# Patient Record
Sex: FEMALE | Race: BLACK OR AFRICAN AMERICAN | Employment: UNEMPLOYED | URBAN - METROPOLITAN AREA
[De-identification: names, ages, dates, MRNs, and addresses within clinical notes are randomized per-mention and may not be internally consistent; named-entity substitution may affect disease eponyms.]

---

## 2017-01-17 RX ORDER — IBUPROFEN 800 MG/1
TABLET ORAL
Qty: 60 TAB | Refills: 0 | Status: SHIPPED | OUTPATIENT
Start: 2017-01-17 | End: 2017-09-20 | Stop reason: SDUPTHER

## 2017-01-30 ENCOUNTER — OFFICE VISIT (OUTPATIENT)
Dept: FAMILY MEDICINE CLINIC | Age: 57
End: 2017-01-30

## 2017-01-30 ENCOUNTER — HOSPITAL ENCOUNTER (OUTPATIENT)
Dept: LAB | Age: 57
Discharge: HOME | End: 2017-01-30

## 2017-01-30 VITALS
DIASTOLIC BLOOD PRESSURE: 80 MMHG | BODY MASS INDEX: 32.28 KG/M2 | SYSTOLIC BLOOD PRESSURE: 138 MMHG | OXYGEN SATURATION: 98 % | WEIGHT: 213 LBS | HEIGHT: 68 IN | HEART RATE: 101 BPM | TEMPERATURE: 98.6 F | RESPIRATION RATE: 14 BRPM

## 2017-01-30 DIAGNOSIS — Z12.11 COLON CANCER SCREENING: ICD-10-CM

## 2017-01-30 DIAGNOSIS — I10 ESSENTIAL HYPERTENSION: ICD-10-CM

## 2017-01-30 DIAGNOSIS — E78.5 HYPERLIPIDEMIA WITH TARGET LDL LESS THAN 100: ICD-10-CM

## 2017-01-30 DIAGNOSIS — E11.8 CONTROLLED TYPE 2 DIABETES MELLITUS WITH COMPLICATION, WITHOUT LONG-TERM CURRENT USE OF INSULIN (HCC): Primary | ICD-10-CM

## 2017-01-30 DIAGNOSIS — E66.9 OBESITY, CLASS I, BMI 30-34.9: ICD-10-CM

## 2017-01-30 DIAGNOSIS — R19.5 OCCULT BLOOD IN STOOLS: ICD-10-CM

## 2017-01-30 LAB — HBA1C MFR BLD HPLC: 6.6 %

## 2017-01-30 NOTE — PROGRESS NOTES
Chief Complaint   Patient presents with    Diabetes       1. Have you been to the ER, urgent care clinic since your last visit? Hospitalized since your last visit? No    2. Have you seen or consulted any other health care providers outside of the [address] [place] [address] since your last visit? Include any pap smears or colon screening. No    Body mass index is 32.39 kg/(m^2).

## 2017-01-30 NOTE — PROGRESS NOTES
HISTORY OF PRESENT ILLNESS  Aung Garrido is a 49 y.o. female. Blood pressure 138/80, pulse (!) 101, temperature 98.6 °F (37 °C), temperature source Oral, resp. rate 14, height 5' 8\" (1.727 m), weight 213 lb (96.6 kg), SpO2 98 %. Body mass index is 32.39 kg/(m^2). Chief Complaint   Patient presents with    Diabetes      HPI   Aung Garrido 49 y.o. female  presents to the office today for a follow up on chronic conditions. Hypertension: Bp at office today 147/78, 138/88 with manual arm cuff recheck. Pt continues with Losartan-HCTZ 100-25 mg daily. Bp is slightly elevated at office today. Pt attributes elevated bp to increased stress in her life. Advised pt to monitor her bp outside the office. DM2: A1c per POC today 6.6%, decreased from A1c 7.6% per labs on 10/31/16. Pt continues with Metformin 500 mg BID. She notes she has been eating more grilled foods lately and eating wheat bread. Diabetes is controlled with A1c 6.6% per POC today. Continue current regimen. Hyperlipidemia: Lipid panel on 10/31/16 notable for total cholesterol 201, LDL 73, HDL 61, and triglycerides 335. Pt continues with Simvastatin 20 mg nightly. Cholesterol is stable, continue current regimen. Health maintenance: Pt agrees to complete FIT testing. Current Outpatient Prescriptions   Medication Sig Dispense Refill    ibuprofen (MOTRIN) 800 mg tablet TAKE ONE TABLET BY MOUTH EVERY 8 HOURS AS NEEDED FOR PAIN 60 Tab 0    simvastatin (ZOCOR) 20 mg tablet TAKE ONE TABLET BY MOUTH NIGHTLY 90 Tab 0    albuterol (PROVENTIL VENTOLIN) 2.5 mg /3 mL (0.083 %) nebulizer solution 3 mL by Nebulization route every four (4) hours as needed for Wheezing. 1 Package 5    fluticasone (FLONASE) 50 mcg/actuation nasal spray INSTILL TWO SPRAYS INTO EACH NOSTRIL ONCE DAILY. 1 Bottle 3    metFORMIN (GLUCOPHAGE) 500 mg tablet Take 1 Tab by mouth two (2) times daily (with meals).  180 Tab 1    losartan-hydroCHLOROthiazide (HYZAAR) 100-25 mg per tablet Take 1 Tab by mouth daily. 90 Tab 1    levocetirizine (XYZAL) 5 mg tablet Take 1 Tab by mouth daily. 90 Tab 0    albuterol (PROVENTIL HFA, VENTOLIN HFA, PROAIR HFA) 90 mcg/actuation inhaler Take 2 Puffs by inhalation every six (6) hours as needed for Wheezing. 1 Inhaler 3    oxyCODONE-acetaminophen (PERCOCET) 5-325 mg per tablet Take 1 Tab by mouth every eight (8) hours as needed for Pain. Max Daily Amount: 3 Tabs. 20 Tab 0    Nebulizer & Compressor machine Nebulizer machine x 1 Nebulizer Kit 1 each 0     Allergies   Allergen Reactions    Vicodin [Hydrocodone-Acetaminophen] Hives     Past Medical History   Diagnosis Date    Arthritis     Diabetes ([address]     GERD (gastroesophageal reflux disease)     High cholesterol     Hypertension     Other ill-defined conditions(799.89)      herniated disc to back    Seasonal allergies      Past Surgical History   Procedure Laterality Date    Hx orthopaedic       left hand    Hx gyn       tubal ligation, hysterectomy    Hx other surgical       corticosteroid injections     Family History   Problem Relation Age of Onset    Hypertension Mother     Diabetes Father      Social History   Substance Use Topics    Smoking status: Former Smoker     Quit date: 10/30/2012    Smokeless tobacco: Never Used    Alcohol use Yes        Review of Systems   Constitutional: Negative. Negative for malaise/fatigue. Eyes: Negative for blurred vision. Respiratory: Negative for shortness of breath. Cardiovascular: Negative for chest pain and leg swelling. Musculoskeletal: Negative. Neurological: Negative. Negative for dizziness and headaches. All other systems reviewed and are negative. Physical Exam   Constitutional: She is oriented to person, place, and time. She appears well-developed and well-nourished. No distress. HENT:   Head: Normocephalic and atraumatic. Neck: Carotid bruit is not present.    Cardiovascular: Normal rate, regular rhythm, normal heart sounds and intact distal pulses. Exam reveals no gallop and no friction rub. No murmur heard. Pulmonary/Chest: Effort normal and breath sounds normal. No respiratory distress. She has no wheezes. She has no rales. Musculoskeletal: She exhibits no edema. Neurological: She is alert and oriented to person, place, and time. Skin: She is not diaphoretic. Psychiatric: She has a normal mood and affect. Her behavior is normal. Judgment and thought content normal.   Nursing note and vitals reviewed. ASSESSMENT and PLAN  Teto Meraz was seen today for diabetes. Diagnoses and all orders for this visit:    Controlled type 2 diabetes mellitus with complication, without long-term current use of insulin (HCC)  -     AMB POC HEMOGLOBIN A1C  - Diabetes is controlled with A1c 6.6% per POC today. Continue current regimen. Essential hypertension        - Bp is slightly elevated at office today. Pt attributes this to increased stress in her life. Advised pt to monitor her bp outside the office. Hyperlipidemia with target LDL less than 100  Stable, continue current regimen    Obesity, Class I, BMI 30-34.9  I have reviewed/discussed the above normal BMI with the patient. I have recommended the following interventions: dietary management education, guidance, and counseling, dietary needs education, encourage exercise, feeding regime, lifestyle education regarding diet and monitor weight . The plan is as follows: I have counseled this patient on diet and exercise regimens. .      Colon cancer screening  -     OCCULT BLOOD, IMMUNOASSAY (FIT)      Follow-up Disposition:  Return in about 3 months (around 4/30/2017) for diabetes follow up. Medication risks/benefits/costs/interactions/alternatives discussed with patient.   Advised patient to call back or return to office if symptoms worsen/change/persist.  If patient cannot reach us or should anything more severe/urgent arise he/she should proceed directly to the nearest emergency department. Discussed expected course/resolution/complications of diagnosis in detail with patient. Patient given a written after visit summary which includes her diagnoses, current medications and vitals. Patient expressed understanding with the diagnosis and plan. Written by liliam Palencia, as dictated by Raimundo Martinez M.D.    I have reviewed and agree with the above note and have made corrections where appropriate, Dr. Yelena Solomon MD

## 2017-01-30 NOTE — MR AVS SNAPSHOT
Visit Information Date & Time Provider Department Dept. Phone Encounter #  
 1/30/2017 11:30 AM Shreya Baron  [facility] [phone] [id number] Follow-up Instructions Return in about 3 months (around 4/30/2017) for diabetes follow up. Upcoming Health Maintenance Date Due  
 PAP AKA CERVICAL CYTOLOGY 10/30/2016 EYE EXAM RETINAL OR DILATED Q1 11/11/2016 Pneumococcal 19-64 Medium Risk (1 of 1 - PPSV23) 7/27/2023* HEMOGLOBIN A1C Q6M 4/30/2017 BREAST CANCER SCRN MAMMOGRAM 6/15/2017 FOOT EXAM Q1 10/31/2017 MICROALBUMIN Q1 10/31/2017 LIPID PANEL Q1 10/31/2017 COLONOSCOPY 4/1/2024 DTaP/Tdap/Td series (2 - Td) 2/5/2025 *Topic was postponed. The date shown is not the original due date. Allergies as of 1/30/2017  Review Complete On: 1/30/2017 By: Shreya Baron MD  
  
 Severity Noted Reaction Type Reactions Vicodin [Hydrocodone-acetaminophen]  07/23/2012    Hives Current Immunizations  Reviewed on 8/29/2016 Name Date Tdap 2/5/2015 Not reviewed this visit You Were Diagnosed With   
  
 Codes Comments Controlled type 2 diabetes mellitus with complication, without long-term current use of insulin ([facility]    -  Primary ICD-10-CM: E11.8 ICD-9-CM: 250.90 Essential hypertension     ICD-10-CM: I10 
ICD-9-CM: 401.9 Hyperlipidemia with target LDL less than 100     ICD-10-CM: E78.5 ICD-9-CM: 272.4 Obesity, Class I, BMI 30-34.9     ICD-10-CM: E66.9 ICD-9-CM: 278.00 Colon cancer screening     ICD-10-CM: Z12.11 ICD-9-CM: V76.51 Vitals BP Pulse Temp Resp Height(growth percentile) Weight(growth percentile) 138/80 (!) 101 98.6 °F (37 °C) (Oral) 14 5' 8\" (1.727 m) 213 lb (96.6 kg) SpO2 BMI OB Status Smoking Status 98% 32.39 kg/m2 Hysterectomy Former Smoker Vitals History BMI and BSA Data  Body Mass Index Body Surface Area  
 32.39 kg/m 2 2.15 m 2  
  
  
 Preferred Pharmacy Pharmacy Name Phone [phone] [facility] [phone] Your Updated Medication List  
  
   
This list is accurate as of: 1/30/17 12:44 PM.  Always use your most recent med list.  
  
  
  
  
 * albuterol 90 mcg/actuation inhaler Commonly known as:  PROVENTIL HFA, VENTOLIN HFA, PROAIR HFA Take 2 Puffs by inhalation every six (6) hours as needed for Wheezing. * albuterol 2.5 mg /3 mL (0.083 %) nebulizer solution Commonly known as:  PROVENTIL VENTOLIN  
3 mL by Nebulization route every four (4) hours as needed for Wheezing. fluticasone 50 mcg/actuation nasal spray Commonly known as:  Kennedy Eddy INSTILL TWO SPRAYS INTO EACH NOSTRIL ONCE DAILY. ibuprofen 800 mg tablet Commonly known as:  MOTRIN  
TAKE ONE TABLET BY MOUTH EVERY 8 HOURS AS NEEDED FOR PAIN  
  
 levocetirizine 5 mg tablet Commonly known as:  Abhinav Balk Take 1 Tab by mouth daily. losartan-hydroCHLOROthiazide 100-25 mg per tablet Commonly known as:  HYZAAR Take 1 Tab by mouth daily. metFORMIN 500 mg tablet Commonly known as:  GLUCOPHAGE Take 1 Tab by mouth two (2) times daily (with meals). Nebulizer & Compressor machine Nebulizer machine x 1 Nebulizer Kit  
  
 oxyCODONE-acetaminophen 5-325 mg per tablet Commonly known as:  PERCOCET Take 1 Tab by mouth every eight (8) hours as needed for Pain. Max Daily Amount: 3 Tabs. simvastatin 20 mg tablet Commonly known as:  ZOCOR  
TAKE ONE TABLET BY MOUTH NIGHTLY * Notice: This list has 2 medication(s) that are the same as other medications prescribed for you. Read the directions carefully, and ask your doctor or other care provider to review them with you. We Performed the Following AMB POC HEMOGLOBIN A1C [24309 CPT(R)] OCCULT BLOOD, IMMUNOASSAY (FIT) [id number] CPT(R)] Follow-up Instructions Return in about 3 months (around 4/30/2017) for diabetes follow up. Introducing [facility]! Dear Rupal Gutierrez: Thank you for requesting a [employer] account. Our records indicate that you have previously registered for a [employer] account but its currently inactive. Please call our [employer] support line at [phone]. Additional Information If you have questions, please visit the Frequently Asked Questions section of the [employer] website at [email or web address] Remember, [employer] is NOT to be used for urgent needs. For medical emergencies, dial 911. Now available from your iPhone and Android! Please provide this summary of care documentation to your next provider. Your primary care clinician is listed as Phylicia Garza. If you have any questions after today's visit, please call [phone].

## 2017-01-31 LAB
ALBUMIN SERPL-MCNC: 4.6 G/DL (ref 3.5–5.5)
ALBUMIN/GLOB SERPL: 1.7 {RATIO} (ref 1.1–2.5)
ALP SERPL-CCNC: 84 IU/L (ref 39–117)
ALT SERPL-CCNC: 20 IU/L (ref 0–32)
AST SERPL-CCNC: 19 IU/L (ref 0–40)
BILIRUB SERPL-MCNC: 0.4 MG/DL (ref 0–1.2)
BUN SERPL-MCNC: 20 MG/DL (ref 6–24)
BUN/CREAT SERPL: 20 (ref 9–23)
CALCIUM SERPL-MCNC: 10.4 MG/DL (ref 8.7–10.2)
CHLORIDE SERPL-SCNC: 97 MMOL/L (ref 96–106)
CHOLEST SERPL-MCNC: 213 MG/DL (ref 100–199)
CO2 SERPL-SCNC: 21 MMOL/L (ref 18–29)
CREAT SERPL-MCNC: 0.99 MG/DL (ref 0.57–1)
GLOBULIN SER CALC-MCNC: 2.7 G/DL (ref 1.5–4.5)
GLUCOSE SERPL-MCNC: 154 MG/DL (ref 65–99)
HDLC SERPL-MCNC: 65 MG/DL
INTERPRETATION, 910389: NORMAL
LDLC SERPL CALC-MCNC: 84 MG/DL (ref 0–99)
POTASSIUM SERPL-SCNC: 4.2 MMOL/L (ref 3.5–5.2)
PROT SERPL-MCNC: 7.3 G/DL (ref 6–8.5)
SODIUM SERPL-SCNC: 134 MMOL/L (ref 134–144)
TRIGL SERPL-MCNC: 318 MG/DL (ref 0–149)
VLDLC SERPL CALC-MCNC: 64 MG/DL (ref 5–40)

## 2017-02-01 ENCOUNTER — HOSPITAL ENCOUNTER (OUTPATIENT)
Dept: LAB | Age: 57
Discharge: HOME | End: 2017-02-01
Payer: MEDICARE

## 2017-02-01 PROCEDURE — 82270 OCCULT BLOOD FECES: CPT

## 2017-02-08 LAB — HEMOCCULT STL QL IA: POSITIVE

## 2017-02-17 NOTE — PROGRESS NOTES
Reached patient on the phone, adv her of results, Jacqueline King will arrange GI appt. Patient/ Caller given an opportunity to ask questions, repeated information, and verbalized understanding.

## 2017-04-11 RX ORDER — LEVOCETIRIZINE DIHYDROCHLORIDE 5 MG/1
TABLET, FILM COATED ORAL
Qty: 90 TAB | Refills: 0 | Status: SHIPPED | OUTPATIENT
Start: 2017-04-11 | End: 2017-06-28 | Stop reason: SDUPTHER

## 2017-05-30 RX ORDER — METFORMIN HYDROCHLORIDE 500 MG/1
500 TABLET ORAL DAILY
COMMUNITY
End: 2017-06-28 | Stop reason: DRUGHIGH

## 2017-05-31 ENCOUNTER — ANESTHESIA EVENT (OUTPATIENT)
Dept: ENDOSCOPY | Age: 57
End: 2017-05-31
Payer: MEDICARE

## 2017-05-31 ENCOUNTER — HOSPITAL ENCOUNTER (OUTPATIENT)
Age: 57
Setting detail: OUTPATIENT SURGERY
Discharge: HOME | End: 2017-05-31
Attending: INTERNAL MEDICINE | Admitting: INTERNAL MEDICINE
Payer: MEDICARE

## 2017-05-31 ENCOUNTER — ANESTHESIA (OUTPATIENT)
Dept: ENDOSCOPY | Age: 57
End: 2017-05-31
Payer: MEDICARE

## 2017-05-31 VITALS
HEART RATE: 76 BPM | WEIGHT: 213 LBS | TEMPERATURE: 97.5 F | SYSTOLIC BLOOD PRESSURE: 137 MMHG | OXYGEN SATURATION: 100 % | HEIGHT: 68 IN | DIASTOLIC BLOOD PRESSURE: 90 MMHG | RESPIRATION RATE: 24 BRPM | BODY MASS INDEX: 32.28 KG/M2

## 2017-05-31 PROCEDURE — 74011250637 HC RX REV CODE- 250/637: Performed by: INTERNAL MEDICINE

## 2017-05-31 PROCEDURE — 88305 TISSUE EXAM BY PATHOLOGIST: CPT | Performed by: INTERNAL MEDICINE

## 2017-05-31 PROCEDURE — 74011000250 HC RX REV CODE- 250

## 2017-05-31 PROCEDURE — 74011250636 HC RX REV CODE- 250/636

## 2017-05-31 PROCEDURE — 77030013992 HC SNR POLYP ENDOSC BSC -B: Performed by: INTERNAL MEDICINE

## 2017-05-31 PROCEDURE — 76040000019: Performed by: INTERNAL MEDICINE

## 2017-05-31 PROCEDURE — 77030027957 HC TBNG IRR ENDOGTR BUSS -B: Performed by: INTERNAL MEDICINE

## 2017-05-31 PROCEDURE — 76060000031 HC ANESTHESIA FIRST 0.5 HR: Performed by: INTERNAL MEDICINE

## 2017-05-31 RX ORDER — DEXTROMETHORPHAN/PSEUDOEPHED 2.5-7.5/.8
1.2 DROPS ORAL
Status: DISCONTINUED | OUTPATIENT
Start: 2017-05-31 | End: 2017-05-31 | Stop reason: HOSPADM

## 2017-05-31 RX ORDER — SODIUM CHLORIDE 0.9 % (FLUSH) 0.9 %
5-10 SYRINGE (ML) INJECTION EVERY 8 HOURS
Status: DISCONTINUED | OUTPATIENT
Start: 2017-05-31 | End: 2017-05-31 | Stop reason: HOSPADM

## 2017-05-31 RX ORDER — PROPOFOL 10 MG/ML
INJECTION, EMULSION INTRAVENOUS AS NEEDED
Status: DISCONTINUED | OUTPATIENT
Start: 2017-05-31 | End: 2017-05-31 | Stop reason: HOSPADM

## 2017-05-31 RX ORDER — EPINEPHRINE 0.1 MG/ML
1 INJECTION INTRACARDIAC; INTRAVENOUS
Status: DISCONTINUED | OUTPATIENT
Start: 2017-05-31 | End: 2017-05-31 | Stop reason: HOSPADM

## 2017-05-31 RX ORDER — LIDOCAINE HYDROCHLORIDE 20 MG/ML
INJECTION, SOLUTION EPIDURAL; INFILTRATION; INTRACAUDAL; PERINEURAL AS NEEDED
Status: DISCONTINUED | OUTPATIENT
Start: 2017-05-31 | End: 2017-05-31 | Stop reason: HOSPADM

## 2017-05-31 RX ORDER — SODIUM CHLORIDE 0.9 % (FLUSH) 0.9 %
5-10 SYRINGE (ML) INJECTION AS NEEDED
Status: DISCONTINUED | OUTPATIENT
Start: 2017-05-31 | End: 2017-05-31 | Stop reason: HOSPADM

## 2017-05-31 RX ORDER — SODIUM CHLORIDE 9 MG/ML
50 INJECTION, SOLUTION INTRAVENOUS CONTINUOUS
Status: DISCONTINUED | OUTPATIENT
Start: 2017-05-31 | End: 2017-05-31 | Stop reason: HOSPADM

## 2017-05-31 RX ORDER — FENTANYL CITRATE 50 UG/ML
100 INJECTION, SOLUTION INTRAMUSCULAR; INTRAVENOUS
Status: DISCONTINUED | OUTPATIENT
Start: 2017-05-31 | End: 2017-05-31 | Stop reason: HOSPADM

## 2017-05-31 RX ORDER — NALOXONE HYDROCHLORIDE 0.4 MG/ML
0.4 INJECTION, SOLUTION INTRAMUSCULAR; INTRAVENOUS; SUBCUTANEOUS
Status: DISCONTINUED | OUTPATIENT
Start: 2017-05-31 | End: 2017-05-31 | Stop reason: HOSPADM

## 2017-05-31 RX ORDER — FLUMAZENIL 0.1 MG/ML
0.2 INJECTION INTRAVENOUS
Status: DISCONTINUED | OUTPATIENT
Start: 2017-05-31 | End: 2017-05-31 | Stop reason: HOSPADM

## 2017-05-31 RX ORDER — SODIUM CHLORIDE 9 MG/ML
INJECTION, SOLUTION INTRAVENOUS
Status: DISCONTINUED | OUTPATIENT
Start: 2017-05-31 | End: 2017-05-31 | Stop reason: HOSPADM

## 2017-05-31 RX ORDER — MIDAZOLAM HYDROCHLORIDE 1 MG/ML
.25-1 INJECTION, SOLUTION INTRAMUSCULAR; INTRAVENOUS
Status: DISCONTINUED | OUTPATIENT
Start: 2017-05-31 | End: 2017-05-31 | Stop reason: HOSPADM

## 2017-05-31 RX ORDER — ATROPINE SULFATE 0.1 MG/ML
0.5 INJECTION INTRAVENOUS
Status: DISCONTINUED | OUTPATIENT
Start: 2017-05-31 | End: 2017-05-31 | Stop reason: HOSPADM

## 2017-05-31 RX ADMIN — PROPOFOL 50 MG: 10 INJECTION, EMULSION INTRAVENOUS at 08:49

## 2017-05-31 RX ADMIN — LIDOCAINE HYDROCHLORIDE 50 MG: 20 INJECTION, SOLUTION EPIDURAL; INFILTRATION; INTRACAUDAL; PERINEURAL at 08:44

## 2017-05-31 RX ADMIN — SODIUM CHLORIDE: 9 INJECTION, SOLUTION INTRAVENOUS at 08:15

## 2017-05-31 RX ADMIN — PROPOFOL 50 MG: 10 INJECTION, EMULSION INTRAVENOUS at 09:00

## 2017-05-31 RX ADMIN — SIMETHICONE 80 MG: 20 SUSPENSION/ DROPS ORAL at 08:54

## 2017-05-31 RX ADMIN — PROPOFOL 50 MG: 10 INJECTION, EMULSION INTRAVENOUS at 08:54

## 2017-05-31 RX ADMIN — PROPOFOL 50 MG: 10 INJECTION, EMULSION INTRAVENOUS at 08:55

## 2017-05-31 RX ADMIN — PROPOFOL 50 MG: 10 INJECTION, EMULSION INTRAVENOUS at 08:51

## 2017-05-31 RX ADMIN — PROPOFOL 50 MG: 10 INJECTION, EMULSION INTRAVENOUS at 08:46

## 2017-05-31 RX ADMIN — PROPOFOL 50 MG: 10 INJECTION, EMULSION INTRAVENOUS at 08:45

## 2017-05-31 RX ADMIN — PROPOFOL 50 MG: 10 INJECTION, EMULSION INTRAVENOUS at 08:50

## 2017-05-31 RX ADMIN — PROPOFOL 50 MG: 10 INJECTION, EMULSION INTRAVENOUS at 08:58

## 2017-05-31 RX ADMIN — PROPOFOL 50 MG: 10 INJECTION, EMULSION INTRAVENOUS at 08:47

## 2017-05-31 RX ADMIN — PROPOFOL 50 MG: 10 INJECTION, EMULSION INTRAVENOUS at 08:56

## 2017-05-31 RX ADMIN — PROPOFOL 50 MG: 10 INJECTION, EMULSION INTRAVENOUS at 09:02

## 2017-05-31 RX ADMIN — LIDOCAINE HYDROCHLORIDE 50 MG: 20 INJECTION, SOLUTION EPIDURAL; INFILTRATION; INTRACAUDAL; PERINEURAL at 08:52

## 2017-05-31 NOTE — H&P
118 [facility].  [address] [facility] [address] [facility], [address]  [phone]                                History and Physical     NAME: Jesse Fox   :  1960   MRN:  [id number]     HPI:  The patient was seen and examined. Past Surgical History:   Procedure Laterality Date    HX GYN      tubal ligation, hysterectomy    HX ORTHOPAEDIC      left hand ligament removed    HX OTHER SURGICAL      corticosteroid injections - back    HX TONSILLECTOMY       Past Medical History:   Diagnosis Date    Arthritis     Chronic pain     3 herniated discs in lower back    Diabetes (HCC)     GERD (gastroesophageal reflux disease)     High cholesterol     Hypertension     Other ill-defined conditions     herniated disc to back    PUD (peptic ulcer disease)     Seasonal allergies      Social History   Substance Use Topics    Smoking status: Former Smoker     Quit date: 10/30/2012    Smokeless tobacco: Never Used    Alcohol use Yes      Comment: weekends     Allergies   Allergen Reactions    Vicodin [Hydrocodone-Acetaminophen] Hives     Family History   Problem Relation Age of Onset    Hypertension Mother     Diabetes Father      No current facility-administered medications for this encounter. Facility-Administered Medications Ordered in Other Encounters   Medication Dose Route Frequency    0.9% sodium chloride infusion   IntraVENous CONTINUOUS         PHYSICAL EXAM:  General: WD, WN. Alert, cooperative, no acute distress    HEENT: NC, Atraumatic. PERRLA, EOMI. Anicteric sclerae. Lungs:  CTA Bilaterally. No Wheezing/Rhonchi/Rales. Heart:  Regular  rhythm,  No murmur, No Rubs, No Gallops  Abdomen: Soft, Non distended, Non tender.  +Bowel sounds, no HSM  Extremities: No c/c/e  Neurologic:  CN 2-12 gi, Alert and oriented X 3. No acute neurological distress   Psych:   Good insight. Not anxious nor agitated.     The heart, lungs and mental status were satisfactory for the administration of MAC sedation and for the procedure.       Mallampati score: 3       Assessment:   · Occult blood in stools  · Diarrhea    Plan:   · Endoscopic procedure  · MAC sedation   ·

## 2017-05-31 NOTE — PROCEDURES
118 [facility].  217 [facility] [address] [facility] [address], [address]  [phone]                              Colonoscopy Procedure Note      Indications:    Family history of coloretal cancer (screening only), Occult blood in stool, Positive cologuard test     :  Jordyn Garza MD    Referring Provider: Gail Tucker MD    Sedation:  MAC anesthesia    Procedure Details:  After informed consent was obtained with all risks and benefits of procedure explained and preoperative exam completed, the patient was taken to the endoscopy suite and placed in the left lateral decubitus position. Upon sequential sedation as per above, a digital rectal exam was performed  And was normal.  The Olympus videocolonoscope  was inserted in the rectum and carefully advanced to the cecum, which was identified by the ileocecal valve and appendiceal orifice. The quality of preparation was fair. The colonoscope was slowly withdrawn with careful evaluation between folds. Retroflexion in the rectum was performed and was normal..     Findings:   Rectum: small internal hemorrhoids were seen  Sigmoid: 1  Sessile polyp(s), the largest 6 mm in size; Descending Colon: no mucosal lesion appreciated  Transverse Colon: no mucosal lesion appreciated  Ascending Colon: no mucosal lesion appreciated  Cecum: no mucosal lesion appreciated  Terminal Ileum: not intubated    Interventions:    1 complete polypectomy were performed using cold snare  and the polyps were  retrieved    Specimen Removed:    ID Type Source Tests Collected by Time Destination   1 : polyp Preservative Sigmoid  Jordyn Garza MD 5/31/2017 0901 Pathology       Complications: None. EBL:  None. Recommendations:   -Await pathology. -Repeat colonoscopy in 3 years.  -High fiber diet. -NO aspirin/NSAID's for 7 days     Discharge Disposition:  Home in the company of a  when able to ambulate.     Jordyn Garza MD  5/31/2017  9:16 AM

## 2017-05-31 NOTE — DISCHARGE INSTRUCTIONS
118 [facility]  217 [facility] [address], [address] [facility]                                  Persaud Poptonie  [id number]  1960    COLON DISCHARGE INSTRUCTIONS    DISCOMFORT:  Redness at IV site- apply warm compress to area; if redness or soreness persist- contact your physician  There may be a slight amount of blood passed from the rectum  Gaseous discomfort- walking, belching will help relieve any discomfort  You may not operate a vehicle for 12 hours  You may not  engage in an occupation involving machinery or appliances for rest of today  You may not  drink alcoholic beverages for at least 12 hours  Avoid making any critical decisions for at least 24 hour    DIET:   High fiber diet. - however -  remember your colon is empty and a heavy meal will produce gas. Avoid these foods:  vegetables, fried / greasy foods, carbonated drinks for today     ACTIVITY:  It is recommended that you spend the remainder of the day resting -  avoid any strenuous activity. CALL M.D. ANY SIGN OF:   Increasing pain, nausea, vomiting  Abdominal distension (swelling)  New increased bleeding (oral or rectal)  Fever (chills)  Pain in chest area  Bloody discharge from nose or mouth  Shortness of breath    You may not  take any Advil, Aspirin, Ibuprofen, Motrin, Aleve, or Goodys for 7 days, ONLY  Tylenol as needed for pain. Post procedure diagnosis:  sigmoid polyp  Internal hemorrhoids    Follow-up Instructions:    Call Dr. Linda Ramesh for any questions or problems. If we took a biopsy please call the office within 2 weeks to discuss your                             pathology results.  Telephone # [phone]

## 2017-05-31 NOTE — ANESTHESIA POSTPROCEDURE EVALUATION
Post-Anesthesia Evaluation and Assessment    Patient: Elif Alaniz MRN: [id number]  SSN: [id number]    YOB: 1960  Age: 49 y.o. Sex: female       Cardiovascular Function/Vital Signs  Visit Vitals    /85    Pulse 86    Temp 36.4 °C (97.5 °F)    Resp 28    Ht 5' 8\" (1.727 m)    Wt 96.6 kg (213 lb)    SpO2 99%    Breastfeeding No    BMI 32.39 kg/m2       Patient is status post MAC anesthesia for Procedure(s):  COLONOSCOPY  ENDOSCOPIC POLYPECTOMY. Nausea/Vomiting: None    Postoperative hydration reviewed and adequate. Pain:  Pain Scale 1: Numeric (0 - 10) (05/31/17 0912)  Pain Intensity 1: 0 (05/31/17 0912)   Managed    Neurological Status: At baseline    Mental Status and Level of Consciousness: Arousable    Pulmonary Status:   O2 Device: Room air (05/31/17 0912)   Adequate oxygenation and airway patent    Complications related to anesthesia: None    Post-anesthesia assessment completed.  No concerns    Signed By: Macie Sanchez MD     May 31, 2017

## 2017-05-31 NOTE — IP AVS SNAPSHOT
2700 [facility] [address] [place] [address] 
[phone] Patient: Ching Chow MRN: [id number] :1960 You are allergic to the following Allergen Reactions Vicodin (Hydrocodone-Acetaminophen) Hives Recent Documentation Height Weight Breastfeeding? BMI OB Status Smoking Status 1.727 m 96.6 kg No 32.39 kg/m2 Hysterectomy Former Smoker Emergency Contacts Name Discharge Info Relation Home Work Mobile Nii Ventura DISCHARGE CAREGIVER [3] Spouse [3] [phone] About your hospitalization You were admitted on:  May 31, 2017 You last received care in the:  [facility] ENDOSCOPY You were discharged on:  May 31, 2017 Unit phone number:  [phone] Why you were hospitalized Your primary diagnosis was:  Not on File Providers Seen During Your Hospitalizations Provider Role Specialty Primary office phone Cosme Salas MD Attending Provider Gastroenterology [phone] Your Primary Care Physician (PCP) Primary Care Physician Office Phone Office Fax Ac Madrigal [phone] Follow-up Information None Your Appointments 2017 12:15 PM EDT ROUTINE CARE with Akira Weldon MD  
[facility]) [address]  
[phone] Current Discharge Medication List  
  
CONTINUE these medications which have NOT CHANGED Dose & Instructions Dispensing Information Comments Morning Noon Evening Bedtime * albuterol 90 mcg/actuation inhaler Commonly known as:  PROVENTIL HFA, VENTOLIN HFA, PROAIR HFA Your last dose was: Your next dose is:    
   
   
 Dose:  2 Puff Take 2 Puffs by inhalation every six (6) hours as needed for Wheezing. Quantity:  1 Inhaler Refills:  3 * albuterol 2.5 mg /3 mL (0.083 %) nebulizer solution Commonly known as:  PROVENTIL VENTOLIN Your last dose was: Your next dose is:    
   
   
 Dose:  2.5 mg  
3 mL by Nebulization route every four (4) hours as needed for Wheezing. Quantity:  1 Package Refills:  5  
     
   
   
   
  
 fluticasone 50 mcg/actuation nasal spray Commonly known as:  Jannette Lackey Your last dose was: Your next dose is:    
   
   
 INSTILL TWO SPRAYS INTO EACH NOSTRIL ONCE DAILY. Quantity:  1 Bottle Refills:  3  
     
   
   
   
  
 ibuprofen 800 mg tablet Commonly known as:  MOTRIN Your last dose was: Your next dose is: TAKE ONE TABLET BY MOUTH EVERY 8 HOURS AS NEEDED FOR PAIN Quantity:  60 Tab Refills:  0  
     
   
   
   
  
 levocetirizine 5 mg tablet Commonly known as:  Ting Clan Your last dose was: Your next dose is: TAKE ONE TABLET BY MOUTH ONCE DAILY Quantity:  90 Tab Refills:  0  
     
   
   
   
  
 losartan-hydroCHLOROthiazide 100-25 mg per tablet Commonly known as:  HYZAAR Your last dose was: Your next dose is:    
   
   
 Dose:  1 Tab Take 1 Tab by mouth daily. Quantity:  90 Tab Refills:  1 Hold until patient is ready to fill   
    
   
   
   
  
 metFORMIN 500 mg tablet Commonly known as:  GLUCOPHAGE Your last dose was: Your next dose is:    
   
   
 Dose:  500 mg Take 500 mg by mouth daily. Refills:  0 Nebulizer & Compressor machine Your last dose was: Your next dose is:    
   
   
 Nebulizer machine x 1 Nebulizer Kit Quantity:  1 each Refills:  0  
     
   
   
   
  
 oxyCODONE-acetaminophen 5-325 mg per tablet Commonly known as:  PERCOCET Your last dose was: Your next dose is:    
   
   
 Dose:  1 Tab Take 1 Tab by mouth every eight (8) hours as needed for Pain. Max Daily Amount: 3 Tabs. Quantity:  20 Tab Refills:  0  
     
   
   
   
  
 simvastatin 20 mg tablet Commonly known as:  ZOCOR Your last dose was: Your next dose is: TAKE ONE TABLET BY MOUTH NIGHTLY Quantity:  90 Tab Refills:  0  
     
   
   
   
  
 * Notice: This list has 2 medication(s) that are the same as other medications prescribed for you. Read the directions carefully, and ask your doctor or other care provider to review them with you. Discharge Instructions 118 ROHINI Yang. 
217 [facility] [address] [place], [address] 
[phone] Merle Loya 
[id number] 
1960 COLON DISCHARGE INSTRUCTIONS DISCOMFORT: 
Redness at IV site- apply warm compress to area; if redness or soreness persist- contact your physician There may be a slight amount of blood passed from the rectum Gaseous discomfort- walking, belching will help relieve any discomfort You may not operate a vehicle for 12 hours You may not  engage in an occupation involving machinery or appliances for rest of today You may not  drink alcoholic beverages for at least 12 hours Avoid making any critical decisions for at least 24 hour DIET: 
 High fiber diet.  however -  remember your colon is empty and a heavy meal will produce gas. Avoid these foods:  vegetables, fried / greasy foods, carbonated drinks for today ACTIVITY: It is recommended that you spend the remainder of the day resting -  avoid any strenuous activity. CALL M.D. ANY SIGN OF: Increasing pain, nausea, vomiting Abdominal distension (swelling) New increased bleeding (oral or rectal) Fever (chills) Pain in chest area Bloody discharge from nose or mouth Shortness of breath You may not  take any Advil, Aspirin, Ibuprofen, Motrin, Aleve, or Goodys for 7 days, ONLY  Tylenol as needed for pain. Post procedure diagnosis:  sigmoid polyp Internal hemorrhoids Follow-up Instructions: 
 
Call Dr. Pastor Romero for any questions or problems. If we took a biopsy please call the office within 2 weeks to discuss your                             pathology results. Telephone # [phone] Discharge Orders None ACO Transitions of Care Introducing [facility] Cindi Nuñez offers a voluntary care coordination program to provide high quality service and care to [facility] fee-for-service beneficiaries. Tiffanie Keyes was designed to help you enhance your health and well-being through the following services: ? Transitions of Care  support for individuals who are transitioning from one care setting to another (example: Hospital to home). ? Chronic and Complex Care Coordination  support for individuals and caregivers of those with serious or chronic illnesses or with more than one chronic (ongoing) condition and those who take a number of different medications. If you meet specific medical criteria, a [facility] [address] may call you directly to coordinate your care with your primary care physician and your other care providers. For questions about the [facility] programs, please, contact your physicians office. For general questions or additional information about Accountable Care Organizations: 
Please visit www.medicare.gov/acos. html or call 1-800-MEDICARE (0-949.679.4075) TTY users should call 2-916.370.2701. Introducing [facility]! Dear Tabatha Schmidt: Thank you for requesting a Look.io account. Our records indicate that you have previously registered for a Look.io account but its currently inactive. Please call our Look[employer]io support line at [phone]. Additional Information If you have questions, please visit the Frequently Asked Questions section of the Look.io website at [email or web address] Remember, MyChart is NOT to be used for urgent needs. For medical emergencies, dial 911. Now available from your iPhone and Android! General Information Please provide this summary of care documentation to your next provider. Patient Signature:  ____________________________________________________________ Date:  ____________________________________________________________  
  
Jonathon Maulik Provider Signature:  ____________________________________________________________ Date:  ____________________________________________________________

## 2017-05-31 NOTE — ROUTINE PROCESS
Josselin Garza  1960  [id number]    Situation:  Verbal report received from: Anastasia Delgado RN  Procedure: Procedure(s):  COLONOSCOPY  ENDOSCOPIC POLYPECTOMY    Background:    Preoperative diagnosis: BLOOD IN STOOL   Postoperative diagnosis: sigmoid polyp  Internal hemorrhoids    :  Dr. Jun Stanton  Assistant(s): Endoscopy Technician-1: Margot Medeiros IV  Endoscopy RN-1: Olive Ramirez RN    Specimens:   ID Type Source Tests Collected by Time Destination   1 : polyp Preservative Sigmoid  Lige Officer, MD 5/31/2017 0901 Pathology     H. Pylori  no    Assessment:  Intra-procedure medications     Anesthesia gave intra-procedure sedation and medications, see anesthesia flow sheet yes    Intravenous fluids: NS@ KVO     Vital signs stable     Abdominal assessment: round and soft     Recommendation:  Discharge patient per MD order.     Family or Friend   Permission to share finding with family or friend yes

## 2017-05-31 NOTE — ADDENDUM NOTE
Addendum  created 05/31/17 1327 by Sole Brown CRNA    Anesthesia Event deleted, Anesthesia Event edited, Procedure Event Log accessed

## 2017-05-31 NOTE — ANESTHESIA PREPROCEDURE EVALUATION
Anesthetic History   No history of anesthetic complications            Review of Systems / Medical History  Patient summary reviewed, nursing notes reviewed and pertinent labs reviewed    Pulmonary            Asthma        Neuro/Psych         Psychiatric history     Cardiovascular    Hypertension              Exercise tolerance: >4 METS     GI/Hepatic/Renal     GERD      PUD    Comments: Blood in stool Endo/Other    Diabetes: type 2    Obesity and arthritis     Other Findings            Physical Exam    Airway  Mallampati: II  TM Distance: > 6 cm  Neck ROM: normal range of motion   Mouth opening: Normal     Cardiovascular    Rhythm: regular  Rate: normal         Dental    Dentition: Upper partial plate     Pulmonary  Breath sounds clear to auscultation               Abdominal  Abdominal exam normal       Other Findings            Anesthetic Plan    ASA: 2  Anesthesia type: MAC          Induction: Intravenous  Anesthetic plan and risks discussed with: Patient

## 2017-06-28 ENCOUNTER — OFFICE VISIT (OUTPATIENT)
Dept: FAMILY MEDICINE CLINIC | Age: 57
End: 2017-06-28

## 2017-06-28 ENCOUNTER — HOSPITAL ENCOUNTER (OUTPATIENT)
Dept: LAB | Age: 57
Discharge: HOME | End: 2017-06-28
Payer: MEDICARE

## 2017-06-28 VITALS
HEIGHT: 68 IN | BODY MASS INDEX: 32.28 KG/M2 | TEMPERATURE: 98.8 F | HEART RATE: 107 BPM | SYSTOLIC BLOOD PRESSURE: 118 MMHG | WEIGHT: 213 LBS | DIASTOLIC BLOOD PRESSURE: 70 MMHG | RESPIRATION RATE: 14 BRPM | OXYGEN SATURATION: 98 %

## 2017-06-28 DIAGNOSIS — Z12.11 COLON CANCER SCREENING: ICD-10-CM

## 2017-06-28 DIAGNOSIS — I10 ESSENTIAL HYPERTENSION: ICD-10-CM

## 2017-06-28 DIAGNOSIS — E78.5 HYPERLIPIDEMIA WITH TARGET LDL LESS THAN 100: Primary | ICD-10-CM

## 2017-06-28 DIAGNOSIS — T63.304A SPIDER BITE, UNDETERMINED INTENT, INITIAL ENCOUNTER: ICD-10-CM

## 2017-06-28 DIAGNOSIS — E11.8 CONTROLLED TYPE 2 DIABETES MELLITUS WITH COMPLICATION, WITHOUT LONG-TERM CURRENT USE OF INSULIN (HCC): ICD-10-CM

## 2017-06-28 DIAGNOSIS — Z12.31 SCREENING MAMMOGRAM, ENCOUNTER FOR: ICD-10-CM

## 2017-06-28 DIAGNOSIS — J30.9 ALLERGIC RHINITIS, UNSPECIFIED ALLERGIC RHINITIS TRIGGER, UNSPECIFIED RHINITIS SEASONALITY: ICD-10-CM

## 2017-06-28 DIAGNOSIS — E66.9 OBESITY, CLASS I, BMI 30-34.9: ICD-10-CM

## 2017-06-28 DIAGNOSIS — Z01.419 ROUTINE GYNECOLOGICAL EXAMINATION: ICD-10-CM

## 2017-06-28 LAB — HBA1C MFR BLD HPLC: 7.7 %

## 2017-06-28 PROCEDURE — 85025 COMPLETE CBC W/AUTO DIFF WBC: CPT

## 2017-06-28 PROCEDURE — 80061 LIPID PANEL: CPT

## 2017-06-28 PROCEDURE — 80053 COMPREHEN METABOLIC PANEL: CPT

## 2017-06-28 RX ORDER — LEVOCETIRIZINE DIHYDROCHLORIDE 5 MG/1
TABLET, FILM COATED ORAL
Qty: 90 TAB | Refills: 0 | Status: SHIPPED | OUTPATIENT
Start: 2017-06-28 | End: 2018-04-30 | Stop reason: SDUPTHER

## 2017-06-28 RX ORDER — CEPHALEXIN 500 MG/1
500 CAPSULE ORAL 4 TIMES DAILY
Qty: 40 CAP | Refills: 0 | Status: SHIPPED | OUTPATIENT
Start: 2017-06-28 | End: 2017-07-08

## 2017-06-28 RX ORDER — METFORMIN HYDROCHLORIDE 500 MG/1
500 TABLET, EXTENDED RELEASE ORAL 2 TIMES DAILY
Qty: 60 TAB | Refills: 5 | Status: SHIPPED | OUTPATIENT
Start: 2017-06-28 | End: 2017-09-27 | Stop reason: SDUPTHER

## 2017-06-28 NOTE — PROGRESS NOTES
1. Have you been to the ER, urgent care clinic since your last visit? Hospitalized since your last visit? No    2. Have you seen or consulted any other health care providers outside of the [address] [place] [address] since your last visit? Include any pap smears or colon screening. No     Chief Complaint   Patient presents with    Diabetes     follow up   Keith Singh 85     patient noticed an insect bite on the left  arm and the right buttocks about a week ago, states area is painful, red, and hot.  Dry Skin     with menopause, skin has started to become very dry     Not fasting    Had colonoscopy done at [facility] 5/2017. Patient will schedule PAP and mammogram soon with her GYN at [facility]. Patient had an eye exam over a year ago and will schedule another appointment soon.

## 2017-06-28 NOTE — PROGRESS NOTES
HISTORY OF PRESENT ILLNESS  Duane Ibarra is a 49 y.o. female. Blood pressure 118/70, pulse (!) 107, temperature 98.8 °F (37.1 °C), temperature source Oral, resp. rate 14, height 5' 8\" (1.727 m), weight 213 lb (96.6 kg), SpO2 98 %. Body mass index is 32.39 kg/(m^2). Chief Complaint   Patient presents with    Diabetes     follow up   Keith Singh 85     patient noticed an insect bite on the left  arm and the right buttocks about a week ago, states area is painful, red, and hot.  Dry Skin     with menopause, skin has started to become very dry        HPI   Duane Ibarra 49 y.o. female  presents to the office today for follow up on chronic conditions. Insect bite: Pt notes that she was bitten by a spider on left arm and right thigh about a week ago, and notes redness and swelling. Pt also notes history of MRSA in 2007. I have recommended that pt take Keflex 500 mg QID. DM2: A1c per POC today 7.7%, increased from A1c of 6.6% on 01/30/17. Pt continues with Metformin 500 mg daily. Diabetes is uncontrolled with A1c 7.7% per POC today. Advised pt to increase dosage of Metformin to 500 mg BID. We will reassess A1c in three months. Health Maintenance: Pt is due for mammogram and eye exam. Pt is also due for PAP smear. Pt complains of dry skin with the start of menopause. Current Outpatient Prescriptions   Medication Sig Dispense Refill    levocetirizine (XYZAL) 5 mg tablet TAKE ONE TABLET BY MOUTH ONCE DAILY 90 Tab 0    cephALEXin (KEFLEX) 500 mg capsule Take 1 Cap by mouth four (4) times daily for 10 days. 40 Cap 0    metFORMIN (GLUCOPHAGE) 500 mg tablet Take 500 mg by mouth daily.  ibuprofen (MOTRIN) 800 mg tablet TAKE ONE TABLET BY MOUTH EVERY 8 HOURS AS NEEDED FOR PAIN 60 Tab 0    simvastatin (ZOCOR) 20 mg tablet TAKE ONE TABLET BY MOUTH NIGHTLY 90 Tab 0    albuterol (PROVENTIL VENTOLIN) 2.5 mg /3 mL (0.083 %) nebulizer solution 3 mL by Nebulization route every four (4) hours as needed for Wheezing. 1 Package 5    fluticasone (FLONASE) 50 mcg/actuation nasal spray INSTILL TWO SPRAYS INTO EACH NOSTRIL ONCE DAILY. 1 Bottle 3    losartan-hydroCHLOROthiazide (HYZAAR) 100-25 mg per tablet Take 1 Tab by mouth daily. 90 Tab 1    albuterol (PROVENTIL HFA, VENTOLIN HFA, PROAIR HFA) 90 mcg/actuation inhaler Take 2 Puffs by inhalation every six (6) hours as needed for Wheezing. 1 Inhaler 3    oxyCODONE-acetaminophen (PERCOCET) 5-325 mg per tablet Take 1 Tab by mouth every eight (8) hours as needed for Pain. Max Daily Amount: 3 Tabs. 20 Tab 0    Nebulizer & Compressor machine Nebulizer machine x 1 Nebulizer Kit 1 each 0     Allergies   Allergen Reactions    Vicodin [Hydrocodone-Acetaminophen] Hives     Past Medical History:   Diagnosis Date    Arthritis     Chronic pain     3 herniated discs in lower back    Diabetes (HCC)     GERD (gastroesophageal reflux disease)     High cholesterol     Hypertension     Other ill-defined conditions     herniated disc to back    PUD (peptic ulcer disease)     Seasonal allergies      Past Surgical History:   Procedure Laterality Date    COLONOSCOPY N/A 5/31/2017    COL[facility]OSCOPY performed by [facility] Evangelista [facility] at [facility] ENDOSCOPY    HX GYN      tubal ligation, hysterectomy    HX ORTHOPAEDIC      left hand ligament removed    HX OTHER SURGICAL      corticosteroid injections - back    HX TONSILLECTOMY       Family History   Problem Relation Age of Onset    Hypertension Mother     Diabetes Father      Social History   Substance Use Topics    Smoking status: Former Smoker     Quit date: 10/30/2012    Smokeless tobacco: Never Used    Alcohol use Yes      Comment: weekends          Review of Systems   Constitutional: Negative. Negative for malaise/fatigue. Eyes: Negative for blurred vision. Respiratory: Negative for shortness of breath. Cardiovascular: Negative for chest pain and leg swelling. Musculoskeletal: Negative.     Skin:        + insect bite on L arm and R thigh   Neurological: Negative. Negative for dizziness and headaches. All other systems reviewed and are negative. Physical Exam   Constitutional: She is oriented to person, place, and time. She appears well-developed and well-nourished. No distress. HENT:   Head: Normocephalic and atraumatic. Neck: Carotid bruit is not present. Cardiovascular: Normal rate, regular rhythm, normal heart sounds and intact distal pulses. Exam reveals no gallop and no friction rub. No murmur heard. Pulmonary/Chest: Effort normal and breath sounds normal. No respiratory distress. She has no wheezes. She has no rales. Musculoskeletal: She exhibits no edema. Neurological: She is alert and oriented to person, place, and time. Skin: She is not diaphoretic. Lower left arm: swelling noted, warm to touch, no no TTP  Left flexor region of left forearm: 1 in swelling, warm to touch, fluctuance postive  Right thigh,below right buttock: swelling noted, 2 x2 in bilaterally, warm to touch,no TTP noted   Psychiatric: She has a normal mood and affect. Her behavior is normal. Judgment and thought content normal.   Nursing note and vitals reviewed. ASSESSMENT and PLAN  Tabatha Schmidt was seen today for diabetes, insect bite and dry skin. Diagnoses and all orders for this visit:    Hyperlipidemia with target LDL less than 100  -     LIPID PANEL  -     METABOLIC PANEL, COMPREHENSIVE  -     CBC WITH AUTOMATED DIFF  - LDL is not at goal. Pt to work on LDL through diet and exercise. Controlled type 2 diabetes mellitus with complication, without long-term current use of insulin ([facility])  -     METABOLIC PANEL, COMPREHENSIVE  -     CBC WITH AUTOMATED DIFF  -     REFERRAL TO OPHTHALMOLOGY  -     AMB POC HEMOGLOBIN A1C        - metFORMIN ER (GLUCOPHAGE XR) 500 mg tablet; Take 1 Tab by mouth two (2) times a day. - Diabetes is uncontrolled with A1c 7.7% per POC today. Advised pt to increase dosage of Metformin 500 mg daily to BID.  Will reassess A1c in three months. Essential hypertension  -     METABOLIC PANEL, COMPREHENSIVE  -     CBC WITH AUTOMATED DIFF  - Bp control stable, continue current regimen. Obesity, Class I, BMI 86-21.3  -     METABOLIC PANEL, COMPREHENSIVE  -     CBC WITH AUTOMATED DIFF  - I have reviewed/discussed the above normal BMI with the patient. I have recommended the following interventions: dietary management education, guidance, and counseling, encourage exercise and monitor weight . Nevaeh Barry Colon cancer screening  -     METABOLIC PANEL, COMPREHENSIVE  -     CBC WITH AUTOMATED DIFF    Spider bite, undetermined intent, initial encounter  -     cephALEXin (KEFLEX) 500 mg capsule; Take 1 Cap by mouth four (4) times daily for 10 days.  - Pt complains of swelling and redness due to spider bites. I have recommended that patient start Keflex QID for 10 days. Allergic rhinitis, unspecified allergic rhinitis trigger, unspecified rhinitis seasonality  -     levocetirizine (XYZAL) 5 mg tablet; TAKE ONE TABLET BY MOUTH ONCE DAILY  - Stable, continue current regimen    Screening mammogram, encounter for  -     [facility] MAMMO BI SCREENING INCL CAD; Future    Routine gynecological examination  -     REFERRAL TO OBSTETRICS AND GYNECOLOGY (Dr. Madhuri Kim)        Follow-up Disposition:  Return in about 3 months (around 9/28/2017) for diabetes follow up. Medication risks/benefits/costs/interactions/alternatives discussed with patient. Advised patient to call back or return to office if symptoms worsen/change/persist.  If patient cannot reach us or should anything more severe/urgent arise he/she should proceed directly to the nearest emergency department. Discussed expected course/resolution/complications of diagnosis in detail with patient. Patient given a written after visit summary which includes her diagnoses, current medications and vitals. Patient expressed understanding with the diagnosis and plan.     Written by Lisa Serrato liliam Kramer, as dictated by Oleg Cano M.D.   I have reviewed and agree with the above note and have made corrections where appropriate, Dr. Jonh Hernandez MD

## 2017-06-28 NOTE — MR AVS SNAPSHOT
Visit Information Date & Time Provider Department Dept. Phone Encounter #  
 6/28/2017 12:15 PM Shawn Samuel MD [address] [place] [address] [phone] [id number] Follow-up Instructions Return in about 3 months (around 9/28/2017) for diabetes follow up. Upcoming Health Maintenance Date Due  
 PAP AKA CERVICAL CYTOLOGY 10/30/2016 EYE EXAM RETINAL OR DILATED Q1 11/11/2016 BREAST CANCER SCRN MAMMOGRAM 6/15/2017 Pneumococcal 19-64 Medium Risk (1 of 1 - PPSV23) 7/27/2023* HEMOGLOBIN A1C Q6M 7/30/2017 INFLUENZA AGE 9 TO ADULT 8/1/2017 FOOT EXAM Q1 10/31/2017 MICROALBUMIN Q1 10/31/2017 LIPID PANEL Q1 1/30/2018 DTaP/Tdap/Td series (2 - Td) 2/5/2025 COLONOSCOPY 5/31/2027 *Topic was postponed. The date shown is not the original due date. Allergies as of 6/28/2017  Review Complete On: 6/28/2017 By: Nick Penny LPN Severity Noted Reaction Type Reactions Vicodin [Hydrocodone-acetaminophen]  07/23/2012    Hives Current Immunizations  Reviewed on 8/29/2016 Name Date Tdap 2/5/2015 Not reviewed this visit You Were Diagnosed With   
  
 Codes Comments Hyperlipidemia with target LDL less than 100    -  Primary ICD-10-CM: E78.5 ICD-9-CM: 272.4 Controlled type 2 diabetes mellitus with complication, without long-term current use of insulin (HCC)     ICD-10-CM: E11.8 ICD-9-CM: 250.90 Essential hypertension     ICD-10-CM: I10 
ICD-9-CM: 401.9 Obesity, Class I, BMI 30-34.9     ICD-10-CM: E66.9 ICD-9-CM: 278.00 Colon cancer screening     ICD-10-CM: Z12.11 ICD-9-CM: V76.51 Spider bite, undetermined intent, initial encounter     ICD-10-CM: T63.304A ICD-9-CM: 989.5, E980.9 Allergic rhinitis, unspecified allergic rhinitis trigger, unspecified rhinitis seasonality     ICD-10-CM: J30.9 ICD-9-CM: 477.9  Screening mammogram, encounter for     ICD-10-CM: Z12.31 
ICD-9-CM: V76.12   
 Routine gynecological examination     ICD-10-CM: H86.146 ICD-9-CM: V72.31 Vitals BP Pulse Temp Resp Height(growth percentile) Weight(growth percentile) 118/70 (BP 1 Location: Left arm, BP Patient Position: Sitting) (!) 107 98.8 °F (37.1 °C) (Oral) 14 5' 8\" (1.727 m) 213 lb (96.6 kg) SpO2 BMI OB Status Smoking Status 98% 32.39 kg/m2 Hysterectomy Former Smoker Vitals History BMI and BSA Data Body Mass Index Body Surface Area  
 32.39 kg/m 2 2.15 m 2 Preferred Pharmacy Pharmacy Name Phone [phone] [facility] [phone] Your Updated Medication List  
  
   
This list is accurate as of: 6/28/17  2:08 PM.  Always use your most recent med list.  
  
  
  
  
 * albuterol 90 mcg/actuation inhaler Commonly known as:  PROVENTIL HFA, VENTOLIN HFA, PROAIR HFA Take 2 Puffs by inhalation every six (6) hours as needed for Wheezing. * albuterol 2.5 mg /3 mL (0.083 %) nebulizer solution Commonly known as:  PROVENTIL VENTOLIN  
3 mL by Nebulization route every four (4) hours as needed for Wheezing. cephALEXin 500 mg capsule Commonly known as:  Jovani Crane Take 1 Cap by mouth four (4) times daily for 10 days. fluticasone 50 mcg/actuation nasal spray Commonly known as:  Jeff Corona INSTILL TWO SPRAYS INTO EACH NOSTRIL ONCE DAILY. ibuprofen 800 mg tablet Commonly known as:  MOTRIN  
TAKE ONE TABLET BY MOUTH EVERY 8 HOURS AS NEEDED FOR PAIN  
  
 levocetirizine 5 mg tablet Commonly known as:  [place] TAKE ONE TABLET BY MOUTH ONCE DAILY losartan-hydroCHLOROthiazide 100-25 mg per tablet Commonly known as:  HYZAAR Take 1 Tab by mouth daily. metFORMIN  mg tablet Commonly known as:  GLUCOPHAGE XR Take 1 Tab by mouth two (2) times a day. Nebulizer & Compressor machine Nebulizer machine x 1 Nebulizer Kit  
  
 oxyCODONE-acetaminophen 5-325 mg per tablet Commonly known as:  PERCOCET Take 1 Tab by mouth every eight (8) hours as needed for Pain. Max Daily Amount: 3 Tabs. simvastatin 20 mg tablet Commonly known as:  ZOCOR  
TAKE ONE TABLET BY MOUTH NIGHTLY * Notice: This list has 2 medication(s) that are the same as other medications prescribed for you. Read the directions carefully, and ask your doctor or other care provider to review them with you. Prescriptions Sent to Pharmacy Refills  
 levocetirizine (XYZAL) 5 mg tablet 0 Sig: TAKE ONE TABLET BY MOUTH ONCE DAILY Class: Normal  
 Pharmacy: [address] Ph #: [phone]  
 cephALEXin (KEFLEX) 500 mg capsule 0 Sig: Take 1 Cap by mouth four (4) times daily for 10 days. Class: Normal  
 Pharmacy: [address] Ph #: [phone] Route: Oral  
 metFORMIN ER (GLUCOPHAGE XR) 500 mg tablet 5 Sig: Take 1 Tab by mouth two (2) times a day. Class: Normal  
 Pharmacy: [address] Ph #: [phone] Route: Oral  
  
We Performed the Following AMB POC HEMOGLOBIN A1C [53659 CPT(R)] CBC WITH AUTOMATED DIFF [31806 CPT(R)] LIPID PANEL [56964 CPT(R)] METABOLIC PANEL, COMPREHENSIVE [60633 CPT(R)] REFERRAL TO OBSTETRICS AND GYNECOLOGY [REF51 Custom] Comments:  
 Please evaluate patient for routine pap. REFERRAL TO OPHTHALMOLOGY [REF57 Custom] Follow-up Instructions Return in about 3 months (around 9/28/2017) for diabetes follow up. To-Do List   
 10/02/2017 Imaging:  [facility] MAMMO BI SCREENING INCL CAD Referral Information Referral ID Referred By Referred To  
  
 [id number] Elizabeth Cory [facility] [address] [place], [address] Visits Status Start Date End Date 1 New Request 6/28/17 6/28/18 If your referral has a status of pending review or denied, additional information will be sent to support the outcome of this decision. Referral ID Referred By Referred To  
 [id number] [facility] [address] [facility], [address] Visits Status Start Date End Date 1 New Request 6/28/17 6/28/18 If your referral has a status of pending review or denied, additional information will be sent to support the outcome of this decision. Introducing [facility]! Dorene Aparicio introduces [employer] patient portal. Now you can access parts of your medical record, email your doctor's office, and request medication refills online. 1. In your internet browser, go to [email or web address] 2. Click on the First Time User? Click Here link in the Sign In box. You will see the New Member Sign Up page. 3. Enter your [employer] Access Code exactly as it appears below. You will not need to use this code after youve completed the sign-up process. If you do not sign up before the expiration date, you must request a new code. · [employer] Access Code: [email or web address] Expires: 9/23/2017 12:54 PM 
 
4. Enter the last four digits of your Social Security Number (xxxx) and Date of Birth (mm/dd/yyyy) as indicated and click Submit. You will be taken to the next sign-up page. 5. Create a [facility] ID. This will be your [employer] login ID and cannot be changed, so think of one that is secure and easy to remember. 6. Create a [employer] password. You can change your password at any time. 7. Enter your Password Reset Question and Answer. This can be used at a later time if you forget your password. 8. Enter your e-mail address. You will receive e-mail notification when new information is available in [address] 9. Click Sign Up. You can now view and download portions of your medical record.  
10. Click the Download Summary menu link to download a portable copy of your medical information. If you have questions, please visit the Frequently Asked Questions section of the [facility] website. Remember, [facility] is NOT to be used for urgent needs. For medical emergencies, dial 911. Now available from your iPhone and Android! Please provide this summary of care documentation to your next provider. Your primary care clinician is listed as [place]. If you have any questions after today's visit, please call [phone].

## 2017-06-28 NOTE — LETTER
7/17/2017 11:03 AM 
 
Ms. Tania Ibrahim [address] [place] [id number] Dear Tania Childs: 
 
Please find your most recent results below. Resulted Orders LIPID PANEL Result Value Ref Range Cholesterol, total 189 100 - 199 mg/dL Triglyceride 171 (H) 0 - 149 mg/dL HDL Cholesterol 71 >39 mg/dL VLDL, calculated 34 5 - 40 mg/dL LDL, calculated 84 0 - 99 mg/dL Narrative Performed at:  [address]  [id number] : Yanira Pimentel MD, Phone:  [phone] METABOLIC PANEL, COMPREHENSIVE Result Value Ref Range Glucose 182 (H) 65 - 99 mg/dL BUN 19 6 - 24 mg/dL Creatinine 1.01 (H) 0.57 - 1.00 mg/dL GFR est non-AA 63 >59 mL/min/1.73 GFR est AA 73 >59 mL/min/1.73  
 BUN/Creatinine ratio 19 9 - 23 Sodium 132 (L) 134 - 144 mmol/L Potassium 4.6 3.5 - 5.2 mmol/L Chloride 93 (L) 96 - 106 mmol/L  
 CO2 20 18 - 29 mmol/L Calcium 10.4 (H) 8.7 - 10.2 mg/dL Protein, total 7.2 6.0 - 8.5 g/dL Albumin 4.3 3.5 - 5.5 g/dL GLOBULIN, TOTAL 2.9 1.5 - 4.5 g/dL A-G Ratio 1.5 1.2 - 2.2 Bilirubin, total 0.4 0.0 - 1.2 mg/dL Alk. phosphatase 99 39 - 117 IU/L  
 AST (SGOT) 13 0 - 40 IU/L  
 ALT (SGPT) 16 0 - 32 IU/L Narrative Performed at:  [address]  [id number] : Yanira Pimentel MD, Phone:  [phone] CBC WITH AUTOMATED DIFF Result Value Ref Range WBC 11.4 (H) 3.4 - 10.8 x10E3/uL  
 RBC 3.76 (L) 3.77 - 5.28 x10E6/uL HGB 11.2 11.1 - 15.9 g/dL HCT 34.3 34.0 - 46.6 % MCV 91 79 - 97 fL  
 MCH 29.8 26.6 - 33.0 pg  
 MCHC 32.7 31.5 - 35.7 g/dL  
 RDW 12.5 12.3 - 15.4 % PLATELET 988 721 - 824 x10E3/uL NEUTROPHILS 76 % Lymphocytes 15 % MONOCYTES 9 % EOSINOPHILS 0 % BASOPHILS 0 %  
 ABS. NEUTROPHILS 8.5 (H) 1.4 - 7.0 x10E3/uL Abs Lymphocytes 1.7 0.7 - 3.1 x10E3/uL  
 ABS. MONOCYTES 1.0 (H) 0.1 - 0.9 x10E3/uL ABS. EOSINOPHILS 0.1 0.0 - 0.4 x10E3/uL  
 ABS. BASOPHILS 0.0 0.0 - 0.2 x10E3/uL IMMATURE GRANULOCYTES 0 %  
 ABS. IMM. GRANS. 0.0 0.0 - 0.1 x10E3/uL Narrative Performed at:  [address]  [id number] : Gladys Carias MD, Phone:  [phone] AMB POC HEMOGLOBIN A1C Result Value Ref Range Hemoglobin A1c (POC) 7.7 % CVD REPORT Result Value Ref Range INTERPRETATION Note Comment:  
   Supplement report is available. Narrative Performed at:  [address] [place] [address] [address] [place] [address]  [id number] : Dottie Jones PhD, Phone:  [phone] Please call me if you have any questions: [phone] Sincerely, Doroteo Mehta MD

## 2017-06-29 LAB
ALBUMIN SERPL-MCNC: 4.3 G/DL (ref 3.5–5.5)
ALBUMIN/GLOB SERPL: 1.5 {RATIO} (ref 1.2–2.2)
ALP SERPL-CCNC: 99 IU/L (ref 39–117)
ALT SERPL-CCNC: 16 IU/L (ref 0–32)
AST SERPL-CCNC: 13 IU/L (ref 0–40)
BASOPHILS # BLD AUTO: 0 X10E3/UL (ref 0–0.2)
BASOPHILS NFR BLD AUTO: 0 %
BILIRUB SERPL-MCNC: 0.4 MG/DL (ref 0–1.2)
BUN SERPL-MCNC: 19 MG/DL (ref 6–24)
BUN/CREAT SERPL: 19 (ref 9–23)
CALCIUM SERPL-MCNC: 10.4 MG/DL (ref 8.7–10.2)
CHLORIDE SERPL-SCNC: 93 MMOL/L (ref 96–106)
CHOLEST SERPL-MCNC: 189 MG/DL (ref 100–199)
CO2 SERPL-SCNC: 20 MMOL/L (ref 18–29)
CREAT SERPL-MCNC: 1.01 MG/DL (ref 0.57–1)
EOSINOPHIL # BLD AUTO: 0.1 X10E3/UL (ref 0–0.4)
EOSINOPHIL NFR BLD AUTO: 0 %
ERYTHROCYTE [DISTWIDTH] IN BLOOD BY AUTOMATED COUNT: 12.5 % (ref 12.3–15.4)
GLOBULIN SER CALC-MCNC: 2.9 G/DL (ref 1.5–4.5)
GLUCOSE SERPL-MCNC: 182 MG/DL (ref 65–99)
HCT VFR BLD AUTO: 34.3 % (ref 34–46.6)
HDLC SERPL-MCNC: 71 MG/DL
HGB BLD-MCNC: 11.2 G/DL (ref 11.1–15.9)
IMM GRANULOCYTES # BLD: 0 X10E3/UL (ref 0–0.1)
IMM GRANULOCYTES NFR BLD: 0 %
INTERPRETATION, 910389: NORMAL
LDLC SERPL CALC-MCNC: 84 MG/DL (ref 0–99)
LYMPHOCYTES # BLD AUTO: 1.7 X10E3/UL (ref 0.7–3.1)
LYMPHOCYTES NFR BLD AUTO: 15 %
MCH RBC QN AUTO: 29.8 PG (ref 26.6–33)
MCHC RBC AUTO-ENTMCNC: 32.7 G/DL (ref 31.5–35.7)
MCV RBC AUTO: 91 FL (ref 79–97)
MONOCYTES # BLD AUTO: 1 X10E3/UL (ref 0.1–0.9)
MONOCYTES NFR BLD AUTO: 9 %
NEUTROPHILS # BLD AUTO: 8.5 X10E3/UL (ref 1.4–7)
NEUTROPHILS NFR BLD AUTO: 76 %
PLATELET # BLD AUTO: 329 X10E3/UL (ref 150–379)
POTASSIUM SERPL-SCNC: 4.6 MMOL/L (ref 3.5–5.2)
PROT SERPL-MCNC: 7.2 G/DL (ref 6–8.5)
RBC # BLD AUTO: 3.76 X10E6/UL (ref 3.77–5.28)
SODIUM SERPL-SCNC: 132 MMOL/L (ref 134–144)
TRIGL SERPL-MCNC: 171 MG/DL (ref 0–149)
VLDLC SERPL CALC-MCNC: 34 MG/DL (ref 5–40)
WBC # BLD AUTO: 11.4 X10E3/UL (ref 3.4–10.8)

## 2017-07-12 ENCOUNTER — TELEPHONE (OUTPATIENT)
Dept: FAMILY MEDICINE CLINIC | Age: 57
End: 2017-07-12

## 2017-07-12 NOTE — TELEPHONE ENCOUNTER
Patient is calling, patient states that she would like to let Dr. Benoit Boyer know that she has stopped taking her Metformin ER, due to becoming light headed and her blood sugar dropping. Patient states that she has started taking her regular metformin medication, but instead of 1 times a day like prescribed on the bottle, she is taking the medication 2 times a day. Patient states that she has not been experiencing any symptoms since doing this.      Best call back # for patient: [phone]

## 2017-07-16 NOTE — PROGRESS NOTES
Pt needs to have pt work on diet and exercise  Her A1`C was not at goal  She really needs to work on diet and follow up in 3 months. Rest of her labs, cholesterol, liver and renal function is stable.

## 2017-07-17 NOTE — PROGRESS NOTES
[phone] (Ava) verified  Patient notified of above note and voiced understanding of what was read.

## 2017-07-20 ENCOUNTER — HOSPITAL ENCOUNTER (EMERGENCY)
Age: 57
Discharge: HOME | End: 2017-07-20
Attending: EMERGENCY MEDICINE | Admitting: EMERGENCY MEDICINE
Payer: MEDICARE

## 2017-07-20 ENCOUNTER — TELEPHONE (OUTPATIENT)
Dept: FAMILY MEDICINE CLINIC | Age: 57
End: 2017-07-20

## 2017-07-20 VITALS
HEART RATE: 99 BPM | TEMPERATURE: 98.1 F | SYSTOLIC BLOOD PRESSURE: 132 MMHG | HEIGHT: 68 IN | BODY MASS INDEX: 32.3 KG/M2 | OXYGEN SATURATION: 97 % | WEIGHT: 213.13 LBS | RESPIRATION RATE: 18 BRPM | DIASTOLIC BLOOD PRESSURE: 79 MMHG

## 2017-07-20 DIAGNOSIS — L02.91 ABSCESS: Primary | ICD-10-CM

## 2017-07-20 DIAGNOSIS — B37.2 YEAST INFECTION OF THE SKIN: ICD-10-CM

## 2017-07-20 LAB
ALBUMIN SERPL BCP-MCNC: 4.1 G/DL (ref 3.5–5)
ALBUMIN/GLOB SERPL: 1 {RATIO} (ref 1.1–2.2)
ALP SERPL-CCNC: 96 U/L (ref 45–117)
ALT SERPL-CCNC: 22 U/L (ref 12–78)
ANION GAP BLD CALC-SCNC: 9 MMOL/L (ref 5–15)
APPEARANCE UR: ABNORMAL
AST SERPL W P-5'-P-CCNC: 8 U/L (ref 15–37)
BACTERIA URNS QL MICRO: NEGATIVE /HPF
BASOPHILS # BLD AUTO: 0 K/UL (ref 0–0.1)
BASOPHILS # BLD: 0 % (ref 0–1)
BILIRUB SERPL-MCNC: 0.4 MG/DL (ref 0.2–1)
BILIRUB UR QL: NEGATIVE
BUN SERPL-MCNC: 21 MG/DL (ref 6–20)
BUN/CREAT SERPL: 17 (ref 12–20)
CALCIUM SERPL-MCNC: 9.4 MG/DL (ref 8.5–10.1)
CHLORIDE SERPL-SCNC: 102 MMOL/L (ref 97–108)
CO2 SERPL-SCNC: 24 MMOL/L (ref 21–32)
COLOR UR: ABNORMAL
CREAT SERPL-MCNC: 1.24 MG/DL (ref 0.55–1.02)
EOSINOPHIL # BLD: 0.1 K/UL (ref 0–0.4)
EOSINOPHIL NFR BLD: 1 % (ref 0–7)
EPITH CASTS URNS QL MICRO: ABNORMAL /LPF
ERYTHROCYTE [DISTWIDTH] IN BLOOD BY AUTOMATED COUNT: 12.6 % (ref 11.5–14.5)
GLOBULIN SER CALC-MCNC: 4.3 G/DL (ref 2–4)
GLUCOSE SERPL-MCNC: 162 MG/DL (ref 65–100)
GLUCOSE UR STRIP.AUTO-MCNC: NEGATIVE MG/DL
HCT VFR BLD AUTO: 34.6 % (ref 35–47)
HGB BLD-MCNC: 11.1 G/DL (ref 11.5–16)
HGB UR QL STRIP: NEGATIVE
HYALINE CASTS URNS QL MICRO: ABNORMAL /LPF (ref 0–5)
KETONES UR QL STRIP.AUTO: ABNORMAL MG/DL
LEUKOCYTE ESTERASE UR QL STRIP.AUTO: ABNORMAL
LYMPHOCYTES # BLD AUTO: 29 % (ref 12–49)
LYMPHOCYTES # BLD: 2.2 K/UL (ref 0.8–3.5)
MCH RBC QN AUTO: 28.8 PG (ref 26–34)
MCHC RBC AUTO-ENTMCNC: 32.1 G/DL (ref 30–36.5)
MCV RBC AUTO: 89.9 FL (ref 80–99)
MONOCYTES # BLD: 0.7 K/UL (ref 0–1)
MONOCYTES NFR BLD AUTO: 9 % (ref 5–13)
NEUTS SEG # BLD: 4.6 K/UL (ref 1.8–8)
NEUTS SEG NFR BLD AUTO: 61 % (ref 32–75)
NITRITE UR QL STRIP.AUTO: NEGATIVE
PH UR STRIP: 5 [PH] (ref 5–8)
PLATELET # BLD AUTO: 314 K/UL (ref 150–400)
POTASSIUM SERPL-SCNC: 3.7 MMOL/L (ref 3.5–5.1)
PROT SERPL-MCNC: 8.4 G/DL (ref 6.4–8.2)
PROT UR STRIP-MCNC: NEGATIVE MG/DL
RBC # BLD AUTO: 3.85 M/UL (ref 3.8–5.2)
RBC #/AREA URNS HPF: ABNORMAL /HPF (ref 0–5)
SODIUM SERPL-SCNC: 135 MMOL/L (ref 136–145)
SP GR UR REFRACTOMETRY: 1.03 (ref 1–1.03)
UROBILINOGEN UR QL STRIP.AUTO: 0.2 EU/DL (ref 0.2–1)
WBC # BLD AUTO: 7.6 K/UL (ref 3.6–11)
WBC URNS QL MICRO: ABNORMAL /HPF (ref 0–4)

## 2017-07-20 PROCEDURE — 36415 COLL VENOUS BLD VENIPUNCTURE: CPT | Performed by: EMERGENCY MEDICINE

## 2017-07-20 PROCEDURE — 85025 COMPLETE CBC W/AUTO DIFF WBC: CPT | Performed by: EMERGENCY MEDICINE

## 2017-07-20 PROCEDURE — 96360 HYDRATION IV INFUSION INIT: CPT

## 2017-07-20 PROCEDURE — 96361 HYDRATE IV INFUSION ADD-ON: CPT

## 2017-07-20 PROCEDURE — 81001 URINALYSIS AUTO W/SCOPE: CPT | Performed by: EMERGENCY MEDICINE

## 2017-07-20 PROCEDURE — 99283 EMERGENCY DEPT VISIT LOW MDM: CPT

## 2017-07-20 PROCEDURE — 74011250636 HC RX REV CODE- 250/636: Performed by: EMERGENCY MEDICINE

## 2017-07-20 PROCEDURE — 80053 COMPREHEN METABOLIC PANEL: CPT | Performed by: EMERGENCY MEDICINE

## 2017-07-20 RX ORDER — SULFAMETHOXAZOLE AND TRIMETHOPRIM 800; 160 MG/1; MG/1
1 TABLET ORAL 2 TIMES DAILY
Qty: 14 TAB | Refills: 0 | Status: SHIPPED | OUTPATIENT
Start: 2017-07-20 | End: 2017-07-27

## 2017-07-20 RX ORDER — NYSTATIN 100000 U/G
CREAM TOPICAL 2 TIMES DAILY
Qty: 15 G | Refills: 0 | Status: SHIPPED | OUTPATIENT
Start: 2017-07-20 | End: 2017-08-17 | Stop reason: SDUPTHER

## 2017-07-20 RX ORDER — CEPHALEXIN 500 MG/1
500 CAPSULE ORAL 3 TIMES DAILY
Qty: 21 CAP | Refills: 0 | Status: SHIPPED | OUTPATIENT
Start: 2017-07-20 | End: 2017-07-27

## 2017-07-20 RX ADMIN — SODIUM CHLORIDE 1000 ML: 900 INJECTION, SOLUTION INTRAVENOUS at 19:02

## 2017-07-20 NOTE — ED TRIAGE NOTES
Pt stated she has a abscess under her right buttock and on her left 2nd finger, some drainage from buttock abscess, no drainage from finger, abscess to finger red and swollen, denies fever , pt also stated she recently changed her diabetes medications in the last week or so, pt c/o feeling lightheaded since

## 2017-07-20 NOTE — ED PROVIDER NOTES
HPI Comments: 49 y.o. female with past medical history significant for HTN, DM, mrsa, PUD, GERD, high cholesterol, tubal ligation, and colonoscopy  who presents from home with chief complaint of abscess. Pt c/o an abscess on her posterior R thigh under her buttocks and on her L index finger that are hot and painful. Pt claims she's had some drainage from her buttocks abscess in the past 2 weeks and has been taking cephalexin since seeing her PCP then. Pt reports she's in the process of moving and is unsure if she has gotten bit by an insect. Pt reports she's been feeling light-headed, and hasn't checked her blood sugar since her PCP visit 2 weeks ago. Pt recently had her metformin dose changed from twice a day, to once a day, back to twice a day extended release. Pt also claims her external aroudn her vagina has been excessively dry and pruritic. There are no other acute medical concerns at this time. PCP: Beata Felder MD    Note written by Mily Yee, as dictated by Deon Boss MD 6:56 PM         The history is provided by the patient. No  was used.         Past Medical History:   Diagnosis Date    Arthritis     Chronic pain     3 herniated discs in lower back    Diabetes ([address]     GERD (gastroesophageal reflux disease)     High cholesterol     Hypertension     MRSA infection     Other ill-defined conditions     herniated disc to back    PUD (peptic ulcer disease)     Seasonal allergies        Past Surgical History:   Procedure Laterality Date    COLONOSCOPY N/A 5/31/2017    COLONOSCOPY performed by Casi Bang [facility] at [facility] ENDOSCOPY    HX GYN      tubal ligation, hysterectomy    HX ORTHOPAEDIC      left hand ligament removed    HX OTHER SURGICAL      corticosteroid injections - back    HX TONSILLECTOMY           Family History:   Problem Relation Age of Onset    Hypertension Mother     Diabetes Father        Social History     Social History    Marital status:      Spouse name: N/A    Number of children: N/A    Years of education: N/A     Occupational History    Not on file. Social History Main Topics    Smoking status: Former Smoker     Quit date: 10/30/2012    Smokeless tobacco: Never Used    Alcohol use Yes      Comment: weekends    Drug use: No    Sexual activity: Yes     Partners: Male     Other Topics Concern    Not on file     Social History Narrative         ALLERGIES: Vicodin [hydrocodone-acetaminophen]    Review of Systems   Constitutional: Negative for chills and fever. HENT: Negative for rhinorrhea and sore throat. Respiratory: Negative for cough and shortness of breath. Cardiovascular: Negative for chest pain. Gastrointestinal: Negative for abdominal pain, diarrhea, nausea and vomiting. Genitourinary: Negative for dysuria and urgency. Vaginal itching   Musculoskeletal: Negative for arthralgias and back pain. Skin: Positive for wound (abscess under R buttocks and on L index finger). Negative for rash. Neurological: Positive for light-headedness. Negative for dizziness and weakness. All other systems reviewed and are negative. Vitals:    07/20/17 1801   BP: 130/81   Pulse: (!) 112   Resp: 16   Temp: 98.4 °F (36.9 °C)   SpO2: 97%   Weight: 96.7 kg (213 lb 2 oz)   Height: 5' 8\" (1.727 m)            Physical Exam   Nursing note and vitals reviewed.    Const:  No acute distress, well developed, well nourished  Head:  Atraumatic, normocephalic  Eyes:  PERRL, conjunctiva normal, no scleral icterus  Neck:  Supple, trachea midline  Cardiovascular:  RRR, no murmurs, no gallops, no rubs  Resp:  No resp distress, no increased work of breathing, no wheezes, no rhonchi, no rales,  Abd:  Soft, non-tender, non-distended, no rebound, no guarding, no CVA tenderness  :  Erythema/irritation of inguinal folds and labia  MSK:  No pedal edema, normal ROM  Neuro:  Alert and oriented x3, no cranial nerve defect  Skin:  Warm, dry, intact scab on R upper leg with 3 0.5cm areas of erythematous induration with no fluctuance. 1cm sized erythema induration on L index finger with white central discoloration  Psych: normal mood and affect, behavior is normal, judgement and thought content is normal  Note written by Mily Otto, as dictated by Jessie Bang MD 6:58 PM        MDM  Number of Diagnoses or Management Options  Abscess:   Yeast infection of the skin:      Amount and/or Complexity of Data Reviewed  Clinical lab tests: reviewed and ordered  Tests in the radiology section of CPT®: ordered and reviewed  Review and summarize past medical records: yes    Patient Progress  Patient progress: stable    ED Course     Pt. Presents to the ER with lesions on her finger and leg. No drainable lesions at this time. Pt. Also with what appears to be a yeast infection in her inguinal folds. Pt. Is well appearing in the ER. Pt. Has mildly elevated blood sugars but she is not in DKA. I will start her on keflex, bactrim, and nystatin cream.  Pt. To f/u with her PCP or return to the ER with worsening sx.       Procedures

## 2017-07-20 NOTE — TELEPHONE ENCOUNTER
Lseley Thorpe  [phone]    Patient states that she recently saw Dr. Bianka Carballo and was given an antibiotic for what he thought was a spider bite. She now has 3 more of these bites on her body. She asked if she should go to the ED? Per Isabelle's instruction PSR notified patient that she should go to the ED to be evaluated. Patient requested that a message be sent to Dr. Bianka Carballo so that he is aware.

## 2017-07-20 NOTE — ED NOTES
Pt ambulatory to the bathroom with a steady gait, urine sample obtained and sent to lab. Pt back in bed, resting comfortably, provided with warm blanket, call bell within reach.

## 2017-07-21 ENCOUNTER — PATIENT OUTREACH (OUTPATIENT)
Dept: FAMILY MEDICINE CLINIC | Age: 57
End: 2017-07-21

## 2017-07-21 NOTE — PROGRESS NOTES
Triston Velasquez is a 49 y.o. female   This patient was received as a referral from the Adam duenas. Low Risk            4       Total Score        4 Charlson Comorbidity Score        Criteria that do not apply:    Relationship with PCP    Patient Living Status    Patient Length of Stay > 5    More than 1 Admission in calendar year    Patient Insurance is Medicare, Medicaid or Self Pay        Summary of patients top three medical problems:     Problem 1: DM2     Problem 2: HTN     Problem 3: Hyperlipidemia    Patient's challenges to self management identified:    Patients motivational level on a scale of 0-10: 7  Medication Management:  Experiencing some problems with adjusting to ER Metformin-seemed to be causing her to be lightheaded, so she went back to the plain Metformin bid. Advance Care Planning:   Patient was offered the opportunity to discuss advance care planning:  no    Does patient have an Advance Directive: no   If no, did you provide information on Advance Care Planning?  no     [employer], Referrals, and Durable Medical Equipment:     Follow up appointments:  Needs to schedule 3 mo f/u with -Sept 27. Goals      Establish PCP relationships and regularly scheduled appointments.  Reduce ED Utilization         Called patient today to check on her. She was at the pharmacy getting her new rxs for the Keflex, Bactrim and Nystantin cream per ED recommendation. States she is moving today so has lots going on- will start new meds today. Also requested refill of her Diabetic test strips-but was unsure which glucometer. She will check at home and call me back. Reports problem with the Metformin ER- seems to be causing her to feel lightheaded. So went back to the plain Metformin bid. Said she had left a message for  telling him about it, but did not hear anything back. Advised I will send him a message as well.  Asked her about her BS numbers since going back to the plain Metformin, had run out of the test strips, so hasn't checked. Last night in ED, glucose was 162. Patient called back, glucometer is One Touch Ultra2-needs test strips called to [facility]. Called refill of One Touch Ultra 2 test strips, quantity of #100 with 3 refills to [address] ph [phone]. Patient verbalized understanding of all information discussed. Patient has this Nurse Navigators contact information for any further questions, concerns, or needs.

## 2017-07-21 NOTE — DISCHARGE INSTRUCTIONS
Skin Abscess: Care Instructions  Your Care Instructions    A skin abscess is a bacterial infection that forms a pocket of pus. A boil is a kind of skin abscess. The doctor may have cut an opening in the abscess so that the pus can drain out. You may have gauze in the cut so that the abscess will stay open and keep draining. You may need antibiotics. You will need to follow up with your doctor to make sure the infection has gone away. The doctor has checked you carefully, but problems can develop later. If you notice any problems or new symptoms, get medical treatment right away. Follow-up care is a key part of your treatment and safety. Be sure to make and go to all appointments, and call your doctor if you are having problems. It's also a good idea to know your test results and keep a list of the medicines you take. How can you care for yourself at home? · Apply warm and dry compresses, a heating pad set on low, or a hot water bottle 3 or 4 times a day for pain. Keep a cloth between the heat source and your skin. · If your doctor prescribed antibiotics, take them as directed. Do not stop taking them just because you feel better. You need to take the full course of antibiotics. · Take pain medicines exactly as directed. ¨ If the doctor gave you a prescription medicine for pain, take it as prescribed. ¨ If you are not taking a prescription pain medicine, ask your doctor if you can take an over-the-counter medicine. · Keep your bandage clean and dry. Change the bandage whenever it gets wet or dirty, or at least one time a day. · If the abscess was packed with gauze:  ¨ Keep follow-up appointments to have the gauze changed or removed. If the doctor instructed you to remove the gauze, gently pull out all of the gauze when your doctor tells you to. ¨ After the gauze is removed, soak the area in warm water for 15 to 20 minutes 2 times a day, until the wound closes. When should you call for help?   Call your doctor now or seek immediate medical care if:  · You have signs of worsening infection, such as:  ¨ Increased pain, swelling, warmth, or redness. ¨ Red streaks leading from the infected skin. ¨ Pus draining from the wound. ¨ A fever. Watch closely for changes in your health, and be sure to contact your doctor if:  · You do not get better as expected. Where can you learn more? Go to http://pooja-shon.info/. Enter K198 in the search box to learn more about \"Skin Abscess: Care Instructions. \"  Current as of: October 13, 2016  Content Version: 11.3  © 7649-8868 [employer]. Care instructions adapted under license by [employer] (which disclaims liability or warranty for this information). If you have questions about a medical condition or this instruction, always ask your healthcare professional. [address] any warranty or liability for your use of this information. Candidiasis: Care Instructions  Your Care Instructions  Candidiasis (say \"lwe-iqu-QM-uh-cathy\") is a yeast infection. Yeast normally lives in your body. But it can cause problems if your body's defenses don't work as they should. Some medicines can increase your chance of getting a yeast infection. These include antibiotics, steroids, and cancer drugs. And some diseases like AIDS and diabetes can make you more likely to get yeast infections. There are different types of yeast infections. [place] is a yeast infection in the mouth. It usually occurs in people with weak immune systems. It causes white patches inside the mouth and throat. Yeast infections of the skin usually occur in skin folds where the skin stays moist. They cause red, oozing patches on your skin. Babies can get these infections under the diaper. People who often wear gloves can get them on their hands. Many women get vaginal yeast infections. They are most common when women take antibiotics.  These infections can cause the vagina to itch and burn. They also cause white discharge that looks like cottage cheese. In rare cases, yeast infects the blood. This can cause serious disease. This kind of infection is treated with medicine given through a needle into a vein (IV). After you start treatment, a yeast infection usually goes away quickly. But if your immune system is weak, the infection may come back. Tell your doctor if you get yeast infections often. Follow-up care is a key part of your treatment and safety. Be sure to make and go to all appointments, and call your doctor if you are having problems. It's also a good idea to know your test results and keep a list of the medicines you take. How can you care for yourself at home? · Take your medicines exactly as prescribed. Call your doctor if you think you are having a problem with your medicine. · Use antibiotics only as directed by your doctor. · Eat yogurt with live cultures. It has bacteria called lactobacillus. It may help prevent some types of yeast infections. · Keep your skin clean and dry. Put powder on moist places. · If you are using a cream or suppository to treat a vaginal yeast infection, don't use condoms or a diaphragm. Use a different type of birth control. · Eat a healthy diet and get regular exercise. This will help keep your immune system strong. When should you call for help? Call your doctor now or seek immediate medical care if:  · You have a fever. · You are pregnant and have signs of a vaginal or urinary tract infection such as:  ¨ Severe itching in your vagina. ¨ Pain during sex or when you urinate. ¨ Unusual discharge from your vagina. ¨ A frequent urge to urinate. ¨ Urine that is cloudy or smells bad. Watch closely for changes in your health, and be sure to contact your doctor if:  · You do not get better as expected. Where can you learn more? Go to http://pooja-shon.info/.   Enter R376 in the search box to learn more about \"Candidiasis: Care Instructions. \"  Current as of: October 13, 2016  Content Version: 11.3  © 5923-9350 [employer]. Care instructions adapted under license by [employer] (which disclaims liability or warranty for this information). If you have questions about a medical condition or this instruction, always ask your healthcare professional. [address] any warranty or liability for your use of this information. We hope that we have addressed all of your medical concerns. The examination and treatment you received in the Emergency Department were for an emergent problem and were not intended as complete care. It is important that you follow up with your healthcare provider(s) for ongoing care. If your symptoms worsen or do not improve as expected, and you are unable to reach your usual health care provider(s), you should return to the Emergency Department. Today's healthcare is undergoing tremendous change, and patient satisfaction surveys are one of the many tools to assess the quality of medical care. You may receive a survey from the [employer] regarding your experience in the Emergency Department. I hope that your experience has been completely positive, particularly the medical care that I provided. As such, please participate in the survey; anything less than excellent does not meet my expectations or intentions. [facility] and [facility] participate in nationally recognized quality of care measures. If your blood pressure is greater than 120/80, as reported below, we urge that you seek medical care to address the potential of high blood pressure, commonly known as hypertension. Hypertension can be hereditary or can be caused by certain medical conditions, pain, stress, or \"white coat syndrome. \"       Please make an appointment with your health care provider(s) for follow up of your Emergency Department visit. VITALS:   Patient Vitals for the past 8 hrs:   Temp Pulse Resp BP SpO2   07/20/17 2123 98.1 °F (36.7 °C) 99 18 132/79 97 %   07/20/17 1801 98.4 °F (36.9 °C) (!) 112 16 130/81 97 %          Thank you for allowing us to provide you with medical care today. We realize that you have many choices for your emergency care needs. Please choose us in the future for any continued health care needs. Fabricio Sorenson Ian, [address] [place], [place] [address].   Office: [phone]            Recent Results (from the past 24 hour(s))   CBC WITH AUTOMATED DIFF    Collection Time: 07/20/17  6:09 PM   Result Value Ref Range    WBC 7.6 3.6 - 11.0 K/uL    RBC 3.85 3.80 - 5.20 M/uL    HGB 11.1 (L) 11.5 - 16.0 g/dL    HCT 34.6 (L) 35.0 - 47.0 %    MCV 89.9 80.0 - 99.0 FL    MCH 28.8 26.0 - 34.0 PG    MCHC 32.1 30.0 - 36.5 g/dL    RDW 12.6 11.5 - 14.5 %    PLATELET 468 432 - 077 K/uL    NEUTROPHILS 61 32 - 75 %    LYMPHOCYTES 29 12 - 49 %    MONOCYTES 9 5 - 13 %    EOSINOPHILS 1 0 - 7 %    BASOPHILS 0 0 - 1 %    ABS. NEUTROPHILS 4.6 1.8 - 8.0 K/UL    ABS. LYMPHOCYTES 2.2 0.8 - 3.5 K/UL    ABS. MONOCYTES 0.7 0.0 - 1.0 K/UL    ABS. EOSINOPHILS 0.1 0.0 - 0.4 K/UL    ABS. BASOPHILS 0.0 0.0 - 0.1 K/UL   METABOLIC PANEL, COMPREHENSIVE    Collection Time: 07/20/17  6:09 PM   Result Value Ref Range    Sodium 135 (L) 136 - 145 mmol/L    Potassium 3.7 3.5 - 5.1 mmol/L    Chloride 102 97 - 108 mmol/L    CO2 24 21 - 32 mmol/L    Anion gap 9 5 - 15 mmol/L    Glucose 162 (H) 65 - 100 mg/dL    BUN 21 (H) 6 - 20 MG/DL    Creatinine 1.24 (H) 0.55 - 1.02 MG/DL    BUN/Creatinine ratio 17 12 - 20      GFR est AA 55 (L) >60 ml/min/1.73m2    GFR est non-AA 45 (L) >60 ml/min/1.73m2    Calcium 9.4 8.5 - 10.1 MG/DL    Bilirubin, total 0.4 0.2 - 1.0 MG/DL    ALT (SGPT) 22 12 - 78 U/L    AST (SGOT) 8 (L) 15 - 37 U/L    Alk.  phosphatase 96 45 - 117 U/L    Protein, total 8.4 (H) 6.4 - 8.2 g/dL    Albumin 4.1 3.5 - 5.0 g/dL    Globulin 4.3 (H) 2.0 - 4.0 g/dL    A-G Ratio 1.0 (L) 1.1 - 2.2     URINALYSIS W/MICROSCOPIC    Collection Time: 07/20/17  7:01 PM   Result Value Ref Range    Color YELLOW/STRAW      Appearance CLOUDY (A) CLEAR      Specific gravity 1.028 1.003 - 1.030      pH (UA) 5.0 5.0 - 8.0      Protein NEGATIVE  NEG mg/dL    Glucose NEGATIVE  NEG mg/dL    Ketone TRACE (A) NEG mg/dL    Bilirubin NEGATIVE  NEG      Blood NEGATIVE  NEG      Urobilinogen 0.2 0.2 - 1.0 EU/dL    Nitrites NEGATIVE  NEG      Leukocyte Esterase TRACE (A) NEG      WBC 5-10 0 - 4 /hpf    RBC 0-5 0 - 5 /hpf    Epithelial cells FEW FEW /lpf    Bacteria NEGATIVE  NEG /hpf    Hyaline cast 0-2 0 - 5 /lpf

## 2017-07-21 NOTE — Clinical Note
She was out of test strips-I called [facility] #100 with 3 refills to [facility] ph [phone] for her. Thanks!  Collin Eldridge

## 2017-08-08 DIAGNOSIS — I10 ESSENTIAL HYPERTENSION WITH GOAL BLOOD PRESSURE LESS THAN 140/90: ICD-10-CM

## 2017-08-08 RX ORDER — LOSARTAN POTASSIUM AND HYDROCHLOROTHIAZIDE 25; 100 MG/1; MG/1
1 TABLET ORAL DAILY
Qty: 90 TAB | Refills: 1 | Status: SHIPPED | OUTPATIENT
Start: 2017-08-08 | End: 2018-01-08 | Stop reason: SDUPTHER

## 2017-08-08 NOTE — TELEPHONE ENCOUNTER
Allison Ceja  [phone]    Billy Olson is requesting a refill of Losartan. She states that she is out of her medication. Pharmacy verified.

## 2017-08-10 ENCOUNTER — TELEPHONE (OUTPATIENT)
Dept: FAMILY MEDICINE CLINIC | Age: 57
End: 2017-08-10

## 2017-08-10 NOTE — TELEPHONE ENCOUNTER
----- Message from Jesus Sheridan sent at 8/10/2017  4:25 PM EDT -----  Regarding: /Telephone  Pt is returning a call. The patient also, want to know why is it a hold on her prescription. Best contact number is [phone].

## 2017-08-10 NOTE — TELEPHONE ENCOUNTER
[phone] spoke to patient prescription refill request was done and the note was already in there but I advised patient that prescription was sent 8/8/2017 and patient called pharmacy for refill they should have released the medication patient understand

## 2017-08-10 NOTE — TELEPHONE ENCOUNTER
[phone] spoke to patient advised her Hyzaar was sent to pharmacy 8/8/2017 patient is not taking Lisinopril

## 2017-08-10 NOTE — TELEPHONE ENCOUNTER
----- Message from Demarcus Menard sent at 8/10/2017  1:53 PM EDT -----  Regarding: Dr. Darlene Trevino Speak requesting a refill for her Lisinopril. Pt has changed Pharmacies. Pt use [address] ([facility]) [phone]. Pt is completely out of her medication. Has not taken her medication in 4 days.       Best contact is [phone]

## 2017-08-17 NOTE — TELEPHONE ENCOUNTER
Refill request:   Requested Prescriptions     Pending Prescriptions Disp Refills    nystatin (MYCOSTATIN) topical cream 15 g 0     Sig: Apply  to affected area two (2) times a day.              Patient requested this on 8- -  Please provide status

## 2017-08-18 RX ORDER — NYSTATIN 100000 U/G
CREAM TOPICAL 2 TIMES DAILY
Qty: 15 G | Refills: 0 | Status: SHIPPED | OUTPATIENT
Start: 2017-08-18 | End: 2018-03-14 | Stop reason: SDUPTHER

## 2017-08-24 DIAGNOSIS — J45.20 MILD INTERMITTENT ASTHMA WITHOUT COMPLICATION: ICD-10-CM

## 2017-08-25 RX ORDER — ALBUTEROL SULFATE 0.83 MG/ML
SOLUTION RESPIRATORY (INHALATION)
Qty: 75 EACH | Refills: 5 | Status: SHIPPED | OUTPATIENT
Start: 2017-08-25 | End: 2018-06-25 | Stop reason: SDUPTHER

## 2017-09-20 DIAGNOSIS — E78.2 MIXED HYPERLIPIDEMIA: ICD-10-CM

## 2017-09-21 RX ORDER — IBUPROFEN 800 MG/1
800 TABLET ORAL
Qty: 60 TAB | Refills: 0 | Status: SHIPPED | OUTPATIENT
Start: 2017-09-21 | End: 2018-06-25 | Stop reason: SDUPTHER

## 2017-09-21 RX ORDER — SIMVASTATIN 20 MG/1
20 TABLET, FILM COATED ORAL
Qty: 90 TAB | Refills: 1 | Status: SHIPPED | OUTPATIENT
Start: 2017-09-21 | End: 2018-03-04 | Stop reason: SDUPTHER

## 2017-09-27 ENCOUNTER — HOSPITAL ENCOUNTER (OUTPATIENT)
Dept: LAB | Age: 57
Discharge: HOME | End: 2017-09-27
Payer: MEDICARE

## 2017-09-27 ENCOUNTER — OFFICE VISIT (OUTPATIENT)
Dept: FAMILY MEDICINE CLINIC | Age: 57
End: 2017-09-27

## 2017-09-27 VITALS
TEMPERATURE: 97.7 F | RESPIRATION RATE: 18 BRPM | OXYGEN SATURATION: 97 % | HEART RATE: 97 BPM | SYSTOLIC BLOOD PRESSURE: 139 MMHG | WEIGHT: 213 LBS | HEIGHT: 68 IN | BODY MASS INDEX: 32.28 KG/M2 | DIASTOLIC BLOOD PRESSURE: 79 MMHG

## 2017-09-27 DIAGNOSIS — E11.8 CONTROLLED TYPE 2 DIABETES MELLITUS WITH COMPLICATION, WITHOUT LONG-TERM CURRENT USE OF INSULIN (HCC): ICD-10-CM

## 2017-09-27 DIAGNOSIS — E78.5 HYPERLIPIDEMIA WITH TARGET LDL LESS THAN 100: ICD-10-CM

## 2017-09-27 DIAGNOSIS — B37.9 YEAST INFECTION: ICD-10-CM

## 2017-09-27 DIAGNOSIS — E66.9 OBESITY, CLASS I, BMI 30-34.9: ICD-10-CM

## 2017-09-27 DIAGNOSIS — L03.90 CELLULITIS, UNSPECIFIED CELLULITIS SITE: ICD-10-CM

## 2017-09-27 DIAGNOSIS — Z51.81 MEDICATION MONITORING ENCOUNTER: ICD-10-CM

## 2017-09-27 DIAGNOSIS — I10 ESSENTIAL HYPERTENSION: ICD-10-CM

## 2017-09-27 DIAGNOSIS — J40 BRONCHITIS: ICD-10-CM

## 2017-09-27 DIAGNOSIS — R79.89 ELEVATED SERUM CREATININE: ICD-10-CM

## 2017-09-27 DIAGNOSIS — R92.8 ABNORMAL MAMMOGRAM: ICD-10-CM

## 2017-09-27 LAB — HBA1C MFR BLD HPLC: 9.1 %

## 2017-09-27 PROCEDURE — 87186 SC STD MICRODIL/AGAR DIL: CPT

## 2017-09-27 PROCEDURE — 87205 SMEAR GRAM STAIN: CPT

## 2017-09-27 RX ORDER — INSULIN PUMP SYRINGE, 3 ML
EACH MISCELLANEOUS
Qty: 1 KIT | Refills: 0 | Status: SHIPPED | OUTPATIENT
Start: 2017-09-27 | End: 2019-03-04

## 2017-09-27 RX ORDER — METFORMIN HYDROCHLORIDE 500 MG/1
1000 TABLET, EXTENDED RELEASE ORAL 2 TIMES DAILY
Qty: 60 TAB | Refills: 5 | Status: SHIPPED | OUTPATIENT
Start: 2017-09-27 | End: 2017-10-23 | Stop reason: SDUPTHER

## 2017-09-27 RX ORDER — SULFAMETHOXAZOLE AND TRIMETHOPRIM 800; 160 MG/1; MG/1
1 TABLET ORAL 2 TIMES DAILY
Qty: 20 TAB | Refills: 0 | Status: SHIPPED | OUTPATIENT
Start: 2017-09-27 | End: 2017-10-07

## 2017-09-27 RX ORDER — FLUCONAZOLE 150 MG/1
150 TABLET ORAL DAILY
Qty: 1 TAB | Refills: 0 | Status: SHIPPED | OUTPATIENT
Start: 2017-09-27 | End: 2017-09-28

## 2017-09-27 NOTE — PROGRESS NOTES
Chief Complaint   Patient presents with    Diabetes     follow up    Lesion     lower right side of abdomen - painfull - x3 days    Chest Pain    Nasal Congestion    Cough     productive         1. Have you been to the ER, urgent care clinic since your last visit? Hospitalized since your last visit? Yes, [facility] ER for a lesion on upper right leg. 2. Have you seen or consulted any other health care providers outside of the [address] [place] [address] [place] since your last visit? Include any pap smears or colon screening. No     Patient states she has a mammogram scheduled for next month.

## 2017-09-27 NOTE — MR AVS SNAPSHOT
Visit Information Date & Time Provider Department Dept. Phone Encounter #  
 9/27/2017 11:30 AM Yomaira Ramirez  [facility] [phone] [id number] Follow-up Instructions Return in about 5 weeks (around 10/30/2017) for diabetes follow up. Upcoming Health Maintenance Date Due  
 PAP AKA CERVICAL CYTOLOGY 10/30/2016 EYE EXAM RETINAL OR DILATED Q1 11/11/2016 BREAST CANCER SCRN MAMMOGRAM 6/15/2017 Pneumococcal 19-64 Medium Risk (1 of 1 - PPSV23) 7/27/2023* FOOT EXAM Q1 10/31/2017 MICROALBUMIN Q1 10/31/2017 HEMOGLOBIN A1C Q6M 12/28/2017 LIPID PANEL Q1 6/28/2018 DTaP/Tdap/Td series (2 - Td) 2/5/2025 COLONOSCOPY 5/31/2027 *Topic was postponed. The date shown is not the original due date. Allergies as of 9/27/2017  Review Complete On: 9/27/2017 By: Yomaira Ramirez MD  
  
 Severity Noted Reaction Type Reactions Vicodin [Hydrocodone-acetaminophen]  07/23/2012    Hives Current Immunizations  Reviewed on 8/29/2016 Name Date Tdap 2/5/2015 Not reviewed this visit You Were Diagnosed With   
  
 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 
ICD-9-CM: 401.9 Hyperlipidemia with target LDL less than 100     ICD-10-CM: E78.5 ICD-9-CM: 272.4 Bronchitis     ICD-10-CM: J40 ICD-9-CM: 730 Obesity, Class I, BMI 30-34.9     ICD-10-CM: E66.9 ICD-9-CM: 278.00 Cellulitis, unspecified cellulitis site     ICD-10-CM: L03.90 ICD-9-CM: 682.9 Yeast infection     ICD-10-CM: B37.9 ICD-9-CM: 112.9 Medication monitoring encounter     ICD-10-CM: Z51.81 
ICD-9-CM: V58.83 Controlled type 2 diabetes mellitus with complication, without long-term current use of insulin (HCC)     ICD-10-CM: E11.8 ICD-9-CM: 250.90 Vitals BP Pulse Temp Resp Height(growth percentile) Weight(growth percentile) 139/79 (BP 1 Location: Left arm, BP Patient Position: Sitting) 97 97.7 °F (36.5 °C) (Oral) 18 5' 8\" (1.727 m) 213 lb (96.6 kg) SpO2 BMI OB Status Smoking Status 97% 32.39 kg/m2 Hysterectomy Former Smoker Vitals History BMI and BSA Data Body Mass Index Body Surface Area  
 32.39 kg/m 2 2.15 m 2 Preferred Pharmacy Pharmacy Name Phone [facility] [address] [facility] [phone] Your Updated Medication List  
  
   
This list is accurate as of: 9/27/17 12:34 PM.  Always use your most recent med list.  
  
  
  
  
 * albuterol 90 mcg/actuation inhaler Commonly known as:  PROVENTIL HFA, VENTOLIN HFA, PROAIR HFA Take 2 Puffs by inhalation every six (6) hours as needed for Wheezing. * albuterol 2.5 mg /3 mL (0.083 %) nebulizer solution Commonly known as:  PROVENTIL VENTOLIN  
3 mL by Nebulization route every four (4) hours as needed for Wheezing. * albuterol 2.5 mg /3 mL (0.083 %) nebulizer solution Commonly known as:  PROVENTIL VENTOLIN  
USE ONE VIAL IN NEBULIZER EVERY 4 HOURS AS NEEDED FOR WHEEZING Blood-Glucose Meter monitoring kit Use as directed  
  
 fluconazole 150 mg tablet Commonly known as:  DIFLUCAN Take 1 Tab by mouth daily for 1 day. FDA advises cautious prescribing of oral fluconazole in pregnancy. fluticasone 50 mcg/actuation nasal spray Commonly known as:  Amy Para INSTILL TWO SPRAYS INTO EACH NOSTRIL ONCE DAILY. ibuprofen 800 mg tablet Commonly known as:  MOTRIN Take 1 Tab by mouth three (3) times daily as needed for Pain.  
  
 levocetirizine 5 mg tablet Commonly known as:  Ashlyn Martyr TAKE ONE TABLET BY MOUTH ONCE DAILY losartan-hydroCHLOROthiazide 100-25 mg per tablet Commonly known as:  HYZAAR Take 1 Tab by mouth daily. metFORMIN  mg tablet Commonly known as:  GLUCOPHAGE XR Take 2 Tabs by mouth two (2) times a day. Nebulizer & Compressor machine Nebulizer machine x 1 Nebulizer Kit  
  
 nystatin topical cream  
Commonly known as:  MYCOSTATIN Apply  to affected area two (2) times a day. * ONETOUCH ULTRA TEST strip Generic drug:  glucose blood VI test strips 48 Each by Does Not Apply route See Admin Instructions. One Touch Ultra 2 glucometer * glucose blood VI test strips strip Commonly known as:  ASCENSIA AUTODISC VI, ONE TOUCH ULTRA TEST VI Check glucose twice daily  
  
 oxyCODONE-acetaminophen 5-325 mg per tablet Commonly known as:  PERCOCET Take 1 Tab by mouth every eight (8) hours as needed for Pain. Max Daily Amount: 3 Tabs. simvastatin 20 mg tablet Commonly known as:  ZOCOR Take 1 Tab by mouth nightly. trimethoprim-sulfamethoxazole 160-800 mg per tablet Commonly known as:  BACTRIM DS, SEPTRA DS Take 1 Tab by mouth two (2) times a day for 10 days. * Notice: This list has 5 medication(s) that are the same as other medications prescribed for you. Read the directions carefully, and ask your doctor or other care provider to review them with you. Prescriptions Sent to Pharmacy Refills  
 fluconazole (DIFLUCAN) 150 mg tablet 0 Sig: Take 1 Tab by mouth daily for 1 day. FDA advises cautious prescribing of oral fluconazole in pregnancy. Class: Normal  
 Pharmacy: [facility], [address] [facility] Ph #: [phone] Route: Oral  
 trimethoprim-sulfamethoxazole (BACTRIM DS, SEPTRA DS) 160-800 mg per tablet 0 Sig: Take 1 Tab by mouth two (2) times a day for 10 days. Class: Normal  
 Pharmacy: [facility], [address] [facility] Ph #: [phone] Route: Oral  
 metFORMIN ER (GLUCOPHAGE XR) 500 mg tablet 5 Sig: Take 2 Tabs by mouth two (2) times a day.   
 Class: Normal  
 Pharmacy: [facility] [place] [address] 36, [address] [facility] Ph #: [phone] Route: Oral  
 Blood-Glucose Meter monitoring kit 0 Sig: Use as directed Class: Normal  
 Pharmacy: [facility] [address] Ph #: [phone]  
 glucose blood VI test strips (ASCENSIA AUTODISC VI, ONE TOUCH ULTRA TEST VI) strip 11 Sig: Check glucose twice daily Class: Normal  
 Pharmacy: [facility] [address] [place] [address] 36, [address] [facility] Ph #: [phone] We Performed the Following AMB POC HEMOGLOBIN A1C [29531 CPT(R)] ANAEROBIC/AEROBIC/GRAM STAIN [id number] CPT(R)] METABOLIC PANEL, COMPREHENSIVE [59223 CPT(R)] Follow-up Instructions Return in about 5 weeks (around 10/30/2017) for diabetes follow up. To-Do List   
 10/10/2017 7:45 AM  
  Appointment with [address] [place] [address] RADHA 1 at [address] [place] [address] ([phone]) Shower or bathe using soap and water. Do not use deodorant, powder, perfumes, or lotion the day of your exam.  If your prior mammograms were not performed at [facility] 6 please bring films with you or forward prior images 2 days before your procedure. Check in at registration 15min before your appointment time unless you were instructed to do otherwise. A script is not necessary, but if you have one, please bring it on the day of the mammogram or have it faxed to the department. [facility] [phone] [address] [place] [address]  [phone] [facility] JANN  [phone] [facility] [phone] [address] [phone] Patient Instructions Learning About Meal Planning for Diabetes Why plan your meals? Meal planning can be a key part of managing diabetes. Planning meals and snacks with the right balance of carbohydrate, protein, and fat can help you keep your blood sugar at the target level you set with your doctor. You don't have to eat special foods.  You can eat what your family eats, including sweets once in a while. But you do have to pay attention to how often you eat and how much you eat of certain foods. You may want to work with a dietitian or a certified diabetes educator. He or she can give you tips and meal ideas and can answer your questions about meal planning. This health professional can also help you reach a healthy weight if that is one of your goals. What plan is right for you? Your dietitian or diabetes educator may suggest that you start with the plate format or carbohydrate counting. The plate format The plate format is a simple way to help you manage how you eat. You plan meals by learning how much space each food should take on a plate. Using the plate format helps you spread carbohydrate throughout the day. It can make it easier to keep your blood sugar level within your target range. It also helps you see if you're eating healthy portion sizes. To use the plate format, you put non-starchy vegetables on half your plate. Add meat or meat substitutes on one-quarter of the plate. Put a grain or starchy vegetable (such as brown rice or a potato) on the final quarter of the plate. You can add a small piece of fruit and some low-fat or fat-free milk or yogurt, depending on your carbohydrate goal for each meal. 
Here are some tips for using the plate format: · Make sure that you are not using an oversized plate. A 9-inch plate is best. Many restaurants use larger plates. · Get used to using the plate format at home. Then you can use it when you eat out. · Write down your questions about using the plate format. Talk to your doctor, a dietitian, or a diabetes educator about your concerns. Carbohydrate counting With carbohydrate counting, you plan meals based on the amount of carbohydrate in each food. Carbohydrate raises blood sugar higher and more quickly than any other nutrient.  It is found in desserts, breads and cereals, and fruit. It's also found in starchy vegetables such as potatoes and corn, grains such as rice and pasta, and milk and yogurt. Spreading carbohydrate throughout the day helps keep your blood sugar levels within your target range. Your daily amount depends on several things, including your weight, how active you are, which diabetes medicines you take, and what your goals are for your blood sugar levels. A registered dietitian or diabetes educator can help you plan how much carbohydrate to include in each meal and snack. A guideline for your daily amount of carbohydrate is: · 45 to 60 grams at each meal. That's about the same as 3 to 4 carbohydrate servings. · 15 to 20 grams at each snack. That's about the same as 1 carbohydrate serving. The Nutrition Facts label on packaged foods tells you how much carbohydrate is in a serving of the food. First, look at the serving size on the food label. Is that the amount you eat in a serving? All of the nutrition information on a food label is based on that serving size. So if you eat more or less than that, you'll need to adjust the other numbers. Total carbohydrate is the next thing you need to look for on the label. If you count carbohydrate servings, one serving of carbohydrate is 15 grams. For foods that don't come with labels, such as fresh fruits and vegetables, you'll need a guide that lists carbohydrate in these foods. Ask your doctor, dietitian, or diabetes educator about books or other nutrition guides you can use. If you take insulin, you need to know how many grams of carbohydrate are in a meal. This lets you know how much rapid-acting insulin to take before you eat. If you use an insulin pump, you get a constant rate of insulin during the day. So the pump must be programmed at meals to give you extra insulin to cover the rise in blood sugar after meals.  
When you know how much carbohydrate you will eat, you can take the right amount of insulin. Or, if you always use the same amount of insulin, you need to make sure that you eat the same amount of carbohydrate at meals. If you need more help to understand carbohydrate counting and food labels, ask your doctor, dietitian, or diabetes educator. How do you get started with meal planning? Here are some tips to get started: 
· Plan your meals a week at a time. Don't forget to include snacks too. · Use cookbooks or online recipes to plan several main meals. Plan some quick meals for busy nights. You also can double some recipes that freeze well. Then you can save half for other busy nights when you don't have time to cook. · Make sure you have the ingredients you need for your recipes. If you're running low on basic items, put these items on your shopping list too. · List foods that you use to make breakfasts, lunches, and snacks. List plenty of fruits and vegetables. · Post this list on the refrigerator. Add to it as you think of more things you need. · Take the list to the store to do your weekly shopping. Follow-up care is a key part of your treatment and safety. Be sure to make and go to all appointments, and call your doctor if you are having problems. It's also a good idea to know your test results and keep a list of the medicines you take. Where can you learn more? Go to http://pooja-shon.info/. Andrés Carlson in the search box to learn more about \"Learning About Meal Planning for Diabetes. \" Current as of: March 13, 2017 Content Version: 11.3 © 6246-6560 [employer]. Care instructions adapted under license by [employer] (which disclaims liability or warranty for this information). If you have questions about a medical condition or this instruction, always ask your healthcare professional. [address] any warranty or liability for your use of this information. Introducing [facility]! Eduardo Alexandra introduces [employer] patient portal. Now you can access parts of your medical record, email your doctor's office, and request medication refills online. 1. In your internet browser, go to [email or web address] 2. Click on the First Time User? Click Here link in the Sign In box. You will see the New Member Sign Up page. 3. Enter your [employer] Access Code exactly as it appears below. You will not need to use this code after youve completed the sign-up process. If you do not sign up before the expiration date, you must request a new code. · [employer] Access Code: [email or web address] Expires: 12/26/2017 12:30 PM 
 
4. Enter the last four digits of your Social Security Number (xxxx) and Date of Birth (mm/dd/yyyy) as indicated and click Submit. You will be taken to the next sign-up page. 5. Create a [employer] ID. This will be your [employer] login ID and cannot be changed, so think of one that is secure and easy to remember. 6. Create a [employer] password. You can change your password at any time. 7. Enter your Password Reset Question and Answer. This can be used at a later time if you forget your password. 8. Enter your e-mail address. You will receive e-mail notification when new information is available in [address] 9. Click Sign Up. You can now view and download portions of your medical record. 10. Click the Download Summary menu link to download a portable copy of your medical information. If you have questions, please visit the Frequently Asked Questions section of the [employer] website. Remember, [employer] is NOT to be used for urgent needs. For medical emergencies, dial 911. Now available from your iPhone and Android! Please provide this summary of care documentation to your next provider. Your primary care clinician is listed as Conception Bal. If you have any questions after today's visit, please call [phone].

## 2017-09-27 NOTE — PROGRESS NOTES
HISTORY OF PRESENT ILLNESS  Vaishali Lu is a 49 y.o. female. Blood pressure 139/79, pulse 97, temperature 97.7 °F (36.5 °C), temperature source Oral, resp. rate 18, height 5' 8\" (1.727 m), weight 213 lb (96.6 kg), SpO2 97 %. Body mass index is 32.39 kg/(m^2). Chief Complaint   Patient presents with    Diabetes     follow up    Lesion     lower right side of abdomen - painfull - x3 days    Chest Pain    Nasal Congestion    Cough     productive        HPI  Vaishali Lu 49 y.o. female  presents to the office today for follow up on chronic conditions. DM2: A1c per POC today 9.1%, elevated from A1c of 7.7% on 06/28/17. Pt continues with Metformin 500mg BID. I advised pt that she needs to take 1000mg BID. Pt states that she keeps forgetting to take her Metformin. I advised pt that she needs to get this under control and is most likely causing all of her abscesses and yeast infections. I advised pt that she needs to start taking her blood sugars daily and keep a log. Will follow up with pt in 1 month to make sure her sugars are more controlled. I advised pt to talk to my nurse navigator about her diet and controlling her diabetes. Cold symptoms: Pt reports that she has not been feeling well. Pt reports that she has had significant chest pain and tightness from coughing. Pt states that she has been using a nebulizer that helps. Pt reports rhinorrhea and nasal congestion. Pt denies green colored mucus, but has had yellow colored mucus. Pt reports taking Mucinex DM that helps open things up, but makes her cough. Pt reports that it feels like Bronchitis and started on Keflex TID for 7 days. Pt states that when she took it, it would help break up her mucus, but also made her cough. I advised pt that it is important she finished the antibiotics. Pt denies fevers or chills. Pt states that she feels like she is getting better, but still has a productive cough.      Abscess: Pt reports that she thought she had a spider bite, but went to the ED and was told it was abscess. Pt reports that she has had multiple abscesses that periodically appear. I advised pt that we should get a culture on it to rule out MRSA. Pt reports pain around the abscesses. I advised pt that because her sugars are not controlled could be causing these abscesses. Pt reports that she had MRSA years ago. I advised pt that I will treat her prophylactically with Bactrim for 10 days, which is stronger than the Amoxicillin. I advised pt to stop the Amoxicillin. I will also give pt Diflucan to prevent yeast infection. I advised pt that we will need to monitor her potassium levels because of the Bactrim and to come back in after 5 days to check her potassium. Hyperlipidemia: Lipid panel on 06/28/17 notable for total cholesterol 189, LDL 84, HDL 71, and triglycerides 171. Pt continues with Simvastatin 20mg daily. Stable, continue current regimen. Hypertension: Bp at office today 139/79. Pt continues with Hyzaar daily. Bp control stable, continue current regimen. Health maintenance: Pt reports that she is getting her mammogram on 10/10/17. I advised pt that she is due for her eye exam and pap smear. Current Outpatient Prescriptions   Medication Sig Dispense Refill    fluconazole (DIFLUCAN) 150 mg tablet Take 1 Tab by mouth daily for 1 day. FDA advises cautious prescribing of oral fluconazole in pregnancy. 1 Tab 0    trimethoprim-sulfamethoxazole (BACTRIM DS, SEPTRA DS) 160-800 mg per tablet Take 1 Tab by mouth two (2) times a day for 10 days. 20 Tab 0    metFORMIN ER (GLUCOPHAGE XR) 500 mg tablet Take 2 Tabs by mouth two (2) times a day. 60 Tab 5    Blood-Glucose Meter monitoring kit Use as directed 1 Kit 0    glucose blood VI test strips (ASCENSIA AUTODISC VI, ONE TOUCH ULTRA TEST VI) strip Check glucose twice daily 100 Strip 11    ibuprofen (MOTRIN) 800 mg tablet Take 1 Tab by mouth three (3) times daily as needed for Pain.  60 Tab 0  simvastatin (ZOCOR) 20 mg tablet Take 1 Tab by mouth nightly. 90 Tab 1    albuterol (PROVENTIL VENTOLIN) 2.5 mg /3 mL (0.083 %) nebulizer solution USE ONE VIAL IN NEBULIZER EVERY 4 HOURS AS NEEDED FOR WHEEZING 75 Each 5    nystatin (MYCOSTATIN) topical cream Apply  to affected area two (2) times a day. 15 g 0    losartan-hydroCHLOROthiazide (HYZAAR) 100-25 mg per tablet Take 1 Tab by mouth daily. 90 Tab 1    glucose blood VI test strips (ONETOUCH ULTRA TEST) strip 50 Each by Does Not Apply route See Admin Instructions. One Touch Ultra 2 glucometer      levocetirizine (XYZAL) 5 mg tablet TAKE ONE TABLET BY MOUTH ONCE DAILY 90 Tab 0    albuterol (PROVENTIL VENTOLIN) 2.5 mg /3 mL (0.083 %) nebulizer solution 3 mL by Nebulization route every four (4) hours as needed for Wheezing. 1 Package 5    fluticasone (FLONASE) 50 mcg/actuation nasal spray INSTILL TWO SPRAYS INTO EACH NOSTRIL ONCE DAILY. 1 Bottle 3    albuterol (PROVENTIL HFA, VENTOLIN HFA, PROAIR HFA) 90 mcg/actuation inhaler Take 2 Puffs by inhalation every six (6) hours as needed for Wheezing. 1 Inhaler 3    oxyCODONE-acetaminophen (PERCOCET) 5-325 mg per tablet Take 1 Tab by mouth every eight (8) hours as needed for Pain. Max Daily Amount: 3 Tabs.  20 Tab 0    Nebulizer & Compressor machine Nebulizer machine x 1 Nebulizer Kit 1 each 0     Allergies   Allergen Reactions    Vicodin [Hydrocodone-Acetaminophen] Hives     Past Medical History:   Diagnosis Date    Arthritis     Chronic pain     3 herniated discs in lower back    Diabetes (HCC)     GERD (gastroesophageal reflux disease)     High cholesterol     Hypertension     MRSA infection     Other ill-defined conditions     herniated disc to back    PUD (peptic ulcer disease)     Seasonal allergies      Past Surgical History:   Procedure Laterality Date    COLONOSCOPY N/A 5/31/2017    COL[facility] ENDOSCOPY    HX GYN      tubal ligation, hysterectomy    HX ORTHOPAEDIC      left hand ligament removed    HX OTHER SURGICAL      corticosteroid injections - back    HX TONSILLECTOMY       Family History   Problem Relation Age of Onset    Hypertension Mother     Diabetes Father      Social History   Substance Use Topics    Smoking status: Former Smoker     Quit date: 10/30/2012    Smokeless tobacco: Never Used    Alcohol use Yes      Comment: weekends        Review of Systems   Constitutional: Negative. Negative for malaise/fatigue. HENT: Positive for congestion. Positive: Rhinorrhea   Eyes: Negative for blurred vision. Respiratory: Positive for cough and sputum production. Cardiovascular: Positive for chest pain (tightness from coughing). Negative for leg swelling. Musculoskeletal: Negative. Skin:        Multiple abscesses   Neurological: Negative. Negative for dizziness and headaches. All other systems reviewed and are negative. Physical Exam   Constitutional: She is oriented to person, place, and time. She appears well-developed and well-nourished. No distress. HENT:   Head: Normocephalic and atraumatic. Neck: Carotid bruit is not present. Cardiovascular: Normal rate, regular rhythm, normal heart sounds and intact distal pulses. Exam reveals no gallop and no friction rub. No murmur heard. Pulmonary/Chest: Effort normal and breath sounds normal. No respiratory distress. She has no wheezes. She has no rales. Musculoskeletal: She exhibits no edema. Neurological: She is alert and oriented to person, place, and time. Skin: She is not diaphoretic. Warm to touch area on lower potion of abdomen. Lesion is 2x2in red ovoid lesion, enlarged with Serosanguineous discharge. Psychiatric: She has a normal mood and affect. Her behavior is normal. Judgment and thought content normal.   Nursing note and vitals reviewed. ASSESSMENT and PLAN  Diagnoses and all orders for this visit:    1.  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 -     Blood-Glucose Meter monitoring kit; Use as directed  -     glucose blood VI test strips (ASCENSIA AUTODISC VI, ONE TOUCH ULTRA TEST VI) strip; Check glucose twice daily        - A1c per POC today 9.1%, elevated from A1c of 7.7% on 06/28/17. - I will increase pt's dosage of Metformin to 1000mg BID        - I advised pt that this is most likely causing all of her abscesses and yeast infections. - I advised pt that she needs to start taking her blood sugars twice a day and keep a log.         - I advised pt to talk to my nurse navigator about her diet and controlling her diabetes. 2. Essential hypertension        - Bp control stable, continue current regimen. 3. Hyperlipidemia with target LDL less than 100        - Lipid panel on 06/28/17 notable for total cholesterol 189, LDL 84, HDL 71, and triglycerides 171. Pt continues with Simvastatin 20mg daily. Stable, continue current regimen. 4. Bronchitis        - I advised pt that it is important she finished the antibiotics. - I have started pt on Bactrim for 10 days to help alleviate her symptoms. 5. Obesity, Class I, BMI 30-34.9        - I have reviewed/discussed the above normal BMI with the patient. I have recommended the following interventions: dietary management education, guidance, and counseling, encourage exercise and monitor weight . 6. Cellulitis, unspecified cellulitis site  -     ANAEROBIC/AEROBIC/GRAM STAIN  -     trimethoprim-sulfamethoxazole (BACTRIM DS, SEPTRA DS) 160-800 mg per tablet;  Take 1 Tab by mouth two (2) times a day for 10 days.  -  I advised pt that I will treat her prophylactically with Bactrim for 10 days, which is stronger than the Amoxicillin. I advised pt to stop the Amoxicillin. - I will also give pt Diflucan to prevent yeast infection. I advised pt that we will need to monitor her potassium levels because of the Bactrim and to come back in after 5 days to check her potassium. 7. Yeast infection  -     fluconazole (DIFLUCAN) 150 mg tablet; Take 1 Tab by mouth daily for 1 day. FDA advises cautious prescribing of oral fluconazole in pregnancy. - See above. 8. Medication monitoring encounter  -     METABOLIC PANEL, COMPREHENSIVE  - See above. Follow-up Disposition:  Return in about 5 weeks (around 10/30/2017) for diabetes follow up. Medication risks/benefits/costs/interactions/alternatives discussed with patient. Advised patient to call back or return to office if symptoms worsen/change/persist.  If patient cannot reach us or should anything more severe/urgent arise he/she should proceed directly to the nearest emergency department. Discussed expected course/resolution/complications of diagnosis in detail with patient. Patient given a written after visit summary which includes her diagnoses, current medications and vitals. Patient expressed understanding with the diagnosis and plan.   Written by liliam Joyner, as dictated by, Dr. Chato Ferrer MD.   I have reviewed and agree with the above note and have made corrections where appropriate, Dr. Chato Ferrer MD

## 2017-10-01 LAB
BACTERIA SPEC AEROBE CULT: ABNORMAL
BACTERIA SPEC ANAEROBE CULT: ABNORMAL
GRAM STN SPEC: ABNORMAL
GRAM STN SPEC: ABNORMAL

## 2017-10-02 ENCOUNTER — TELEPHONE (OUTPATIENT)
Dept: FAMILY MEDICINE CLINIC | Age: 57
End: 2017-10-02

## 2017-10-02 NOTE — TELEPHONE ENCOUNTER
----- Message from Tamiko Pradhan sent at 9/29/2017  6:00 PM EDT -----  Regarding: Dr. Khanna Sor: [phone]  Pt called to check the status on her pharmacy's request for more information regarding the glucose monitor, Pt's insurance states they need more information before they can give the Pt the glucose monitor that Dr. Randy Cifuentes prescribed. [address] [facility] [phone] on [address] told Pt that they sent a fax requesting more information on Wednesday 9/27/2017 and have not heard back from the practice. Please contact the Pharmacy and the Pt. Pt is dealing with High sugar and needs a way to monitor it daily.     Thank you

## 2017-10-02 NOTE — TELEPHONE ENCOUNTER
[phone] spoke to Lucille Kahn they needed patient medicare number which I gave to Lucille Kahn per Lucille Kahn needs more information from the patient patient needs to call pharmacy.     [phone] notified patient to call pharmacy per patient she will call them

## 2017-10-02 NOTE — PROGRESS NOTES
Pt on correct ABX  She has staph aureus  conttinue current regimen  If no imp she needs to RTO after 1 week

## 2017-10-02 NOTE — TELEPHONE ENCOUNTER
[phone] called [facility] per patient didn't get prescription for blood sugar machine per pharmacist they received it but they don't have it in stock they will have it tomorrow    [phone] notified patient via

## 2017-10-04 ENCOUNTER — HOSPITAL ENCOUNTER (OUTPATIENT)
Dept: LAB | Age: 57
Discharge: HOME | End: 2017-10-04
Payer: MEDICARE

## 2017-10-04 PROCEDURE — 80053 COMPREHEN METABOLIC PANEL: CPT

## 2017-10-05 LAB
ALBUMIN SERPL-MCNC: 4.5 G/DL (ref 3.5–5.5)
ALBUMIN/GLOB SERPL: 1.4 {RATIO} (ref 1.2–2.2)
ALP SERPL-CCNC: 98 IU/L (ref 39–117)
ALT SERPL-CCNC: 15 IU/L (ref 0–32)
AST SERPL-CCNC: 15 IU/L (ref 0–40)
BILIRUB SERPL-MCNC: 0.4 MG/DL (ref 0–1.2)
BUN SERPL-MCNC: 27 MG/DL (ref 6–24)
BUN/CREAT SERPL: 20 (ref 9–23)
CALCIUM SERPL-MCNC: 10.6 MG/DL (ref 8.7–10.2)
CHLORIDE SERPL-SCNC: 96 MMOL/L (ref 96–106)
CO2 SERPL-SCNC: 24 MMOL/L (ref 18–29)
CREAT SERPL-MCNC: 1.36 MG/DL (ref 0.57–1)
GLOBULIN SER CALC-MCNC: 3.3 G/DL (ref 1.5–4.5)
GLUCOSE SERPL-MCNC: 138 MG/DL (ref 65–99)
INTERPRETATION: NORMAL
POTASSIUM SERPL-SCNC: 4.9 MMOL/L (ref 3.5–5.2)
PROT SERPL-MCNC: 7.8 G/DL (ref 6–8.5)
SODIUM SERPL-SCNC: 134 MMOL/L (ref 134–144)

## 2017-10-11 ENCOUNTER — HOSPITAL ENCOUNTER (OUTPATIENT)
Dept: MAMMOGRAPHY | Age: 57
Discharge: HOME | End: 2017-10-11
Attending: FAMILY MEDICINE
Payer: MEDICARE

## 2017-10-11 DIAGNOSIS — Z12.31 VISIT FOR SCREENING MAMMOGRAM: ICD-10-CM

## 2017-10-11 PROCEDURE — 77067 SCR MAMMO BI INCL CAD: CPT

## 2017-10-16 NOTE — PROGRESS NOTES
Please recheck BMP this week  Pt needs to be well hydrated.   Place order and inform pt to come for labs

## 2017-10-17 NOTE — PROGRESS NOTES
1. BIRADS 0:  Incomplete: Needs additional imaging evaluation. Left breast  focal asymmetry. Additional evaluation is indicated. Spot compression views,  ML view, and possible ultrasound are recommended for further evaluation. Please set up Diagnostic mammo and ultrasound of left breast        2.  No mammographic evidence of malignancy, right breast.

## 2017-10-18 NOTE — PROGRESS NOTES
[phone] (home)   Verified  notified of her labs patient understand   BMP order ok per Dr Luke Gilliland

## 2017-10-18 NOTE — PROGRESS NOTES
[phone] verified  Patient notified of above note and voiced understanding of what was read.   Ultrasound of left breast ordered ok per Dr Lopez Sang

## 2017-10-20 ENCOUNTER — TELEPHONE (OUTPATIENT)
Dept: FAMILY MEDICINE CLINIC | Age: 57
End: 2017-10-20

## 2017-10-20 ENCOUNTER — HOSPITAL ENCOUNTER (OUTPATIENT)
Dept: LAB | Age: 57
Discharge: HOME | End: 2017-10-20
Payer: MEDICARE

## 2017-10-20 DIAGNOSIS — R79.89 ELEVATED SERUM CREATININE: ICD-10-CM

## 2017-10-20 PROCEDURE — 80048 BASIC METABOLIC PNL TOTAL CA: CPT

## 2017-10-20 PROCEDURE — 36415 COLL VENOUS BLD VENIPUNCTURE: CPT

## 2017-10-20 NOTE — TELEPHONE ENCOUNTER
Sultana Hooper    [phone]            Wants to know why they want an ultra sound since having my mammogram?  Asking for a return call

## 2017-10-21 LAB
BUN SERPL-MCNC: 22 MG/DL (ref 6–24)
BUN/CREAT SERPL: 21 (ref 9–23)
CALCIUM SERPL-MCNC: 10 MG/DL (ref 8.7–10.2)
CHLORIDE SERPL-SCNC: 98 MMOL/L (ref 96–106)
CO2 SERPL-SCNC: 23 MMOL/L (ref 18–29)
CREAT SERPL-MCNC: 1.04 MG/DL (ref 0.57–1)
GFR SERPLBLD CREATININE-BSD FMLA CKD-EPI: 61 ML/MIN/1.73
GFR SERPLBLD CREATININE-BSD FMLA CKD-EPI: 70 ML/MIN/1.73
GLUCOSE SERPL-MCNC: 137 MG/DL (ref 65–99)
POTASSIUM SERPL-SCNC: 4.1 MMOL/L (ref 3.5–5.2)
SODIUM SERPL-SCNC: 136 MMOL/L (ref 134–144)

## 2017-10-23 ENCOUNTER — HOSPITAL ENCOUNTER (OUTPATIENT)
Dept: MAMMOGRAPHY | Age: 57
Discharge: HOME | End: 2017-10-23
Attending: FAMILY MEDICINE
Payer: MEDICARE

## 2017-10-23 DIAGNOSIS — R92.8 ABNORMAL MAMMOGRAM: ICD-10-CM

## 2017-10-23 PROCEDURE — 77065 DX MAMMO INCL CAD UNI: CPT

## 2017-10-24 RX ORDER — METFORMIN HYDROCHLORIDE 500 MG/1
1000 TABLET, EXTENDED RELEASE ORAL 2 TIMES DAILY
Qty: 180 TAB | Refills: 1 | Status: SHIPPED | OUTPATIENT
Start: 2017-10-24 | End: 2018-02-01 | Stop reason: SDUPTHER

## 2017-10-30 ENCOUNTER — TELEPHONE (OUTPATIENT)
Dept: FAMILY MEDICINE CLINIC | Age: 57
End: 2017-10-30

## 2017-10-30 NOTE — TELEPHONE ENCOUNTER
Patient is calling, she is requesting a call back from [facility], she called today to reschedule her appointment for 10/30 at 10:30, I advised the next available for Dr. Cathryn Torres would be 12/11/2017 at 2:00pm, the patient states this is to long and she wants to be \"squeezed in\" before then.     Best call back # for patient: [phone]

## 2017-10-30 NOTE — PROGRESS NOTES
IMPRESSION:   1. BI-RADS Assessment Category 1: Negative.  No persistent abnormalities within  the left breast. No mammographic evidence of malignancy.     Annual screening mammography is recommended

## 2017-11-02 ENCOUNTER — TELEPHONE (OUTPATIENT)
Dept: FAMILY MEDICINE CLINIC | Age: 57
End: 2017-11-02

## 2017-11-02 NOTE — TELEPHONE ENCOUNTER
Patient states she has an appt on November 8 with  at 6:15, and she would like nurse to squeeze her in  schedule at a later time if possible because she forgot she had another appointment she has to go to somewhere else she would like a call back   Best call back # for Josey Cookie [phone]

## 2017-11-02 NOTE — TELEPHONE ENCOUNTER
[phone] spoke to patient advised her appointment is on 11/8/2017 at 6:45P and she will try to get her advised her if she can't make it to just call me back and will try to schedule

## 2017-11-08 ENCOUNTER — HOSPITAL ENCOUNTER (OUTPATIENT)
Dept: LAB | Age: 57
Discharge: HOME | End: 2017-11-08
Payer: MEDICARE

## 2017-11-08 ENCOUNTER — OFFICE VISIT (OUTPATIENT)
Dept: FAMILY MEDICINE CLINIC | Age: 57
End: 2017-11-08

## 2017-11-08 VITALS
WEIGHT: 214.8 LBS | HEART RATE: 105 BPM | SYSTOLIC BLOOD PRESSURE: 132 MMHG | TEMPERATURE: 97.4 F | RESPIRATION RATE: 16 BRPM | OXYGEN SATURATION: 97 % | DIASTOLIC BLOOD PRESSURE: 80 MMHG | HEIGHT: 68 IN | BODY MASS INDEX: 32.55 KG/M2

## 2017-11-08 DIAGNOSIS — E78.5 HYPERLIPIDEMIA WITH TARGET LDL LESS THAN 100: ICD-10-CM

## 2017-11-08 DIAGNOSIS — E66.9 OBESITY, CLASS I, BMI 30-34.9: ICD-10-CM

## 2017-11-08 DIAGNOSIS — I10 ESSENTIAL HYPERTENSION: Primary | ICD-10-CM

## 2017-11-08 DIAGNOSIS — E11.8 CONTROLLED TYPE 2 DIABETES MELLITUS WITH COMPLICATION, WITHOUT LONG-TERM CURRENT USE OF INSULIN (HCC): ICD-10-CM

## 2017-11-08 PROCEDURE — 82043 UR ALBUMIN QUANTITATIVE: CPT

## 2017-11-08 NOTE — PROGRESS NOTES
Chief Complaint   Patient presents with    Follow-up     1. Have you been to the ER, urgent care clinic since your last visit? Hospitalized since your last visit? No    2. Have you seen or consulted any other health care providers outside of the [address] [place] [address] since your last visit? Include any pap smears or colon screening.  No

## 2017-11-08 NOTE — MR AVS SNAPSHOT
Visit Information Date & Time Provider Department Dept. Phone Encounter #  
 11/8/2017  6:45 PM Pastora Don MD [address] [place] [address] [phone] [id number] Follow-up Instructions Return in about 2 months (around 1/8/2018) for diabetes follow up. Upcoming Health Maintenance Date Due  
 PAP AKA CERVICAL CYTOLOGY 10/30/2016 EYE EXAM RETINAL OR DILATED Q1 11/11/2016 FOOT EXAM Q1 10/31/2017 MICROALBUMIN Q1 10/31/2017 Pneumococcal 19-64 Medium Risk (1 of 1 - PPSV23) 7/27/2023* HEMOGLOBIN A1C Q6M 3/27/2018 LIPID PANEL Q1 6/28/2018 BREAST CANCER SCRN MAMMOGRAM 10/23/2018 DTaP/Tdap/Td series (2 - Td) 2/5/2025 COLONOSCOPY 5/31/2027 *Topic was postponed. The date shown is not the original due date. Allergies as of 11/8/2017  Review Complete On: 11/8/2017 By: Pastora Don MD  
  
 Severity Noted Reaction Type Reactions Vicodin [Hydrocodone-acetaminophen]  07/23/2012    Hives Current Immunizations  Reviewed on 8/29/2016 Name Date Tdap 2/5/2015 Not reviewed this visit You Were Diagnosed With   
  
 Codes Comments Essential hypertension    -  Primary ICD-10-CM: I10 
ICD-9-CM: 401.9 Controlled type 2 diabetes mellitus with complication, without long-term current use of insulin (HCC)     ICD-10-CM: E11.8 ICD-9-CM: 250.90 Hyperlipidemia with target LDL less than 100     ICD-10-CM: E78.5 ICD-9-CM: 272.4 Obesity, Class I, BMI 30-34.9     ICD-10-CM: E66.9 ICD-9-CM: 278.00 Vitals BP Pulse Temp Resp Height(growth percentile) Weight(growth percentile) 132/80 (!) 105 97.4 °F (36.3 °C) (Oral) 16 5' 8\" (1.727 m) 214 lb 12.8 oz (97.4 kg) SpO2 BMI OB Status Smoking Status 97% 32.66 kg/m2 Hysterectomy Former Smoker Vitals History BMI and BSA Data Body Mass Index Body Surface Area  
 32.66 kg/m 2 2.16 m 2 Preferred Pharmacy Pharmacy Name Phone [facility] [address] [place] [address] [phone] Your Updated Medication List  
  
   
This list is accurate as of: 11/8/17  7:51 PM.  Always use your most recent med list.  
  
  
  
  
 * albuterol 90 mcg/actuation inhaler Commonly known as:  PROVENTIL HFA, VENTOLIN HFA, PROAIR HFA Take 2 Puffs by inhalation every six (6) hours as needed for Wheezing. * albuterol 2.5 mg /3 mL (0.083 %) nebulizer solution Commonly known as:  PROVENTIL VENTOLIN  
3 mL by Nebulization route every four (4) hours as needed for Wheezing. * albuterol 2.5 mg /3 mL (0.083 %) nebulizer solution Commonly known as:  PROVENTIL VENTOLIN  
USE ONE VIAL IN NEBULIZER EVERY 4 HOURS AS NEEDED FOR WHEEZING Blood-Glucose Meter monitoring kit Use as directed  
  
 fluticasone 50 mcg/actuation nasal spray Commonly known as:  Ward Cyphers INSTILL TWO SPRAYS INTO EACH NOSTRIL ONCE DAILY. ibuprofen 800 mg tablet Commonly known as:  MOTRIN Take 1 Tab by mouth three (3) times daily as needed for Pain.  
  
 lancets 33 gauge Misc Commonly known as: One Touch Luis Angel Copping Check blood sugar twice daily DX:E11.65  
  
 levocetirizine 5 mg tablet Commonly known as:  Norma Hemp TAKE ONE TABLET BY MOUTH ONCE DAILY losartan-hydroCHLOROthiazide 100-25 mg per tablet Commonly known as:  HYZAAR Take 1 Tab by mouth daily. metFORMIN  mg tablet Commonly known as:  GLUCOPHAGE XR Take 2 Tabs by mouth two (2) times a day. Nebulizer & Compressor machine Nebulizer machine x 1 Nebulizer Kit  
  
 nystatin topical cream  
Commonly known as:  MYCOSTATIN Apply  to affected area two (2) times a day. * ONETOUCH ULTRA TEST strip Generic drug:  glucose blood VI test strips 48 Each by Does Not Apply route See Admin Instructions. One Touch Ultra 2 glucometer * glucose blood VI test strips strip Commonly known as:  ASCENSIA AUTODISC VI, ONE TOUCH ULTRA TEST VI  
 Check glucose twice daily  
  
 oxyCODONE-acetaminophen 5-325 mg per tablet Commonly known as:  PERCOCET Take 1 Tab by mouth every eight (8) hours as needed for Pain. Max Daily Amount: 3 Tabs. simvastatin 20 mg tablet Commonly known as:  ZOCOR Take 1 Tab by mouth nightly. * Notice: This list has 5 medication(s) that are the same as other medications prescribed for you. Read the directions carefully, and ask your doctor or other care provider to review them with you. We Performed the Following  DIABETES FOOT EXAM [7 Custom] MICROALBUMIN, UR, RAND W/ MICROALBUMIN/CREA RATIO [id number] CPT(R)] Follow-up Instructions Return in about 2 months (around 1/8/2018) for diabetes follow up. To-Do List   
 12/18/2017 Lab:  HEMOGLOBIN A1C WITH EAG   
  
 12/18/2017 Lab:  LIPID PANEL   
  
 12/18/2017 Lab:  METABOLIC PANEL, COMPREHENSIVE Patient Instructions Learning About Diabetes Food Guidelines Your Care Instructions Meal planning is important to manage diabetes. It helps keep your blood sugar at a target level (which you set with your doctor). You don't have to eat special foods. You can eat what your family eats, including sweets once in a while. But you do have to pay attention to how often you eat and how much you eat of certain foods. You may want to work with a dietitian or a certified diabetes educator (CDE) to help you plan meals and snacks. A dietitian or CDE can also help you lose weight if that is one of your goals. What should you know about eating carbs? Managing the amount of carbohydrate (carbs) you eat is an important part of healthy meals when you have diabetes. Carbohydrate is found in many foods. · Learn which foods have carbs. And learn the amounts of carbs in different foods. ¨ Bread, cereal, pasta, and rice have about 15 grams of carbs in a serving.  A serving is 1 slice of bread (1 ounce), ½ cup of cooked cereal, or 1/3 cup of cooked pasta or rice. ¨ Fruits have 15 grams of carbs in a serving. A serving is 1 small fresh fruit, such as an apple or orange; ½ of a banana; ½ cup of cooked or canned fruit; ½ cup of fruit juice; 1 cup of melon or raspberries; or 2 tablespoons of dried fruit. ¨ Milk and no-sugar-added yogurt have 15 grams of carbs in a serving. A serving is 1 cup of milk or 2/3 cup of no-sugar-added yogurt. ¨ Starchy vegetables have 15 grams of carbs in a serving. A serving is ½ cup of mashed potatoes or sweet potato; 1 cup winter squash; ½ of a small baked potato; ½ cup of cooked beans; or ½ cup cooked corn or green peas. · Learn how much carbs to eat each day and at each meal. A dietitian or CDE can teach you how to keep track of the amount of carbs you eat. This is called carbohydrate counting. · If you are not sure how to count carbohydrate grams, use the Plate Method to plan meals. It is a good, quick way to make sure that you have a balanced meal. It also helps you spread carbs throughout the day. ¨ Divide your plate by types of foods. Put non-starchy vegetables on half the plate, meat or other protein food on one-quarter of the plate, and a grain or starchy vegetable in the final quarter of the plate. To this you can add a small piece of fruit and 1 cup of milk or yogurt, depending on how many carbs you are supposed to eat at a meal. 
· Try to eat about the same amount of carbs at each meal. Do not \"save up\" your daily allowance of carbs to eat at one meal. 
· Proteins have very little or no carbs per serving. Examples of proteins are beef, chicken, turkey, fish, eggs, tofu, cheese, cottage cheese, and peanut butter. A serving size of meat is 3 ounces, which is about the size of a deck of cards. Examples of meat substitute serving sizes (equal to 1 ounce of meat) are 1/4 cup of cottage cheese, 1 egg, 1 tablespoon of peanut butter, and ½ cup of tofu. How can you eat out and still eat healthy? · Learn to estimate the serving sizes of foods that have carbohydrate. If you measure food at home, it will be easier to estimate the amount in a serving of restaurant food. · If the meal you order has too much carbohydrate (such as potatoes, corn, or baked beans), ask to have a low-carbohydrate food instead. Ask for a salad or green vegetables. · If you use insulin, check your blood sugar before and after eating out to help you plan how much to eat in the future. · If you eat more carbohydrate at a meal than you had planned, take a walk or do other exercise. This will help lower your blood sugar. What else should you know? · Limit saturated fat, such as the fat from meat and dairy products. This is a healthy choice because people who have diabetes are at higher risk of heart disease. So choose lean cuts of meat and nonfat or low-fat dairy products. Use olive or canola oil instead of butter or shortening when cooking. · Don't skip meals. Your blood sugar may drop too low if you skip meals and take insulin or certain medicines for diabetes. · Check with your doctor before you drink alcohol. Alcohol can cause your blood sugar to drop too low. Alcohol can also cause a bad reaction if you take certain diabetes medicines. Follow-up care is a key part of your treatment and safety. Be sure to make and go to all appointments, and call your doctor if you are having problems. It's also a good idea to know your test results and keep a list of the medicines you take. Where can you learn more? Go to http://pooja-shon.info/. Enter M532 in the search box to learn more about \"Learning About Diabetes Food Guidelines. \" Current as of: March 13, 2017 Content Version: 11.4 © 3905-8500 [employer]. Care instructions adapted under license by [employer] (which disclaims liability or warranty for this information).  If you have questions about a medical condition or this instruction, always ask your healthcare professional. [facility] any warranty or liability for your use of this information. Introducing [facility]! [employer] introduces [employer] patient portal. Now you can access parts of your medical record, email your doctor's office, and request medication refills online. 1. In your internet browser, go to [email or web address] 2. Click on the First Time User? Click Here link in the Sign In box. You will see the New Member Sign Up page. 3. Enter your [employer] Access Code exactly as it appears below. You will not need to use this code after youve completed the sign-up process. If you do not sign up before the expiration date, you must request a new code. · [employer] Access Code: [email or web address] Expires: 12/26/2017 11:30 AM 
 
4. Enter the last four digits of your Social Security Number (xxxx) and Date of Birth (mm/dd/yyyy) as indicated and click Submit. You will be taken to the next sign-up page. 5. Create a [employer] ID. This will be your [employer] login ID and cannot be changed, so think of one that is secure and easy to remember. 6. Create a [employer] password. You can change your password at any time. 7. Enter your Password Reset Question and Answer. This can be used at a later time if you forget your password. 8. Enter your e-mail address. You will receive e-mail notification when new information is available in [address]. 9. Click Sign Up. You can now view and download portions of your medical record. 10. Click the Download Summary menu link to download a portable copy of your medical information. If you have questions, please visit the Frequently Asked Questions section of the [employer] website. Remember, [employer] is NOT to be used for urgent needs. For medical emergencies, dial 911. Now available from your iPhone and Android! Please provide this summary of care documentation to your next provider. Your primary care clinician is listed as Anika Patterson. If you have any questions after today's visit, please call [phone].

## 2017-11-09 LAB
ALBUMIN/CREAT UR: 13.6 MG/G CREAT (ref 0–30)
CREAT UR-MCNC: 273 MG/DL
MICROALBUMIN UR-MCNC: 37.2 UG/ML

## 2017-11-09 NOTE — PROGRESS NOTES
HISTORY OF PRESENT ILLNESS  Toya Dangelo is a 56 y.o. female. Blood pressure 132/80, pulse (!) 105, temperature 97.4 °F (36.3 °C), temperature source Oral, resp. rate 16, height 5' 8\" (1.727 m), weight 214 lb 12.8 oz (97.4 kg), SpO2 97 %. Body mass index is 32.66 kg/(m^2). Chief Complaint   Patient presents with    Follow-up        HPI  Toya Dangelo 56 y.o. female  presents to the office today for follow up on chronic conditions. Hypertension: Bp at office today 132/80 by manual arm cuff recheck. Pt continues with Hyzaar 100-25mg daily. Bp control stable, continue current regimen. Hyperlipidemia: Lipid panel on 06/28/17 notable for total cholesterol 189, , HDL 71, and triglycerides 171. Pt continues with Simvastatin 20mg daily. Advised pt that I want to get labs done in 12/17. DM2: A1c was 9.1% on 09/27/17, elevated from A1c of 7.7% on 06/28/17. Pt continues with Metformin 500mg 2 tablets BID. Pt states that she has been trying to count carbs per meal. Pt reports that she has been working on her diet and has cut out most of her regular junk food. Pt states that she has not been checking her sugars recently because she has been working on her diet. Pt notes that she has occasional heart burn that she thinks is caused by her Metformin and Simvastain. Pt states that she has noticed her toe nails are becoming harder to cut. Advised pt that I want to get labs done in 12/17. Health maintenance: Pt states that she has not set up her eye exam yet. Pt reports that she has a pap smear scheduled for 11/20/17. Pt states that she lost her referral to Dr. Barry Almanza (Sleep medicine). Advised pt that her referral has been approved and will give her Dr. Diez Living number to set up an appointment. Current Outpatient Prescriptions   Medication Sig Dispense Refill    metFORMIN ER (GLUCOPHAGE XR) 500 mg tablet Take 2 Tabs by mouth two (2) times a day.  180 Tab 1    lancets (ONE TOUCH DELICA) 33 gauge misc Check blood sugar twice daily DX:E11.65 100 Lancet 5    Blood-Glucose Meter monitoring kit Use as directed 1 Kit 0    glucose blood VI test strips (ASCENSIA AUTODISC VI, ONE TOUCH ULTRA TEST VI) strip Check glucose twice daily 100 Strip 11    ibuprofen (MOTRIN) 800 mg tablet Take 1 Tab by mouth three (3) times daily as needed for Pain. 60 Tab 0    simvastatin (ZOCOR) 20 mg tablet Take 1 Tab by mouth nightly. 90 Tab 1    albuterol (PROVENTIL VENTOLIN) 2.5 mg /3 mL (0.083 %) nebulizer solution USE ONE VIAL IN NEBULIZER EVERY 4 HOURS AS NEEDED FOR WHEEZING 75 Each 5    losartan-hydroCHLOROthiazide (HYZAAR) 100-25 mg per tablet Take 1 Tab by mouth daily. 90 Tab 1    glucose blood VI test strips (ONETOUCH ULTRA TEST) strip 50 Each by Does Not Apply route See Admin Instructions. One Touch Ultra 2 glucometer      levocetirizine (XYZAL) 5 mg tablet TAKE ONE TABLET BY MOUTH ONCE DAILY 90 Tab 0    albuterol (PROVENTIL VENTOLIN) 2.5 mg /3 mL (0.083 %) nebulizer solution 3 mL by Nebulization route every four (4) hours as needed for Wheezing. 1 Package 5    fluticasone (FLONASE) 50 mcg/actuation nasal spray INSTILL TWO SPRAYS INTO EACH NOSTRIL ONCE DAILY. 1 Bottle 3    albuterol (PROVENTIL HFA, VENTOLIN HFA, PROAIR HFA) 90 mcg/actuation inhaler Take 2 Puffs by inhalation every six (6) hours as needed for Wheezing. 1 Inhaler 3    oxyCODONE-acetaminophen (PERCOCET) 5-325 mg per tablet Take 1 Tab by mouth every eight (8) hours as needed for Pain. Max Daily Amount: 3 Tabs. 20 Tab 0    Nebulizer & Compressor machine Nebulizer machine x 1 Nebulizer Kit 1 each 0    nystatin (MYCOSTATIN) topical cream Apply  to affected area two (2) times a day.  15 g 0     Allergies   Allergen Reactions    Vicodin [Hydrocodone-Acetaminophen] Hives     Past Medical History:   Diagnosis Date    Arthritis     Chronic pain     3 herniated discs in lower back    Diabetes (HCC)     GERD (gastroesophageal reflux disease)     High cholesterol     Hypertension     MRSA infection     Other ill-defined conditions(799.89)     herniated disc to back    PUD (peptic ulcer disease)     Seasonal allergies      Past Surgical History:   Procedure Laterality Date    COLONOSCOPY N/A 5/31/2017    COL[facility] ENDOSCOPY    HX GYN      tubal ligation, hysterectomy    HX ORTHOPAEDIC      left hand ligament removed    HX OTHER SURGICAL      corticosteroid injections - back    HX TONSILLECTOMY       Family History   Problem Relation Age of Onset    Hypertension Mother     Diabetes Father      Social History   Substance Use Topics    Smoking status: Former Smoker     Quit date: 10/30/2012    Smokeless tobacco: Never Used    Alcohol use Yes      Comment: weekends        Review of Systems   Constitutional: Negative. Negative for malaise/fatigue. Eyes: Negative for blurred vision. Respiratory: Negative for shortness of breath. Cardiovascular: Negative for chest pain and leg swelling. Musculoskeletal: Negative. Neurological: Negative. Negative for dizziness and headaches. All other systems reviewed and are negative. Physical Exam   Constitutional: She is oriented to person, place, and time. She appears well-developed and well-nourished. No distress. HENT:   Head: Normocephalic and atraumatic. Neck: Carotid bruit is not present. Cardiovascular: Normal rate, regular rhythm, normal heart sounds and intact distal pulses. Exam reveals no gallop and no friction rub. No murmur heard. Pulmonary/Chest: Effort normal and breath sounds normal. No respiratory distress. She has no wheezes. She has no rales. Musculoskeletal: She exhibits no edema. Neurological: She is alert and oriented to person, place, and time. Skin: She is not diaphoretic. Psychiatric: She has a normal mood and affect.  Her behavior is normal. Judgment and thought content normal.   Nursing note and vitals reviewed. Diabetic foot exam:     Left: Reflexes 2+     Vibratory sensation normal    Proprioception normal   Sharp/dull discrimination normal    Filament test normal sensation with micro filament   Pulse DP: 2+ (normal)   Pulse PT: 2+ (normal)   Deformities: None  Right: Reflexes 2+   Vibratory sensation normal   Proprioception normal   Sharp/dull discrimination normal   Filament test normal sensation with micro filament   Pulse DP: 2+ (normal)   Pulse PT: 2+ (normal)   Deformities: None     ASSESSMENT and PLAN  Diagnoses and all orders for this visit:    1. Essential hypertension  -     METABOLIC PANEL, COMPREHENSIVE; Future  - Bp at office today 132/80. Pt continues with Hyzaar 100-25mg daily. Bp control stable, continue current regimen. 2. Controlled type 2 diabetes mellitus with complication, without long-term current use of insulin ([facility])  -      DIABETES FOOT EXAM  -     MICROALBUMIN, UR, RAND W/ MICROALBUMIN/CREA RATIO  -     METABOLIC PANEL, COMPREHENSIVE; Future  -     HEMOGLOBIN A1C WITH EAG; Future  - A1c was 9.1% on 09/27/17, elevated from A1c of 7.7% on 06/28/17. Pt continues with Metformin 500mg 2 tablets BID. Advised pt that I want to get labs done in 12/17. 3. Hyperlipidemia with target LDL less than 479  -     METABOLIC PANEL, COMPREHENSIVE; Future  -     LIPID PANEL; Future  - Lipid panel on 06/28/17 notable for total cholesterol 189, , HDL 71, and triglycerides 171. Pt continues with Simvastatin 20mg daily. Advised pt that I want to get labs done in 12/17.     4. Obesity, Class I, BMI 30-34.9        - I have reviewed/discussed the above normal BMI with the patient. I have recommended the following interventions: dietary management education, guidance, and counseling, encourage exercise and monitor weight . Follow-up Disposition:  Return in about 2 months (around 1/8/2018) for diabetes follow up.    Medication risks/benefits/costs/interactions/alternatives discussed with patient. Advised patient to call back or return to office if symptoms worsen/change/persist.  If patient cannot reach us or should anything more severe/urgent arise he/she should proceed directly to the nearest emergency department. Discussed expected course/resolution/complications of diagnosis in detail with patient. Patient given a written after visit summary which includes her diagnoses, current medications and vitals. Patient expressed understanding with the diagnosis and plan. Written by liliam Black, as dictated by Christie Sotomayor M.D.   I have reviewed and agree with the above note and have made corrections where appropriate, Dr. Pinky East MD

## 2017-11-09 NOTE — PATIENT INSTRUCTIONS

## 2018-01-08 DIAGNOSIS — I10 ESSENTIAL HYPERTENSION WITH GOAL BLOOD PRESSURE LESS THAN 140/90: ICD-10-CM

## 2018-01-08 NOTE — TELEPHONE ENCOUNTER
Patient called because she lost for blood pressure medication. She needs a refill. Patient does not know the name of the medication.  Best contact num [phone]

## 2018-01-09 RX ORDER — LOSARTAN POTASSIUM AND HYDROCHLOROTHIAZIDE 25; 100 MG/1; MG/1
1 TABLET ORAL DAILY
Qty: 90 TAB | Refills: 1 | Status: SHIPPED | OUTPATIENT
Start: 2018-01-09 | End: 2018-09-19 | Stop reason: SDUPTHER

## 2018-02-02 RX ORDER — METFORMIN HYDROCHLORIDE 500 MG/1
TABLET, EXTENDED RELEASE ORAL
Qty: 180 TAB | Refills: 1 | Status: SHIPPED | OUTPATIENT
Start: 2018-02-02 | End: 2018-07-06 | Stop reason: SDUPTHER

## 2018-03-04 DIAGNOSIS — E78.2 MIXED HYPERLIPIDEMIA: ICD-10-CM

## 2018-03-05 RX ORDER — SIMVASTATIN 20 MG/1
TABLET, FILM COATED ORAL
Qty: 90 TAB | Refills: 0 | Status: SHIPPED | OUTPATIENT
Start: 2018-03-05 | End: 2018-06-16 | Stop reason: SDUPTHER

## 2018-03-14 NOTE — TELEPHONE ENCOUNTER
----- Message from [address] [place] [address] sent at 3/14/2018  3:53 PM EDT -----  Regarding: Dr. Mago Youngblood / Isaac Landeros  Patient is requesting refill on \"cream for itching\".  Best contact [phone]

## 2018-03-15 RX ORDER — NYSTATIN 100000 U/G
CREAM TOPICAL 2 TIMES DAILY
Qty: 45 G | Refills: 0 | Status: SHIPPED | OUTPATIENT
Start: 2018-03-15 | End: 2019-03-04

## 2018-03-15 RX ORDER — NYSTATIN 100000 U/G
CREAM TOPICAL
Qty: 30 G | Refills: 0 | Status: SHIPPED | OUTPATIENT
Start: 2018-03-15 | End: 2018-06-25 | Stop reason: SDUPTHER

## 2018-04-30 NOTE — TELEPHONE ENCOUNTER
----- Message from Noe Chapman sent at 4/28/2018  2:14 PM EDT -----  Regarding: Dr. Junior Melgar  Pt is requesting a refill on Rx Flonase and her Allergy Rx thats a pill (pt statd she is not sure of the name) however it is on file with the practice. Pt pharmacy is [facility] on [address] ph# [phone]. Pt best contact number is [phone].

## 2018-05-02 RX ORDER — FLUTICASONE PROPIONATE 50 MCG
SPRAY, SUSPENSION (ML) NASAL
Qty: 1 BOTTLE | Refills: 3 | Status: SHIPPED | OUTPATIENT
Start: 2018-05-02 | End: 2018-08-14

## 2018-05-02 RX ORDER — LEVOCETIRIZINE DIHYDROCHLORIDE 5 MG/1
TABLET, FILM COATED ORAL
Qty: 90 TAB | Refills: 1 | Status: SHIPPED | OUTPATIENT
Start: 2018-05-02 | End: 2018-10-22 | Stop reason: SDUPTHER

## 2018-05-02 NOTE — TELEPHONE ENCOUNTER
[phone] spoke to patient notified prescription will be send today    Per Dr Elías ryan to send medication

## 2018-05-02 NOTE — TELEPHONE ENCOUNTER
Patient is calling requesting a call back from nurse in regards to her medication (Allergy) she states, she has bad allergy and the medication was not refilled yet.   She is very upset  Best call back :[phone]

## 2018-05-21 ENCOUNTER — OFFICE VISIT (OUTPATIENT)
Dept: FAMILY MEDICINE CLINIC | Age: 58
End: 2018-05-21

## 2018-05-21 VITALS
RESPIRATION RATE: 18 BRPM | DIASTOLIC BLOOD PRESSURE: 89 MMHG | TEMPERATURE: 98.1 F | WEIGHT: 222 LBS | SYSTOLIC BLOOD PRESSURE: 148 MMHG | HEART RATE: 85 BPM | OXYGEN SATURATION: 97 % | BODY MASS INDEX: 33.65 KG/M2 | HEIGHT: 68 IN

## 2018-05-21 DIAGNOSIS — H10.9 BACTERIAL CONJUNCTIVITIS OF RIGHT EYE: Primary | ICD-10-CM

## 2018-05-21 RX ORDER — GENTAMICIN SULFATE 3 MG/ML
1 SOLUTION/ DROPS OPHTHALMIC 4 TIMES DAILY
Qty: 1 BOTTLE | Refills: 0 | Status: SHIPPED | OUTPATIENT
Start: 2018-05-21 | End: 2018-05-28

## 2018-05-21 NOTE — PROGRESS NOTES
HISTORY OF PRESENT ILLNESS  Yaneth Gibbons is a 56 y.o. female. HPI  C/o right eye irritation with thick discharge this am.  Was exposed to her grandson a few weeks ago who had conjunctivitis. No fever/chills or eye pain. Past medical history, social history, family history and medications were reviewed and updated. Visit Vitals    /89 (BP 1 Location: Left arm, BP Patient Position: Sitting)    Pulse 85    Temp 98.1 °F (36.7 °C) (Oral)    Resp 18    Ht 5' 8\" (1.727 m)    Wt 222 lb (100.7 kg)    SpO2 97%    BMI 33.75 kg/m2     Review of Systems   Constitutional: Negative for chills and fever. Eyes: Positive for discharge and redness. Negative for blurred vision, double vision, photophobia and pain. Respiratory: Negative. Cardiovascular: Negative. All other systems reviewed and are negative. Physical Exam   Constitutional: No distress. Overweight. Eyes: EOM are normal. Pupils are equal, round, and reactive to light. Right eye exhibits discharge and exudate. Left eye exhibits no discharge. Right conjunctiva is injected. Left conjunctiva is not injected. Right eye with trace edema of upper and lower lids. Conjunctiva injected with approximately 3mm ulceration of lower lid conjunctiva. Purulent discharge inner canthus. Neck: Neck supple. Cardiovascular: Normal rate, regular rhythm and normal heart sounds. Pulmonary/Chest: Effort normal and breath sounds normal.   Lymphadenopathy:     She has no cervical adenopathy. Skin: Skin is warm and dry. ASSESSMENT and PLAN  Diagnoses and all orders for this visit:    1. Bacterial conjunctivitis of right eye  -     gentamicin (GARAMYCIN) 0.3 % ophthalmic solution; Administer 1 Drop to right eye four (4) times daily for 7 days. Warm compresses x 20 minutes qid. Contact precautions reviewed. Eye drops as above. 2. BMI 33.0-33.9,adult  Reviewed healthy diet and exercise recommendations. Follow up if sx worsen or FTI.

## 2018-05-21 NOTE — PROGRESS NOTES
Chief Complaint   Patient presents with   [address]     pt states that her grandson had pink eye and now she is having the same symptoms. 1. Have you been to the ER, urgent care clinic since your last visit? Hospitalized since your last visit? No    2. Have you seen or consulted any other health care providers outside of the [address] [place] [address] since your last visit? Include any pap smears or colon screening.  No

## 2018-05-21 NOTE — MR AVS SNAPSHOT
[address] [facility] [address] 
 
 
 [address] [place] [facility] [address] 
[phone] Patient: Merary Hamilton MRN: [id number] :1960 Visit Information Date & Time Provider Department Dept. Phone Encounter #  
 2018 11:30 AM Rosemary Knapp, [address] [facility] [phone] [id number] Your Appointments 2018 11:45 AM  
ROUTINE CARE with Jeff Bernabe MD  
[facility]) Appt Note: 6month follow-up cl18  
 [address] [address]  
[phone]  
  
   
 [address] [address] Upcoming Health Maintenance Date Due  
 PAP AKA CERVICAL CYTOLOGY 10/30/2016 EYE EXAM RETINAL OR DILATED Q1 2016 MEDICARE YEARLY EXAM 3/14/2018 HEMOGLOBIN A1C Q6M 3/27/2018 Pneumococcal 19-64 Medium Risk (1 of 1 - PPSV23) 2023* LIPID PANEL Q1 2018 Influenza Age 5 to Adult 2018 BREAST CANCER SCRN MAMMOGRAM 10/23/2018 FOOT EXAM Q1 2018 MICROALBUMIN Q1 2018 DTaP/Tdap/Td series (2 - Td) 2025 COLONOSCOPY 2027 *Topic was postponed. The date shown is not the original due date. Allergies as of 2018  Review Complete On: 2018 By: Rosemary Knapp NP Severity Noted Reaction Type Reactions Vicodin [Hydrocodone-acetaminophen]  2012    Hives Current Immunizations  Reviewed on 2016 Name Date Tdap 2015 Not reviewed this visit You Were Diagnosed With   
  
 Codes Comments Bacterial conjunctivitis of right eye    -  Primary ICD-10-CM: H10.9 ICD-9-CM: 372.39, 041.9 Vitals BP Pulse Temp Resp Height(growth percentile) Weight(growth percentile) 148/89 (BP 1 Location: Left arm, BP Patient Position: Sitting) 85 98.1 °F (36.7 °C) (Oral) 18 5' 8\" (1.727 m) 222 lb (100.7 kg) SpO2 BMI OB Status Smoking Status 97% 33.75 kg/m2 Hysterectomy Former Smoker BMI and BSA Data Body Mass Index Body Surface Area 33.75 kg/m 2 2.2 m 2 Preferred Pharmacy Pharmacy Name Phone [facility] [address], [address] Sheryl Silva [phone] Your Updated Medication List  
  
   
This list is accurate as of 5/21/18 12:14 PM.  Always use your most recent med list.  
  
  
  
  
 * albuterol 90 mcg/actuation inhaler Commonly known as:  PROVENTIL HFA, VENTOLIN HFA, PROAIR HFA Take 2 Puffs by inhalation every six (6) hours as needed for Wheezing. * albuterol 2.5 mg /3 mL (0.083 %) nebulizer solution Commonly known as:  PROVENTIL VENTOLIN  
3 mL by Nebulization route every four (4) hours as needed for Wheezing. * albuterol 2.5 mg /3 mL (0.083 %) nebulizer solution Commonly known as:  PROVENTIL VENTOLIN  
USE ONE VIAL IN NEBULIZER EVERY 4 HOURS AS NEEDED FOR WHEEZING Blood-Glucose Meter monitoring kit Use as directed  
  
 fluticasone 50 mcg/actuation nasal spray Commonly known as:  [facility] SPRAYS INTO EACH NOSTRIL ONCE DAILY. gentamicin 0.3 % ophthalmic solution Commonly known as:  GARAMYCIN Administer 1 Drop to right eye four (4) times daily for 7 days. ibuprofen 800 mg tablet Commonly known as:  MOTRIN Take 1 Tab by mouth three (3) times daily as needed for Pain.  
  
 lancets 33 gauge Misc Commonly known as: One Touch Cesar Herring Check blood sugar twice daily DX:E11.65  
  
 levocetirizine 5 mg tablet Commonly known as:  Jose Joe TAKE ONE TABLET BY MOUTH ONCE DAILY losartan-hydroCHLOROthiazide 100-25 mg per tablet Commonly known as:  HYZAAR Take 1 Tab by mouth daily. metFORMIN  mg tablet Commonly known as:  GLUCOPHAGE XR  
TAKE TWO TABLETS BY MOUTH TWICE DAILY Nebulizer & Compressor machine Nebulizer machine x 1 Nebulizer Kit  * nystatin topical cream  
Commonly known as:  MYCOSTATIN  
 Apply  to affected area two (2) times a day. * nystatin topical cream  
Commonly known as:  MYCOSTATIN  
APPLY  CREAM TOPICALLY TO AFFECTED AREA TWICE DAILY  
  
 * ONETOUCH ULTRA TEST strip Generic drug:  glucose blood VI test strips 48 Each by Does Not Apply route See Admin Instructions. One Touch Ultra 2 glucometer * glucose blood VI test strips strip Commonly known as:  ASCENSIA AUTODISC VI, ONE TOUCH ULTRA TEST VI Check glucose twice daily  
  
 oxyCODONE-acetaminophen 5-325 mg per tablet Commonly known as:  PERCOCET Take 1 Tab by mouth every eight (8) hours as needed for Pain. Max Daily Amount: 3 Tabs. simvastatin 20 mg tablet Commonly known as:  ZOCOR  
TAKE ONE TABLET BY MOUTH AT NIGHT * Notice: This list has 7 medication(s) that are the same as other medications prescribed for you. Read the directions carefully, and ask your doctor or other care provider to review them with you. Prescriptions Sent to Pharmacy Refills  
 gentamicin (GARAMYCIN) 0.3 % ophthalmic solution 0 Sig: Administer 1 Drop to right eye four (4) times daily for 7 days. Class: Normal  
 Pharmacy: [address], [address] #: [phone] Route: Right Eye Introducing [facility] & HEALTH SERVICES! [employer] introduces ZapMe patient portal. Now you can access parts of your medical record, email your doctor's office, and request medication refills online. 1. In your internet browser, go to [email or web address] 2. Click on the First Time User? Click Here link in the Sign In box. You will see the New Member Sign Up page. 3. Enter your ZapMe Access Code exactly as it appears below. You will not need to use this code after youve completed the sign-up process. If you do not sign up before the expiration date, you must request a new code. · ZapMe Access Code: [email or web address] Expires: 8/19/2018 11:46 AM 
 
 4. Enter the last four digits of your Social Security Number (xxxx) and Date of Birth (mm/dd/yyyy) as indicated and click Submit. You will be taken to the next sign-up page. 5. Create a 2C2P ID. This will be your 2C2P login ID and cannot be changed, so think of one that is secure and easy to remember. 6. Create a 2C2P password. You can change your password at any time. 7. Enter your Password Reset Question and Answer. This can be used at a later time if you forget your password. 8. Enter your e-mail address. You will receive e-mail notification when new information is available in [address] 9. Click Sign Up. You can now view and download portions of your medical record. 10. Click the Download Summary menu link to download a portable copy of your medical information. If you have questions, please visit the Frequently Asked Questions section of the 2C2P website. Remember, 2C2P is NOT to be used for urgent needs. For medical emergencies, dial 911. Now available from your iPhone and Android! Please provide this summary of care documentation to your next provider. Your primary care clinician is listed as Deuce Nicole. If you have any questions after today's visit, please call [phone].

## 2018-05-31 ENCOUNTER — TELEPHONE (OUTPATIENT)
Dept: FAMILY MEDICINE CLINIC | Age: 58
End: 2018-05-31

## 2018-05-31 NOTE — TELEPHONE ENCOUNTER
----- Message from Jeff Lovett sent at 5/31/2018  2:36 PM EDT -----  Regarding: Dr Gamboa/Phone  Pt has appt with Dr Enrique Luciano on 6/18. [place] be in [place]. Needs to R/S and needs sooner due to meds running out. She will only see Dr Enrique Luciano.   Her number is [phone]

## 2018-06-01 NOTE — TELEPHONE ENCOUNTER
[phone] spoke to patient cancelled appointment for 6/18/2018 and notified patient of her new appointment 6/25/2018 at 2:45PM patient understand

## 2018-06-16 DIAGNOSIS — E78.2 MIXED HYPERLIPIDEMIA: ICD-10-CM

## 2018-06-18 RX ORDER — SIMVASTATIN 20 MG/1
TABLET, FILM COATED ORAL
Qty: 90 TAB | Refills: 0 | Status: SHIPPED | OUTPATIENT
Start: 2018-06-18 | End: 2018-09-19 | Stop reason: SDUPTHER

## 2018-06-25 ENCOUNTER — OFFICE VISIT (OUTPATIENT)
Dept: FAMILY MEDICINE CLINIC | Age: 58
End: 2018-06-25

## 2018-06-25 ENCOUNTER — HOSPITAL ENCOUNTER (OUTPATIENT)
Dept: LAB | Age: 58
Discharge: HOME | End: 2018-06-25
Payer: MEDICARE

## 2018-06-25 VITALS
OXYGEN SATURATION: 97 % | DIASTOLIC BLOOD PRESSURE: 78 MMHG | HEART RATE: 100 BPM | TEMPERATURE: 98 F | SYSTOLIC BLOOD PRESSURE: 130 MMHG | BODY MASS INDEX: 33.65 KG/M2 | WEIGHT: 222 LBS | HEIGHT: 68 IN | RESPIRATION RATE: 10 BRPM

## 2018-06-25 DIAGNOSIS — E78.5 HYPERLIPIDEMIA WITH TARGET LDL LESS THAN 100: ICD-10-CM

## 2018-06-25 DIAGNOSIS — E66.9 OBESITY, CLASS I, BMI 30-34.9: ICD-10-CM

## 2018-06-25 DIAGNOSIS — Z13.31 SCREENING FOR DEPRESSION: ICD-10-CM

## 2018-06-25 DIAGNOSIS — E11.9 DIABETES MELLITUS, STABLE (HCC): Primary | ICD-10-CM

## 2018-06-25 DIAGNOSIS — I10 ESSENTIAL HYPERTENSION: ICD-10-CM

## 2018-06-25 DIAGNOSIS — Z00.00 ENCOUNTER FOR MEDICARE ANNUAL WELLNESS EXAM: ICD-10-CM

## 2018-06-25 LAB — HBA1C MFR BLD HPLC: 10.7 %

## 2018-06-25 PROCEDURE — 80053 COMPREHEN METABOLIC PANEL: CPT

## 2018-06-25 PROCEDURE — 80061 LIPID PANEL: CPT

## 2018-06-25 PROCEDURE — 85025 COMPLETE CBC W/AUTO DIFF WBC: CPT

## 2018-06-25 RX ORDER — PEN NEEDLE, DIABETIC 31 GX3/16"
NEEDLE, DISPOSABLE MISCELLANEOUS
Qty: 100 PEN NEEDLE | Refills: 11 | Status: SHIPPED | OUTPATIENT
Start: 2018-06-25 | End: 2019-07-31

## 2018-06-25 RX ORDER — IBUPROFEN 800 MG/1
800 TABLET ORAL
Qty: 60 TAB | Refills: 0 | Status: SHIPPED | OUTPATIENT
Start: 2018-06-25 | End: 2018-08-27

## 2018-06-25 RX ORDER — INSULIN GLARGINE 100 [IU]/ML
20 INJECTION, SOLUTION SUBCUTANEOUS DAILY
Qty: 6 ADJUSTABLE DOSE PRE-FILLED PEN SYRINGE | Refills: 5 | Status: SHIPPED | OUTPATIENT
Start: 2018-06-25 | End: 2018-06-27

## 2018-06-25 NOTE — MR AVS SNAPSHOT
[address] [facility] [address] 
 
 
 [address] 
[phone] Patient: Leilani iBanchi MRN: [id number] :1960 Visit Information Date & Time Provider Department Dept. Phone Encounter #  
 2018 12:15 PM Haylee Bolanos MD [address] [place] [address] [phone] [id number] Follow-up Instructions Return in about 2 weeks (around 2018) for diabetes follow up. Your Appointments 2018 10:00 AM  
ROUTINE CARE with Josselin Wright, 2011 [facility] ([facility]) Appt Note: Meeting with Mary Hagan  
 [phone] [id number] [phone]  
  
   
 [address] [address] Upcoming Health Maintenance Date Due  
 PAP AKA CERVICAL CYTOLOGY 10/30/2016 EYE EXAM RETINAL OR DILATED Q1 2016 MEDICARE YEARLY EXAM 3/14/2018 HEMOGLOBIN A1C Q6M 3/27/2018 LIPID PANEL Q1 2018 BREAST CANCER SCRN MAMMOGRAM 10/23/2018 Pneumococcal 19-64 Medium Risk (1 of 1 - PPSV23) 2023* Influenza Age 5 to Adult 2018 FOOT EXAM Q1 2018 MICROALBUMIN Q1 2018 DTaP/Tdap/Td series (2 - Td) 2025 COLONOSCOPY 2027 *Topic was postponed. The date shown is not the original due date. Allergies as of 2018  Review Complete On: 2018 By: Maddie Shepard LPN Severity Noted Reaction Type Reactions Vicodin [Hydrocodone-acetaminophen]  2012    Hives Current Immunizations  Reviewed on 2016 Name Date Tdap 2015 Not reviewed this visit You Were Diagnosed With   
  
 Codes Comments Diabetes mellitus, stable ([facility] [address]    -  Primary ICD-10-CM: E11.9 ICD-9-CM: 250.00 Uncontrolled type 2 diabetes mellitus without complication, without long-term current use of insulin ([facility] [address]     ICD-10-CM: E11.65 ICD-9-CM: 250.02  Essential hypertension     ICD-10-CM: I10 
 ICD-9-CM: 401.9 Hyperlipidemia with target LDL less than 100     ICD-10-CM: E78.5 ICD-9-CM: 272.4 Obesity, Class I, BMI 30-34.9     ICD-10-CM: E66.9 ICD-9-CM: 278.00 Encounter for Medicare annual wellness exam     ICD-10-CM: Z00.00 ICD-9-CM: V70.0 Screening for depression     ICD-10-CM: Z13.89 ICD-9-CM: V79.0 Vitals BP Pulse Temp Resp Height(growth percentile) Weight(growth percentile) 144/80 (BP 1 Location: Left arm, BP Patient Position: Sitting) 100 98 °F (36.7 °C) (Oral) 10 5' 8\" (1.727 m) 222 lb (100.7 kg) SpO2 BMI OB Status Smoking Status 97% 33.75 kg/m2 Hysterectomy Former Smoker Vitals History BMI and BSA Data Body Mass Index Body Surface Area 33.75 kg/m 2 2.2 m 2 Preferred Pharmacy Pharmacy Name Phone [facility], [address] [phone] Your Updated Medication List  
  
   
This list is accurate as of 6/25/18  1:50 PM.  Always use your most recent med list.  
  
  
  
  
 * albuterol 90 mcg/actuation inhaler Commonly known as:  PROVENTIL HFA, VENTOLIN HFA, PROAIR HFA Take 2 Puffs by inhalation every six (6) hours as needed for Wheezing. * albuterol 2.5 mg /3 mL (0.083 %) nebulizer solution Commonly known as:  PROVENTIL VENTOLIN  
3 mL by Nebulization route every four (4) hours as needed for Wheezing. Blood-Glucose Meter monitoring kit Use as directed  
  
 fluticasone 50 mcg/actuation nasal spray Commonly known as:  Martha Manuel INSTILL TWO SPRAYS INTO EACH NOSTRIL ONCE DAILY. ibuprofen 800 mg tablet Commonly known as:  MOTRIN Take 1 Tab by mouth three (3) times daily as needed for Pain. insulin glargine 100 unit/mL (3 mL) Inpn Commonly known as:  LANTUSBASAGLAR  
20 Units by SubCUTAneous route daily. Indications: type 2 diabetes mellitus Insulin Needles (Disposable) 32 gauge x 5/32\" Ndle Commonly known as:  Marta Pen Needle Use daily lancets 33 gauge Misc Commonly known as: One Touch Franci Jain Check blood sugar twice daily DX:E11.65  
  
 levocetirizine 5 mg tablet Commonly known as:  Aloha Borders TAKE ONE TABLET BY MOUTH ONCE DAILY losartan-hydroCHLOROthiazide 100-25 mg per tablet Commonly known as:  HYZAAR Take 1 Tab by mouth daily. metFORMIN  mg tablet Commonly known as:  GLUCOPHAGE XR  
TAKE TWO TABLETS BY MOUTH TWICE DAILY Nebulizer & Compressor machine Nebulizer machine x 1 Nebulizer Kit  
  
 nystatin topical cream  
Commonly known as:  MYCOSTATIN Apply  to affected area two (2) times a day. * ONETOUCH ULTRA TEST strip Generic drug:  glucose blood VI test strips 48 Each by Does Not Apply route See Admin Instructions. One Touch Ultra 2 glucometer * glucose blood VI test strips strip Commonly known as:  ASCENSIA AUTODISC VI, ONE TOUCH ULTRA TEST VI Check glucose twice daily  
  
 oxyCODONE-acetaminophen 5-325 mg per tablet Commonly known as:  PERCOCET Take 1 Tab by mouth every eight (8) hours as needed for Pain. Max Daily Amount: 3 Tabs. simvastatin 20 mg tablet Commonly known as:  ZOCOR  
TAKE 1 TABLET BY MOUTH AT NIGHT * Notice: This list has 4 medication(s) that are the same as other medications prescribed for you. Read the directions carefully, and ask your doctor or other care provider to review them with you. Prescriptions Sent to Pharmacy Refills  
 ibuprofen (MOTRIN) 800 mg tablet 0 Sig: Take 1 Tab by mouth three (3) times daily as needed for Pain. Class: Normal  
 Pharmacy: [facility] [address], [address] Ph #: [phone] Route: Oral  
 insulin glargine (LANTUS,BASAGLAR) 100 unit/mL (3 mL) inpn 5 Si Units by SubCUTAneous route daily. Indications: type 2 diabetes mellitus Class: Normal  
 Pharmacy: [facility] [address], [address] Ph #: [phone] Route: SubCUTAneous Insulin Needles, Disposable, (REJI PEN NEEDLE) 32 gauge x 5/32\" ndle 11 Sig: Use daily Class: Normal  
 Pharmacy: [facility] [address], [address] [facility] #: [phone] We Performed the Following AMB POC HEMOGLOBIN A1C [59949 CPT(R)] CBC WITH AUTOMATED DIFF [52404 CPT(R)] [address] [[id number] hospitals] LIPID PANEL [22754 CPT(R)] METABOLIC PANEL, COMPREHENSIVE [02593 CPT(R)] REFERRAL TO OPHTHALMOLOGY [REF57 Custom] Comments:  
 Please evaluate patient for glaucoma screening Follow-up Instructions Return in about 2 weeks (around 7/9/2018) for diabetes follow up. Referral Information Referral ID Referred By Referred To  
  
 [id number] Mia Bihari [facility] [address] [place], [address] Visits Status Start Date End Date 1 New Request 6/25/18 6/25/19 If your referral has a status of pending review or denied, additional information will be sent to support the outcome of this decision. Patient Instructions Basal Insulin (Levemir/Lantus) Titration Schedule Starting Dose:  _____________ units Your goal with basal insulin titration is to obtain a blood glucose between 70 - 140. Raise your basal insulin dose by 3 units if your fasting blood glucose is greater than 140 for 2 days in a row. Each morning when you wake up, check your blood glucose with your home glucose monitor before you have anything to eat or drink and this gives you your fasting blood glucose result. your dosage adjustment for your Levemir/Lantus insulin is only based on the fasting blood glucose. Thus if you are at a 10 unit dose of Levemir/Lantus and your fasting blood glucose is 155 and the next day it is 160 you should raise your Levemir/Lantus dose to 13 units.  Keep raising your Levemir/Lantus dose daily until you achieve a consistent fasting blood glucose < 140.  just remember only to increase it if the fasting glucose is >140 for two days in a row. So if it were 160 on day 1 and 135 on day 2 you would not raise it because it wasnt two days in a row! When your blood glucose gets between 70 - 140, stay at the same basal insulin dose that got you to this point. In other words, if you end up raising your daily basal insulin dose to 20 units to get to a fasting blood glucose between 70 - 140, stay at that 20 units dose as long as it allows you to maintain that 70 - 140 fasting blood glucose level. If your fasting blood glucose goes below 70, lower your basal insulin dosage by 2 units. In other words, if you wake up one morning and find your fasting blood glucose is 65, lower the basal insulin dose by 2 units (i.e. if you were taking 20 units of Levemir/Lantus the day before, now you reduce your dose to 18 units). Another examples of how to titrate your Levemir/Lantus dose: You take your first 10 unit dose of Levemir/Lantus and your fasting blood glucose the next day is 154. You dont increase yet. You check it again tomorrow. If it is greater than 140 then you should now raise your Levemir/Lantus dose to 13 units, which is 3 units more than the previous day. You keep checking you morning blood sugar everyday but dont make any additional increases unless it greater than 140 for 2 days in a row. Medicare Wellness Visit, Female The best way to live healthy is to have a lifestyle where you eat a well-balanced diet, exercise regularly, limit alcohol use, and quit all forms of tobacco/nicotine, if applicable. Regular preventive services are another way to keep healthy. Preventive services (vaccines, screening tests, monitoring & exams) can help personalize your care plan, which helps you manage your own care. Screening tests can find health problems at the earliest stages, when they are easiest to treat. [address] follows the current, evidence-based guidelines published by the [facility] States Thor Black ([facility]) when recommending preventive services for our patients. Because we follow these guidelines, sometimes recommendations change over time as research supports it. (For example, mammograms used to be recommended annually. Even though Medicare will still pay for an annual mammogram, the newer guidelines recommend a mammogram every two years for women of average risk.) Of course, you and your provider may decide to screen more often for some diseases, based on your risk and co-morbidities (chronic disease you are already diagnosed with). Preventive services for you include: - Medicare offers their members a free annual wellness visit, which is time for you and your primary care provider to discuss and plan for your preventive service needs. Take advantage of this benefit every year! 
 
-All people over age 72 should receive the recommended pneumonia vaccines. Current USPSTF guidelines recommend a series of two vaccines for the best pneumonia protection.  
 
-All adults should have a yearly flu vaccine and a tetanus vaccine every 10 years. All adults age 61 years should receive a shingles vaccine once in their lifetime.   
 
-A bone mass density test is recommended when a woman turns 65 to screen for osteoporosis. This test is only recommended once as a screening. Some providers will use this same test as a disease monitoring tool if you already have osteoporosis.  
 
-All adults age 38-68 years who are overweight should have a diabetes screening test once every three years.  
 
-Other screening tests & preventive services for persons with diabetes include: an eye exam to screen for diabetic retinopathy, a kidney function test, a foot exam, and stricter control over your cholesterol.  
 
-Cardiovascular screening for adults with routine risk involves an electrocardiogram (ECG) at intervals determined by the provider.  
 
-Colorectal cancer screenings should be done for adults age 54-65 years with normal risk. There are a number of acceptable methods of screening for this type of cancer. Each test has its own benefits and drawbacks. Discuss with your provider what is most appropriate for you during your annual wellness visit. The different tests include: colonoscopy (considered the best screening method), a fecal occult blood test, a fecal DNA test, and sigmoidoscopy. -Breast cancer screenings are recommended every other year for women of normal risk age 54-69 years.  
 
-Cervical cancer screenings for women over age 72 are only recommended with certain risk factors.  
 
-All adults born between [facility] should be screened once for Hepatitis C. Here is a list of your current Health Maintenance items (your personalized list of preventive services) with a due date: 
Health Maintenance Due Topic Date Due  Cervical Cancer Screening  10/30/2016  Eye Exam  11/11/2016 Shelia Sosa Annual Well Visit  03/14/2018  Hemoglobin A1C    03/27/2018  Cholesterol Test   06/28/2018  Breast Cancer Screening  10/23/2018 Introducing \[facility]\"" & HEALTH SERVICES! Marilu Herrera introduces [employer] patient portal. Now you can access parts of your medical record, email your doctor's office, and request medication refills online. 1. In your internet browser, go to [email or web address] 2. Click on the First Time User? Click Here link in the Sign In box. You will see the New Member Sign Up page. 3. Enter your Photozeen Access Code exactly as it appears below. You will not need to use this code after youve completed the sign-up process. If you do not sign up before the expiration date, you must request a new code. · Photozeen Access Code: [email or web address] Expires: 8/19/2018 11:46 AM 
 
 4. Enter the last four digits of your Social Security Number (xxxx) and Date of Birth (mm/dd/yyyy) as indicated and click Submit. You will be taken to the next sign-up page. 5. Create a [employer] ID. This will be your [employer] login ID and cannot be changed, so think of one that is secure and easy to remember. 6. Create a [employer] password. You can change your password at any time. 7. Enter your Password Reset Question and Answer. This can be used at a later time if you forget your password. 8. Enter your e-mail address. You will receive e-mail notification when new information is available in [address] 9. Click Sign Up. You can now view and download portions of your medical record. 10. Click the Download Summary menu link to download a portable copy of your medical information. If you have questions, please visit the Frequently Asked Questions section of the [employer] website. Remember, [employer] is NOT to be used for urgent needs. For medical emergencies, dial 911. Now available from your iPhone and Android! Please provide this summary of care documentation to your next provider. Your primary care clinician is listed as Amna Christie. If you have any questions after today's visit, please call [phone].

## 2018-06-25 NOTE — PROGRESS NOTES
This is the Subsequent Medicare Annual Wellness Exam, performed 12 months or more after the Initial AWV or the last Subsequent AWV    I have reviewed the patient's medical history in detail and updated the computerized patient record. History     Past Medical History:   Diagnosis Date    Arthritis     Chronic pain     3 herniated discs in lower back    Diabetes ([address]     GERD (gastroesophageal reflux disease)     High cholesterol     Hypertension     MRSA infection     Other ill-defined conditions(799.89)     herniated disc to back    PUD (peptic ulcer disease)     Seasonal allergies       Past Surgical History:   Procedure Laterality Date    COLONOSCOPY N/A 5/31/2017    COLON[facility]COPY performed by Opal Price MD at [facility] ENDOSCOPY    HX GYN      tubal ligation, hysterectomy    HX ORTHOPAEDIC      left hand ligament removed    HX OTHER SURGICAL      corticosteroid injections - back    HX TONSILLECTOMY       Current Outpatient Prescriptions   Medication Sig Dispense Refill    simvastatin (ZOCOR) 20 mg tablet TAKE 1 TABLET BY MOUTH AT NIGHT 90 Tab 0    fluticasone (FLONASE) 50 mcg/actuation nasal spray INSTILL TWO SPRAYS INTO EACH NOSTRIL ONCE DAILY. 1 Bottle 3    levocetirizine (XYZAL) 5 mg tablet TAKE ONE TABLET BY MOUTH ONCE DAILY 90 Tab 1    nystatin (MYCOSTATIN) topical cream Apply  to affected area two (2) times a day. 45 g 0    metFORMIN ER (GLUCOPHAGE XR) 500 mg tablet TAKE TWO TABLETS BY MOUTH TWICE DAILY 180 Tab 1    losartan-hydroCHLOROthiazide (HYZAAR) 100-25 mg per tablet Take 1 Tab by mouth daily. 90 Tab 1    ibuprofen (MOTRIN) 800 mg tablet Take 1 Tab by mouth three (3) times daily as needed for Pain. 60 Tab 0    albuterol (PROVENTIL VENTOLIN) 2.5 mg /3 mL (0.083 %) nebulizer solution 3 mL by Nebulization route every four (4) hours as needed for Wheezing.  1 Package 5    albuterol (PROVENTIL HFA, VENTOLIN HFA, PROAIR HFA) 90 mcg/actuation inhaler Take 2 Puffs by inhalation every six (6) hours as needed for Wheezing. 1 Inhaler 3    oxyCODONE-acetaminophen (PERCOCET) 5-325 mg per tablet Take 1 Tab by mouth every eight (8) hours as needed for Pain. Max Daily Amount: 3 Tabs. 20 Tab 0    Nebulizer & Compressor machine Nebulizer machine x 1 Nebulizer Kit 1 each 0    lancets (ONE TOUCH DELICA) 33 gauge misc Check blood sugar twice daily DX:E11.65 100 Lancet 5    Blood-Glucose Meter monitoring kit Use as directed 1 Kit 0    glucose blood VI test strips (ASCENSIA AUTODISC VI, ONE TOUCH ULTRA TEST VI) strip Check glucose twice daily 100 Strip 11    glucose blood VI test strips (ONETOUCH ULTRA TEST) strip 50 Each by Does Not Apply route See Admin Instructions.  One Touch Ultra 2 glucometer       Allergies   Allergen Reactions    Vicodin [Hydrocodone-Acetaminophen] Hives     Family History   Problem Relation Age of Onset    Hypertension Mother     Diabetes Father      Social History   Substance Use Topics    Smoking status: Former Smoker     Quit date: 10/30/2012    Smokeless tobacco: Never Used    Alcohol use Yes      Comment: weekends     Patient Active Problem List   Diagnosis Code    Lumbar herniated disc M51.26    Back pain M54.9    Anxiety F41.9    HTN (hypertension) I10    Depression F32.9    Vitamin D deficiency E55.9    Asthma J45.909    Obesity, Class I, BMI 30-34.9 E66.9    Hyperlipidemia with target LDL less than 100 E78.5    Diabetes mellitus type 2, controlled ([facility] [address] E11.9    Encounter for Medicare annual wellness exam Z00.00       Depression Risk Factor Screening:     PHQ over the last two weeks 6/25/2018   Little interest or pleasure in doing things Not at all   Feeling down, depressed or hopeless Not at all   Total Score PHQ 2 0   Trouble falling or staying asleep, or sleeping too much -   Feeling tired or having little energy -   Poor appetite or overeating -   Feeling bad about yourself - or that you are a failure or have let yourself or your family down - Trouble concentrating on things such as school, work, reading or watching TV -   Moving or speaking so slowly that other people could have noticed; or the opposite being so fidgety that others notice -   Thoughts of being better off dead, or hurting yourself in some way -   PHQ 9 Score -   How difficult have these problems made it for you to do your work, take care of your home and get along with others -     Alcohol Risk Factor Screening: You do not drink alcohol or very rarely. Functional Ability and Level of Safety:   Hearing Loss  Hearing is good. Activities of Daily Living  The home contains: no safety equipment. Patient does total self care    Fall Risk  Fall Risk Assessment, last 12 mths 6/25/2018   Able to walk? Yes   Fall in past 12 months? No       Abuse Screen  Patient is not abused    Cognitive Screening   Evaluation of Cognitive Function:  Has your family/caregiver stated any concerns about your memory: no      Patient Care Team   Patient Care Team:  Ruben Kelley MD as PCP - General (Family Practice)  Severiano Rojas MD (Cardiology)  Jessi Lomax, RN as Ambulatory Care Navigator    Assessment/Plan   Education and counseling provided:  Are appropriate based on today's review and evaluation    Diagnoses and all orders for this visit:    1. Diabetes mellitus, stable (HCC)  -     AMB POC HEMOGLOBIN A1C    2. Uncontrolled type 2 diabetes mellitus without complication, without long-term current use of insulin ([address]    3. Essential hypertension    4. Hyperlipidemia with target LDL less than 100    5. Obesity, Class I, BMI 30-34.9    6. Encounter for Medicare annual wellness exam    7. Screening for depression  -     Depression Screen Annual    Other orders  -     ibuprofen (MOTRIN) 800 mg tablet;  Take 1 Tab by mouth three (3) times daily as needed for Pain.  -     REFERRAL TO OPHTHALMOLOGY        Health Maintenance Due   Topic Date Due    PAP AKA CERVICAL CYTOLOGY  10/30/2016    EYE EXAM RETINAL OR DILATED Q1  11/11/2016    MEDICARE YEARLY EXAM  03/14/2018    HEMOGLOBIN A1C Q6M  03/27/2018    LIPID PANEL Q1  06/28/2018    BREAST CANCER SCRN MAMMOGRAM  10/23/2018

## 2018-06-25 NOTE — PROGRESS NOTES
HISTORY OF PRESENT ILLNESS  Cayetano Delarosa is a 56 y.o. female. Blood pressure 144/80, pulse 100, temperature 98 °F (36.7 °C), temperature source Oral, resp. rate 10, height 5' 8\" (1.727 m), weight 222 lb (100.7 kg), SpO2 97 %. Body mass index is 33.75 kg/(m^2). Chief Complaint   Patient presents with    Diabetes        HPI  Cayetano Delarosa 56 y.o. female  presents to the office today for diabetes follow up. Hypertension: Bp at office today 144/80, 130/78. Pt continues with Hyzaar 100-25mg daily. Bp control stable, continue current regimen. Hyperlipidemia: Lipid panel on 06/28/17 notable for total cholesterol 189, LDL 84, HDL 71, and triglycerides 171. Pt continues with Simvastatin 20mg daily. Presumed stable, will assess levels today. DM2: A1c per POC today 10.7%, elevated from A1c of 9.1% on 09/27/17. Pt continues with Metformin 1,000mg BID. Pt states that her Metformin causes her to urinate frequently during the night and she notices the tablets in her stool. Pt notes that she does not eat regular meals throughout the day, but will snack and eat meals late at night before she goes to bed. Discussed with pt that if she can't take Metformin and her A1c is this high she needs to start on insulin. Will start pt on Lantus 20 units daily and have pt meet with our pharm-D to discuss a titration schedule. Advised pt that she needs to check her blood sugar daily. Health maintenance: Pt states that she needs to start working on a regular exercise regimen. Advised pt that she needs to have an eye exam, will refer pt to Dr. Kaiden Garcia (Ophthalmology). I have asked pt a series of questions related to [employer]. Current Outpatient Prescriptions   Medication Sig Dispense Refill    ibuprofen (MOTRIN) 800 mg tablet Take 1 Tab by mouth three (3) times daily as needed for Pain. 60 Tab 0    insulin glargine (LANTUS,BASAGLAR) 100 unit/mL (3 mL) inpn 20 Units by SubCUTAneous route daily. Indications: type 2 diabetes mellitus 6 Adjustable Dose Pre-filled Pen Syringe 5    Insulin Needles, Disposable, (REJI PEN NEEDLE) 32 gauge x 5/32\" ndle Use daily 100 Pen Needle 11    simvastatin (ZOCOR) 20 mg tablet TAKE 1 TABLET BY MOUTH AT NIGHT 90 Tab 0    fluticasone (FLONASE) 50 mcg/actuation nasal spray INSTILL TWO SPRAYS INTO EACH NOSTRIL ONCE DAILY. 1 Bottle 3    levocetirizine (XYZAL) 5 mg tablet TAKE ONE TABLET BY MOUTH ONCE DAILY 90 Tab 1    nystatin (MYCOSTATIN) topical cream Apply  to affected area two (2) times a day. 45 g 0    metFORMIN ER (GLUCOPHAGE XR) 500 mg tablet TAKE TWO TABLETS BY MOUTH TWICE DAILY 180 Tab 1    losartan-hydroCHLOROthiazide (HYZAAR) 100-25 mg per tablet Take 1 Tab by mouth daily. 90 Tab 1    albuterol (PROVENTIL VENTOLIN) 2.5 mg /3 mL (0.083 %) nebulizer solution 3 mL by Nebulization route every four (4) hours as needed for Wheezing. 1 Package 5    albuterol (PROVENTIL HFA, VENTOLIN HFA, PROAIR HFA) 90 mcg/actuation inhaler Take 2 Puffs by inhalation every six (6) hours as needed for Wheezing. 1 Inhaler 3    oxyCODONE-acetaminophen (PERCOCET) 5-325 mg per tablet Take 1 Tab by mouth every eight (8) hours as needed for Pain. Max Daily Amount: 3 Tabs. 20 Tab 0    Nebulizer & Compressor machine Nebulizer machine x 1 Nebulizer Kit 1 each 0    lancets (ONE TOUCH DELICA) 33 gauge misc Check blood sugar twice daily DX:E11.65 100 Lancet 5    Blood-Glucose Meter monitoring kit Use as directed 1 Kit 0    glucose blood VI test strips (ASCENSIA AUTODISC VI, ONE TOUCH ULTRA TEST VI) strip Check glucose twice daily 100 Strip 11    glucose blood VI test strips (ONETOUCH ULTRA TEST) strip 50 Each by Does Not Apply route See Admin Instructions.  One Touch Ultra 2 glucometer       Allergies   Allergen Reactions    Vicodin [Hydrocodone-Acetaminophen] Hives     Past Medical History:   Diagnosis Date    Arthritis     Chronic pain     3 herniated discs in lower back    Diabetes [address] [address]     GERD (gastroesophageal reflux disease)     High cholesterol     Hypertension     MRSA infection     Other ill-defined conditions(799.89)     herniated disc to back    PUD (peptic ulcer disease)     Seasonal allergies      Past Surgical History:   Procedure Laterality Date    COLONOSCOPY N/A 5/31/2017    COLONOS[facility] performed by Chau Harley [facility] at [facility] ENDOSCOPY    HX GYN      tubal ligation, hysterectomy    HX ORTHOPAEDIC      left hand ligament removed    HX OTHER SURGICAL      corticosteroid injections - back    HX TONSILLECTOMY       Family History   Problem Relation Age of Onset    Hypertension Mother     Diabetes Father      Social History   Substance Use Topics    Smoking status: Former Smoker     Quit date: 10/30/2012    Smokeless tobacco: Never Used    Alcohol use Yes      Comment: weekends        Review of Systems   Constitutional: Negative. Negative for malaise/fatigue. Eyes: Negative for blurred vision. Respiratory: Negative for shortness of breath. Cardiovascular: Negative for chest pain and leg swelling. Musculoskeletal: Negative. Neurological: Negative. Negative for dizziness and headaches. All other systems reviewed and are negative. Physical Exam   Constitutional: She is oriented to person, place, and time. She appears well-developed and well-nourished. No distress. HENT:   Head: Normocephalic and atraumatic. Neck: Carotid bruit is not present. Cardiovascular: Normal rate, regular rhythm, normal heart sounds and intact distal pulses. Exam reveals no gallop and no friction rub. No murmur heard. Pulmonary/Chest: Effort normal and breath sounds normal. No respiratory distress. She has no wheezes. She has no rales. Musculoskeletal: She exhibits no edema. Neurological: She is alert and oriented to person, place, and time. Skin: She is not diaphoretic. Psychiatric: She has a normal mood and affect.  Her behavior is normal. Judgment and thought content normal.   Nursing note and vitals reviewed. ASSESSMENT and PLAN  Diagnoses and all orders for this visit:    1. Diabetes mellitus, stable ([facility] [address]  -     AMB POC HEMOGLOBIN A1C  - A1c per POC today 10.7%, elevated from A1c of 9.1% on 09/27/17. Pt continues with Metformin 1,000mg BID.   - Discussed with pt that if she can't take Metformin and her A1c is this high she needs to start on insulin. Will start pt on Lantus 20 units daily and have pt meet with our pharm-D to discuss a titration schedule. Advised pt that she needs to check her blood sugar daily. 2. Uncontrolled type 2 diabetes mellitus without complication, without long-term current use of insulin ([facility])  -     REFERRAL TO OPHTHALMOLOGY  -     METABOLIC PANEL, COMPREHENSIVE  -     insulin glargine (LANTUS,BASAGLAR) 100 unit/mL (3 mL) inpn; 20 Units by SubCUTAneous route daily. Indications: type 2 diabetes mellitus  -     Insulin Needles, Disposable, (REJI PEN NEEDLE) 32 gauge x 5/32\" ndle; Use daily  - See above    3. Essential hypertension  -     CBC WITH AUTOMATED DIFF  - Bp at office today 144/80, 130/78. Pt continues with Hyzaar 100-25mg daily. Bp control stable, continue current regimen. 4. Hyperlipidemia with target LDL less than 100  -     LIPID PANEL  - Lipid panel on 06/28/17 notable for total cholesterol 189, LDL 84, HDL 71, and triglycerides 171. Pt continues with Simvastatin 20mg daily. Presumed stable, will assess levels today. 5. Obesity, Class I, BMI 30-34.9        - I have reviewed/discussed the above normal BMI with the patient. I have recommended the following interventions: dietary management education, guidance, and counseling, encourage exercise and monitor weight . 6. Encounter for Medicare annual wellness exam        - I have asked pt a series of questions related to [facility].      7. Screening for depression  -     Depression Screen Annual    Other orders  -     ibuprofen (MOTRIN) 800 mg tablet; Take 1 Tab by mouth three (3) times daily as needed for Pain. Follow-up Disposition:  Return in about 2 weeks (around 7/9/2018) for diabetes follow up. Medication risks/benefits/costs/interactions/alternatives discussed with patient. Advised patient to call back or return to office if symptoms worsen/change/persist.  If patient cannot reach us or should anything more severe/urgent arise he/she should proceed directly to the nearest emergency department. Discussed expected course/resolution/complications of diagnosis in detail with patient. Patient given a written after visit summary which includes her diagnoses, current medications and vitals. Patient expressed understanding with the diagnosis and plan. Written by liliam Mendez, as dictated by Jennifer Viera M.D.  1:25 PM - 1:57 PM   Total time spent with the patient 32 minutes, greater than 50% of time spent counseling patient.     I have reviewed and agree with the above note and have made corrections where appropriate, Dr. Scarlett Suarez MD

## 2018-06-25 NOTE — PROGRESS NOTES
Chief Complaint   Patient presents with    Diabetes       Reviewed Record in preparation for visit and have obtained necessary documentation. Identified pt with two pt identifiers (Name @ )    Health Maintenance Due   Topic    PAP AKA CERVICAL CYTOLOGY     EYE EXAM RETINAL OR DILATED Q1     MEDICARE YEARLY EXAM     HEMOGLOBIN A1C Q6M     LIPID PANEL Q1     BREAST CANCER SCRN MAMMOGRAM          1. Have you been to the ER, urgent care clinic since your last visit? Hospitalized since your last visit? no    2. Have you seen or consulted any other health care providers outside of the [address] [place] [address] since your last visit? Include any pap smears or colon screening.  no

## 2018-06-25 NOTE — PATIENT INSTRUCTIONS
Basal Insulin (Levemir/Lantus) Titration Schedule    Starting Dose:  _____________ units    Your goal with basal insulin titration is to obtain a blood glucose between 70 - 140. Raise your basal insulin dose by 3 units if your fasting blood glucose is greater than 140 for 2 days in a row. Each morning when you wake up, check your blood glucose with your home glucose monitor before you have anything to eat or drink and this gives you your fasting blood glucose result. your dosage adjustment for your Levemir/Lantus insulin is only based on the fasting blood glucose. Thus if you are at a 10 unit dose of Levemir/Lantus and your fasting blood glucose is 155 and the next day it is 160 you should raise your Levemir/Lantus dose to 13 units. Keep raising your Levemir/Lantus dose daily until you achieve a consistent fasting blood glucose < 140.  just remember only to increase it if the fasting glucose is >140 for two days in a row. So if it were 160 on day 1 and 135 on day 2 you would not raise it because it wasnt two days in a row! When your blood glucose gets between 70 - 140, stay at the same basal insulin dose that got you to this point. In other words, if you end up raising your daily basal insulin dose to 20 units to get to a fasting blood glucose between 70 - 140, stay at that 20 units dose as long as it allows you to maintain that 70 - 140 fasting blood glucose level. If your fasting blood glucose goes below 70, lower your basal insulin dosage by 2 units. In other words, if you wake up one morning and find your fasting blood glucose is 65, lower the basal insulin dose by 2 units (i.e. if you were taking 20 units of Levemir/Lantus the day before, now you reduce your dose to 18 units). Another examples of how to titrate your Levemir/Lantus dose: You take your first 10 unit dose of Levemir/Lantus and your fasting blood glucose the next day is 154. You dont increase yet.   You check it again tomorrow. If it is greater than 140 then you should now raise your Levemir/Lantus dose to 13 units, which is 3 units more than the previous day. You keep checking you morning blood sugar everyday but dont make any additional increases unless it greater than 140 for 2 days in a row. Medicare Wellness Visit, Female    The best way to live healthy is to have a lifestyle where you eat a well-balanced diet, exercise regularly, limit alcohol use, and quit all forms of tobacco/nicotine, if applicable. Regular preventive services are another way to keep healthy. Preventive services (vaccines, screening tests, monitoring & exams) can help personalize your care plan, which helps you manage your own care. Screening tests can find health problems at the earliest stages, when they are easiest to treat. [facility] follows the current, evidence-based guidelines published by the [facility] ([facility]) when recommending preventive services for our patients. Because we follow these guidelines, sometimes recommendations change over time as research supports it. (For example, mammograms used to be recommended annually. Even though Medicare will still pay for an annual mammogram, the newer guidelines recommend a mammogram every two years for women of average risk.)    Of course, you and your provider may decide to screen more often for some diseases, based on your risk and co-morbidities (chronic disease you are already diagnosed with). Preventive services for you include:    - Medicare offers their members a free annual wellness visit, which is time for you and your primary care provider to discuss and plan for your preventive service needs. Take advantage of this benefit every year!    -All people over age 72 should receive the recommended pneumonia vaccines.  Current USPSTF guidelines recommend a series of two vaccines for the best pneumonia protection.     -All adults should have a yearly flu vaccine and a tetanus vaccine every 10 years. All adults age 61 years should receive a shingles vaccine once in their lifetime.      -A bone mass density test is recommended when a woman turns 65 to screen for osteoporosis. This test is only recommended once as a screening. Some providers will use this same test as a disease monitoring tool if you already have osteoporosis. -All adults age 38-68 years who are overweight should have a diabetes screening test once every three years.     -Other screening tests & preventive services for persons with diabetes include: an eye exam to screen for diabetic retinopathy, a kidney function test, a foot exam, and stricter control over your cholesterol.     -Cardiovascular screening for adults with routine risk involves an electrocardiogram (ECG) at intervals determined by the provider.     -Colorectal cancer screenings should be done for adults age 54-65 years with normal risk. There are a number of acceptable methods of screening for this type of cancer. Each test has its own benefits and drawbacks. Discuss with your provider what is most appropriate for you during your annual wellness visit. The different tests include: colonoscopy (considered the best screening method), a fecal occult blood test, a fecal DNA test, and sigmoidoscopy. -Breast cancer screenings are recommended every other year for women of normal risk age 54-69 years.     -Cervical cancer screenings for women over age 72 are only recommended with certain risk factors.     -All adults born between [facility] should be screened once for Hepatitis C.      Here is a list of your current Health Maintenance items (your personalized list of preventive services) with a due date:  Health Maintenance Due   Topic Date Due    Cervical Cancer Screening  10/30/2016    Eye Exam  11/11/2016    Annual Well Visit  03/14/2018    Hemoglobin A1C    03/27/2018    Cholesterol Test   06/28/2018    Breast Cancer Screening  10/23/2018

## 2018-06-26 ENCOUNTER — TELEPHONE (OUTPATIENT)
Dept: FAMILY MEDICINE CLINIC | Age: 58
End: 2018-06-26

## 2018-06-26 LAB
ALBUMIN SERPL-MCNC: 4.6 G/DL (ref 3.5–5.5)
ALBUMIN/GLOB SERPL: 1.5 {RATIO} (ref 1.2–2.2)
ALP SERPL-CCNC: 103 IU/L (ref 39–117)
ALT SERPL-CCNC: 20 IU/L (ref 0–32)
AST SERPL-CCNC: 15 IU/L (ref 0–40)
BASOPHILS # BLD AUTO: 0 X10E3/UL (ref 0–0.2)
BASOPHILS NFR BLD AUTO: 0 %
BILIRUB SERPL-MCNC: 0.4 MG/DL (ref 0–1.2)
BUN SERPL-MCNC: 18 MG/DL (ref 6–24)
BUN/CREAT SERPL: 17 (ref 9–23)
CALCIUM SERPL-MCNC: 10.5 MG/DL (ref 8.7–10.2)
CHLORIDE SERPL-SCNC: 93 MMOL/L (ref 96–106)
CHOLEST SERPL-MCNC: 164 MG/DL (ref 100–199)
CO2 SERPL-SCNC: 23 MMOL/L (ref 20–29)
CREAT SERPL-MCNC: 1.09 MG/DL (ref 0.57–1)
EOSINOPHIL # BLD AUTO: 0.1 X10E3/UL (ref 0–0.4)
EOSINOPHIL NFR BLD AUTO: 1 %
ERYTHROCYTE [DISTWIDTH] IN BLOOD BY AUTOMATED COUNT: 12.4 % (ref 12.3–15.4)
GFR SERPLBLD CREATININE-BSD FMLA CKD-EPI: 57 ML/MIN/1.73
GFR SERPLBLD CREATININE-BSD FMLA CKD-EPI: 66 ML/MIN/1.73
GLOBULIN SER CALC-MCNC: 3 G/DL (ref 1.5–4.5)
GLUCOSE SERPL-MCNC: 268 MG/DL (ref 65–99)
HCT VFR BLD AUTO: 38.6 % (ref 34–46.6)
HDLC SERPL-MCNC: 59 MG/DL
HGB BLD-MCNC: 12.6 G/DL (ref 11.1–15.9)
IMM GRANULOCYTES # BLD: 0 X10E3/UL (ref 0–0.1)
IMM GRANULOCYTES NFR BLD: 0 %
INTERPRETATION, 910389: NORMAL
INTERPRETATION: NORMAL
LDLC SERPL CALC-MCNC: 70 MG/DL (ref 0–99)
LYMPHOCYTES # BLD AUTO: 1.9 X10E3/UL (ref 0.7–3.1)
LYMPHOCYTES NFR BLD AUTO: 26 %
MCH RBC QN AUTO: 28.8 PG (ref 26.6–33)
MCHC RBC AUTO-ENTMCNC: 32.6 G/DL (ref 31.5–35.7)
MCV RBC AUTO: 88 FL (ref 79–97)
MONOCYTES # BLD AUTO: 0.6 X10E3/UL (ref 0.1–0.9)
MONOCYTES NFR BLD AUTO: 8 %
NEUTROPHILS # BLD AUTO: 4.7 X10E3/UL (ref 1.4–7)
NEUTROPHILS NFR BLD AUTO: 65 %
PDF IMAGE, 910387: NORMAL
PLATELET # BLD AUTO: 299 X10E3/UL (ref 150–379)
POTASSIUM SERPL-SCNC: 4.3 MMOL/L (ref 3.5–5.2)
PROT SERPL-MCNC: 7.6 G/DL (ref 6–8.5)
RBC # BLD AUTO: 4.37 X10E6/UL (ref 3.77–5.28)
SODIUM SERPL-SCNC: 134 MMOL/L (ref 134–144)
TRIGL SERPL-MCNC: 176 MG/DL (ref 0–149)
VLDLC SERPL CALC-MCNC: 35 MG/DL (ref 5–40)
WBC # BLD AUTO: 7.2 X10E3/UL (ref 3.4–10.8)

## 2018-06-26 NOTE — TELEPHONE ENCOUNTER
Please call patient. [phone]. She said the pharm on file sent a request for patient's insulin that they need auth and she doesn't know what to do and when is she suppose to start this. She said no one called back to the pharm so it looks like she's not starting the insulin.

## 2018-06-26 NOTE — TELEPHONE ENCOUNTER
PA for Lantus sent to [facility] via covermymeds and its pending     If [facility] has not responded to your request within 24 hours, contact [facility] at [phone]. If you think there may be a problem with your PA request, use our live chat feature at the bottom right.     Preferred are Mia Crouch     [phone] notified patient PA is pending patient understand

## 2018-06-27 RX ORDER — INSULIN GLARGINE 100 [IU]/ML
20 INJECTION, SOLUTION SUBCUTANEOUS DAILY
Qty: 15 ML | Refills: 5 | OUTPATIENT
Start: 2018-06-27 | End: 2018-08-08 | Stop reason: SDUPTHER

## 2018-06-27 NOTE — TELEPHONE ENCOUNTER
PA denied for Lantus per [facility] preferred   [facility] and Levemir

## 2018-06-27 NOTE — TELEPHONE ENCOUNTER
Per Dr ryan to call in [place]    Requested Prescriptions     Signed Prescriptions Disp Refills    insulin glargine (BASAGLAR KWIKPEN U-100 INSULIN) 100 unit/mL (3 mL) inpn 15 mL 5     Si Units by SubCUTAneous route daily. Authorizing Provider: Tariq Beckett     Ordering User: Sanjay Blas    Per Dr Luke Gilliland, verbal order received.       [address] spoke to pharmacist and called in [place] and her insurance does cover it.    [phone] spoke to patient notified of the new medication sent to pharmacy patient understand

## 2018-07-06 ENCOUNTER — OFFICE VISIT (OUTPATIENT)
Dept: FAMILY MEDICINE CLINIC | Age: 58
End: 2018-07-06

## 2018-07-06 VITALS
DIASTOLIC BLOOD PRESSURE: 88 MMHG | RESPIRATION RATE: 18 BRPM | HEIGHT: 68 IN | BODY MASS INDEX: 34.1 KG/M2 | HEART RATE: 88 BPM | OXYGEN SATURATION: 98 % | SYSTOLIC BLOOD PRESSURE: 138 MMHG | WEIGHT: 225 LBS | TEMPERATURE: 97.1 F

## 2018-07-06 DIAGNOSIS — E78.5 HYPERLIPIDEMIA WITH TARGET LDL LESS THAN 100: ICD-10-CM

## 2018-07-06 DIAGNOSIS — I10 ESSENTIAL HYPERTENSION: ICD-10-CM

## 2018-07-06 DIAGNOSIS — Z12.39 SCREENING FOR MALIGNANT NEOPLASM OF BREAST: ICD-10-CM

## 2018-07-06 DIAGNOSIS — E66.9 OBESITY, CLASS I, BMI 30-34.9: ICD-10-CM

## 2018-07-06 PROBLEM — E11.21 TYPE 2 DIABETES WITH NEPHROPATHY (HCC): Status: ACTIVE | Noted: 2018-07-06

## 2018-07-06 RX ORDER — METFORMIN HYDROCHLORIDE 500 MG/1
TABLET, EXTENDED RELEASE ORAL
Qty: 180 TAB | Refills: 1 | Status: SHIPPED | OUTPATIENT
Start: 2018-07-06 | End: 2018-10-22 | Stop reason: SDUPTHER

## 2018-07-06 NOTE — MR AVS SNAPSHOT
[address] 
[phone] Patient: Kaylie Barraza MRN: [id number] :1960 Visit Information Date & Time Provider Department Dept. Phone Encounter #  
 2018  8:45 AM Anupam Roman  [facility] [phone] [id number] Follow-up Instructions Return in about 1 month (around 2018) for diabetes follow up. Upcoming Health Maintenance Date Due  
 PAP AKA CERVICAL CYTOLOGY 10/30/2016 EYE EXAM RETINAL OR DILATED Q1 2016 BREAST CANCER SCRN MAMMOGRAM 10/23/2018 Pneumococcal 19-64 Medium Risk (1 of 1 - PPSV23) 2023* Influenza Age 5 to Adult 2018 FOOT EXAM Q1 2018 MICROALBUMIN Q1 2018 HEMOGLOBIN A1C Q6M 2018 LIPID PANEL Q1 2019 MEDICARE YEARLY EXAM 2019 DTaP/Tdap/Td series (2 - Td) 2025 COLONOSCOPY 2027 *Topic was postponed. The date shown is not the original due date. Allergies as of 2018  Review Complete On: 2018 By: Anupam Roman MD  
  
 Severity Noted Reaction Type Reactions Vicodin [Hydrocodone-acetaminophen]  2012    Hives Current Immunizations  Reviewed on 2016 Name Date Tdap 2015 Not reviewed this visit You Were Diagnosed With   
  
 Codes Comments Uncontrolled type 2 diabetes mellitus without complication, with long-term current use of insulin ([facility] [address]    -  Primary ICD-10-CM: E11.65, Z79.4 ICD-9-CM: 250.02, V58.67 Hyperlipidemia with target LDL less than 100     ICD-10-CM: E78.5 ICD-9-CM: 272.4 Essential hypertension     ICD-10-CM: I10 
ICD-9-CM: 401.9 Obesity, Class I, BMI 30-34.9     ICD-10-CM: E66.9 ICD-9-CM: 278.00 Screening for malignant neoplasm of breast     ICD-10-CM: Z12.31 
ICD-9-CM: V76.10 Vitals BP Pulse Temp Resp Height(growth percentile) Weight(growth percentile) 138/88 88 97.1 °F (36.2 °C) (Oral) 18 5' 8\" (1.727 m) 225 lb (102.1 kg) SpO2 BMI OB Status Smoking Status 98% 34.21 kg/m2 Hysterectomy Former Smoker Vitals History BMI and BSA Data Body Mass Index Body Surface Area  
 34.21 kg/m 2 2.21 m 2 Preferred Pharmacy Pharmacy Name Phone [facility], [address] [phone] Your Updated Medication List  
  
   
This list is accurate as of 7/6/18 11:05 AM.  Always use your most recent med list.  
  
  
  
  
 * albuterol 90 mcg/actuation inhaler Commonly known as:  PROVENTIL HFA, VENTOLIN HFA, PROAIR HFA Take 2 Puffs by inhalation every six (6) hours as needed for Wheezing. * albuterol 2.5 mg /3 mL (0.083 %) nebulizer solution Commonly known as:  PROVENTIL VENTOLIN  
3 mL by Nebulization route every four (4) hours as needed for Wheezing. Blood-Glucose Meter monitoring kit Use as directed  
  
 dulaglutide 0.75 mg/0.5 mL sub-q pen Commonly known as:  TRULICITY  
0.5 mL by SubCUTAneous route every seven (7) days. fluticasone 50 mcg/actuation nasal spray Commonly known as:  Euna Andrew INSTILL TWO SPRAYS INTO EACH NOSTRIL ONCE DAILY. ibuprofen 800 mg tablet Commonly known as:  MOTRIN Take 1 Tab by mouth three (3) times daily as needed for Pain. insulin glargine 100 unit/mL (3 mL) Inpn Commonly known as:  BASAGLAR KWIKPEN U-100 INSULIN  
20 Units by SubCUTAneous route daily. Insulin Needles (Disposable) 32 gauge x 5/32\" Ndle Commonly known as:  Marta Pen Needle Use daily  
  
 lancets 33 gauge Misc Commonly known as: One Touch Blease Luis Miguel Check blood sugar twice daily DX:E11.65  
  
 levocetirizine 5 mg tablet Commonly known as:  Demaris Abel TAKE ONE TABLET BY MOUTH ONCE DAILY losartan-hydroCHLOROthiazide 100-25 mg per tablet Commonly known as:  HYZAAR Take 1 Tab by mouth daily. metFORMIN  mg tablet Commonly known as:  GLUCOPHAGE XR  
TAKE TWO TABLETS BY MOUTH TWICE DAILY Nebulizer & Compressor machine Nebulizer machine x 1 Nebulizer Kit  
  
 nystatin topical cream  
Commonly known as:  MYCOSTATIN Apply  to affected area two (2) times a day. * ONETOUCH ULTRA TEST strip Generic drug:  glucose blood VI test strips 48 Each by Does Not Apply route See Admin Instructions. One Touch Ultra 2 glucometer * glucose blood VI test strips strip Commonly known as:  ASCENSIA AUTODISC VI, ONE TOUCH ULTRA TEST VI Check glucose twice daily  
  
 oxyCODONE-acetaminophen 5-325 mg per tablet Commonly known as:  PERCOCET Take 1 Tab by mouth every eight (8) hours as needed for Pain. Max Daily Amount: 3 Tabs. simvastatin 20 mg tablet Commonly known as:  ZOCOR  
TAKE 1 TABLET BY MOUTH AT NIGHT * Notice: This list has 4 medication(s) that are the same as other medications prescribed for you. Read the directions carefully, and ask your doctor or other care provider to review them with you. Prescriptions Sent to Pharmacy Refills  
 dulaglutide (TRULICITY) 5.16 EJ/8.3 mL sub-q pen 1 Si.5 mL by SubCUTAneous route every seven (7) days. Class: Normal  
 Pharmacy: [address] [facility], [address] [facility] Ph #: [phone] Route: SubCUTAneous  
 metFORMIN ER (GLUCOPHAGE XR) 500 mg tablet 1 Sig: TAKE TWO TABLETS BY MOUTH TWICE DAILY Class: Normal  
 Pharmacy: [address] [facility], [address] [facility] Ph #: [phone] Follow-up Instructions Return in about 1 month (around 2018) for diabetes follow up. To-Do List   
 10/18/2018 Imaging:  [facility] MAMMO BI SCREENING INCL CAD Patient Instructions Try taking 2 metformin tablets with breakfast and 2 tablets with dinner Start looking at your snacks and try to limit to 15 gram total carbs. Great work on the changes to [employer] that you have already made! Start with 10 min of walking a couple times a week. Increase as you can with the ultimate goal of 30 min at least 45 days a week (either all at once or broken up into 3-10 min intervals) With the trulicity, eating smaller meals more frequently will help decrease the upset stomach sometimes people can feel Introducing [facility]! Hilaryangela Clark introduces [employer] patient portal. Now you can access parts of your medical record, email your doctor's office, and request medication refills online. 1. In your internet browser, go to [email or web address]. [employer] 2. Click on the First Time User? Click Here link in the Sign In box. You will see the New Member Sign Up page. 3. Enter your [employer] Access Code exactly as it appears below. You will not need to use this code after youve completed the sign-up process. If you do not sign up before the expiration date, you must request a new code. · [employer] Access Code: [email or web address] Expires: 8/19/2018 11:46 AM 
 
4. Enter the last four digits of your Social Security Number (xxxx) and Date of Birth (mm/dd/yyyy) as indicated and click Submit. You will be taken to the next sign-up page. 5. Create a [employer] ID. This will be your [employer] login ID and cannot be changed, so think of one that is secure and easy to remember. 6. Create a [employer] password. You can change your password at any time. 7. Enter your Password Reset Question and Answer. This can be used at a later time if you forget your password. 8. Enter your e-mail address. You will receive e-mail notification when new information is available in [address] 9. Click Sign Up. You can now view and download portions of your medical record. 10. Click the Download Summary menu link to download a portable copy of your medical information.  
 
If you have questions, please visit the Frequently Asked Questions section of the [employer]. Remember, [employer] is NOT to be used for urgent needs. For medical emergencies, dial 911. Now available from your iPhone and Android! Please provide this summary of care documentation to your next provider. Your primary care clinician is listed as Jessica Arango. If you have any questions after today's visit, please call [phone].

## 2018-07-06 NOTE — PATIENT INSTRUCTIONS
Try taking 2 metformin tablets with breakfast and 2 tablets with dinner    Start looking at your snacks and try to limit to 15 gram total carbs. Great work on the changes to [employer] that you have already made! Start with 10 min of walking a couple times a week.   Increase as you can with the ultimate goal of 30 min at least 45 days a week (either all at once or broken up into 3-10 min intervals)    With the trulicity, eating smaller meals more frequently will help decrease the upset stomach sometimes people can feel

## 2018-07-10 NOTE — PROGRESS NOTES
HISTORY OF PRESENT ILLNESS  Mariano Valdovinos is a 56 y.o. female. Blood pressure 138/88, pulse 88, temperature 97.1 °F (36.2 °C), temperature source Oral, resp. rate 18, height 5' 8\" (1.727 m), weight 225 lb (102.1 kg), SpO2 98 %. Body mass index is 34.21 kg/(m^2). Chief Complaint   Patient presents with    Diabetes     follow up        HPI  Mariano Valdovinos 56 y.o. female  presents to the office today for diabetes follow up. DM2: A1c was 10.7% on 06/25/18, was from A1c of 9.1% on 09/27/17. Pt continues with Metformin 500mg 2 tablets BID and insulin glargine 20 units daily. Pt reports she stopped metformin when she started basaglar. She states she has gradually increased basaglar but is still having fasting BG over 200 in the morning. She reports taking 26 U of basaglar this morning. Pt notes she has been making healthier lifestyle choices, such as exercising more and cutting out junk food. I instructed pt to re-introduce metformin 500mg 2 tablets BID into her DM regimen and start 5.60 mg trulicity once weekly. Kidney function was slightly decreased on last BMP. Hypertension: Bp at office today 144/88, 138/88 by manual arm cuff recheck. Pt continues with Hyzaar 100-25mg daily. Bp control stable, continue current regimen. Hyperlipidemia: Lipid panel on 06/25/18 notable for total cholesterol 164, LDL 70, HDL 59, and triglycerides 176. Pt continues with Simvastatin 20mg daily. Advised pt to continue to work on diet and exercise. Health maintenance: Pt reports her last mammogram was in 10/2017. I set pt up for mammogram appointment on 10/18/2018. Pt also notes she is going to schedule an eye appointment soon. Current Outpatient Prescriptions   Medication Sig Dispense Refill    dulaglutide (TRULICITY) 2.44 PALOMO/7.1 mL sub-q pen 0.5 mL by SubCUTAneous route every seven (7) days.  4 Pen 1    metFORMIN ER (GLUCOPHAGE XR) 500 mg tablet TAKE TWO TABLETS BY MOUTH TWICE DAILY 180 Tab 1    insulin glargine (BASAGLAR KWIKPEN U-100 INSULIN) 100 unit/mL (3 mL) inpn 20 Units by SubCUTAneous route daily. (Patient taking differently: 26 Units by SubCUTAneous route daily. ) 15 mL 5    ibuprofen (MOTRIN) 800 mg tablet Take 1 Tab by mouth three (3) times daily as needed for Pain. 60 Tab 0    Insulin Needles, Disposable, (REJI PEN NEEDLE) 32 gauge x 5/32\" ndle Use daily 100 Pen Needle 11    simvastatin (ZOCOR) 20 mg tablet TAKE 1 TABLET BY MOUTH AT NIGHT 90 Tab 0    fluticasone (FLONASE) 50 mcg/actuation nasal spray INSTILL TWO SPRAYS INTO EACH NOSTRIL ONCE DAILY. 1 Bottle 3    levocetirizine (XYZAL) 5 mg tablet TAKE ONE TABLET BY MOUTH ONCE DAILY 90 Tab 1    nystatin (MYCOSTATIN) topical cream Apply  to affected area two (2) times a day. 45 g 0    losartan-hydroCHLOROthiazide (HYZAAR) 100-25 mg per tablet Take 1 Tab by mouth daily. 90 Tab 1    lancets (ONE TOUCH DELICA) 33 gauge misc Check blood sugar twice daily DX:E11.65 100 Lancet 5    Blood-Glucose Meter monitoring kit Use as directed 1 Kit 0    glucose blood VI test strips (ASCENSIA AUTODISC VI, ONE TOUCH ULTRA TEST VI) strip Check glucose twice daily 100 Strip 11    oxyCODONE-acetaminophen (PERCOCET) 5-325 mg per tablet Take 1 Tab by mouth every eight (8) hours as needed for Pain. Max Daily Amount: 3 Tabs. 20 Tab 0    glucose blood VI test strips (ONETOUCH ULTRA TEST) strip 50 Each by Does Not Apply route See Admin Instructions. One Touch Ultra 2 glucometer      albuterol (PROVENTIL VENTOLIN) 2.5 mg /3 mL (0.083 %) nebulizer solution 3 mL by Nebulization route every four (4) hours as needed for Wheezing. 1 Package 5    albuterol (PROVENTIL HFA, VENTOLIN HFA, PROAIR HFA) 90 mcg/actuation inhaler Take 2 Puffs by inhalation every six (6) hours as needed for Wheezing.  1 Inhaler 3    Nebulizer & Compressor machine Nebulizer machine x 1 Nebulizer Kit 1 each 0     Allergies   Allergen Reactions    Vicodin [Hydrocodone-Acetaminophen] Hives     Past Medical History:   Diagnosis Date    Arthritis     Chronic pain     3 herniated discs in lower back    Diabetes ([address]     GERD (gastroesophageal reflux disease)     High cholesterol     Hypertension     MRSA infection     Other ill-defined conditions(799.89)     herniated disc to back    PUD (peptic ulcer disease)     Seasonal allergies      Past Surgical History:   Procedure Laterality Date    COLONOSCOPY N/A 5/31/2017    COLON[facility] Zoran [facility]olla [facility] at [facility] ENDOSCOPY    HX GYN      tubal ligation, hysterectomy    HX ORTHOPAEDIC      left hand ligament removed    HX OTHER SURGICAL      corticosteroid injections - back    HX TONSILLECTOMY       Family History   Problem Relation Age of Onset    Hypertension Mother     Diabetes Father      Social History   Substance Use Topics    Smoking status: Former Smoker     Quit date: 10/30/2012    Smokeless tobacco: Never Used    Alcohol use Yes      Comment: weekends        Review of Systems   Constitutional: Negative. Negative for malaise/fatigue. Eyes: Negative for blurred vision. Respiratory: Negative for shortness of breath. Cardiovascular: Negative for chest pain and leg swelling. Musculoskeletal: Negative. Neurological: Negative. Negative for dizziness and headaches. All other systems reviewed and are negative. Physical Exam   Constitutional: She is oriented to person, place, and time. She appears well-developed and well-nourished. No distress. HENT:   Head: Normocephalic and atraumatic. Neck: Carotid bruit is not present. Cardiovascular: Normal rate, regular rhythm, normal heart sounds and intact distal pulses. Exam reveals no gallop and no friction rub. No murmur heard. Pulmonary/Chest: Effort normal and breath sounds normal. No respiratory distress. She has no wheezes. She has no rales. Musculoskeletal: She exhibits no edema. Neurological: She is alert and oriented to person, place, and time.    Skin: She is not diaphoretic. Psychiatric: She has a normal mood and affect. Her behavior is normal. Judgment and thought content normal.   Nursing note and vitals reviewed. ASSESSMENT and PLAN  Diagnoses and all orders for this visit:    1. Uncontrolled type 2 diabetes mellitus without complication, with long-term current use of insulin (HCC)  -     dulaglutide (TRULICITY) 2.11 IJ/4.4 mL sub-q pen; 0.5 mL by SubCUTAneous route every seven (7) days. -     metFORMIN ER (GLUCOPHAGE XR) 500 mg tablet; TAKE TWO TABLETS BY MOUTH TWICE DAILY  - A1c was 10.7% on 06/25/18, was from A1c of 9.1% on 09/27/17. Pt continues with Metformin 500mg 2 tablets BID and insulin glargine 20 units daily. I instructed pt to re-introduce metformin 500mg 2 tablets BID into her DM regimen and start 3.30 mg trulicity once weekly. 2. Hyperlipidemia with target LDL less than 100        - Lipid panel on 06/25/18 notable for total cholesterol 164, LDL 70, HDL 59, and triglycerides 176. Pt continues with Simvastatin 20mg daily. Advised pt to continue to work on diet and exercise. 3. Essential hypertension        - Bp at office today 144/88, 138/88 by manual arm cuff recheck. Pt continues with Hyzaar 100-25mg daily. Bp control stable, continue current regimen. 4. Obesity, Class I, BMI 30-34.9        - I have reviewed/discussed the above normal BMI with the patient. I have recommended the following interventions: dietary management education, guidance, and counseling, encourage exercise and monitor weight . 5. Screening for malignant neoplasm of breast  -     [facility] MAMMOGRAM SCREENING DIGITAL BILAT; Future  - Will order a mammogram for pt to set up. Follow-up Disposition:  Return in about 1 month (around 8/6/2018) for diabetes follow up. Medication risks/benefits/costs/interactions/alternatives discussed with patient.   Advised patient to call back or return to office if symptoms worsen/change/persist.  If patient cannot reach us or should anything more severe/urgent arise he/she should proceed directly to the nearest emergency department. Discussed expected course/resolution/complications of diagnosis in detail with patient. Patient given a written after visit summary which includes her diagnoses, current medications and vitals. Patient expressed understanding with the diagnosis and plan. Written by liliam Dueñas, as dictated by Ramana Wyman M.D.  10:33 AM - 10:42 AM  Total time spent with the patient 9 minutes, greater than 50% of time spent counseling patient.     I have reviewed and agree with the above note and have made corrections where appropriate, Dr. Jacqueline Breen MD

## 2018-07-16 ENCOUNTER — TELEPHONE (OUTPATIENT)
Dept: FAMILY MEDICINE CLINIC | Age: 58
End: 2018-07-16

## 2018-07-16 NOTE — TELEPHONE ENCOUNTER
Faxed labs results to [address] [place] [address] [phone] and mailed lab results to patient

## 2018-07-16 NOTE — TELEPHONE ENCOUNTER
----- Message from Elijah Altman sent at 7/16/2018 11:21 AM EDT -----  Regarding: Dr. Brandee Hsieh requesting a copy of labs to be faxed to Dr. Fiona Guerra management provider\"  fax [phone] and mailed to herself from date of service 06/29/2018. Pt best contact number is [phone].

## 2018-07-23 NOTE — TELEPHONE ENCOUNTER
Patient is calling in regards to medication dulaglutide (TRULICITY) 3.89 LD/3.5 mL sub-q pen. She states that the first two weeks she did the medication injection wrong an the medication was wasted. She states that she tried to get medication refilled at pharmacy and they stated it was too soon. She states she is due to take next injection on Friday. She is requesting a call back with suggestion of what she should do. Requested Prescriptions     Pending Prescriptions Disp Refills    dulaglutide (TRULICITY) 8.83 AY/2.2 mL sub-q pen 4 Pen 1     Si.5 mL by SubCUTAneous route every seven (7) days.        Pharmacy on file verified       Best call back [phone]

## 2018-07-24 ENCOUNTER — TELEPHONE (OUTPATIENT)
Dept: FAMILY MEDICINE CLINIC | Age: 58
End: 2018-07-24

## 2018-07-24 NOTE — TELEPHONE ENCOUNTER
[phone] spoke to patient per patient first two weeks she did the medication injection wrong an the medication was wasted. She states that she tried to get medication refilled at pharmacy and they stated it was too soon. She states she is due to take next injection on Friday.      [phone] spoke to patient notified Beto Dee  is approve

## 2018-08-08 ENCOUNTER — OFFICE VISIT (OUTPATIENT)
Dept: FAMILY MEDICINE CLINIC | Age: 58
End: 2018-08-08

## 2018-08-08 VITALS
WEIGHT: 222 LBS | SYSTOLIC BLOOD PRESSURE: 170 MMHG | RESPIRATION RATE: 18 BRPM | OXYGEN SATURATION: 98 % | BODY MASS INDEX: 33.65 KG/M2 | HEIGHT: 68 IN | DIASTOLIC BLOOD PRESSURE: 100 MMHG | HEART RATE: 101 BPM | TEMPERATURE: 98.2 F

## 2018-08-08 DIAGNOSIS — I10 ESSENTIAL HYPERTENSION: ICD-10-CM

## 2018-08-08 DIAGNOSIS — R53.83 MALAISE AND FATIGUE: ICD-10-CM

## 2018-08-08 DIAGNOSIS — R42 DIZZINESS: Primary | ICD-10-CM

## 2018-08-08 DIAGNOSIS — G47.9 SLEEP DISTURBANCE: ICD-10-CM

## 2018-08-08 DIAGNOSIS — E66.9 OBESITY, CLASS I, BMI 30-34.9: ICD-10-CM

## 2018-08-08 DIAGNOSIS — R53.81 MALAISE AND FATIGUE: ICD-10-CM

## 2018-08-08 DIAGNOSIS — J30.1 ALLERGIC RHINITIS DUE TO POLLEN, UNSPECIFIED SEASONALITY: ICD-10-CM

## 2018-08-08 RX ORDER — FLUTICASONE PROPIONATE 50 MCG
2 SPRAY, SUSPENSION (ML) NASAL DAILY
Qty: 1 BOTTLE | Refills: 5 | Status: SHIPPED | OUTPATIENT
Start: 2018-08-08 | End: 2018-10-22 | Stop reason: SDUPTHER

## 2018-08-08 RX ORDER — AMLODIPINE BESYLATE 5 MG/1
5 TABLET ORAL
Qty: 30 TAB | Refills: 5 | Status: SHIPPED | OUTPATIENT
Start: 2018-08-08 | End: 2019-03-04

## 2018-08-08 RX ORDER — INSULIN GLARGINE 100 [IU]/ML
20 INJECTION, SOLUTION SUBCUTANEOUS DAILY
Qty: 12 PEN | Refills: 0 | Status: SHIPPED | OUTPATIENT
Start: 2018-08-08 | End: 2019-03-04

## 2018-08-08 NOTE — PROGRESS NOTES
HISTORY OF PRESENT ILLNESS  Mey eDjesus is a 56 y.o. female. Blood pressure (!) 170/100, pulse (!) 101, temperature 98.2 °F (36.8 °C), temperature source Oral, resp. rate 18, height 5' 8\" (1.727 m), weight 222 lb (100.7 kg), SpO2 98 %. Body mass index is 33.75 kg/(m^2). Chief Complaint   Patient presents with    Diabetes        HPI  Mey Dejesus 56 y.o. female  presents to the office today diabetes checkup. DM2: A1c on 6/25/2018 was 10.7%, elevated from A1c of 9.1 on 9/27/2017. Pt continues with Trulicity 2.97IZ/8.7AX weekly, metformin 500mg BID, and insulin. Pt reports blood sugar as 123 this morning, 194 during office measurement today. Advised pt to continue to work on diet and exercise. Hypertension: Bp at office today 144/88, 170/100 on manual arm recheck. Pt continues with HYZAAR 100-25mg daily. Pt reports feeling lightheaded. Pt reports feeling dizzy when bending over and feels faint. Pt denies palpitations. Pt denies measuring blood pressure at home. Pt states that she is experiencing SOB and chest pains. Pulse is elevated at 101 bpm. Recommended an EKG to evaluate cardiac function. Prescribing NORVASC 5mg to lower BP, asked pt to return in 3 months to reevaluate. Dizziness: Pt reports feeling lightheaded. Pt reports feeling dizzy when bending over and feels faint. Pt denies palpitations. Pt denies measuring blood pressure at home. Pt states that she is experiencing SOB and chest pains. Pulse is elevated at 101 bpm. Pt denies sensitivity to light. Recommended an EKG to evaluate cardiac function in office today. Advised pt to continue monitoring symptoms, if symptoms worsen, return to clinic. Health Maintenance: Will be visiting with Dr. Peña Solano (opthamology) for annual eye exam on 8/21/2018. Pt reports having seasonal allergies and headache. Pt notes she uses ibuprofen for her symptoms and an OTC nasal spray.  Advised pt that she should avoid using Afrin everyday because it is addictive. Recommended that pt should use flonase for her symptoms. Current Outpatient Prescriptions   Medication Sig Dispense Refill    dulaglutide (TRULICITY) 3.70 XR/7.9 mL sub-q pen 0.5 mL by SubCUTAneous route every seven (7) days. 12 Pen 0    insulin glargine (BASAGLAR KWIKPEN U-100 INSULIN) 100 unit/mL (3 mL) inpn 20 Units by SubCUTAneous route daily. 12 Pen 0    fluticasone (FLONASE) 50 mcg/actuation nasal spray 2 Sprays by Both Nostrils route daily. 1 Bottle 5    amLODIPine (NORVASC) 5 mg tablet Take 1 Tab by mouth nightly. 30 Tab 5    metFORMIN ER (GLUCOPHAGE XR) 500 mg tablet TAKE TWO TABLETS BY MOUTH TWICE DAILY 180 Tab 1    ibuprofen (MOTRIN) 800 mg tablet Take 1 Tab by mouth three (3) times daily as needed for Pain. 60 Tab 0    Insulin Needles, Disposable, (REJI PEN NEEDLE) 32 gauge x 5/32\" ndle Use daily 100 Pen Needle 11    simvastatin (ZOCOR) 20 mg tablet TAKE 1 TABLET BY MOUTH AT NIGHT 90 Tab 0    levocetirizine (XYZAL) 5 mg tablet TAKE ONE TABLET BY MOUTH ONCE DAILY 90 Tab 1    nystatin (MYCOSTATIN) topical cream Apply  to affected area two (2) times a day. 45 g 0    losartan-hydroCHLOROthiazide (HYZAAR) 100-25 mg per tablet Take 1 Tab by mouth daily. 90 Tab 1    lancets (ONE TOUCH DELICA) 33 gauge misc Check blood sugar twice daily DX:E11.65 100 Lancet 5    Blood-Glucose Meter monitoring kit Use as directed 1 Kit 0    glucose blood VI test strips (ASCENSIA AUTODISC VI, ONE TOUCH ULTRA TEST VI) strip Check glucose twice daily 100 Strip 11    glucose blood VI test strips (ONETOUCH ULTRA TEST) strip 50 Each by Does Not Apply route See Admin Instructions. One Touch Ultra 2 glucometer      albuterol (PROVENTIL VENTOLIN) 2.5 mg /3 mL (0.083 %) nebulizer solution 3 mL by Nebulization route every four (4) hours as needed for Wheezing.  1 Package 5    albuterol (PROVENTIL HFA, VENTOLIN HFA, PROAIR HFA) 90 mcg/actuation inhaler Take 2 Puffs by inhalation every six (6) hours as needed for Wheezing. 1 Inhaler 3    oxyCODONE-acetaminophen (PERCOCET) 5-325 mg per tablet Take 1 Tab by mouth every eight (8) hours as needed for Pain. Max Daily Amount: 3 Tabs. 20 Tab 0    Nebulizer & Compressor machine Nebulizer machine x 1 Nebulizer Kit 1 each 0    fluticasone (FLONASE) 50 mcg/actuation nasal spray INSTILL TWO SPRAYS INTO EACH NOSTRIL ONCE DAILY. 1 Bottle 3     Allergies   Allergen Reactions    Vicodin [Hydrocodone-Acetaminophen] Hives     Past Medical History:   Diagnosis Date    Arthritis     Chronic pain     3 herniated discs in lower back    Diabetes (HCC)     GERD (gastroesophageal reflux disease)     High cholesterol     Hypertension     MRSA infection     Other ill-defined conditions(799.89)     herniated disc to back    PUD (peptic ulcer disease)     Seasonal allergies      Past Surgical History:   Procedure Laterality Date    COLONOSCOPY N/A 5/31/2017    [facility] END[facility]COPY    HX GYN      tubal ligation, hysterectomy    HX ORTHOPAEDIC      left hand ligament removed    HX OTHER SURGICAL      corticosteroid injections - back    HX TONSILLECTOMY       Family History   Problem Relation Age of Onset    Hypertension Mother     Diabetes Father      Social History   Substance Use Topics    Smoking status: Former Smoker     Quit date: 10/30/2012    Smokeless tobacco: Never Used    Alcohol use Yes      Comment: weekends        Review of Systems   Constitutional: Negative. Negative for malaise/fatigue. HENT: Positive for congestion (nasal). Eyes: Negative for blurred vision. Respiratory: Positive for shortness of breath. Cardiovascular: Positive for chest pain. Negative for leg swelling. Musculoskeletal: Negative. Neurological: Negative. Negative for dizziness and headaches. All other systems reviewed and are negative. Physical Exam   Constitutional: She is oriented to person, place, and time.  She appears well-developed and well-nourished. No distress. HENT:   Head: Normocephalic and atraumatic. Neck: Carotid bruit is not present. Cardiovascular: Normal rate, regular rhythm, normal heart sounds and intact distal pulses. Exam reveals no gallop and no friction rub. No murmur heard. Pulmonary/Chest: Effort normal and breath sounds normal. No respiratory distress. She has no wheezes. She has no rales. Musculoskeletal: She exhibits no edema. Neurological: She is alert and oriented to person, place, and time. Skin: She is not diaphoretic. Psychiatric: She has a normal mood and affect. Her behavior is normal. Judgment and thought content normal.   Nursing note and vitals reviewed. ASSESSMENT and PLAN  Diagnoses and all orders for this visit:    1. Dizziness  -     AMB POC EKG ROUTINE W/ 12 LEADS, INTER & REP  -     CBC WITH AUTOMATED DIFF  - Advised pt to continue monitoring symptoms, if symptoms worsen, return to clinic. 2. Uncontrolled type 2 diabetes mellitus without complication, with long-term current use of insulin (HCC)  -     dulaglutide (TRULICITY) 8.79 ZC/9.4 mL sub-q pen; 0.5 mL by SubCUTAneous route every seven (7) days. -     insulin glargine (BASAGLAR KWIKPEN U-100 INSULIN) 100 unit/mL (3 mL) inpn; 20 Units by SubCUTAneous route daily.  -     METABOLIC PANEL, COMPREHENSIVE  -     HEMOGLOBIN A1C WITH EAG  - Advised pt to continue to work on diet and exercise. 3. Essential hypertension  -     amLODIPine (NORVASC) 5 mg tablet; Take 1 Tab by mouth nightly. - Bp at office today 144/88, 170/100 on manual arm recheck. Pt continues with HYZAAR 100-25mg daily. Recommended an EKG to evaluate cardiac function. Prescribing NORVASC 5mg to lower BP, asked pt to return in 3 months to reevaluate. 4. Obesity, Class I, BMI 30-34.9        - Advised pt to continue to work on diet and exercise.     5. Malaise and fatigue  -     TSH 3RD GENERATION  -     T4, FREE  - Presumed stable, will assess levels today. 6. Sleep disturbance  -     SLEEP MEDICINE REFERRAL (Dr. Arturo Seo)  -     [address]; Future  - Referred pt to Dr. Arturo Seo (sleep disorders) for further evaluation    7. Allergic rhinitis due to pollen, unspecified seasonality  -     fluticasone (FLONASE) 50 mcg/actuation nasal spray; 2 Sprays by Both Nostrils route daily.  - Advised pt that she should avoid using Afrin everyday because it is addictive. Recommended that pt should use flonase for her symptoms. Follow-up Disposition:  Return in about 1 month (around 9/8/2018) for diabetes follow up. Medication risks/benefits/costs/interactions/alternatives discussed with patient. Advised patient to call back or return to office if symptoms worsen/change/persist.  If patient cannot reach us or should anything more severe/urgent arise he/she should proceed directly to the nearest emergency department. Discussed expected course/resolution/complications of diagnosis in detail with patient. Patient given a written after visit summary which includes her diagnoses, current medications and vitals. Patient expressed understanding with the diagnosis and plan. Written by liliam Rojas, as dictated by Anisha Grijalva M.D.    11:16 AM - 11:27 AM    Total time spent with the patient 11 minutes, greater than 50% of time spent counseling patient.       I have reviewed and agree with the above note and have made corrections where appropriate, Dr. Zarina Woo MD

## 2018-08-08 NOTE — MR AVS SNAPSHOT
[facility] 
 
 
 [address] 
[phone] Patient: Mayito Angel MRN: [id number] :1960 Visit Information Date & Time Provider Department Dept. Phone Encounter #  
 2018 10:30 AM Dre Rebolledo MD [address] [place] [address] [phone] [id number] Follow-up Instructions Return in about 1 month (around 2018) for diabetes follow up. Upcoming Health Maintenance Date Due  
 BREAST CANCER SCRN MAMMOGRAM 10/23/2018 EYE EXAM RETINAL OR DILATED Q1 2018* Influenza Age 5 to Adult 10/1/2018* PAP AKA CERVICAL CYTOLOGY 2018* Pneumococcal 19-64 Medium Risk (1 of 1 - PPSV23) 2023* FOOT EXAM Q1 2018 MICROALBUMIN Q1 2018 HEMOGLOBIN A1C Q6M 2018 LIPID PANEL Q1 2019 MEDICARE YEARLY EXAM 2019 DTaP/Tdap/Td series (2 - Td) 2025 COLONOSCOPY 2027 *Topic was postponed. The date shown is not the original due date. Allergies as of 2018  Review Complete On: 2018 By: Dre Rebolledo MD  
  
 Severity Noted Reaction Type Reactions Vicodin [Hydrocodone-acetaminophen]  2012    Hives Current Immunizations  Reviewed on 2016 Name Date Tdap 2015 Not reviewed this visit You Were Diagnosed With   
  
 Codes Comments Dizziness    -  Primary ICD-10-CM: F33 ICD-9-CM: 780.4 Uncontrolled type 2 diabetes mellitus without complication, with long-term current use of insulin (HCC)     ICD-10-CM: E11.65, Z79.4 ICD-9-CM: 250.02, V58.67 Essential hypertension     ICD-10-CM: I10 
ICD-9-CM: 401.9 Obesity, Class I, BMI 30-34.9     ICD-10-CM: E66.9 ICD-9-CM: 278.00 Malaise and fatigue     ICD-10-CM: R53.81, R53.83 ICD-9-CM: 780.79 Sleep disturbance     ICD-10-CM: G47.9 ICD-9-CM: 780.50 Allergic rhinitis due to pollen, unspecified seasonality     ICD-10-CM: J30.1 ICD-9-CM: 477.0 Vitals BP Pulse Temp Resp Height(growth percentile) Weight(growth percentile) (!) 170/100 (!) 101 98.2 °F (36.8 °C) (Oral) 18 5' 8\" (1.727 m) 222 lb (100.7 kg) SpO2 BMI OB Status Smoking Status 98% 33.75 kg/m2 Hysterectomy Former Smoker Vitals History BMI and BSA Data Body Mass Index Body Surface Area 33.75 kg/m 2 2.2 m 2 Preferred Pharmacy Pharmacy Name Phone [facility] [phone] Your Updated Medication List  
  
   
This list is accurate as of 8/8/18 12:21 PM.  Always use your most recent med list.  
  
  
  
  
 * albuterol 90 mcg/actuation inhaler Commonly known as:  PROVENTIL HFA, VENTOLIN HFA, PROAIR HFA Take 2 Puffs by inhalation every six (6) hours as needed for Wheezing. * albuterol 2.5 mg /3 mL (0.083 %) nebulizer solution Commonly known as:  PROVENTIL VENTOLIN  
3 mL by Nebulization route every four (4) hours as needed for Wheezing. amLODIPine 5 mg tablet Commonly known as:  [place] John Take 1 Tab by mouth nightly. Blood-Glucose Meter monitoring kit Use as directed  
  
 dulaglutide 0.75 mg/0.5 mL sub-q pen Commonly known as:  TRULICITY  
0.5 mL by SubCUTAneous route every seven (7) days. * fluticasone 50 mcg/actuation nasal spray Commonly known as:  Antonio Muse INSTILL TWO SPRAYS INTO EACH NOSTRIL ONCE DAILY. * fluticasone 50 mcg/actuation nasal spray Commonly known as:  [place] 2 Sprays by Both Nostrils route daily. ibuprofen 800 mg tablet Commonly known as:  MOTRIN Take 1 Tab by mouth three (3) times daily as needed for Pain. insulin glargine 100 unit/mL (3 mL) Inpn Commonly known as:  BASAGLCHIKIS FLORENTINOPEN U-100 INSULIN  
20 Units by SubCUTAneous route daily. Insulin Needles (Disposable) 32 gauge x 5/32\" Ndle Commonly known as:  Marta Pen Needle Use daily  
  
 lancets 33 gauge Misc Commonly known as: One Touch Wanna Boy Check blood sugar twice daily DX:E11.65  
  
 levocetirizine 5 mg tablet Commonly known as:  Sarah Beth Boyjason TAKE ONE TABLET BY MOUTH ONCE DAILY losartan-hydroCHLOROthiazide 100-25 mg per tablet Commonly known as:  HYZAAR Take 1 Tab by mouth daily. metFORMIN  mg tablet Commonly known as:  GLUCOPHAGE XR  
TAKE TWO TABLETS BY MOUTH TWICE DAILY Nebulizer & Compressor machine Nebulizer machine x 1 Nebulizer Kit  
  
 nystatin topical cream  
Commonly known as:  MYCOSTATIN Apply  to affected area two (2) times a day. * ONETOUCH ULTRA TEST strip Generic drug:  glucose blood VI test strips 48 Each by Does Not Apply route See Admin Instructions. One Touch Ultra 2 glucometer * glucose blood VI test strips strip Commonly known as:  ASCENSIA AUTODISC VI, ONE TOUCH ULTRA TEST VI Check glucose twice daily  
  
 oxyCODONE-acetaminophen 5-325 mg per tablet Commonly known as:  PERCOCET Take 1 Tab by mouth every eight (8) hours as needed for Pain. Max Daily Amount: 3 Tabs. simvastatin 20 mg tablet Commonly known as:  ZOCOR  
TAKE 1 TABLET BY MOUTH AT NIGHT * Notice: This list has 6 medication(s) that are the same as other medications prescribed for you. Read the directions carefully, and ask your doctor or other care provider to review them with you. Prescriptions Sent to Pharmacy Refills  
 dulaglutide (TRULICITY) 8.16 AD/8.1 mL sub-q pen 0 Si.5 mL by SubCUTAneous route every seven (7) days. Class: Normal  
 Pharmacy: [address] Pending sale to [facility], [facility] Ph #: [phone] Route: SubCUTAneous  
 insulin glargine (BASAGLAR KWIKPEN U-100 INSULIN) 100 unit/mL (3 mL) inpn 0 Si Units by SubCUTAneous route daily. Class: Normal  
 Pharmacy: [address] Pending sale to [facility], [facility] Ph #: [phone] Route: SubCUTAneous  
 fluticasone (FLONASE) 50 mcg/actuation nasal spray 5 Si Sprays by Both Nostrils route daily. Class: Normal  
 Pharmacy: [address] [facility] [address] Ph #: [phone] Route: Both Nostrils  
 amLODIPine (NORVASC) 5 mg tablet 5 Sig: Take 1 Tab by mouth nightly. Class: Normal  
 Pharmacy: [address] [facility] [address] Ph #: [phone] Route: Oral  
  
We Performed the Following AMB POC EKG ROUTINE  LEADS, INTER & REP [14798 CPT(R)] CBC WITH AUTOMATED DIFF [23973 CPT(R)] HEMOGLOBIN A1C WITH EAG [77509 CPT(R)] METABOLIC PANEL, COMPREHENSIVE [52181 CPT(R)] SLEEP MEDICINE REFERRAL [AGO205 Custom] T4, FREE [id number] CPT(R)] TSH 3RD GENERATION [29098 CPT(R)] Follow-up Instructions Return in about 1 month (around 2018) for diabetes follow up. To-Do List   
 2018 Imaging:  DUPLEX CAROTID BILATERAL Referral Information Referral ID Referred By Referred To  
  
 [id number] Steve Awan MD   
   [address] [place] [address] Phone: [phone] Fax: [phone] Visits Status Start Date End Date 1 New Request 18 If your referral has a status of pending review or denied, additional information will be sent to support the outcome of this decision. Introducing [facility]! Dear Vaishali Chavarria: Thank you for requesting a [employer] account. Our records indicate that you already have an active [employer] account. You can access your account anytime at [email or web address] Did you know that you can access your hospital and ER discharge instructions at any time in [employer]? You can also review all of your test results from your hospital stay or ER visit. Additional Information If you have questions, please visit the Frequently Asked Questions section of the [employer] website at [email or web address] Remember, MyChart is NOT to be used for urgent needs. For medical emergencies, dial 911. Now available from your iPhone and Android! Please provide this summary of care documentation to your next provider. Your primary care clinician is listed as Jammie Smith. If you have any questions after today's visit, please call [phone].

## 2018-08-08 NOTE — PROGRESS NOTES
Chief Complaint   Patient presents with    Diabetes       Reviewed Record in preparation for visit and have obtained necessary documentation. Identified pt with two pt identifiers (Name @ )    Health Maintenance Due   Topic    PAP AKA CERVICAL CYTOLOGY     EYE EXAM RETINAL OR DILATED Q1     Influenza Age 5 to Adult     BREAST CANCER SCRN MAMMOGRAM          1. Have you been to the ER, urgent care clinic since your last visit? Hospitalized since your last visit? no    2. Have you seen or consulted any other health care providers outside of the [address] [place] [address] since your last visit? Include any pap smears or colon screening.  no

## 2018-08-09 LAB
ALBUMIN SERPL-MCNC: 4.9 G/DL (ref 3.5–5.5)
ALBUMIN/GLOB SERPL: 1.5 {RATIO} (ref 1.2–2.2)
ALP SERPL-CCNC: 85 IU/L (ref 39–117)
ALT SERPL-CCNC: 11 IU/L (ref 0–32)
AST SERPL-CCNC: 12 IU/L (ref 0–40)
BASOPHILS # BLD AUTO: 0 X10E3/UL (ref 0–0.2)
BASOPHILS NFR BLD AUTO: 0 %
BILIRUB SERPL-MCNC: 0.3 MG/DL (ref 0–1.2)
BUN SERPL-MCNC: 17 MG/DL (ref 6–24)
BUN/CREAT SERPL: 15 (ref 9–23)
CALCIUM SERPL-MCNC: 11.1 MG/DL (ref 8.7–10.2)
CHLORIDE SERPL-SCNC: 95 MMOL/L (ref 96–106)
CO2 SERPL-SCNC: 26 MMOL/L (ref 20–29)
CREAT SERPL-MCNC: 1.15 MG/DL (ref 0.57–1)
EOSINOPHIL # BLD AUTO: 0.1 X10E3/UL (ref 0–0.4)
EOSINOPHIL NFR BLD AUTO: 2 %
ERYTHROCYTE [DISTWIDTH] IN BLOOD BY AUTOMATED COUNT: 12.8 % (ref 12.3–15.4)
EST. AVERAGE GLUCOSE BLD GHB EST-MCNC: 148 MG/DL
GLOBULIN SER CALC-MCNC: 3.2 G/DL (ref 1.5–4.5)
GLUCOSE SERPL-MCNC: 131 MG/DL (ref 65–99)
HBA1C MFR BLD: 6.8 % (ref 4.8–5.6)
HCT VFR BLD AUTO: 38.1 % (ref 34–46.6)
HGB BLD-MCNC: 12.2 G/DL (ref 11.1–15.9)
IMM GRANULOCYTES # BLD: 0 X10E3/UL (ref 0–0.1)
IMM GRANULOCYTES NFR BLD: 0 %
INTERPRETATION: NORMAL
LYMPHOCYTES # BLD AUTO: 1.9 X10E3/UL (ref 0.7–3.1)
LYMPHOCYTES NFR BLD AUTO: 32 %
MCH RBC QN AUTO: 29.2 PG (ref 26.6–33)
MCHC RBC AUTO-ENTMCNC: 32 G/DL (ref 31.5–35.7)
MCV RBC AUTO: 91 FL (ref 79–97)
MONOCYTES # BLD AUTO: 0.5 X10E3/UL (ref 0.1–0.9)
MONOCYTES NFR BLD AUTO: 9 %
NEUTROPHILS # BLD AUTO: 3.4 X10E3/UL (ref 1.4–7)
NEUTROPHILS NFR BLD AUTO: 57 %
PLATELET # BLD AUTO: 328 X10E3/UL (ref 150–379)
POTASSIUM SERPL-SCNC: 4 MMOL/L (ref 3.5–5.2)
PROT SERPL-MCNC: 8.1 G/DL (ref 6–8.5)
RBC # BLD AUTO: 4.18 X10E6/UL (ref 3.77–5.28)
SODIUM SERPL-SCNC: 135 MMOL/L (ref 134–144)
T4 FREE SERPL-MCNC: 1.4 NG/DL (ref 0.82–1.77)
TSH SERPL DL<=0.005 MIU/L-ACNC: 0.67 UIU/ML (ref 0.45–4.5)
WBC # BLD AUTO: 6 X10E3/UL (ref 3.4–10.8)

## 2018-08-16 ENCOUNTER — TELEPHONE (OUTPATIENT)
Dept: FAMILY MEDICINE CLINIC | Age: 58
End: 2018-08-16

## 2018-08-16 NOTE — TELEPHONE ENCOUNTER
----- Message from Dread Irizarry sent at 8/16/2018 12:04 PM EDT -----  Regarding: Dr. Irina Ferreira  The patient is requesting a call back from [facility] to reschedule her appointment for 9/12/18. The patient stated that the doctor wanted her back for an appointment in a month.  [phone]

## 2018-08-16 NOTE — TELEPHONE ENCOUNTER
[phone] spoke to patient per patient needs to reschedule 9/12/2018 appointment due to her Mother is having procedure the same day I advised of the new appointment 9/19/2018 at 2:45PM patient understand

## 2018-08-17 ENCOUNTER — HOSPITAL ENCOUNTER (OUTPATIENT)
Dept: VASCULAR SURGERY | Age: 58
Discharge: HOME | End: 2018-08-17
Attending: FAMILY MEDICINE
Payer: MEDICARE

## 2018-08-17 DIAGNOSIS — G47.9 SLEEP DISTURBANCE: ICD-10-CM

## 2018-08-17 PROCEDURE — 93880 EXTRACRANIAL BILAT STUDY: CPT

## 2018-08-17 NOTE — PROCEDURES
[facility]  *** FINAL REPORT ***    Name: Adeline Ortega  MRN: [id number]    Outpatient  : 28 Oct 1960  HIS Order #: [id number]  [address] [facility] Visit #: [id number]  Date: 17 Aug 2018    TYPE OF TEST: Cerebrovascular Duplex    REASON FOR TEST  Dizziness/vertigo    Right Carotid:-             Proximal               Mid                 Distal  cm/s  Systolic  Diastolic  Systolic  Diastolic  Systolic  Diastolic  CCA:     66.8      16.0                            83.0      23.0  Bulb:  ECA:     51.4       7.1  ICA:     86.4      28.2       58.5      20.5       86.5      44.6  ICA/CCA:  1.0       1.2    ICA Stenosis: <50%    Right Vertebral:-  Finding: Antegrade  Sys:       59.1  Lydia:       23.6    Right Subclavian:    Left Carotid:-            Proximal                Mid                 Distal  cm/s  Systolic  Diastolic  Systolic  Diastolic  Systolic  Diastolic  CCA:     12.4      15.5                            75.2      21.9  Bulb:  ECA:     49.0       6.2  ICA:     64.4      21.5       78.5      30.0       59.4      28.7  ICA/CCA:  0.9       1.0    ICA Stenosis: <50%    Left Vertebral:-  Finding: Antegrade  Sys:       67.2  Lydia:       26.8    Left Subclavian:    INTERPRETATION/FINDINGS  PROCEDURE:  Evaluation of the extracranial cerebrovascular arteries  with ultrasound (B-mode imaging, pulsed Doppler, color Doppler). Includes the common carotid, internal carotid, external carotid, and  vertebral arteries. FINDINGS: Bilateral intimal thickening noted within the common carotid   arteries. Technically difficult study due to vessel tortuosity and  high bifurcation. IMPRESSION: Findings are consistent with 0-49% stenosis of the right  internal carotid and 0-49% stenosis of the left internal carotid. Vertebrals are patent with antegrade flow. ADDITIONAL COMMENTS    I have personally reviewed the data relevant to the interpretation of  this  study.     TECHNOLOGIST: Nabil Bower RVT  Signed: 2018 12:59 PM    PHYSICIAN: Stevo Hilario.  Flower Barrow MD  Signed: 08/19/2018 09:52 AM

## 2018-08-20 NOTE — PROGRESS NOTES
Needs an OV to discuss results and referral to Dr. Lashonda To    Please place and set up appt    FINDINGS: Bilateral intimal thickening noted within the common carotid   arteries. Technically difficult study due to vessel tortuosity and  high bifurcation.     IMPRESSION: Findings are consistent with 0-49% stenosis of the right  internal carotid and 0-49% stenosis of the left internal carotid. Vertebrals are patent with antegrade flow.

## 2018-08-22 ENCOUNTER — TELEPHONE (OUTPATIENT)
Dept: FAMILY MEDICINE CLINIC | Age: 58
End: 2018-08-22

## 2018-08-22 NOTE — TELEPHONE ENCOUNTER
----- Message from Jose Ivory sent at 8/22/2018 12:38 PM EDT -----  Regarding: Dr. Des Viveros  Pt wants to come in today versus 08/27/2018 but there are not available appointments. She wants to discuss her test results.  Best contact number [phone]

## 2018-08-22 NOTE — TELEPHONE ENCOUNTER
[phone] spoke to patient notified no available appointment for Dr Surekha De La O today but offer to be seen by another provider tomorrow but patient refused  Patient will wait 8/27/2018 to see Dr Surekha De La O

## 2018-08-27 ENCOUNTER — OFFICE VISIT (OUTPATIENT)
Dept: FAMILY MEDICINE CLINIC | Age: 58
End: 2018-08-27

## 2018-08-27 VITALS
HEART RATE: 92 BPM | TEMPERATURE: 99.4 F | WEIGHT: 217 LBS | HEIGHT: 68 IN | SYSTOLIC BLOOD PRESSURE: 115 MMHG | DIASTOLIC BLOOD PRESSURE: 71 MMHG | RESPIRATION RATE: 18 BRPM | OXYGEN SATURATION: 98 % | BODY MASS INDEX: 32.89 KG/M2

## 2018-08-27 DIAGNOSIS — I77.1 TORTUOSITY OF ARTERY (HCC): Primary | ICD-10-CM

## 2018-08-27 DIAGNOSIS — E66.9 OBESITY, CLASS I, BMI 30-34.9: ICD-10-CM

## 2018-08-27 DIAGNOSIS — I10 ESSENTIAL HYPERTENSION: ICD-10-CM

## 2018-08-27 NOTE — PROGRESS NOTES
HISTORY OF PRESENT ILLNESS  Brandi Piper is a 56 y.o. female. Blood pressure 115/71, pulse 92, temperature 99.4 °F (37.4 °C), temperature source Oral, resp. rate 18, height 5' 8\" (1.727 m), weight 217 lb (98.4 kg), SpO2 98 %. Body mass index is 32.99 kg/(m^2). Chief Complaint   Patient presents with    Follow-up     f/u to discuss carotid duplex results        HPI  Brandi Piper 56 y.o. female  presents to the office today with mother for follow up to discuss Carotid Duplex Results. Carotid Duplex Results Discussion: Pt had DUPLEX CAROTID BILATERAL performed on 8/17/2018. 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 Discussed that pt has bilateral intimal thickening noted within the common carotid arteries. Advised that the results indicated that it was technically difficult study due to vessel tortuosity and high bifurcation. Advised pt that I will be referring her to Dr. Marguerite Henriquez. Clinton Ford MD (Vascular Surgery) for further evaluation. Hypertension: Bp at office today 115/71. Pt continues with HYZAAR 100-25mg daily. Pt reports feeling dizzy and that she has not been taking BP measurements at home. Advised pt to cut Norvasc in half at night. DM2: A1c per POC on 8/8/2018 6.8%, lowered from A1c of 10.7% on 6/25/2018. Pt continues with Trulicity 4.05RY/5.6MC weekly, metformin 500mg BID, and insulin. Pt reports that she sometimes feels like she has low blood sugar. Advised pt to always eat before taking Insulin. Advised pt to continue to work on diet and exercise. Health Maintenance: Pt reports that she has not followed up with sleep medicine. Advised pt to schedule her sleep study. Current Outpatient Prescriptions   Medication Sig Dispense Refill    dulaglutide (TRULICITY) 8.28 FA/5.5 mL sub-q pen 0.5 mL by SubCUTAneous route every seven (7) days.  12 Pen 0    insulin glargine (BASAGLAR KWIKPEN U-100 INSULIN) 100 unit/mL (3 mL) inpn 20 Units by SubCUTAneous route daily. 12 Pen 0    fluticasone (FLONASE) 50 mcg/actuation nasal spray 2 Sprays by Both Nostrils route daily. 1 Bottle 5    amLODIPine (NORVASC) 5 mg tablet Take 1 Tab by mouth nightly. 30 Tab 5    metFORMIN ER (GLUCOPHAGE XR) 500 mg tablet TAKE TWO TABLETS BY MOUTH TWICE DAILY 180 Tab 1    Insulin Needles, Disposable, (REJI PEN NEEDLE) 32 gauge x 5/32\" ndle Use daily 100 Pen Needle 11    simvastatin (ZOCOR) 20 mg tablet TAKE 1 TABLET BY MOUTH AT NIGHT 90 Tab 0    levocetirizine (XYZAL) 5 mg tablet TAKE ONE TABLET BY MOUTH ONCE DAILY 90 Tab 1    nystatin (MYCOSTATIN) topical cream Apply  to affected area two (2) times a day. 45 g 0    losartan-hydroCHLOROthiazide (HYZAAR) 100-25 mg per tablet Take 1 Tab by mouth daily. 90 Tab 1    lancets (ONE TOUCH DELICA) 33 gauge misc Check blood sugar twice daily DX:E11.65 100 Lancet 5    Blood-Glucose Meter monitoring kit Use as directed 1 Kit 0    glucose blood VI test strips (ASCENSIA AUTODISC VI, ONE TOUCH ULTRA TEST VI) strip Check glucose twice daily 100 Strip 11    glucose blood VI test strips (ONETOUCH ULTRA TEST) strip 50 Each by Does Not Apply route See Admin Instructions. One Touch Ultra 2 glucometer      albuterol (PROVENTIL VENTOLIN) 2.5 mg /3 mL (0.083 %) nebulizer solution 3 mL by Nebulization route every four (4) hours as needed for Wheezing. 1 Package 5    albuterol (PROVENTIL HFA, VENTOLIN HFA, PROAIR HFA) 90 mcg/actuation inhaler Take 2 Puffs by inhalation every six (6) hours as needed for Wheezing.  1 Inhaler 3    Nebulizer & Compressor machine Nebulizer machine x 1 Nebulizer Kit 1 each 0     Allergies   Allergen Reactions    Vicodin [Hydrocodone-Acetaminophen] Hives     Past Medical History:   Diagnosis Date    Arthritis     Chronic pain     3 herniated discs in lower back    Diabetes (HCC)     GERD (gastroesophageal reflux disease)     High cholesterol     Hypertension     MRSA infection     Other ill-defined conditions(799.89)     herniated disc to back    PUD (peptic ulcer disease)     Seasonal allergies      Past Surgical History:   Procedure Laterality Date    COLONOSCOPY N/A 5/31/2017    COLONOSCOPY performed by Lazaro Harper MD at [facility]    HX GYN      tubal ligation, hysterectomy    HX ORTHOPAEDIC      left hand ligament removed    HX OTHER SURGICAL      corticosteroid injections - back    HX TONSILLECTOMY       Family History   Problem Relation Age of Onset    Hypertension Mother     Diabetes Father      Social History   Substance Use Topics    Smoking status: Former Smoker     Quit date: 10/30/2012    Smokeless tobacco: Never Used    Alcohol use Yes      Comment: weekends        Review of Systems   Constitutional: Negative. Negative for malaise/fatigue. Eyes: Negative for blurred vision. Respiratory: Negative for shortness of breath. Cardiovascular: Negative for chest pain and leg swelling. Musculoskeletal: Negative. Neurological: Positive for dizziness. Negative for headaches. Lightheaded   All other systems reviewed and are negative. Physical Exam   Constitutional: She is oriented to person, place, and time. She appears well-developed and well-nourished. No distress. HENT:   Head: Normocephalic and atraumatic. Neck: Carotid bruit is not present. Cardiovascular: Normal rate, regular rhythm, normal heart sounds and intact distal pulses. Exam reveals no gallop and no friction rub. No murmur heard. Pulmonary/Chest: Effort normal and breath sounds normal. No respiratory distress. She has no wheezes. She has no rales. Musculoskeletal: She exhibits no edema. Neurological: She is alert and oriented to person, place, and time. Skin: She is not diaphoretic. Psychiatric: She has a normal mood and affect. Her behavior is normal. Judgment and thought content normal.   Nursing note and vitals reviewed.       ASSESSMENT and PLAN  Diagnoses and all orders for this visit:    1. Tortuosity of artery (HCC)  -     REFERRAL TO VASCULAR SURGERY (Gildardo Lorenzo MD)  - Advised pt that I will be referring her to Dr. Brii Caal. Trish Lorenzo MD (Vascular Surgery) for further evaluation. 2. Essential hypertension        - Bp at office today 115/71. Pt continues with HYZAAR 100-25mg daily. Pt reports feeling dizzy and that she has not been taking BP measurements at home. Advised pt to cut Norvasc in half at night. 3. Uncontrolled type 2 diabetes mellitus without complication, with long-term current use of insulin ([facility])        - A1c per POC on 8/8/2018 6.8%, lowered from A1c of 10.7% on 6/25/2018. Pt continues with Trulicity 7.09OE/1.8IS weekly, metformin 500mg BID, and insulin. Advised pt to always eat before taking Insulin. Advised pt to continue to work on diet and exercise. 4. Obesity, Class I, BMI 30-34.9        - I have reviewed/discussed the above normal BMI with the patient. I have recommended the following interventions: dietary management education, guidance, and counseling, encourage exercise and monitor weight . Follow-up Disposition: Not on File   Medication risks/benefits/costs/interactions/alternatives discussed with patient. Advised patient to call back or return to office if symptoms worsen/change/persist.  If patient cannot reach us or should anything more severe/urgent arise he/she should proceed directly to the nearest emergency department. Discussed expected course/resolution/complications of diagnosis in detail with patient. Patient given a written after visit summary which includes her diagnoses, current medications and vitals. Patient expressed understanding with the diagnosis and plan. Written by liliam Michael, as dictated by Webb Meigs, M.D.  12:48 PM - 1:04 PM    Total time spent with the patient 16 minutes, greater than 50% of time spent counseling patient.      I have reviewed and agree with the above note and have made corrections where appropriate, Dr. Richard Kay MD

## 2018-08-27 NOTE — PROGRESS NOTES
Chief Complaint   Patient presents with    Follow-up     f/u to discuss carotid duplex results     1. Have you been to the ER, urgent care clinic since your last visit? Hospitalized since your last visit? No    2. Have you seen or consulted any other health care providers outside of the [address] [place] [address] since your last visit? Include any pap smears or colon screening.  No

## 2018-08-27 NOTE — MR AVS SNAPSHOT
[address] [facility] [address] 
 
 
 [address] 
[phone] Patient: Tone Ring MRN: [id number] :1960 Visit Information Date & Time Provider Department Dept. Phone Encounter #  
 2018 12:00 PM Lacie Blanco  [facility] [phone] [id number] Your Appointments 2018  2:45 PM  
ROUTINE CARE with Lacie Blanco MD  
[facility]) Appt Note: 1 month follow up visit [address] [place] [address]  
[phone]  
  
   
 [address] [id number]  
  
    
 2018  2:30 PM  
New Patient with Davis Emery MD  
[address] [facility] [place] [facility] Appt Note: NP refd by Dr. Lyric Arias for sleep distrubance_Medicare [facility] [address] [place] [phone] Upcoming Health Maintenance Date Due  
 BREAST CANCER SCRN MAMMOGRAM 10/23/2018 EYE EXAM RETINAL OR DILATED Q1 2018* Influenza Age 5 to Adult 10/1/2018* PAP AKA CERVICAL CYTOLOGY 2018* Pneumococcal 19-64 Medium Risk (1 of 1 - PPSV23) 2023* FOOT EXAM Q1 2018 MICROALBUMIN Q1 2018 HEMOGLOBIN A1C Q6M 2019 LIPID PANEL Q1 2019 MEDICARE YEARLY EXAM 2019 DTaP/Tdap/Td series (2 - Td) 2025 COLONOSCOPY 2027 *Topic was postponed. The date shown is not the original due date. Allergies as of 2018  Review Complete On: 2018 By: Lacie Blanco MD  
  
 Severity Noted Reaction Type Reactions Vicodin [Hydrocodone-acetaminophen]  2012    Hives Current Immunizations  Reviewed on 2016 Name Date Tdap 2015 Not reviewed this visit You Were Diagnosed With   
  
 Codes Comments Tortuosity of artery ([facility] [address]    -  Primary ICD-10-CM: I77.1 ICD-9-CM: 447.1 Essential hypertension     ICD-10-CM: I10 
ICD-9-CM: 401.9 Uncontrolled type 2 diabetes mellitus without complication, with long-term current use of insulin (HCC)     ICD-10-CM: E11.65, Z79.4 ICD-9-CM: 250.02, V58.67 Obesity, Class I, BMI 30-34.9     ICD-10-CM: E66.9 ICD-9-CM: 278.00 Vitals BP Pulse Temp Resp Height(growth percentile) Weight(growth percentile) 115/71 (BP 1 Location: Right arm, BP Patient Position: Sitting) 92 99.4 °F (37.4 °C) (Oral) 18 5' 8\" (1.727 m) 217 lb (98.4 kg) SpO2 BMI OB Status Smoking Status 98% 32.99 kg/m2 Hysterectomy Former Smoker Vitals History BMI and BSA Data Body Mass Index Body Surface Area  
 32.99 kg/m 2 2.17 m 2 [facility] [address] [phone] Your Updated Medication List  
  
   
This list is accurate as of 8/27/18  1:05 PM.  Always use your most recent med list.  
  
  
  
  
 * albuterol 90 mcg/actuation inhaler Commonly known as:  PROVENTIL HFA, VENTOLIN HFA, PROAIR HFA Take 2 Puffs by inhalation every six (6) hours as needed for Wheezing. * albuterol 2.5 mg /3 mL (0.083 %) nebulizer solution Commonly known as:  PROVENTIL VENTOLIN  
3 mL by Nebulization route every four (4) hours as needed for Wheezing. amLODIPine 5 mg tablet Commonly known as:  Lyle Zelaya Take 1 Tab by mouth nightly. Blood-Glucose Meter monitoring kit Use as directed  
  
 dulaglutide 0.75 mg/0.5 mL sub-q pen Commonly known as:  TRULICITY  
0.5 mL by SubCUTAneous route every seven (7) days. fluticasone 50 mcg/actuation nasal spray Commonly known as:  Sandrine Primitivo 2 Sprays by Both Nostrils route daily. insulin glargine 100 unit/mL (3 mL) Inpn Commonly known as:  BASAGLAR KWIKPEN U-100 INSULIN  
20 Units by SubCUTAneous route daily. Insulin Needles (Disposable) 32 gauge x 5/32\" Ndle Commonly known as:  Marta Pen Needle Use daily  
  
 lancets 33 gauge Misc Commonly known as: One Touch Carey Balderas Check blood sugar twice daily DX:E11.65  
  
 levocetirizine 5 mg tablet Commonly known as:  Abhinav Balk TAKE ONE TABLET BY MOUTH ONCE DAILY losartan-hydroCHLOROthiazide 100-25 mg per tablet Commonly known as:  HYZAAR Take 1 Tab by mouth daily. metFORMIN  mg tablet Commonly known as:  GLUCOPHAGE XR  
TAKE TWO TABLETS BY MOUTH TWICE DAILY Nebulizer & Compressor machine Nebulizer machine x 1 Nebulizer Kit  
  
 nystatin topical cream  
Commonly known as:  MYCOSTATIN Apply  to affected area two (2) times a day. * ONETOUCH ULTRA TEST strip Generic drug:  glucose blood VI test strips 48 Each by Does Not Apply route See Admin Instructions. One Touch Ultra 2 glucometer * glucose blood VI test strips strip Commonly known as:  ASCENSIA AUTODISC VI, ONE TOUCH ULTRA TEST VI Check glucose twice daily  
  
 simvastatin 20 mg tablet Commonly known as:  ZOCOR  
TAKE 1 TABLET BY MOUTH AT NIGHT * Notice: This list has 4 medication(s) that are the same as other medications prescribed for you. Read the directions carefully, and ask your doctor or other care provider to review them with you. We Performed the Following REFERRAL TO VASCULAR SURGERY [[id number] Custom] Referral Information Referral ID Referred By Referred To  
  
 [id number] [facility] Swetha Rodriguez Visits Status Start Date End Date 1 Authorized 8/27/18 8/27/19 If your referral has a status of pending review or denied, additional information will be sent to support the outcome of this decision. Introducing [facility]! Dear Vandana Spann: Thank you for requesting a Memoir account. Our records indicate that you already have an active Memoir account.   You can access your account anytime at [email or web address] Did you know that you can access your hospital and ER discharge instructions at any time in [employer]? You can also review all of your test results from your hospital stay or ER visit. Additional Information If you have questions, please visit the Frequently Asked Questions section of the [employer] website at [email or web address] Remember, [employer] is NOT to be used for urgent needs. For medical emergencies, dial 911. Now available from your iPhone and Android! Please provide this summary of care documentation to your next provider. Your primary care clinician is listed as Aristides Pedersen. If you have any questions after today's visit, please call [phone].

## 2018-09-17 DIAGNOSIS — I10 ESSENTIAL HYPERTENSION WITH GOAL BLOOD PRESSURE LESS THAN 140/90: ICD-10-CM

## 2018-09-17 DIAGNOSIS — E78.2 MIXED HYPERLIPIDEMIA: ICD-10-CM

## 2018-09-19 ENCOUNTER — OFFICE VISIT (OUTPATIENT)
Dept: FAMILY MEDICINE CLINIC | Age: 58
End: 2018-09-19

## 2018-09-19 VITALS
SYSTOLIC BLOOD PRESSURE: 122 MMHG | DIASTOLIC BLOOD PRESSURE: 74 MMHG | HEART RATE: 90 BPM | WEIGHT: 219 LBS | TEMPERATURE: 98.7 F | BODY MASS INDEX: 33.19 KG/M2 | HEIGHT: 68 IN | RESPIRATION RATE: 18 BRPM | OXYGEN SATURATION: 99 %

## 2018-09-19 DIAGNOSIS — E78.2 MIXED HYPERLIPIDEMIA: ICD-10-CM

## 2018-09-19 DIAGNOSIS — Z79.4 CONTROLLED TYPE 2 DIABETES MELLITUS WITHOUT COMPLICATION, WITH LONG-TERM CURRENT USE OF INSULIN (HCC): Primary | ICD-10-CM

## 2018-09-19 DIAGNOSIS — I10 ESSENTIAL HYPERTENSION WITH GOAL BLOOD PRESSURE LESS THAN 140/90: ICD-10-CM

## 2018-09-19 DIAGNOSIS — E11.9 CONTROLLED TYPE 2 DIABETES MELLITUS WITHOUT COMPLICATION, WITH LONG-TERM CURRENT USE OF INSULIN (HCC): Primary | ICD-10-CM

## 2018-09-19 DIAGNOSIS — M72.2 PLANTAR FASCIITIS, LEFT: ICD-10-CM

## 2018-09-19 RX ORDER — SIMVASTATIN 20 MG/1
TABLET, FILM COATED ORAL
Qty: 90 TAB | Refills: 1 | Status: SHIPPED | OUTPATIENT
Start: 2018-09-19 | End: 2018-11-19 | Stop reason: SDUPTHER

## 2018-09-19 RX ORDER — LOSARTAN POTASSIUM AND HYDROCHLOROTHIAZIDE 25; 100 MG/1; MG/1
1 TABLET ORAL DAILY
Qty: 90 TAB | Refills: 1 | Status: SHIPPED | OUTPATIENT
Start: 2018-09-19 | End: 2018-11-19 | Stop reason: SDUPTHER

## 2018-09-19 NOTE — MR AVS SNAPSHOT
[address] [facility] 
 
 
 222 [facility] [address] [place] [address] 
[phone] Patient: Puma Almazan MRN: [id number] :1960 Visit Information Date & Time Provider Department Dept. Phone Encounter #  
 2018  2:45 PM Rhea Tucker  [facility] [phone] [id number] Follow-up Instructions Return in about 2 months (around 2018). Your Appointments 2018  2:30 PM  
New Patient with Jj Cevallos MD  
3240 [facility] ([address]) Appt Note: NP refd by Dr. Dave Reece for sleep distrubance_Medicare [address]  
  
   
 75[address] [facility] [address] [id number] Upcoming Health Maintenance Date Due  
 BREAST CANCER SCRN MAMMOGRAM 10/23/2018 EYE EXAM RETINAL OR DILATED Q1 10/1/2018* Influenza Age 5 to Adult 10/1/2018* PAP AKA CERVICAL CYTOLOGY 2018* Pneumococcal 19-64 Medium Risk (1 of 1 - PPSV23) 2023* FOOT EXAM Q1 2018 MICROALBUMIN Q1 2018 HEMOGLOBIN A1C Q6M 2019 LIPID PANEL Q1 2019 MEDICARE YEARLY EXAM 2019 DTaP/Tdap/Td series (2 - Td) 2025 COLONOSCOPY 2027 *Topic was postponed. The date shown is not the original due date. Allergies as of 2018  Review Complete On: 2018 By: Rhea Tucker MD  
  
 Severity Noted Reaction Type Reactions Vicodin [Hydrocodone-acetaminophen]  2012    Hives Current Immunizations  Reviewed on 2016 Name Date Tdap 2015 Not reviewed this visit You Were Diagnosed With   
  
 Codes Comments Controlled type 2 diabetes mellitus without complication, with long-term current use of insulin (HCC)    -  Primary ICD-10-CM: E11.9, Z79.4 ICD-9-CM: 250.00, V58.67  Essential hypertension with goal blood pressure less than 140/90     ICD-10-CM: I10 
 ICD-9-CM: 401.9 Mixed hyperlipidemia     ICD-10-CM: E78.2 ICD-9-CM: 272.2 Vitals BP Pulse Temp Resp Height(growth percentile) Weight(growth percentile) 122/74 (BP 1 Location: Right arm, BP Patient Position: Sitting) 90 98.7 °F (37.1 °C) (Oral) 18 5' 8\" (1.727 m) 219 lb (99.3 kg) SpO2 BMI OB Status Smoking Status 99% 33.3 kg/m2 Hysterectomy Former Smoker Vitals History BMI and BSA Data Body Mass Index Body Surface Area  
 33.3 kg/m 2 2.18 m 2 Preferred Pharmacy Pharmacy Name Phone [address] [facility], [address] [phone] Your Updated Medication List  
  
   
This list is accurate as of 9/19/18  3:52 PM.  Always use your most recent med list.  
  
  
  
  
 * albuterol 90 mcg/actuation inhaler Commonly known as:  PROVENTIL HFA, VENTOLIN HFA, PROAIR HFA Take 2 Puffs by inhalation every six (6) hours as needed for Wheezing. * albuterol 2.5 mg /3 mL (0.083 %) nebulizer solution Commonly known as:  PROVENTIL VENTOLIN  
3 mL by Nebulization route every four (4) hours as needed for Wheezing. amLODIPine 5 mg tablet Commonly known as:  Sujit Whiteside Take 1 Tab by mouth nightly. Blood-Glucose Meter monitoring kit Use as directed  
  
 dulaglutide 0.75 mg/0.5 mL sub-q pen Commonly known as:  TRULICITY  
0.5 mL by SubCUTAneous route every seven (7) days. fluticasone 50 mcg/actuation nasal spray Commonly known as:  Shelvia Birks 2 Sprays by Both Nostrils route daily. insulin glargine 100 unit/mL (3 mL) Inpn Commonly known as:  BASAGLAR KWIKPEN U-100 INSULIN  
20 Units by SubCUTAneous route daily. Insulin Needles (Disposable) 32 gauge x 5/32\" Ndle Commonly known as:  Marta Pen Needle Use daily  
  
 lancets 33 gauge Misc Commonly known as: One Touch Thang Cheng Check blood sugar twice daily DX:E11.65  
  
 levocetirizine 5 mg tablet Commonly known as:  Monet Blanco  
 TAKE ONE TABLET BY MOUTH ONCE DAILY losartan-hydroCHLOROthiazide 100-25 mg per tablet Commonly known as:  HYZAAR Take 1 Tab by mouth daily. metFORMIN  mg tablet Commonly known as:  GLUCOPHAGE XR  
TAKE TWO TABLETS BY MOUTH TWICE DAILY Nebulizer & Compressor machine Nebulizer machine x 1 Nebulizer Kit  
  
 nystatin topical cream  
Commonly known as:  MYCOSTATIN Apply  to affected area two (2) times a day. * ONETOUCH ULTRA TEST strip Generic drug:  glucose blood VI test strips 48 Each by Does Not Apply route See Admin Instructions. One Touch Ultra 2 glucometer * glucose blood VI test strips strip Commonly known as:  ASCENSIA AUTODISC VI, ONE TOUCH ULTRA TEST VI Check glucose twice daily  
  
 simvastatin 20 mg tablet Commonly known as:  ZOCOR  
TAKE 1 TABLET BY MOUTH AT NIGHT * Notice: This list has 4 medication(s) that are the same as other medications prescribed for you. Read the directions carefully, and ask your doctor or other care provider to review them with you. Prescriptions Sent to Pharmacy Refills  
 losartan-hydroCHLOROthiazide (HYZAAR) 100-25 mg per tablet 1 Sig: Take 1 Tab by mouth daily. Class: Normal  
 Pharmacy: [facility] [address], [address] [place] [address] Ph #: [phone] Route: Oral  
 simvastatin (ZOCOR) 20 mg tablet 1 Sig: TAKE 1 TABLET BY MOUTH AT NIGHT Class: Normal  
 Pharmacy: [facility] [address], [address] Ph #: [phone] Follow-up Instructions Return in about 2 months (around 11/19/2018). Patient Instructions Learning About Meal Planning for Diabetes Why plan your meals? Meal planning can be a key part of managing diabetes. Planning meals and snacks with the right balance of carbohydrate, protein, and fat can help you keep your blood sugar at the target level you set with your doctor. You don't have to eat special foods. You can eat what your family eats, including sweets once in a while. But you do have to pay attention to how often you eat and how much you eat of certain foods. You may want to work with a dietitian or a certified diabetes educator. He or she can give you tips and meal ideas and can answer your questions about meal planning. This health professional can also help you reach a healthy weight if that is one of your goals. What plan is right for you? Your dietitian or diabetes educator may suggest that you start with the plate format or carbohydrate counting. The plate format The plate format is a simple way to help you manage how you eat. You plan meals by learning how much space each food should take on a plate. Using the plate format helps you spread carbohydrate throughout the day. It can make it easier to keep your blood sugar level within your target range. It also helps you see if you're eating healthy portion sizes. To use the plate format, you put non-starchy vegetables on half your plate. Add meat or meat substitutes on one-quarter of the plate. Put a grain or starchy vegetable (such as brown rice or a potato) on the final quarter of the plate. You can add a small piece of fruit and some low-fat or fat-free milk or yogurt, depending on your carbohydrate goal for each meal. 
Here are some tips for using the plate format: · Make sure that you are not using an oversized plate. A 9-inch plate is best. Many restaurants use larger plates. · Get used to using the plate format at home. Then you can use it when you eat out. · Write down your questions about using the plate format. Talk to your doctor, a dietitian, or a diabetes educator about your concerns. Carbohydrate counting With carbohydrate counting, you plan meals based on the amount of carbohydrate in each food.  Carbohydrate raises blood sugar higher and more quickly than any other nutrient. It is found in desserts, breads and cereals, and fruit. It's also found in starchy vegetables such as potatoes and corn, grains such as rice and pasta, and milk and yogurt. Spreading carbohydrate throughout the day helps keep your blood sugar levels within your target range. Your daily amount depends on several things, including your weight, how active you are, which diabetes medicines you take, and what your goals are for your blood sugar levels. A registered dietitian or diabetes educator can help you plan how much carbohydrate to include in each meal and snack. A guideline for your daily amount of carbohydrate is: · 45 to 60 grams at each meal. That's about the same as 3 to 4 carbohydrate servings. · 15 to 20 grams at each snack. That's about the same as 1 carbohydrate serving. The Nutrition Facts label on packaged foods tells you how much carbohydrate is in a serving of the food. First, look at the serving size on the food label. Is that the amount you eat in a serving? All of the nutrition information on a food label is based on that serving size. So if you eat more or less than that, you'll need to adjust the other numbers. Total carbohydrate is the next thing you need to look for on the label. If you count carbohydrate servings, one serving of carbohydrate is 15 grams. For foods that don't come with labels, such as fresh fruits and vegetables, you'll need a guide that lists carbohydrate in these foods. Ask your doctor, dietitian, or diabetes educator about books or other nutrition guides you can use. If you take insulin, you need to know how many grams of carbohydrate are in a meal. This lets you know how much rapid-acting insulin to take before you eat. If you use an insulin pump, you get a constant rate of insulin during the day. So the pump must be programmed at meals to give you extra insulin to cover the rise in blood sugar after meals. When you know how much carbohydrate you will eat, you can take the right amount of insulin. Or, if you always use the same amount of insulin, you need to make sure that you eat the same amount of carbohydrate at meals. If you need more help to understand carbohydrate counting and food labels, ask your doctor, dietitian, or diabetes educator. How do you get started with meal planning? Here are some tips to get started: 
· Plan your meals a week at a time. Don't forget to include snacks too. · Use cookbooks or online recipes to plan several main meals. Plan some quick meals for busy nights. You also can double some recipes that freeze well. Then you can save half for other busy nights when you don't have time to cook. · Make sure you have the ingredients you need for your recipes. If you're running low on basic items, put these items on your shopping list too. · List foods that you use to make breakfasts, lunches, and snacks. List plenty of fruits and vegetables. · Post this list on the refrigerator. Add to it as you think of more things you need. · Take the list to the store to do your weekly shopping. Follow-up care is a key part of your treatment and safety. Be sure to make and go to all appointments, and call your doctor if you are having problems. It's also a good idea to know your test results and keep a list of the medicines you take. Where can you learn more? Go to http://pooja-shon.info/. Esther Baum in the search box to learn more about \"Learning About Meal Planning for Diabetes. \" Current as of: December 7, 2017 Content Version: 11.7 © 8293-1836 Healthwise, Incorporated. Care instructions adapted under license by [employer] (which disclaims liability or warranty for this information).  If you have questions about a medical condition or this instruction, always ask your healthcare professional. Deidra Cuevas, Incorporated disclaims any warranty or liability for your use of this information. Introducing [facility]! Dear Vandana Spann: Thank you for requesting a Prize[employer] account. Our records indicate that you already have an active Prize[employer] account. You can access your account anytime at [email or web address] Did you know that you can access your hospital and ER discharge instructions at any time in Prizeo? You can also review all of your test results from your hospital stay or ER visit. Additional Information If you have questions, please visit the Frequently Asked Questions section of the Prize[employer] website at [email or web address] Remember, Prizeo is NOT to be used for urgent needs. For medical emergencies, dial 911. Now available from your iPhone and Android! Please provide this summary of care documentation to your next provider. Your primary care clinician is listed as Isabella Okeefe. If you have any questions after today's visit, please call [phone].

## 2018-09-19 NOTE — PROGRESS NOTES
No chief complaint on file. Reviewed Record in preparation for visit and have obtained necessary documentation. Identified pt with two pt identifiers (Name @ )    Health Maintenance Due   Topic    EYE EXAM RETINAL OR DILATED Q1     BREAST CANCER SCRN MAMMOGRAM          1. Have you been to the ER, urgent care clinic since your last visit? Hospitalized since your last visit? 2. Have you seen or consulted any other health care providers outside of the [address] [place] [address] since your last visit? Include any pap smears or colon screening.

## 2018-09-19 NOTE — PROGRESS NOTES
HISTORY OF PRESENT ILLNESS  Puma Almazan is a 56 y.o. female. Blood pressure 122/74, pulse 90, temperature 98.7 °F (37.1 °C), temperature source Oral, resp. rate 18, height 5' 8\" (1.727 m), weight 219 lb (99.3 kg), SpO2 99 %. Body mass index is 33.3 kg/(m^2). Chief Complaint   Patient presents with    Diabetes     1 month follow up, blood sugars have been low         HPI  Puma Almazan 56 y.o. female  presents to the office today for routine follow up. Hypertension: Bp at office today 122/74. Pt continues with Hyzaar 100-25mg daily, and Norvasc 5mg daily. Bp control stable, continue current regimen. Hyperlipidemia: Lipid panel on 6/25/2018 notable for total cholesterol 164, LDL 70, HDL 59, and triglycerides 176. Pt continues with Zocor 20mg daily. Presumed stable, will assess levels in future. DM2: A1c per POC 8/8/2018 6.8%, decreased from A1c of 10.7% on 6/25/2018. Pt continues with Trulicity 5.06VU weekly, 20 units Basaglar daily, and Metformin 500mg two tabs BID. Pt reports that her blood sugars are around 120 in the morning and never exceeds 180 now. She notes that when her blood sugar is in the 120's she feels dizzy. Recommended that she eat before taking medication. Advised pt to refrain from insulin only if her blood sugar is below 60. She notes that Trulicity stops her appetite. Advised pt to stick with her medication regimen. Health Maintenance: Pt reports that she is going in for an eye exam this month with Dr. Orlin Cox MD (opthamology). Pt reports that she has a mammogram scheduled in October. Advised pt to receive flu vaccine. Pt reports that she does not want the flu vaccine at this time. Pt reports having sore left foot at the end of day and thinks that she may be experiencing plantar fascitis. Counseled pt on stretches for feet and recommended that she massage foot.       Current Outpatient Prescriptions   Medication Sig Dispense Refill    losartan-hydroCHLOROthiazide (HYZAAR) 100-25 mg per tablet Take 1 Tab by mouth daily. 90 Tab 1    simvastatin (ZOCOR) 20 mg tablet TAKE 1 TABLET BY MOUTH AT NIGHT 90 Tab 1    dulaglutide (TRULICITY) 8.16 XQ/8.1 mL sub-q pen 0.5 mL by SubCUTAneous route every seven (7) days. 12 Pen 0    insulin glargine (BASAGLAR KWIKPEN U-100 INSULIN) 100 unit/mL (3 mL) inpn 20 Units by SubCUTAneous route daily. 12 Pen 0    fluticasone (FLONASE) 50 mcg/actuation nasal spray 2 Sprays by Both Nostrils route daily. 1 Bottle 5    metFORMIN ER (GLUCOPHAGE XR) 500 mg tablet TAKE TWO TABLETS BY MOUTH TWICE DAILY 180 Tab 1    Insulin Needles, Disposable, (REJI PEN NEEDLE) 32 gauge x 5/32\" ndle Use daily 100 Pen Needle 11    levocetirizine (XYZAL) 5 mg tablet TAKE ONE TABLET BY MOUTH ONCE DAILY 90 Tab 1    nystatin (MYCOSTATIN) topical cream Apply  to affected area two (2) times a day. 45 g 0    lancets (ONE TOUCH DELICA) 33 gauge misc Check blood sugar twice daily DX:E11.65 100 Lancet 5    Blood-Glucose Meter monitoring kit Use as directed 1 Kit 0    glucose blood VI test strips (ASCENSIA AUTODISC VI, ONE TOUCH ULTRA TEST VI) strip Check glucose twice daily 100 Strip 11    glucose blood VI test strips (ONETOUCH ULTRA TEST) strip 50 Each by Does Not Apply route See Admin Instructions. One Touch Ultra 2 glucometer      albuterol (PROVENTIL VENTOLIN) 2.5 mg /3 mL (0.083 %) nebulizer solution 3 mL by Nebulization route every four (4) hours as needed for Wheezing. 1 Package 5    albuterol (PROVENTIL HFA, VENTOLIN HFA, PROAIR HFA) 90 mcg/actuation inhaler Take 2 Puffs by inhalation every six (6) hours as needed for Wheezing. 1 Inhaler 3    Nebulizer & Compressor machine Nebulizer machine x 1 Nebulizer Kit 1 each 0    amLODIPine (NORVASC) 5 mg tablet Take 1 Tab by mouth nightly.  30 Tab 5     Allergies   Allergen Reactions    Vicodin [Hydrocodone-Acetaminophen] Hives     Past Medical History:   Diagnosis Date    Arthritis     Chronic pain     3 herniated discs in lower back    Diabetes ([facility] [address]     GERD (gastroesophageal reflux disease)     High cholesterol     Hypertension     MRSA infection     Other ill-defined conditions(799.89)     herniated disc to back    PUD (peptic ulcer disease)     Seasonal allergies      Past Surgical History:   Procedure Laterality Date    COLONOSCOPY N/A 5/31/2017    [facility] at [facility] ENDOSCOPY    HX GYN      tubal ligation, hysterectomy    HX ORTHOPAEDIC      left hand ligament removed    HX OTHER SURGICAL      corticosteroid injections - back    HX TONSILLECTOMY       Family History   Problem Relation Age of Onset    Hypertension Mother     Diabetes Father      Social History   Substance Use Topics    Smoking status: Former Smoker     Quit date: 10/30/2012    Smokeless tobacco: Never Used    Alcohol use Yes      Comment: weekends        Review of Systems   Constitutional: Negative. Negative for malaise/fatigue. Eyes: Negative for blurred vision. Respiratory: Negative for shortness of breath. Cardiovascular: Negative for chest pain and leg swelling. Musculoskeletal: Negative. Left foot sore   Neurological: Positive for dizziness (occasional, when blood sugar is low). Negative for headaches. All other systems reviewed and are negative. Physical Exam   Constitutional: She is oriented to person, place, and time. She appears well-developed and well-nourished. No distress. HENT:   Head: Normocephalic and atraumatic. Neck: Carotid bruit is not present. Cardiovascular: Normal rate, regular rhythm, normal heart sounds and intact distal pulses. Exam reveals no gallop and no friction rub. No murmur heard. Pulmonary/Chest: Effort normal and breath sounds normal. No respiratory distress. She has no wheezes. She has no rales. Musculoskeletal: She exhibits no edema. Neurological: She is alert and oriented to person, place, and time.    Skin: She is not diaphoretic. Psychiatric: She has a normal mood and affect. Her behavior is normal. Judgment and thought content normal.   Nursing note and vitals reviewed. ASSESSMENT and PLAN  Diagnoses and all orders for this visit:    1. Controlled type 2 diabetes mellitus without complication, with long-term current use of insulin (HCC)        - A1c per POC 8/8/2018 6.8%, decreased from A1c of 10.7% on 6/25/2018. Pt continues with Trulicity 0.88ZP weekly, 20 units Basaglar daily, and Metformin 500mg two tabs BID. Recommended that she eat before taking medication. Advised pt to refrain from insulin only if her blood sugar is below 60. Advised pt to stick with her medication regimen. 2. Essential hypertension with goal blood pressure less than 140/90  -     losartan-hydroCHLOROthiazide (HYZAAR) 100-25 mg per tablet; Take 1 Tab by mouth daily. - Bp at office today 122/74. Pt continues with Hyzaar 100-25mg daily, and Norvasc 5mg daily. Bp control stable, continue current regimen. 3. Mixed hyperlipidemia  -     simvastatin (ZOCOR) 20 mg tablet; TAKE 1 TABLET BY MOUTH AT NIGHT  - Lipid panel on 6/25/2018 notable for total cholesterol 164, LDL 70, HDL 59, and triglycerides 176. Pt continues with Zocor 20mg daily. Presumed stable, will assess levels in future. 4. Plantar fasciitis, left        - Counseled pt on stretches for feet and recommended that she massage foot. Follow-up Disposition:  Return in about 2 months (around 11/19/2018). Medication risks/benefits/costs/interactions/alternatives discussed with patient. Advised patient to call back or return to office if symptoms worsen/change/persist.  If patient cannot reach us or should anything more severe/urgent arise he/she should proceed directly to the nearest emergency department. Discussed expected course/resolution/complications of diagnosis in detail with patient.   Patient given a written after visit summary which includes her diagnoses, current medications and vitals. Patient expressed understanding with the diagnosis and plan. Written by liliam Marie, as dictated by Maura Halsted, M.D.  3:44 PM - 3:58 PM  Total time spent with the patient 14 minutes, greater than 50% of time spent counseling patient.        I have reviewed and agree with the above note and have made corrections where appropriate, Dr. Sergio Pack MD

## 2018-09-19 NOTE — PATIENT INSTRUCTIONS

## 2018-09-20 RX ORDER — SIMVASTATIN 20 MG/1
TABLET, FILM COATED ORAL
Qty: 90 TAB | Refills: 0 | Status: SHIPPED | OUTPATIENT
Start: 2018-09-20 | End: 2019-04-02 | Stop reason: SDUPTHER

## 2018-09-20 RX ORDER — LOSARTAN POTASSIUM AND HYDROCHLOROTHIAZIDE 25; 100 MG/1; MG/1
TABLET ORAL
Qty: 90 TAB | Refills: 1 | Status: SHIPPED | OUTPATIENT
Start: 2018-09-20 | End: 2019-04-02 | Stop reason: SDUPTHER

## 2018-10-22 DIAGNOSIS — J30.1 ALLERGIC RHINITIS DUE TO POLLEN, UNSPECIFIED SEASONALITY: ICD-10-CM

## 2018-10-23 NOTE — TELEPHONE ENCOUNTER
Pt is requesting for medication      metFORMIN ER (GLUCOPHAGE XR) 500 mg tablet to be called in for 3 mth supply 360 pills.      Best call back [phone]    Pharmacy on file verified

## 2018-10-24 ENCOUNTER — HOSPITAL ENCOUNTER (OUTPATIENT)
Dept: MAMMOGRAPHY | Age: 58
Discharge: HOME | End: 2018-10-24
Attending: FAMILY MEDICINE
Payer: MEDICARE

## 2018-10-24 DIAGNOSIS — Z12.39 SCREENING FOR MALIGNANT NEOPLASM OF BREAST: ICD-10-CM

## 2018-10-24 PROCEDURE — 77063 BREAST TOMOSYNTHESIS BI: CPT

## 2018-10-24 RX ORDER — METFORMIN HYDROCHLORIDE 500 MG/1
TABLET, EXTENDED RELEASE ORAL
Qty: 360 TAB | Refills: 1 | Status: SHIPPED | OUTPATIENT
Start: 2018-10-24 | End: 2019-04-02 | Stop reason: SDUPTHER

## 2018-10-24 RX ORDER — LEVOCETIRIZINE DIHYDROCHLORIDE 5 MG/1
TABLET, FILM COATED ORAL
Qty: 90 TAB | Refills: 1 | Status: SHIPPED | OUTPATIENT
Start: 2018-10-24 | End: 2019-05-19 | Stop reason: SDUPTHER

## 2018-10-24 RX ORDER — FLUTICASONE PROPIONATE 50 MCG
2 SPRAY, SUSPENSION (ML) NASAL DAILY
Qty: 3 BOTTLE | Refills: 1 | Status: SHIPPED | OUTPATIENT
Start: 2018-10-24 | End: 2020-03-30 | Stop reason: SDUPTHER

## 2018-11-05 NOTE — PROGRESS NOTES
BREAST COMPOSITION:  There are scattered areas of fibroglandular density.     FINDINGS: Bilateral digital screening mammography was performed and is  interpreted in conjunction with a computer assisted detection (CAD) system. Additionally, tomosynthesis of both breasts in the CC and MLO projections was  performed. No suspicious masses or calcifications are identified. There has been  no significant change.     IMPRESSION  IMPRESSION:  BI-RADS 1: Negative.  No mammographic evidence of malignancy.     RECOMMENDATIONS:  Next screening mammogram is recommended in one year

## 2018-11-07 ENCOUNTER — PATIENT MESSAGE (OUTPATIENT)
Dept: FAMILY MEDICINE CLINIC | Age: 58
End: 2018-11-07

## 2018-11-08 RX ORDER — IBUPROFEN 800 MG/1
TABLET ORAL
COMMUNITY
End: 2018-11-08 | Stop reason: SDUPTHER

## 2018-11-08 RX ORDER — IBUPROFEN 800 MG/1
800 TABLET ORAL
Qty: 60 TAB | Refills: 0 | Status: SHIPPED | OUTPATIENT
Start: 2018-11-08 | End: 2018-11-09 | Stop reason: SDUPTHER

## 2018-11-08 NOTE — TELEPHONE ENCOUNTER
From: Angelina Eastman  To: Zohaib Wellington MD  Sent: 11/7/2018 5:56 PM EST  Subject: Prescription Question    I need a refill on my ibuprofen 800mg 180count I wasnt on my medication list

## 2018-11-11 RX ORDER — IBUPROFEN 800 MG/1
800 TABLET ORAL
Qty: 180 TAB | Refills: 0 | Status: SHIPPED | OUTPATIENT
Start: 2018-11-11 | End: 2019-03-29 | Stop reason: SDUPTHER

## 2018-11-12 ENCOUNTER — TELEPHONE (OUTPATIENT)
Dept: FAMILY MEDICINE CLINIC | Age: 58
End: 2018-11-12

## 2018-11-12 NOTE — TELEPHONE ENCOUNTER
----- Message from Araceli Hall sent at 11/12/2018 12:35 PM EST -----  Regarding: Cooper/telephone  Pt is requesting for you to call her in regard too her medication. She stated she did not  the Rx for Ibuprofen because it was for only 60 pills and she gets 180 pills called to [facility]. Pts number is [phone].

## 2018-11-12 NOTE — TELEPHONE ENCOUNTER
[phone] notified patient that it was resend for 180 pills per patient pharmacy don't have it    [phone] spoke to pharmacy at [facility] they have the prescription    [phone] notified patient pharmacy has the prescription

## 2018-11-19 ENCOUNTER — OFFICE VISIT (OUTPATIENT)
Dept: SLEEP MEDICINE | Age: 58
End: 2018-11-19

## 2018-11-19 ENCOUNTER — HOSPITAL ENCOUNTER (OUTPATIENT)
Dept: LAB | Age: 58
Discharge: HOME | End: 2018-11-19
Payer: MEDICARE

## 2018-11-19 ENCOUNTER — OFFICE VISIT (OUTPATIENT)
Dept: FAMILY MEDICINE CLINIC | Age: 58
End: 2018-11-19

## 2018-11-19 ENCOUNTER — HOSPITAL ENCOUNTER (OUTPATIENT)
Dept: SLEEP MEDICINE | Age: 58
Discharge: HOME | End: 2018-11-19
Payer: MEDICARE

## 2018-11-19 VITALS
WEIGHT: 225 LBS | RESPIRATION RATE: 16 BRPM | SYSTOLIC BLOOD PRESSURE: 120 MMHG | OXYGEN SATURATION: 97 % | HEIGHT: 68 IN | TEMPERATURE: 98.5 F | BODY MASS INDEX: 34.1 KG/M2 | DIASTOLIC BLOOD PRESSURE: 80 MMHG | HEART RATE: 87 BPM

## 2018-11-19 VITALS
DIASTOLIC BLOOD PRESSURE: 76 MMHG | BODY MASS INDEX: 34.1 KG/M2 | WEIGHT: 225 LBS | HEIGHT: 68 IN | OXYGEN SATURATION: 95 % | SYSTOLIC BLOOD PRESSURE: 118 MMHG | HEART RATE: 94 BPM

## 2018-11-19 DIAGNOSIS — E78.5 HYPERLIPIDEMIA, UNSPECIFIED HYPERLIPIDEMIA TYPE: ICD-10-CM

## 2018-11-19 DIAGNOSIS — E11.9 CONTROLLED TYPE 2 DIABETES MELLITUS WITHOUT COMPLICATION, WITH LONG-TERM CURRENT USE OF INSULIN (HCC): Primary | ICD-10-CM

## 2018-11-19 DIAGNOSIS — Z79.4 CONTROLLED TYPE 2 DIABETES MELLITUS WITHOUT COMPLICATION, WITH LONG-TERM CURRENT USE OF INSULIN (HCC): Primary | ICD-10-CM

## 2018-11-19 DIAGNOSIS — G47.33 OSA (OBSTRUCTIVE SLEEP APNEA): Primary | ICD-10-CM

## 2018-11-19 DIAGNOSIS — E66.9 OBESITY, CLASS I, BMI 30-34.9: ICD-10-CM

## 2018-11-19 DIAGNOSIS — I10 ESSENTIAL HYPERTENSION: ICD-10-CM

## 2018-11-19 LAB — HBA1C MFR BLD HPLC: 5.7 %

## 2018-11-19 PROCEDURE — 95806 SLEEP STUDY UNATT&RESP EFFT: CPT | Performed by: SPECIALIST

## 2018-11-19 PROCEDURE — 80061 LIPID PANEL: CPT

## 2018-11-19 PROCEDURE — 36415 COLL VENOUS BLD VENIPUNCTURE: CPT

## 2018-11-19 PROCEDURE — 80053 COMPREHEN METABOLIC PANEL: CPT

## 2018-11-19 PROCEDURE — 82043 UR ALBUMIN QUANTITATIVE: CPT

## 2018-11-19 NOTE — PROGRESS NOTES
HISTORY OF PRESENT ILLNESS  Joe Nava is a 66 y.o. female who presents today for diabetes follow up. Blood pressure 120/80, pulse 87, temperature 98.5 °F (36.9 °C), temperature source Oral, resp. rate 16, height 5' 8\" (1.727 m), weight 225 lb (102.1 kg), SpO2 97 %. Body mass index is 34.21 kg/m². Chief Complaint   Patient presents with    Diabetes     follow up       HPI  DM2: A1c per POC today 5.7%, decreased from A1c of 6.8% on 08/08/2018. Pt continues with Trulicity 0.5 mL every 7 days, Glucophage 500 mg BID, and insuline 20 units daily. Pt notes taking 14 units of insulin but then went back up to 17 units when sugar crept up. Congratulated pt on getting her A1C down. Foot exam was normal. Pt notes that her glucose has gotten as low as 111. Pt denies her sugar ever getting below 70. She does note that in the morning she has troubling getting out of bed to get food because she's lightheaded. Advised that we speak to John Raya and see if pt can get higher dose of trulicity and cut insulin in half and eventually get off of it. Advised that pt see Dr. Trina Mullen for help losing weight. Recommended that if pt's blood sugar is less than 110 to not take insulin. Advised that as she loses weight she'll be able to come off medications. Hypertension: Bp at office today 140/79. 120/80 on manual arm cuff recheck. Pt continues with hyzaar 100-25 mg daily. Bp stable upon recheck. Health Maintenance: Pt sees Dr. Mark Brown at [facility] . Pt had eye appointment by Dr. Elías Corrales on 10/04/2018. Pt will see Dr. Omkar Cheema at  for annual dental appointment. Pt declined flu shot today. Pt is fasting today for lab work. Current Outpatient Medications   Medication Sig Dispense Refill    dulaglutide (TRULICITY) 1.5 IX/4.2 mL sub-q pen 0.5 mL by SubCUTAneous route every seven (7) days. 12 Pen 0    ibuprofen (MOTRIN) 800 mg tablet Take 1 Tab by mouth every eight (8) hours as needed for Pain.  [address] [address] Tab 0    levocetirizine (XYZAL) 5 mg tablet TAKE ONE TABLET BY MOUTH ONCE DAILY 90 Tab 1    metFORMIN ER (GLUCOPHAGE XR) 500 mg tablet TAKE TWO TABLETS BY MOUTH TWICE DAILY 360 Tab 1    fluticasone (FLONASE) 50 mcg/actuation nasal spray 2 Sprays by Both Nostrils route daily. 3 Bottle 1    simvastatin (ZOCOR) 20 mg tablet TAKE 1 TABLET BY MOUTH ONCE DAILY AT NIGHT 90 Tab 0    losartan-hydroCHLOROthiazide (HYZAAR) 100-25 mg per tablet TAKE ONE TABLET BY MOUTH DAILY 90 Tab 1    insulin glargine (BASAGLAR KWIKPEN U-100 INSULIN) 100 unit/mL (3 mL) inpn 20 Units by SubCUTAneous route daily. 12 Pen 0    amLODIPine (NORVASC) 5 mg tablet Take 1 Tab by mouth nightly. 30 Tab 5    Insulin Needles, Disposable, (REJI PEN NEEDLE) 32 gauge x 5/32\" ndle Use daily 100 Pen Needle 11    lancets (ONE TOUCH DELICA) 33 gauge misc Check blood sugar twice daily DX:E11.65 100 Lancet 5    Blood-Glucose Meter monitoring kit Use as directed 1 Kit 0    glucose blood VI test strips (ASCENSIA AUTODISC VI, ONE TOUCH ULTRA TEST VI) strip Check glucose twice daily 100 Strip 11    glucose blood VI test strips (ONETOUCH ULTRA TEST) strip 50 Each by Does Not Apply route See Admin Instructions. One Touch Ultra 2 glucometer      albuterol (PROVENTIL VENTOLIN) 2.5 mg /3 mL (0.083 %) nebulizer solution 3 mL by Nebulization route every four (4) hours as needed for Wheezing. 1 Package 5    albuterol (PROVENTIL HFA, VENTOLIN HFA, PROAIR HFA) 90 mcg/actuation inhaler Take 2 Puffs by inhalation every six (6) hours as needed for Wheezing. 1 Inhaler 3    Nebulizer & Compressor machine Nebulizer machine x 1 Nebulizer Kit 1 each 0    nystatin (MYCOSTATIN) topical cream Apply  to affected area two (2) times a day.  45 g 0     Allergies   Allergen Reactions    Vicodin [Hydrocodone-Acetaminophen] Hives     Past Medical History:   Diagnosis Date    Arthritis     Chronic pain     3 herniated discs in lower back    Diabetes (HCC)     GERD (gastroesophageal reflux disease)     High cholesterol     Hypertension     MRSA infection     Other ill-defined conditions(799.89)     herniated disc to back    PUD (peptic ulcer disease)     Seasonal allergies      Past Surgical History:   Procedure Laterality Date    HX GYN      tubal ligation, hysterectomy    HX HYSTERECTOMY      HX ORTHOPAEDIC      left hand ligament removed    HX OTHER SURGICAL      corticosteroid injections - back    HX TONSILLECTOMY       Family History   Problem Relation Age of Onset    Hypertension Mother     Diabetes Father      Social History     Tobacco Use    Smoking status: Former Smoker     Last attempt to quit: 10/30/2012     Years since quittin.0    Smokeless tobacco: Never Used   Substance Use Topics    Alcohol use: Yes     Comment: weekends        Review of Systems   Constitutional: Negative. Negative for malaise/fatigue. Eyes: Negative for blurred vision. Respiratory: Negative for shortness of breath. Cardiovascular: Negative for chest pain and leg swelling. Musculoskeletal: Negative. Neurological: Negative. Negative for dizziness and headaches. All other systems reviewed and are negative. Physical Exam   Constitutional: She is oriented to person, place, and time. She appears well-developed and well-nourished. No distress. HENT:   Head: Normocephalic and atraumatic. Neck: Carotid bruit is not present. Cardiovascular: Normal rate, regular rhythm, normal heart sounds and intact distal pulses. Exam reveals no gallop and no friction rub. No murmur heard. Pulmonary/Chest: Effort normal and breath sounds normal. No respiratory distress. She has no wheezes. She has no rales. Musculoskeletal: She exhibits no edema. Neurological: She is alert and oriented to person, place, and time.    Diabetic foot exam:     Left Foot:   Visual Exam: normal    Pulse DP: 2+ (normal)   Filament test: normal sensation    Vibratory sensation: normal Right Foot:   Visual Exam: normal    Pulse DP: 2+ (normal)   Filament test: normal sensation    Vibratory sensation: normal     Skin: She is not diaphoretic. Psychiatric: She has a normal mood and affect. Her behavior is normal. Judgment and thought content normal.   Nursing note and vitals reviewed. ASSESSMENT and PLAN  Diagnoses and all orders for this visit:    1. Controlled type 2 diabetes mellitus without complication, with long-term current use of insulin (HCC)  -     AMB POC HEMOGLOBIN A1C  Results for orders placed or performed in visit on 11/19/18   MICROALBUMIN, UR, RAND W/ MICROALB/CREAT RATIO   Result Value Ref Range    Creatinine, urine 70.8 Not Estab. mg/dL    Microalbumin, urine 4.3 Not Estab. ug/mL    Microalb/Creat ratio (ug/mg creat.) 6.1 0.0 - 30.0 mg/g creat   LIPID PANEL   Result Value Ref Range    Cholesterol, total 167 100 - 199 mg/dL    Triglyceride 89 0 - 149 mg/dL    HDL Cholesterol 90 >39 mg/dL    VLDL, calculated 18 5 - 40 mg/dL    LDL, calculated 59 0 - 99 mg/dL   METABOLIC PANEL, COMPREHENSIVE   Result Value Ref Range    Glucose 103 (H) 65 - 99 mg/dL    BUN 24 6 - 24 mg/dL    Creatinine 1.38 (H) 0.57 - 1.00 mg/dL    GFR est non-AA 43 (L) >59 mL/min/1.73    GFR est AA 49 (L) >59 mL/min/1.73    BUN/Creatinine ratio 17 9 - 23    Sodium 134 134 - 144 mmol/L    Potassium 4.3 3.5 - 5.2 mmol/L    Chloride 96 96 - 106 mmol/L    CO2 23 20 - 29 mmol/L    Calcium 10.8 (H) 8.7 - 10.2 mg/dL    Protein, total 8.0 6.0 - 8.5 g/dL    Albumin 4.9 3.5 - 5.5 g/dL    GLOBULIN, TOTAL 3.1 1.5 - 4.5 g/dL    A-G Ratio 1.6 1.2 - 2.2    Bilirubin, total 0.3 0.0 - 1.2 mg/dL    Alk.  phosphatase 79 39 - 117 IU/L    AST (SGOT) 20 0 - 40 IU/L    ALT (SGPT) 27 0 - 32 IU/L   CVD REPORT   Result Value Ref Range    INTERPRETATION Note     PDF IMAGE Not applicable    CKD REPORT   Result Value Ref Range    Interpretation Note    AMB POC HEMOGLOBIN A1C   Result Value Ref Range    Hemoglobin A1c (POC) 5.7 % -     MICROALBUMIN, UR, RAND W/ MICROALB/CREAT RATIO  -      DIABETES FOOT EXAM  -     dulaglutide (TRULICITY) 1.5 QP/0.8 mL sub-q pen; 0.5 mL by SubCUTAneous route every seven (7) days.  -     METABOLIC PANEL, COMPREHENSIVE  -     Advised that we speak to Jose E Mitchell and see if pt can get higher dose of trulicity and cut insulin in half and eventually get off of it. Advised that pt see Dr. Dinora Germain for help losing weight. Recommended that if pt's blood sugar is less than 110 to not take insulin. Advised that as she loses weight she'll be able to come off medications. Diabetic foot exam was normal.    2. Hyperlipidemia, unspecified hyperlipidemia type  -     LIPID PANEL  -     METABOLIC PANEL, COMPREHENSIVE  -      Presumed stable, continue current regimen, Will check labs today. 3. Essential hypertension        -     At goal continue current regimen  -     METABOLIC PANEL, COMPREHENSIVE      4. Obesity, Class I, BMI 30-34.9  I have reviewed/discussed the above normal BMI with the patient. I have recommended the following interventions: dietary management education, guidance, and counseling . .      -     Shanique Coleman- Dr. Dinora Germain      Follow-up Disposition:  Return in about 3 months (around 2/19/2019) for hypertension, diabetes, cholesterol follow up. Medication risks/benefits/costs/interactions/alternatives discussed with patient. Advised patient to call back or return to office if symptoms worsen/change/persist.  If patient cannot reach us or should anything more severe/urgent arise he/she should proceed directly to the nearest emergency department. Discussed expected course/resolution/complications of diagnosis in detail with patient. Patient given a written after visit summary which includes their diagnoses, current medications and vitals. Patient expressed understanding with the diagnosis and plan.   Written by liliam Quigley, as dictated by Rebecca Matthews M.D.  11:47 AM - 12:13 PM    Total time spent with the patient 26 minutes, greater than 50% of time spent counseling patient.

## 2018-11-19 NOTE — PROGRESS NOTES
Chief Complaint   Patient presents with    Diabetes     follow up        Reviewed Record in preparation for visit and have obtained necessary documentation. Identified pt with two pt identifiers (Name @ )    Health Maintenance Due   Topic    Shingrix Vaccine Age 50> (1 of 2)    PAP AKA CERVICAL CYTOLOGY     EYE EXAM RETINAL OR DILATED Q1     Influenza Age 5 to Adult     MICROALBUMIN Q1     FOOT EXAM Q1          1. Have you been to the ER, urgent care clinic since your last visit? Hospitalized since your last visit? no    2. Have you seen or consulted any other health care providers outside of the [address] [place] [address] since your last visit? Include any pap smears or colon screening.  no

## 2018-11-19 NOTE — PATIENT INSTRUCTIONS

## 2018-11-19 NOTE — PROGRESS NOTES
HSAT Set Up    · Patient was educated on proper hookup and operation of the HST. · Instruction forms and documentation were reviewed and signed. · The patient demonstrated good understanding of the HST. · General information regarding operations and maintenance of the device was provided. · He was provided information on sleep apnea including corresponding risk factors and the importance of proper treatment. · Follow-up appointment was made to return the HST. He will be contacted once the results have been reviewed. · He was asked to contact our office for any problems regarding his home sleep test study.

## 2018-11-19 NOTE — PROGRESS NOTES
Patient seen during PCP appointment for follow-up on Diabetes. Patient continues to take the following for her diabetes:  Basaglar 20 units daily (has been titrating her dose up and down depending on if her sugar is above/below 130 mg/dl), Trulicity 3.54 mg weekly, and metformin  mg 2 tablets twice daily. Patient reports that her sugars are doing well, but they have been elevated the last 2 days due to eating some desserts before bedtime. She didn't bring in her glucometer with her today but yesterday it was 180, today it was 170. Prior to this her fasting sugars have been in the 110's-120's. She does report that when it is the 110's she wakes up dizzy and needs to eat. She doesn't have any low symptoms later during the day or night. Since I saw her last in July she has been eating less, she feels like it is from the Trulicity, and eating more non-starchy vegetables and smaller portions of foods higher in carbs. She is trying to stay active with her grandson, but has not been able to \"exercise\" due to her heel hurting her, she plans on discussing with Dr. Camden Zapata today. She declines flu shot or pneumonia shot. Reviewed with her the importance of these vaccinations and asked her reconsider at some point. Discussed how her A1C shows great improvement and encouraged continued lifestyle changes. Patient currently is on starting dose of Trulicity, could consider increasing which may allow for continued reduction of basaglar, but this would depend on her fasting sugars.     Murray Fan, PharmD, [facility] Com[facility]

## 2018-11-19 NOTE — PATIENT INSTRUCTIONS
Body Mass Index: Care Instructions  Your Care Instructions    Body mass index (BMI) can help you see if your weight is raising your risk for health problems. It uses a formula to compare how much you weigh with how tall you are. · A BMI lower than 18.5 is considered underweight. · A BMI between 18.5 and 24.9 is considered healthy. · A BMI between 25 and 29.9 is considered overweight. A BMI of 30 or higher is considered obese. If your BMI is in the normal range, it means that you have a lower risk for weight-related health problems. If your BMI is in the overweight or obese range, you may be at increased risk for weight-related health problems, such as high blood pressure, heart disease, stroke, arthritis or joint pain, and diabetes. If your BMI is in the underweight range, you may be at increased risk for health problems such as fatigue, lower protection (immunity) against illness, muscle loss, bone loss, hair loss, and hormone problems. BMI is just one measure of your risk for weight-related health problems. You may be at higher risk for health problems if you are not active, you eat an unhealthy diet, or you drink too much alcohol or use tobacco products. Follow-up care is a key part of your treatment and safety. Be sure to make and go to all appointments, and call your doctor if you are having problems. It's also a good idea to know your test results and keep a list of the medicines you take. How can you care for yourself at home? · Practice healthy eating habits. This includes eating plenty of fruits, vegetables, whole grains, lean protein, and low-fat dairy. · If your doctor recommends it, get more exercise. Walking is a good choice. Bit by bit, increase the amount you walk every day. Try for at least 30 minutes on most days of the week. · Do not smoke. Smoking can increase your risk for health problems. If you need help quitting, talk to your doctor about stop-smoking programs and medicines. These can increase your chances of quitting for good. · Limit alcohol to 2 drinks a day for men and 1 drink a day for women. Too much alcohol can cause health problems. If you have a BMI higher than 25  · Your doctor may do other tests to check your risk for weight-related health problems. This may include measuring the distance around your waist. A waist measurement of more than 40 inches in men or 35 inches in women can increase the risk of weight-related health problems. · Talk with your doctor about steps you can take to stay healthy or improve your health. You may need to make lifestyle changes to lose weight and stay healthy, such as changing your diet and getting regular exercise. If you have a BMI lower than 18.5  · Your doctor may do other tests to check your risk for health problems. · Talk with your doctor about steps you can take to stay healthy or improve your health. You may need to make lifestyle changes to gain or maintain weight and stay healthy, such as getting more healthy foods in your diet and doing exercises to build muscle. Where can you learn more? Go to http://pooja-shon.info/. Enter S176 in the search box to learn more about \"Body Mass Index: Care Instructions. \"  Current as of: October 13, 2016  Content Version: 11.4  © 3132-8587 [employer]wise, [employer]orporated. Care instructions adapted under license by [employer] (which disclaims liability or warranty for this information). If you have questions about a medical condition or this instruction, always ask your healthcare professional. [address] any warranty or liability for your use of this information.

## 2018-11-19 NOTE — PROGRESS NOTES
[address] [place], [address]  Tel.  [phone]  Fax. [address] [facility] [address]  [place], [address] [facility]  Tel.  [phone]  Fax. [phone] [address] [facility] [address] Patricia Schafer   Tel.  [phone]  Fax. [phone]       Chief Complaint       Chief Complaint   Patient presents with    Sleep Problem     NP refd by Dr. Hunter Collins for sleep disturbance. JAMIL Espinoza is a  66 y.o.  female seen for evaluation of a sleep disorder  . The patient reports she has experienced daytime sleepiness, snoring, non-restorative sleep, nocturnal awakening. The patient retires at 11 pm and awakens at 8: 30 am. The patient notes that she will experience frequent awakening from sleep. In general she is not able to easily return to sleep after awakening. she tends to awaken spontaneously. She has a history of snoring which is described as wound. She is unable to comment on associated apnea. She will awaken 4-5 times during the night. She has a history of back issues and, at some point, had been taking Percocet. She continues to feel tired on awakening and sluggish throughout the day \"feeling like I am beat up\". She has a history of reflux. The patient has not undergone diagnostic testing for the current problems. [place] Sleepiness Score: 0       Allergies   Allergen Reactions    Vicodin [Hydrocodone-Acetaminophen] Hives       Current Outpatient Medications   Medication Sig Dispense Refill    dulaglutide (TRULICITY) 1.5 DE/1.4 mL sub-q pen 0.5 mL by SubCUTAneous route every seven (7) days. 12 Pen 0    ibuprofen (MOTRIN) 800 mg tablet Take 1 Tab by mouth every eight (8) hours as needed for Pain.  180 Tab 0    levocetirizine (XYZAL) 5 mg tablet TAKE ONE TABLET BY MOUTH ONCE DAILY 90 Tab 1    metFORMIN ER (GLUCOPHAGE XR) 500 mg tablet TAKE TWO TABLETS BY MOUTH TWICE DAILY 360 Tab 1    fluticasone (FLONASE) 50 mcg/actuation nasal spray 2 Sprays by Both Nostrils route daily. 3 Bottle 1    simvastatin (ZOCOR) 20 mg tablet TAKE 1 TABLET BY MOUTH ONCE DAILY AT NIGHT 90 Tab 0    losartan-hydroCHLOROthiazide (HYZAAR) 100-25 mg per tablet TAKE ONE TABLET BY MOUTH DAILY 90 Tab 1    insulin glargine (BASAGLAR KWIKPEN U-100 INSULIN) 100 unit/mL (3 mL) inpn 20 Units by SubCUTAneous route daily. 12 Pen 0    amLODIPine (NORVASC) 5 mg tablet Take 1 Tab by mouth nightly. 30 Tab 5    Insulin Needles, Disposable, (REJI PEN NEEDLE) 32 gauge x 5/32\" ndle Use daily 100 Pen Needle 11    nystatin (MYCOSTATIN) topical cream Apply  to affected area two (2) times a day. 45 g 0    lancets (ONE TOUCH DELICA) 33 gauge misc Check blood sugar twice daily DX:E11.65 100 Lancet 5    Blood-Glucose Meter monitoring kit Use as directed 1 Kit 0    glucose blood VI test strips (ASCENSIA AUTODISC VI, ONE TOUCH ULTRA TEST VI) strip Check glucose twice daily 100 Strip 11    glucose blood VI test strips (ONETOUCH ULTRA TEST) strip 50 Each by Does Not Apply route See Admin Instructions. One Touch Ultra 2 glucometer      albuterol (PROVENTIL VENTOLIN) 2.5 mg /3 mL (0.083 %) nebulizer solution 3 mL by Nebulization route every four (4) hours as needed for Wheezing. 1 Package 5    albuterol (PROVENTIL HFA, VENTOLIN HFA, PROAIR HFA) 90 mcg/actuation inhaler Take 2 Puffs by inhalation every six (6) hours as needed for Wheezing. 1 Inhaler 3    Nebulizer & Compressor machine Nebulizer machine x 1 Nebulizer Kit 1 each 0        She  has a past medical history of Arthritis, Chronic pain, Diabetes ([address] GERD (gastroesophageal reflux disease), High cholesterol, Hypertension, MRSA infection, Other ill-defined conditions(799.89), PUD (peptic ulcer disease), and Seasonal allergies. She  has a past surgical history that includes hx gyn; hx other surgical; hx orthopaedic; hx tonsillectomy; hx hysterectomy; COLONOSCOPY (N/A, 5/31/2017); and ENDOSCOPIC POLYPECTOMY (N/A, 5/31/2017).     She family history includes Diabetes in her father; Hypertension in her mother. She  reports that she quit smoking about 6 years ago. Her smoking use included cigarettes. she has never used smokeless tobacco. She reports that she drinks alcohol. She reports that she does not use drugs. Review of Systems:  Review of Systems   Constitutional: Negative for chills and fever. HENT: Negative for hearing loss and tinnitus. Eyes: Negative for blurred vision and double vision. Respiratory: Positive for shortness of breath. Negative for cough. Cardiovascular: Negative for chest pain and palpitations. Gastrointestinal: Negative for abdominal pain and heartburn. Genitourinary: Negative for frequency and urgency. Musculoskeletal: Negative for back pain and neck pain. Skin: Negative for itching and rash. Neurological: Negative for dizziness and headaches. Endo/Heme/Allergies: Does not bruise/bleed easily. Psychiatric/Behavioral: Negative for depression. Objective:     Visit Vitals  /76 (BP 1 Location: Right arm, BP Patient Position: Sitting)   Pulse 94   Ht 5' 8\" (1.727 m)   Wt 225 lb (102.1 kg)   SpO2 95%   BMI 34.21 kg/m²     Body mass index is 34.21 kg/m². General:   Conversant, cooperative   Eyes:  Pupils equal and reactive, no nystagmus,flat disk margins. Normal A/V ratio   Oropharynx:   Mallampati score II, , tongue scalloped   Tonsils:      Neck:   No carotid bruits; Neck circ. in \"inches\": 15   Chest/Lungs:  Clear on auscultation    CVS:  Normal rate, regular rhythm   Skin:  Warm to touch; no obvious rashes   Neuro:  Speech fluent, face symmetrical, tongue movement normal   Psych:  Normal affect,  normal countenance        Assessment:       ICD-10-CM ICD-9-CM    1. TRAVIS (obstructive sleep apnea) G47.33 327.23 SLEEP STUDY UNATTENDED, 4 CHANNEL      CANCELED: SLEEP STUDY UNATTENDED, 4 CHANNEL     History of snoring, daytime fatigue consistent with sleep disordered breathing.   She will be evaluated with a home sleep test.  The results will be reviewed with her. Plan:     Orders Placed This Encounter    SLEEP STUDY UNATTENDED, 4 CHANNEL     Order Specific Question:   Reason for Exam     Answer:   snoring, fatigue       * Patient has a history and examination consistent with the diagnosis of sleep apnea. *Home sleep testing was ordered for initial evaluation. * She was provided information on sleep apnea including corresponding risk factors and the importance of proper treatment. * Treatment options if indicated were reviewed today. Instructions:  o The patient would benefit from weight reduction measures. o Do not engage in activities requiring a normal degree of alertness if fatigue is present. o The patient understands that untreated or undertreated sleep apnea could impair judgement and the ability to function normally during the day.  o Call or return if symptoms worsen or persist.          Yumiko Sharp MD, [facility]  Electronically signed 11/19/18       This note was created using voice recognition software. Despite editing, there may be syntax errors. This note will not be viewable in [address]

## 2018-11-20 LAB
ALBUMIN SERPL-MCNC: 4.9 G/DL (ref 3.5–5.5)
ALBUMIN/CREAT UR: 6.1 MG/G CREAT (ref 0–30)
ALBUMIN/GLOB SERPL: 1.6 {RATIO} (ref 1.2–2.2)
ALP SERPL-CCNC: 79 IU/L (ref 39–117)
ALT SERPL-CCNC: 27 IU/L (ref 0–32)
AST SERPL-CCNC: 20 IU/L (ref 0–40)
BILIRUB SERPL-MCNC: 0.3 MG/DL (ref 0–1.2)
BUN SERPL-MCNC: 24 MG/DL (ref 6–24)
BUN/CREAT SERPL: 17 (ref 9–23)
CALCIUM SERPL-MCNC: 10.8 MG/DL (ref 8.7–10.2)
CHLORIDE SERPL-SCNC: 96 MMOL/L (ref 96–106)
CHOLEST SERPL-MCNC: 167 MG/DL (ref 100–199)
CO2 SERPL-SCNC: 23 MMOL/L (ref 20–29)
CREAT SERPL-MCNC: 1.38 MG/DL (ref 0.57–1)
CREAT UR-MCNC: 70.8 MG/DL
GLOBULIN SER CALC-MCNC: 3.1 G/DL (ref 1.5–4.5)
GLUCOSE SERPL-MCNC: 103 MG/DL (ref 65–99)
HDLC SERPL-MCNC: 90 MG/DL
INTERPRETATION, 910389: NORMAL
INTERPRETATION: NORMAL
LDLC SERPL CALC-MCNC: 59 MG/DL (ref 0–99)
MICROALBUMIN UR-MCNC: 4.3 UG/ML
PDF IMAGE, 910387: NORMAL
POTASSIUM SERPL-SCNC: 4.3 MMOL/L (ref 3.5–5.2)
PROT SERPL-MCNC: 8 G/DL (ref 6–8.5)
SODIUM SERPL-SCNC: 134 MMOL/L (ref 134–144)
TRIGL SERPL-MCNC: 89 MG/DL (ref 0–149)
VLDLC SERPL CALC-MCNC: 18 MG/DL (ref 5–40)

## 2018-11-20 NOTE — PROGRESS NOTES
Patient was seen 7/6/2018 and labs were discussed Verified Results  SGPT/ALT 02Feb2018 10:25AM MAGGIE MENDOZA     Test Name Result Flag Reference   GPT/ALT 16 Units/L  <79     SGOT/AST 02Feb2018 10:25AM MAGGIE MENDOZA     Test Name Result Flag Reference   GOT/AST 10 Units/L  <38     RBC SED RATE 02Feb2018 10:25AM MAGGIE MENDOZA     Test Name Result Flag Reference   RBC SED RATE 14 mm/hr  0-20     BUN/CREATININE 02Feb2018 10:25AM MAGGIE MENDOZA     Test Name Result Flag Reference   CREATININE 1.03 mg/dl H 0.51-0.95   BUN/CREATININE RATIO 16  7-25   BUN 16 mg/dl  6-20   GFR EST. AMER 58     eGFR 30-59 mL/min/1.73m2 = Moderate decrease in kidney function. Stage 3 CKD (chronic kidney disease) or moderate kidney disease.   GFR EST.NONAFRI AMER 50     eGFR 30-59 mL/min/1.73m2 = Moderate decrease in kidney function. Stage 3 CKD (chronic kidney disease) or moderate kidney disease.     CBC WITH AUTOMATED DIFFERENTIAL 02Feb2018 10:25AM MAGGIE MENDOZA     Test Name Result Flag Reference   WHITE BLOOD COUNT 3.8 K/mcL L 4.2-11.0   RED CELL COUNT 4.44 mil/mcL  4.00-5.20   HEMOGLOBIN 13.3 g/dl  12.0-15.5   HEMATOCRIT 42.4 %  36.0-46.5   MEAN CORPUSCULAR VOLUME 95.5 fL  78.0-100.0   MEAN CORPUSCULAR HEMOGLOBIN 30.0 pg  26.0-34.0   MEAN CORPUSCULAR HGB CONC 31.4 g/dl L 32.0-36.5   RDW-CV 12.9 %  11.0-15.0   PLATELET COUNT 192 K/mcL  140-450   SUSHILA% 48 %     LYM% 42 %     MON% 8 %     EOS% 2 %     BASO% 0 %     SUSHILA ABS 1.9 K/mcL  1.8-7.7   LYM ABS 1.6 K/mcL  1.0-4.0   MON ABS 0.3 K/mcL  0.3-0.9   EOS ABS 0.1 K/mcL  0.1-0.5   BASO ABS 0.0 K/mcL  0.0-0.3   DIFF TYPE      AUTOMATED DIFFERENTIAL       Message   mild cecrease WBC. Mildly elevated creatinine. F/u with PCP. Please task to PCP

## 2018-11-27 ENCOUNTER — TELEPHONE (OUTPATIENT)
Dept: SLEEP MEDICINE | Age: 58
End: 2018-11-27

## 2018-11-27 DIAGNOSIS — G47.33 OBSTRUCTIVE SLEEP APNEA (ADULT) (PEDIATRIC): Primary | ICD-10-CM

## 2018-12-03 ENCOUNTER — DOCUMENTATION ONLY (OUTPATIENT)
Dept: SLEEP MEDICINE | Age: 58
End: 2018-12-03

## 2018-12-10 DIAGNOSIS — E83.52 SERUM CALCIUM ELEVATED: Primary | ICD-10-CM

## 2018-12-10 NOTE — PROGRESS NOTES
Spoke to patient informed her of results and recommendations. Patient will be in later this week for repeat labs.

## 2018-12-10 NOTE — PROGRESS NOTES
Labs look good, but renal function is slightly elevated and serum calcium levels are elevated too. we need to recheck cmp  PTH and phosphate levels. Ask pt to be well hydrated and come for labs this week      Rest of her labs look good.

## 2018-12-14 DIAGNOSIS — J45.20 MILD INTERMITTENT ASTHMA WITHOUT COMPLICATION: ICD-10-CM

## 2018-12-14 DIAGNOSIS — J45.909 ASTHMA: ICD-10-CM

## 2018-12-15 RX ORDER — ALBUTEROL SULFATE 0.83 MG/ML
2.5 SOLUTION RESPIRATORY (INHALATION)
Qty: 1 PACKAGE | Refills: 5 | Status: SHIPPED | OUTPATIENT
Start: 2018-12-15 | End: 2021-03-08 | Stop reason: SDUPTHER

## 2018-12-15 RX ORDER — NEBULIZER AND COMPRESSOR
EACH MISCELLANEOUS
Qty: 1 EACH | Refills: 0 | Status: SHIPPED | OUTPATIENT
Start: 2018-12-15 | End: 2018-12-27 | Stop reason: SDUPTHER

## 2018-12-17 ENCOUNTER — OFFICE VISIT (OUTPATIENT)
Dept: FAMILY MEDICINE CLINIC | Age: 58
End: 2018-12-17

## 2018-12-17 ENCOUNTER — TELEPHONE (OUTPATIENT)
Dept: FAMILY MEDICINE CLINIC | Age: 58
End: 2018-12-17

## 2018-12-17 VITALS
OXYGEN SATURATION: 98 % | BODY MASS INDEX: 32.13 KG/M2 | RESPIRATION RATE: 18 BRPM | TEMPERATURE: 98.8 F | WEIGHT: 212 LBS | DIASTOLIC BLOOD PRESSURE: 82 MMHG | SYSTOLIC BLOOD PRESSURE: 118 MMHG | HEIGHT: 68 IN | HEART RATE: 120 BPM

## 2018-12-17 DIAGNOSIS — J20.9 ACUTE BRONCHITIS, UNSPECIFIED ORGANISM: Primary | ICD-10-CM

## 2018-12-17 DIAGNOSIS — J45.20 MILD INTERMITTENT ASTHMA WITHOUT COMPLICATION: ICD-10-CM

## 2018-12-17 RX ORDER — PREDNISONE 20 MG/1
20 TABLET ORAL 2 TIMES DAILY
Qty: 10 TAB | Refills: 0 | Status: SHIPPED | OUTPATIENT
Start: 2018-12-17 | End: 2019-02-18 | Stop reason: ALTCHOICE

## 2018-12-17 RX ORDER — PROMETHAZINE HYDROCHLORIDE AND CODEINE PHOSPHATE 6.25; 1 MG/5ML; MG/5ML
1 SOLUTION ORAL
Qty: 118 ML | Refills: 0 | Status: SHIPPED | OUTPATIENT
Start: 2018-12-17 | End: 2019-02-18 | Stop reason: ALTCHOICE

## 2018-12-17 RX ORDER — AZITHROMYCIN 250 MG/1
TABLET, FILM COATED ORAL
Qty: 6 TAB | Refills: 0 | Status: SHIPPED | OUTPATIENT
Start: 2018-12-17 | End: 2018-12-22

## 2018-12-17 NOTE — PROGRESS NOTES
HISTORY OF PRESENT ILLNESS  Jericho Malave is a 66 y.o. female. HPI  C/o cough, chest tightness, mild dyspnea and chest congestion x 6 days. History of asthma. Has called for refill of her albuteral, HFA and jet neb. Taking mucinex DM with some minor sx relief. Having difficulty sleeping due to cough. Past medical history, social history, family history and medications were reviewed and updated. Blood pressure 118/82, pulse (!) 120, temperature 98.8 °F (37.1 °C), temperature source Oral, resp. rate 18, height 5' 8\" (1.727 m), weight 212 lb (96.2 kg), SpO2 98 %. Review of Systems   Constitutional: Positive for malaise/fatigue. Negative for chills and fever. HENT: Positive for congestion. Negative for sinus pain and sore throat. Respiratory: Positive for cough, shortness of breath and wheezing. Negative for sputum production. Cardiovascular: Negative for chest pain. All other systems reviewed and are negative. Physical Exam   Constitutional: No distress. HENT:   Right Ear: Tympanic membrane and ear canal normal.   Left Ear: Tympanic membrane and ear canal normal.   Nose: Mucosal edema present. Right sinus exhibits no maxillary sinus tenderness and no frontal sinus tenderness. Left sinus exhibits no maxillary sinus tenderness and no frontal sinus tenderness. Mouth/Throat: Posterior oropharyngeal erythema present. No oropharyngeal exudate or posterior oropharyngeal edema. Neck: Neck supple. Cardiovascular: Normal rate, regular rhythm and normal heart sounds. Pulmonary/Chest: Effort normal. She has wheezes (forced expiratory bilateral lung fields). She exhibits no tenderness. Lymphadenopathy:     She has no cervical adenopathy. Skin: Skin is warm and dry. ASSESSMENT and PLAN  Diagnoses and all orders for this visit:    1. Acute bronchitis, unspecified organism  -     promethazine-codeine (PHENERGAN WITH CODEINE) 6.25-10 mg/5 mL syrup;  Take 5 mL by mouth nightly as needed for Cough. Max Daily Amount: 5 mL.  -     azithromycin (ZITHROMAX) 250 mg tablet; Take 2 tablets today, then take 1 tablet daily  Rest and fluids. Continue mucinex. Add z pack as above. May use phenergan with codeine at bedtime. 2. Mild intermittent asthma without complication  -     predniSONE (DELTASONE) 20 mg tablet; Take 20 mg by mouth two (2) times a day. Continue albuterol as directed. Prednisone as above. Medication risks, benefits and potential side effects were reviewed. Follow up prn if sx worsen or FTI.

## 2018-12-17 NOTE — PROGRESS NOTES
Chief Complaint   Patient presents with    Cough    Nasal Congestion     1. Have you been to the ER, urgent care clinic since your last visit? Hospitalized since your last visit? No    2. Have you seen or consulted any other health care providers outside of the [address] [place] [address] since your last visit? Include any pap smears or colon screening.  No

## 2018-12-17 NOTE — TELEPHONE ENCOUNTER
Chief Complaint   Patient presents with    Other     Patient came to office today for office visit for nasal congestion and cough. Patient requesting order /prescription to be sent to [facility] , fax to [phone]. Patient needs prescription for nebulizer machine to be faxed to [facility] at [phone]. Patient current machine is broken, last purchased in 2014. [facility] no longer honors [facility].

## 2018-12-27 DIAGNOSIS — J45.909 ASTHMA: ICD-10-CM

## 2018-12-28 NOTE — TELEPHONE ENCOUNTER
Dr. Gianna Chan. Looks like you did this on 12/15/18. Not sure where rx is . Can you please print rx again. ?

## 2018-12-30 RX ORDER — NEBULIZER AND COMPRESSOR
EACH MISCELLANEOUS
Qty: 1 EACH | Refills: 0 | Status: SHIPPED | OUTPATIENT
Start: 2018-12-30 | End: 2021-03-17 | Stop reason: SDUPTHER

## 2019-02-18 ENCOUNTER — OFFICE VISIT (OUTPATIENT)
Dept: SLEEP MEDICINE | Age: 59
End: 2019-02-18

## 2019-02-18 VITALS
BODY MASS INDEX: 33.49 KG/M2 | HEART RATE: 92 BPM | DIASTOLIC BLOOD PRESSURE: 80 MMHG | WEIGHT: 221 LBS | SYSTOLIC BLOOD PRESSURE: 117 MMHG | RESPIRATION RATE: 16 BRPM | OXYGEN SATURATION: 98 % | HEIGHT: 68 IN

## 2019-02-18 DIAGNOSIS — G47.33 OSA (OBSTRUCTIVE SLEEP APNEA): Primary | ICD-10-CM

## 2019-02-18 NOTE — PROGRESS NOTES
[address] [facility]. [place], [employer]  Tel.  [phone]  Fax. [address] [facility] [address]  [place], [address] [facility]  Tel.  [phone]  Fax. [phone] [address] [facility] [place] [facility]  Tel.  [phone]  Fax. [phone]         Chief Complaint       Chief Complaint   Patient presents with    Sleep Problem         HPI        Lori De La Cruz is a 66 y.o.  female seen for follow-up. She was evaluated with a home sleep study which demonstrated  sleep disordered breathing characterized by an AHI of 21.2/h associated with minimal SaO2 of 76%. Snoring during 28.8% of the recording. APAP 7-15 cm was initiated. Compliance data downloaded and reviewed in detail with the patient today. During the past 30 days, APAP used during 29 days with the average daily use of 8.45 hours. CMS compliance criteria 93 %. AHI 2 per hour. Maximum pressure setting 14.9 cm. Mask leak within acceptable limits. Patient is notes that she is doing well with APAP. She is rested on awakening and states that \"I love it\". She notes that she is not awakening frequently at night and is no longer snoring. Allergies   Allergen Reactions    Vicodin [Hydrocodone-Acetaminophen] Hives       Current Outpatient Medications   Medication Sig Dispense Refill    Nebulizer & Compressor machine Nebulizer machine x 1 Nebulizer Kit 1 Each 0    albuterol (PROVENTIL VENTOLIN) 2.5 mg /3 mL (0.083 %) nebulizer solution 3 mL by Nebulization route every four (4) hours as needed for Wheezing. 1 Package 5    dulaglutide (TRULICITY) 1.5 SB/4.3 mL sub-q pen 0.5 mL by SubCUTAneous route every seven (7) days. 12 Pen 0    ibuprofen (MOTRIN) 800 mg tablet Take 1 Tab by mouth every eight (8) hours as needed for Pain.  180 Tab 0    levocetirizine (XYZAL) 5 mg tablet TAKE ONE TABLET BY MOUTH ONCE DAILY 90 Tab 1    metFORMIN ER (GLUCOPHAGE XR) 500 mg tablet TAKE TWO TABLETS BY MOUTH TWICE DAILY 360 Tab 1    fluticasone (FLONASE) 50 mcg/actuation nasal spray 2 Sprays by Both Nostrils route daily. 3 Bottle 1    simvastatin (ZOCOR) 20 mg tablet TAKE 1 TABLET BY MOUTH ONCE DAILY AT NIGHT 90 Tab 0    losartan-hydroCHLOROthiazide (HYZAAR) 100-25 mg per tablet TAKE ONE TABLET BY MOUTH DAILY 90 Tab 1    nystatin (MYCOSTATIN) topical cream Apply  to affected area two (2) times a day. 45 g 0    Blood-Glucose Meter monitoring kit Use as directed 1 Kit 0    glucose blood VI test strips (ONETOUCH ULTRA TEST) strip 50 Each by Does Not Apply route See Admin Instructions. One Touch Ultra 2 glucometer      albuterol (PROVENTIL HFA, VENTOLIN HFA, PROAIR HFA) 90 mcg/actuation inhaler Take 2 Puffs by inhalation every six (6) hours as needed for Wheezing. 1 Inhaler 3    insulin glargine (BASAGLAR KWIKPEN U-100 INSULIN) 100 unit/mL (3 mL) inpn 20 Units by SubCUTAneous route daily. 12 Pen 0    amLODIPine (NORVASC) 5 mg tablet Take 1 Tab by mouth nightly. 30 Tab 5    Insulin Needles, Disposable, (REJI PEN NEEDLE) 32 gauge x 5/32\" ndle Use daily 100 Pen Needle 11    lancets (ONE TOUCH DELICA) 33 gauge misc Check blood sugar twice daily DX:E11.65 100 Lancet 5    glucose blood VI test strips (ASCENSIA AUTODISC VI, ONE TOUCH ULTRA TEST VI) strip Check glucose twice daily 100 Strip 11        She  has a past medical history of Arthritis, Chronic pain, Diabetes ([address] GERD (gastroesophageal reflux disease), High cholesterol, Hypertension, MRSA infection, Other ill-defined conditions(799.89), PUD (peptic ulcer disease), and Seasonal allergies. She also has no past medical history of Adverse effect of anesthesia or Sleep apnea. She  has a past surgical history that includes hx gyn; hx other surgical; hx orthopaedic; hx tonsillectomy; colonoscopy (N/A, 5/31/2017); and hx hysterectomy. She family history includes Diabetes in her father; Hypertension in her mother. She  reports that she quit smoking about 6 years ago.  Her smoking use included cigarettes. she has never used smokeless tobacco. She reports that she drinks about 0.6 oz of alcohol per week. She reports that she does not use drugs. Review of Systems:  Unchanged per patient      Objective:     Visit Vitals  /80 (BP 1 Location: Left arm, BP Patient Position: Sitting)   Pulse 92   Resp 16   Ht 5' 8\" (1.727 m)   Wt 221 lb (100.2 kg)   SpO2 98%   BMI 33.60 kg/m²     Body mass index is 33.6 kg/m². Assessment:       ICD-10-CM ICD-9-CM    1. TRAVIS (obstructive sleep apnea) G47.33 327.23      Sleep disordered breathing responding consistently to APAP 7-15 cm. She will continue at the current pressure settings. She will contact the office for specific problems. Plan:   No orders of the defined types were placed in this encounter. * Patient has a history and examination consistent with the diagnosis of sleep apnea. * She was provided information on sleep apnea including coresponding risk factors and the importance of proper treatment. * Treatment options if indicated were reviewed today. Potential benefit of weight reduction was discussed. Weight reduction techniques reviewed. Latonia Santiago MD, FAASM  Electronically signed 02/18/19        This note was created using voice recognition software. Despite editing, there may be syntax errors. This note will not be viewable in [address]

## 2019-02-18 NOTE — PATIENT INSTRUCTIONS

## 2019-03-04 ENCOUNTER — HOSPITAL ENCOUNTER (OUTPATIENT)
Dept: LAB | Age: 59
Discharge: HOME | End: 2019-03-04
Payer: MEDICARE

## 2019-03-04 ENCOUNTER — OFFICE VISIT (OUTPATIENT)
Dept: FAMILY MEDICINE CLINIC | Age: 59
End: 2019-03-04

## 2019-03-04 VITALS
BODY MASS INDEX: 33.95 KG/M2 | DIASTOLIC BLOOD PRESSURE: 80 MMHG | WEIGHT: 224 LBS | OXYGEN SATURATION: 98 % | HEART RATE: 63 BPM | HEIGHT: 68 IN | SYSTOLIC BLOOD PRESSURE: 121 MMHG | TEMPERATURE: 98 F | RESPIRATION RATE: 18 BRPM

## 2019-03-04 DIAGNOSIS — Z79.4 CONTROLLED TYPE 2 DIABETES MELLITUS WITHOUT COMPLICATION, WITH LONG-TERM CURRENT USE OF INSULIN (HCC): Primary | ICD-10-CM

## 2019-03-04 DIAGNOSIS — E78.5 HYPERLIPIDEMIA, UNSPECIFIED HYPERLIPIDEMIA TYPE: ICD-10-CM

## 2019-03-04 DIAGNOSIS — E11.9 CONTROLLED TYPE 2 DIABETES MELLITUS WITHOUT COMPLICATION, WITH LONG-TERM CURRENT USE OF INSULIN (HCC): Primary | ICD-10-CM

## 2019-03-04 DIAGNOSIS — I10 ESSENTIAL HYPERTENSION: ICD-10-CM

## 2019-03-04 DIAGNOSIS — E66.9 OBESITY, CLASS I, BMI 30-34.9: ICD-10-CM

## 2019-03-04 LAB — HBA1C MFR BLD HPLC: 5.8 %

## 2019-03-04 PROCEDURE — 80053 COMPREHEN METABOLIC PANEL: CPT

## 2019-03-04 PROCEDURE — 36415 COLL VENOUS BLD VENIPUNCTURE: CPT

## 2019-03-04 NOTE — PROGRESS NOTES
S/O:  Patient seen during PCP visit for follow-up of DM medications. Patient states that when her Trulicity was increased to 1.5 mg in November she decided to stop the Nicole Duncan since she had titrated her dose down to 10 units daily. Current regimen: Metformin  mg 2 tablets twice daily and Trulicity 1.5 mg weekly. Patient has not been monitoring her blood sugars since stopping Basaglar. She administers her Trulicity on Tuesday and reports that a couple days later she will have severe abdominal pain, gas and feel nauseated. She eventually will vomit and then feels better. This has occurred ever since the dose increase. She continues to make changes in her diet and with the Trulicity she is not very hungry during the day. She has also decreased her portion size. She currently isn't exercising due to low motivation with the weather but plans on restarting. A/P:  A1C below goal.  Discussed the above with Dr. Zelda Williamson who is going to stop the Trulicity for now, but if her sugars start to increase will restart Trulicity at the 6.82 mg dose which patient was able to tolerate. Encouraged patient to restart checking some blood sugars just to help her stay on track and accountable and to try walking indoors at the mall until the weather warms up.     Chucho Mackenzie, PharmD, [place]

## 2019-03-04 NOTE — PROGRESS NOTES
Chief Complaint   Patient presents with    Hypertension     f/u     Diabetes     f/u    Cholesterol Problem     f/u       Reviewed Record in preparation for visit and have obtained necessary documentation. Identified pt with two pt identifiers (Name @ )    Health Maintenance Due   Topic    PAP AKA CERVICAL CYTOLOGY     EYE EXAM RETINAL OR DILATED          1. Have you been to the ER, urgent care clinic since your last visit? Hospitalized since your last visit?  no    2. Have you seen or consulted any other health care providers outside of the [address] [place] [address] since your last visit? Include any pap smears or colon screening.  no

## 2019-03-04 NOTE — PROGRESS NOTES
HPI  Brent Powell 66 y.o. female  presents to the office today for follow up on HTN, DM, cholesterol. Blood pressure 121/80, pulse 63, temperature 98 °F (36.7 °C), temperature source Oral, resp. rate 18, height 5' 8\" (1.727 m), weight 224 lb (101.6 kg), SpO2 98 %. Body mass index is 34.06 kg/m². Chief Complaint   Patient presents with    Hypertension     f/u     Diabetes     f/u    Cholesterol Problem     f/u     DM2: A1c per POC today 5.8, decreased from A1c of 6.8 on 8/8/18. Pt continues with  Trulicity 1.5 FG/0.9 mL. Pt notes she has not been taking Basaglar since 11/2018. Pt states she has been using the increased dose of Trulicity 1.5 DK/7.4 mL, but she can not tolerate due to severe abdominal pain and residual gas. Advised pt to resume taking Metformin 500 mg/BID; instructed pt to stop taking Trulicity and Basalglar. Hyperlipidemia: Lipid panel on 11/19/18 notable for total cholesterol 167, LDL 59, HDL 90, and triglycerides 89. Pt continues with simvastatin 20 mg/d. Lipids well controlled, will assess labs today in office. If labs stable in 3 months on repeat check, we will discuss cutting back on medications. Hypertension: BP at office today 121/80. Pt continues with Hyzaar 100-25 mg/d. BP is at goal today in office. Advised pt to continue with Hyzaar. Obesity: I have reviewed/discussed the above normal BMI with the patient. I have recommended the following interventions: dietary management education, guidance, and counseling, encourage exercise and monitor weight . Current Outpatient Medications   Medication Sig Dispense Refill    glucose blood VI test strips (ASCENSIA AUTODISC VI, ONE TOUCH ULTRA TEST VI) strip Check glucose twice daily 100 Strip 11    Nebulizer & Compressor machine Nebulizer machine x 1 Nebulizer Kit 1 Each 0    albuterol (PROVENTIL VENTOLIN) 2.5 mg /3 mL (0.083 %) nebulizer solution 3 mL by Nebulization route every four (4) hours as needed for Wheezing.  1 Package 5    ibuprofen (MOTRIN) 800 mg tablet Take 1 Tab by mouth every eight (8) hours as needed for Pain. 180 Tab 0    levocetirizine (XYZAL) 5 mg tablet TAKE ONE TABLET BY MOUTH ONCE DAILY 90 Tab 1    metFORMIN ER (GLUCOPHAGE XR) 500 mg tablet TAKE TWO TABLETS BY MOUTH TWICE DAILY 360 Tab 1    fluticasone (FLONASE) 50 mcg/actuation nasal spray 2 Sprays by Both Nostrils route daily. 3 Bottle 1    simvastatin (ZOCOR) 20 mg tablet TAKE 1 TABLET BY MOUTH ONCE DAILY AT NIGHT 90 Tab 0    losartan-hydroCHLOROthiazide (HYZAAR) 100-25 mg per tablet TAKE ONE TABLET BY MOUTH DAILY 90 Tab 1    Insulin Needles, Disposable, (REJI PEN NEEDLE) 32 gauge x \" ndle Use daily 100 Pen Needle 11    albuterol (PROVENTIL HFA, VENTOLIN HFA, PROAIR HFA) 90 mcg/actuation inhaler Take 2 Puffs by inhalation every six (6) hours as needed for Wheezing.  1 Inhaler 3     Allergies   Allergen Reactions    Vicodin [Hydrocodone-Acetaminophen] Hives     Past Medical History:   Diagnosis Date    Arthritis     Chronic pain     3 herniated discs in lower back    Diabetes (HCC)     GERD (gastroesophageal reflux disease)     High cholesterol     Hypertension     MRSA infection     Other ill-defined conditions(799.89)     herniated disc to back    PUD (peptic ulcer disease)     Seasonal allergies      Past Surgical History:   Procedure Laterality Date    COLONOSCOPY N/A 2017    [facility] ENDOSCOPY    HX GYN      tubal ligation, hysterectomy    HX HYSTERECTOMY      HX ORTHOPAEDIC      left hand ligament removed    HX OTHER SURGICAL      corticosteroid injections - back    HX TONSILLECTOMY       Family History   Problem Relation Age of Onset    Hypertension Mother     Diabetes Father      Social History     Tobacco Use    Smoking status: Former Smoker     Types: Cigarettes     Last attempt to quit: 10/30/2012     Years since quittin.3    Smokeless tobacco: Never Used   Substance Use Topics    Alcohol use: Yes     Alcohol/week: 0.6 oz     Types: 1 Shots of liquor per week     Comment: weekends        Review of Systems   Constitutional: Negative for chills and fever. HENT: Negative for hearing loss and tinnitus. Eyes: Negative for blurred vision and double vision. Respiratory: Negative for shortness of breath. Cardiovascular: Negative for chest pain and palpitations. Gastrointestinal: Negative for nausea and vomiting. Genitourinary: Negative for dysuria and frequency. Musculoskeletal: Negative for back pain and falls. Skin: Negative for itching and rash. Neurological: Negative for dizziness, loss of consciousness and headaches. Psychiatric/Behavioral: Negative for depression. The patient is not nervous/anxious. Physical Exam   Constitutional: She is oriented to person, place, and time. She appears well-developed and well-nourished. HENT:   Head: Normocephalic and atraumatic. Right Ear: External ear normal.   Left Ear: External ear normal.   Nose: Nose normal.   Mouth/Throat: Oropharynx is clear and moist.   Eyes: Conjunctivae and EOM are normal.   Neck: Normal range of motion. Neck supple. Carotid bruit is not present. No thyroid mass and no thyromegaly present. Cardiovascular: Normal rate, regular rhythm, S1 normal, S2 normal, normal heart sounds and intact distal pulses. Pulmonary/Chest: Effort normal and breath sounds normal.   Abdominal: Soft. Normal appearance and bowel sounds are normal. There is no hepatosplenomegaly. There is no tenderness. Musculoskeletal: Normal range of motion. Neurological: She is alert and oriented to person, place, and time. She has normal strength. No cranial nerve deficit or sensory deficit. Coordination normal.   Skin: Skin is warm, dry and intact. No abrasion and no rash noted. Psychiatric: She has a normal mood and affect.  Her behavior is normal. Judgment and thought content normal.   Nursing note and vitals reviewed. ASSESSMENT and PLAN  Diagnoses and all orders for this visit:    1. Controlled type 2 diabetes mellitus without complication, with long-term current use of insulin ([address]  Advised pt to resume taking Metformin 500 mg/BID; instructed pt to stop taking Trulicity and Basalglar.   -     AMB POC HEMOGLOBIN A1C  -     [facility]  -     glucose blood VI test strips (ASCENSIA AUTODISC VI, ONE TOUCH ULTRA TEST VI) strip; Check glucose twice daily  -     METABOLIC PANEL, COMPREHENSIVE    2. Hyperlipidemia, unspecified hyperlipidemia type  Pt continues with simvastatin 20 mg/d. Lipids well controlled, will assess labs today in office. If labs stable in 3 months on repeat check, we will discuss cutting back on medications.   -     METABOLIC PANEL, COMPREHENSIVE    3. Essential hypertension  BP is at goal today in office. Advised pt to continue with Hyzaar 100-25 mg/d. -     METABOLIC PANEL, COMPREHENSIVE    4. Obesity, Class I, BMI 30-34.9  I have reviewed/discussed the above normal BMI with the patient. I have recommended the following interventions: dietary management education, guidance, and counseling, encourage exercise and monitor weight . Follow-up Disposition:  Return in about 3 months (around 6/4/2019) for hypertension, diabetes, cholesterol follow up. Medication risks/benefits/costs/interactions/alternatives discussed with patient. Advised patient to call back or return to office if symptoms worsen/change/persist.  If patient cannot reach us or should anything more severe/urgent arise he/she should proceed directly to the nearest emergency department. Discussed expected course/resolution/complications of diagnosis in detail with patient. Patient given a written after visit summary which includes her diagnoses, current medications and vitals. Patient expressed understanding with the diagnosis and plan.     Written by liliam Cerna, as dictated by Tom Vogel Travis German M.D.    2:25 PM - 2:42 PM    Total time spent with the patient 17 minutes, greater than 50% of time spent counseling patient.

## 2019-03-05 LAB
ALBUMIN SERPL-MCNC: 4.8 G/DL (ref 3.5–5.5)
ALBUMIN/GLOB SERPL: 1.8 {RATIO} (ref 1.2–2.2)
ALP SERPL-CCNC: 70 IU/L (ref 39–117)
ALT SERPL-CCNC: 19 IU/L (ref 0–32)
AST SERPL-CCNC: 13 IU/L (ref 0–40)
BILIRUB SERPL-MCNC: 0.4 MG/DL (ref 0–1.2)
BUN SERPL-MCNC: 19 MG/DL (ref 6–24)
BUN/CREAT SERPL: 14 (ref 9–23)
CALCIUM SERPL-MCNC: 11 MG/DL (ref 8.7–10.2)
CHLORIDE SERPL-SCNC: 97 MMOL/L (ref 96–106)
CO2 SERPL-SCNC: 25 MMOL/L (ref 20–29)
CREAT SERPL-MCNC: 1.33 MG/DL (ref 0.57–1)
GLOBULIN SER CALC-MCNC: 2.7 G/DL (ref 1.5–4.5)
GLUCOSE SERPL-MCNC: 102 MG/DL (ref 65–99)
INTERPRETATION: NORMAL
POTASSIUM SERPL-SCNC: 4.1 MMOL/L (ref 3.5–5.2)
PROT SERPL-MCNC: 7.5 G/DL (ref 6–8.5)
SODIUM SERPL-SCNC: 137 MMOL/L (ref 134–144)

## 2019-03-16 DIAGNOSIS — E34.9 ELEVATED PARATHYROID HORMONE: ICD-10-CM

## 2019-03-16 DIAGNOSIS — E83.52 SERUM CALCIUM ELEVATED: Primary | ICD-10-CM

## 2019-03-16 NOTE — PROGRESS NOTES
[phone] Spoke to patient Identified pt with two pt identifiers (Name @ )  The patient has been notified of this information and understand  Patient will come back for labs Monday  Per Dr Alberto ryan to order PTh, phosphate, CMP

## 2019-03-16 NOTE — PROGRESS NOTES
Her calcium levels are  Concerning    We need to check  PTH levels, phosphate, and CMP     Please place order for labs please nad ask pt to come for labs only.

## 2019-03-27 ENCOUNTER — HOSPITAL ENCOUNTER (OUTPATIENT)
Dept: LAB | Age: 59
Discharge: HOME | End: 2019-03-27
Payer: MEDICARE

## 2019-03-27 PROCEDURE — 83970 ASSAY OF PARATHORMONE: CPT

## 2019-03-27 PROCEDURE — 36415 COLL VENOUS BLD VENIPUNCTURE: CPT

## 2019-03-27 PROCEDURE — 84100 ASSAY OF PHOSPHORUS: CPT

## 2019-03-27 PROCEDURE — 80053 COMPREHEN METABOLIC PANEL: CPT

## 2019-03-28 LAB
ALBUMIN SERPL-MCNC: 4.3 G/DL (ref 3.5–5.5)
ALBUMIN/GLOB SERPL: 1.4 {RATIO} (ref 1.2–2.2)
ALP SERPL-CCNC: 86 IU/L (ref 39–117)
ALT SERPL-CCNC: 21 IU/L (ref 0–32)
AST SERPL-CCNC: 15 IU/L (ref 0–40)
BILIRUB SERPL-MCNC: <0.2 MG/DL (ref 0–1.2)
BUN SERPL-MCNC: 19 MG/DL (ref 6–24)
BUN/CREAT SERPL: 16 (ref 9–23)
CALCIUM SERPL-MCNC: 10 MG/DL (ref 8.7–10.2)
CHLORIDE SERPL-SCNC: 99 MMOL/L (ref 96–106)
CO2 SERPL-SCNC: 21 MMOL/L (ref 20–29)
CREAT SERPL-MCNC: 1.18 MG/DL (ref 0.57–1)
GLOBULIN SER CALC-MCNC: 3.1 G/DL (ref 1.5–4.5)
GLUCOSE SERPL-MCNC: 192 MG/DL (ref 65–99)
INTERPRETATION: NORMAL
PHOSPHATE SERPL-MCNC: 3.1 MG/DL (ref 2.5–4.5)
POTASSIUM SERPL-SCNC: 4.4 MMOL/L (ref 3.5–5.2)
PROT SERPL-MCNC: 7.4 G/DL (ref 6–8.5)
PTH-INTACT SERPL-MCNC: 141 PG/ML (ref 15–65)
SODIUM SERPL-SCNC: 135 MMOL/L (ref 134–144)

## 2019-03-29 ENCOUNTER — TELEPHONE (OUTPATIENT)
Dept: FAMILY MEDICINE CLINIC | Age: 59
End: 2019-03-29

## 2019-03-29 DIAGNOSIS — I10 ESSENTIAL HYPERTENSION WITH GOAL BLOOD PRESSURE LESS THAN 140/90: ICD-10-CM

## 2019-03-29 DIAGNOSIS — R79.89 ELEVATED PTHRP LEVEL: Primary | ICD-10-CM

## 2019-03-29 DIAGNOSIS — E78.2 MIXED HYPERLIPIDEMIA: ICD-10-CM

## 2019-03-29 NOTE — TELEPHONE ENCOUNTER
PT is calling to request a call back because of the X-Ray or a sonogram  that was ordered. Pt stated that she have done this before. Appt is schedule for Monday .

## 2019-03-29 NOTE — TELEPHONE ENCOUNTER
[phone] spoke to patient advised patient she had Carotid duplex on 8/18/2018 Dr Volodymyr Rendon wanted US of the thyroid patient understand

## 2019-03-29 NOTE — PROGRESS NOTES
Viewed by Melina Sanabria on 3/29/2019 11:29 AM   Patient aware via my chart   US scheduled for 4/1/2019  Referral to Dr Ayden ryan per Dr Ju Pulido

## 2019-03-29 NOTE — PROGRESS NOTES
Elevated PTH levels  Pt has bee advised to gt US parathyroid  Also needs a referral to endocrinology    Dr. Cristal Willams

## 2019-04-01 ENCOUNTER — HOSPITAL ENCOUNTER (OUTPATIENT)
Dept: ULTRASOUND IMAGING | Age: 59
Discharge: HOME | End: 2019-04-01
Attending: FAMILY MEDICINE
Payer: MEDICARE

## 2019-04-01 DIAGNOSIS — R79.89 ELEVATED PTHRP LEVEL: ICD-10-CM

## 2019-04-01 PROCEDURE — 76536 US EXAM OF HEAD AND NECK: CPT

## 2019-04-02 ENCOUNTER — TELEPHONE (OUTPATIENT)
Dept: FAMILY MEDICINE CLINIC | Age: 59
End: 2019-04-02

## 2019-04-02 DIAGNOSIS — E11.9 CONTROLLED TYPE 2 DIABETES MELLITUS WITHOUT COMPLICATION, WITH LONG-TERM CURRENT USE OF INSULIN (HCC): ICD-10-CM

## 2019-04-02 DIAGNOSIS — Z79.4 CONTROLLED TYPE 2 DIABETES MELLITUS WITHOUT COMPLICATION, WITH LONG-TERM CURRENT USE OF INSULIN (HCC): ICD-10-CM

## 2019-04-02 DIAGNOSIS — E78.2 MIXED HYPERLIPIDEMIA: ICD-10-CM

## 2019-04-02 DIAGNOSIS — I10 ESSENTIAL HYPERTENSION: Primary | ICD-10-CM

## 2019-04-02 DIAGNOSIS — I10 ESSENTIAL HYPERTENSION WITH GOAL BLOOD PRESSURE LESS THAN 140/90: ICD-10-CM

## 2019-04-02 RX ORDER — METFORMIN HYDROCHLORIDE 500 MG/1
TABLET, EXTENDED RELEASE ORAL
Qty: 360 TAB | Refills: 1 | Status: SHIPPED | OUTPATIENT
Start: 2019-04-02 | End: 2019-10-01 | Stop reason: SDUPTHER

## 2019-04-02 RX ORDER — IBUPROFEN 800 MG/1
TABLET ORAL
Qty: 180 TAB | Refills: 0 | Status: SHIPPED | OUTPATIENT
Start: 2019-04-02 | End: 2020-01-11 | Stop reason: SDUPTHER

## 2019-04-02 RX ORDER — LOSARTAN POTASSIUM AND HYDROCHLOROTHIAZIDE 25; 100 MG/1; MG/1
TABLET ORAL
Qty: 90 TAB | Refills: 1 | Status: SHIPPED | OUTPATIENT
Start: 2019-04-02 | End: 2019-04-05 | Stop reason: RX

## 2019-04-02 RX ORDER — LOSARTAN POTASSIUM AND HYDROCHLOROTHIAZIDE 25; 100 MG/1; MG/1
1 TABLET ORAL DAILY
Qty: 90 TAB | Refills: 1 | Status: SHIPPED | OUTPATIENT
Start: 2019-04-02 | End: 2019-04-03 | Stop reason: SDUPTHER

## 2019-04-02 RX ORDER — SIMVASTATIN 20 MG/1
TABLET, FILM COATED ORAL
Qty: 90 TAB | Refills: 1 | Status: SHIPPED | OUTPATIENT
Start: 2019-04-02 | End: 2019-10-17 | Stop reason: SDUPTHER

## 2019-04-02 RX ORDER — SIMVASTATIN 20 MG/1
20 TABLET, FILM COATED ORAL
Qty: 90 TAB | Refills: 1 | Status: SHIPPED | OUTPATIENT
Start: 2019-04-02 | End: 2019-04-03 | Stop reason: SDUPTHER

## 2019-04-02 RX ORDER — IBUPROFEN 800 MG/1
800 TABLET ORAL
Qty: 180 TAB | Refills: 0 | Status: SHIPPED | OUTPATIENT
Start: 2019-04-02 | End: 2019-04-03 | Stop reason: SDUPTHER

## 2019-04-02 NOTE — TELEPHONE ENCOUNTER
Pharmacy requesting medication clarification     Medication losartan-hydroCHLOROthiazide (HYZAAR) 100-25 mg per tablet         Pharmacy Comments: This Combo is on backorder.  Can we dispense as separate components    Pharmacy on file verified  ([id number])

## 2019-04-03 ENCOUNTER — OFFICE VISIT (OUTPATIENT)
Dept: FAMILY MEDICINE CLINIC | Age: 59
End: 2019-04-03

## 2019-04-03 VITALS
WEIGHT: 229 LBS | DIASTOLIC BLOOD PRESSURE: 88 MMHG | HEIGHT: 68 IN | SYSTOLIC BLOOD PRESSURE: 139 MMHG | RESPIRATION RATE: 18 BRPM | OXYGEN SATURATION: 96 % | BODY MASS INDEX: 34.71 KG/M2 | HEART RATE: 86 BPM | TEMPERATURE: 97.4 F

## 2019-04-03 DIAGNOSIS — F41.9 ANXIETY: ICD-10-CM

## 2019-04-03 DIAGNOSIS — D35.1 PARATHYROID ADENOMA: Primary | ICD-10-CM

## 2019-04-03 PROBLEM — E11.21 TYPE 2 DIABETES WITH NEPHROPATHY (HCC): Status: RESOLVED | Noted: 2018-07-06 | Resolved: 2019-04-03

## 2019-04-03 NOTE — PATIENT INSTRUCTIONS
Hyperparathyroidism: Care Instructions  Your Care Instructions  Hyperparathyroidism means that your parathyroid glands are too active. These are tiny glands in the neck. They sit behind the thyroid gland. They make a hormone that helps control how much calcium is in the blood. When these glands make too much hormone, the amount of calcium in the blood goes up. Most people with this problem have no symptoms. But it can cause constipation, nausea, vomiting, fatigue, and depression. It also can lead to high blood pressure, ulcers, kidney stones, and weak bones. This problem often is caused by a tumor on the parathyroid glands. The tumor usually is not cancer. You may need surgery to take out one or more of the glands. Follow-up care is a key part of your treatment and safety. Be sure to make and go to all appointments, and call your doctor if you are having problems. It's also a good idea to know your test results and keep a list of the medicines you take. How can you care for yourself at home? · Take your medicines exactly as prescribed. Call your doctor if you think you are having a problem with your medicine. You will get more details on the specific medicines your doctor prescribes. · You will need to see your doctor regularly to check your condition. You also will have tests to check the level of calcium in your blood and to make sure your kidneys are working well. · Drink plenty of fluids (enough so that your urine is light yellow or clear like water) to prevent dehydration. Choose water and other caffeine-free clear liquids. If you have kidney, heart, or liver disease and have to limit fluids, talk with your doctor before you increase the amount of fluids you drink. · Get at least 30 minutes of exercise on most days of the week. Walking is a good choice. You also may want to do other activities, such as running, swimming, cycling, or playing tennis or team sports.   · If you are taking any diuretic medicines or calcium supplements, talk to your doctor about whether you should keep taking them. When should you call for help? Call 911 anytime you think you may need emergency care. For example, call if:    · You passed out (lost consciousness).    Call your doctor now or seek immediate medical care if:    · You are confused or have trouble thinking.     · Your vomiting and nausea do not go away with treatment.    Watch closely for changes in your health, and be sure to contact your doctor if:    · You get weaker and more tired even after treatment.     · You feel depressed or have aches and pains.     · You are constipated.     · You have increased thirst and urination.     · You do not feel hungry.     · You do not get better as expected. Where can you learn more? Go to http://pooja-shon.info/. Enter D624 in the search box to learn more about \"Hyperparathyroidism: Care Instructions. \"  Current as of: March 14, 2018  Content Version: 11.9  © 4674-8790 [employer]. Care instructions adapted under license by [employer] (which disclaims liability or warranty for this information). If you have questions about a medical condition or this instruction, always ask your healthcare professional. [address] any warranty or liability for your use of this information.

## 2019-04-03 NOTE — PROGRESS NOTES
[phone] (home)   Spoke to patient notified needs OV to discuss results patient has appointment 4/3/2019 at 12:30PM

## 2019-04-03 NOTE — PROGRESS NOTES
HPI  Katherin Meigs 66 y.o. female  presents to the office today for lab result discussion. Blood pressure 139/88, pulse 86, temperature 97.4 °F (36.3 °C), temperature source Oral, resp. rate 18, height 5' 8\" (1.727 m), weight 229 lb (103.9 kg), SpO2 96 %. Body mass index is 34.82 kg/m². Chief Complaint   Patient presents with    Results     discuss ultrasound results      Parathyroid Adenoma: We discussed abnormality with elevated calcium levels and PTH levels per labs 3/27/19. Pt followed up for US of thyroid/parathyroid gland notable for: 1.0 cm homogeneous hypoechoic nodule at the base of the left thyroid gland is compatible with a parathyroid adenoma. Partially calcified nodule lower pole left gland. Solid nodule mid pole left gland. Slight enlargement of lower left pole palpated today in office. Anxiety: Pt reports feeling more stressed and anxious at home with shift in her household dynamic. She inquires about a psychiatrist she can see or counselor. We discussed counseling may help and provided pt with referral.     Hypertension: BP at office today 165/90 and 139/88 on manual recheck. Pt continues with Hyzaar 100-25 mg/d. Pt notes she ran out of medication 2 days ago and will follow up with pharmacy after office visit for medication.       Current Outpatient Medications   Medication Sig Dispense Refill    ibuprofen (MOTRIN) 800 mg tablet TAKE 1 TABLET BY MOUTH EVERY 8 HOURS AS NEEDED FOR PAIN 180 Tab 0    simvastatin (ZOCOR) 20 mg tablet TAKE 1 TABLET BY MOUTH AT NIGHT 90 Tab 1    losartan-hydroCHLOROthiazide (HYZAAR) 100-25 mg per tablet TAKE 1 TABLET BY MOUTH ONCE DAILY 90 Tab 1    metFORMIN ER (GLUCOPHAGE XR) 500 mg tablet TAKE TWO TABLETS BY MOUTH TWICE DAILY 360 Tab 1    glucose blood VI test strips (ASCENSIA AUTODISC VI, ONE TOUCH ULTRA TEST VI) strip Check glucose twice daily 100 Strip 11    Nebulizer & Compressor machine Nebulizer machine x 1 Nebulizer Kit 1 Each 0    albuterol (PROVENTIL VENTOLIN) 2.5 mg /3 mL (0.083 %) nebulizer solution 3 mL by Nebulization route every four (4) hours as needed for Wheezing. 1 Package 5    levocetirizine (XYZAL) 5 mg tablet TAKE ONE TABLET BY MOUTH ONCE DAILY 90 Tab 1    fluticasone (FLONASE) 50 mcg/actuation nasal spray 2 Sprays by Both Nostrils route daily. 3 Bottle 1    Insulin Needles, Disposable, (REJI PEN NEEDLE) 32 gauge x \" ndle Use daily 100 Pen Needle 11    albuterol (PROVENTIL HFA, VENTOLIN HFA, PROAIR HFA) 90 mcg/actuation inhaler Take 2 Puffs by inhalation every six (6) hours as needed for Wheezing. 1 Inhaler 3     Allergies   Allergen Reactions    Vicodin [Hydrocodone-Acetaminophen] Hives     Past Medical History:   Diagnosis Date    Arthritis     Chronic pain     3 herniated discs in lower back    Diabetes (HCC)     GERD (gastroesophageal reflux disease)     High cholesterol     Hypertension     MRSA infection     Other ill-defined conditions(799.89)     herniated disc to back    PUD (peptic ulcer disease)     Seasonal allergies      Past Surgical History:   Procedure Laterality Date    COLONOSCOPY N/A 2017    COLONOSCOPY performed by Deloris Fonesca MD at [facility] ENDOSCOPY    HX GYN      tubal ligation, hysterectomy    HX HYSTERECTOMY      HX ORTHOPAEDIC      left hand ligament removed    HX OTHER SURGICAL      corticosteroid injections - back    HX TONSILLECTOMY       Family History   Problem Relation Age of Onset    Hypertension Mother     Diabetes Father      Social History     Tobacco Use    Smoking status: Former Smoker     Types: Cigarettes     Last attempt to quit: 10/30/2012     Years since quittin.4    Smokeless tobacco: Never Used   Substance Use Topics    Alcohol use: Yes     Alcohol/week: 0.6 oz     Types: 1 Shots of liquor per week     Comment: weekends        Review of Systems   Constitutional: Negative for chills and fever. HENT: Negative for hearing loss and tinnitus.     Eyes: Negative for blurred vision and double vision. Respiratory: Negative for shortness of breath. Cardiovascular: Negative for chest pain and palpitations. Gastrointestinal: Negative for nausea and vomiting. Genitourinary: Negative for dysuria and frequency. Musculoskeletal: Negative for back pain and falls. Skin: Negative for itching and rash. Neurological: Negative for dizziness, loss of consciousness and headaches. Psychiatric/Behavioral: Negative for depression. The patient is not nervous/anxious. Physical Exam   Constitutional: She is oriented to person, place, and time. She appears well-developed and well-nourished. HENT:   Head: Normocephalic and atraumatic. Right Ear: External ear normal.   Left Ear: External ear normal.   Nose: Nose normal.   Mouth/Throat: Oropharynx is clear and moist.   Eyes: Conjunctivae and EOM are normal.   Neck: Normal range of motion. Neck supple. Carotid bruit is not present. No thyroid mass and no thyromegaly present. Cardiovascular: Normal rate, regular rhythm, S1 normal, S2 normal, normal heart sounds and intact distal pulses. Pulmonary/Chest: Effort normal and breath sounds normal.   Abdominal: Soft. Normal appearance and bowel sounds are normal. There is no hepatosplenomegaly. There is no tenderness. Musculoskeletal: Normal range of motion. Neurological: She is alert and oriented to person, place, and time. She has normal strength. No cranial nerve deficit or sensory deficit. Coordination normal.   Skin: Skin is warm, dry and intact. No abrasion and no rash noted. Psychiatric: She has a normal mood and affect. Her behavior is normal. Judgment and thought content normal.   Nursing note and vitals reviewed. ASSESSMENT and PLAN  Diagnoses and all orders for this visit:    1. Parathyroid adenoma  US notable for 1.0 cm homogeneous hypoechoic nodule at the base of the left thyroid gland is compatible with a parathyroid adenoma.  Strongly advised pt to follow up with surgeon, Dr. Elizabeth Retana, for an evaluation and to discuss treatment.   -     REFERRAL TO GENERAL SURGERY    2. Anxiety  Provided pt with referral for counseling with [facility] .   -     REFERRAL TO BEHAVIORAL HEALTH    Follow-up and Dispositions    · Return in about 1 month (around 5/1/2019) for adenoma. Medication risks/benefits/costs/interactions/alternatives discussed with patient. Advised patient to call back or return to office if symptoms worsen/change/persist.  If patient cannot reach us or should anything more severe/urgent arise he/she should proceed directly to the nearest emergency department. Discussed expected course/resolution/complications of diagnosis in detail with patient. Patient given a written after visit summary which includes her diagnoses, current medications and vitals. Patient expressed understanding with the diagnosis and plan. Written by liliam Lowe, as dictated by Lacie Blanco M.D.    1:30 PM - 1:41 PM    Total time spent with the patient 11 minutes, greater than 50% of time spent counseling patient.

## 2019-04-03 NOTE — PROGRESS NOTES
Chief Complaint   Patient presents with    Results     discuss ultrasound results       Reviewed Record in preparation for visit and have obtained necessary documentation. Identified pt with two pt identifiers (Name @ )    Health Maintenance Due   Topic    Pneumococcal 0-64 years (1 of 1 - PPSV23)         1. Have you been to the ER, urgent care clinic since your last visit? Hospitalized since your last visit? no    2. Have you seen or consulted any other health care providers outside of the [address] [place] [address] since your last visit? Include any pap smears or colon screening.   no

## 2019-04-04 NOTE — TELEPHONE ENCOUNTER
Iglesia Concepcion is calling to request if the doctor can submit the  Losartan 100 mg  Separate so the patient can take it until the back order comes in.   hydroCHLOROthiazide (HYZAAR) 25 mg per tablet

## 2019-04-05 RX ORDER — LOSARTAN POTASSIUM 100 MG/1
100 TABLET ORAL DAILY
Qty: 90 TAB | Refills: 1 | Status: SHIPPED | OUTPATIENT
Start: 2019-04-05 | End: 2019-05-31 | Stop reason: ALTCHOICE

## 2019-04-05 RX ORDER — HYDROCHLOROTHIAZIDE 25 MG/1
25 TABLET ORAL DAILY
Qty: 90 TAB | Refills: 1 | Status: SHIPPED | OUTPATIENT
Start: 2019-04-05 | End: 2019-05-31 | Stop reason: ALTCHOICE

## 2019-04-05 NOTE — TELEPHONE ENCOUNTER
Per Dr Julien Slider ok to send prescription to pharmacy losartan 100 mg and HCTZ 25 mg    Requested Prescriptions     Signed Prescriptions Disp Refills    losartan (COZAAR) 100 mg tablet 90 Tab 1     Sig: Take 1 Tab by mouth daily. Authorizing Provider: Addison Rush     Ordering User: Jhonny Del Cid    hydroCHLOROthiazide (HYDRODIURIL) 25 mg tablet 90 Tab 1     Sig: Take 1 Tab by mouth daily.      Authorizing Provider: Addison Rush     Ordering User: Jhonny Del Cid

## 2019-04-09 ENCOUNTER — OFFICE VISIT (OUTPATIENT)
Dept: SURGERY | Age: 59
End: 2019-04-09

## 2019-04-09 VITALS
RESPIRATION RATE: 18 BRPM | DIASTOLIC BLOOD PRESSURE: 80 MMHG | HEIGHT: 68 IN | OXYGEN SATURATION: 98 % | BODY MASS INDEX: 34.4 KG/M2 | TEMPERATURE: 98.2 F | SYSTOLIC BLOOD PRESSURE: 160 MMHG | WEIGHT: 227 LBS | HEART RATE: 78 BPM

## 2019-04-09 DIAGNOSIS — E21.0 PRIMARY HYPERPARATHYROIDISM (HCC): Primary | ICD-10-CM

## 2019-04-09 RX ORDER — OXYCODONE AND ACETAMINOPHEN 5; 325 MG/1; MG/1
TABLET ORAL
Refills: 0 | COMMUNITY
Start: 2019-04-02 | End: 2019-05-31

## 2019-04-09 RX ORDER — METFORMIN HYDROCHLORIDE 500 MG/1
TABLET, EXTENDED RELEASE ORAL
Qty: 360 TAB | Refills: 1 | Status: CANCELLED | OUTPATIENT
Start: 2019-04-09

## 2019-04-09 RX ORDER — LOSARTAN POTASSIUM AND HYDROCHLOROTHIAZIDE 12.5; 5 MG/1; MG/1
TABLET ORAL
Refills: 0 | COMMUNITY
Start: 2019-04-04 | End: 2019-04-09 | Stop reason: SDUPTHER

## 2019-04-09 NOTE — PROGRESS NOTES
1. Have you been to the ER, urgent care clinic since your last visit? Hospitalized since your last visit? No    2. Have you seen or consulted any other health care providers outside of the [address] [place] [address] since your last visit? Include any pap smears or colon screening.  No

## 2019-04-10 ENCOUNTER — TELEPHONE (OUTPATIENT)
Dept: SURGERY | Age: 59
End: 2019-04-10

## 2019-04-10 NOTE — TELEPHONE ENCOUNTER
MACKENZIE john Spoke to patient regarding her appt for October for Dr. Ben Muir and explained to her I was able to get her an appointment for 7/26/2019 @10:50sm. Patient understands she should arrive at 10:30pm.  Patient also understands per her visit with Dr. Savannah Aguilera that if Dr. Ben Muir believes she doesn't need to see him we will cancel appt. I told patient I would make sure to speak to Dr. Savannah Aguilera to get an update to see if Dr. Ben Muir has responded to his message. Patient is in agreement.

## 2019-04-11 ENCOUNTER — HOSPITAL ENCOUNTER (OUTPATIENT)
Dept: NUCLEAR MEDICINE | Age: 59
Discharge: HOME | End: 2019-04-11
Attending: SURGERY
Payer: MEDICARE

## 2019-04-11 ENCOUNTER — TELEPHONE (OUTPATIENT)
Dept: SURGERY | Age: 59
End: 2019-04-11

## 2019-04-11 DIAGNOSIS — E21.0 PRIMARY HYPERPARATHYROIDISM (HCC): ICD-10-CM

## 2019-04-11 PROCEDURE — 78070 PARATHYROID PLANAR IMAGING: CPT

## 2019-04-11 NOTE — TELEPHONE ENCOUNTER
----- Message from Pastor Lopes LPN sent at 8/94/9230  5:00 PM EDT -----  Would you please ask Dr. Lyon Back if he has heard from Dr. Chris Isabel. If he has would you call patient and tell her what he said. Thanks a bunch.

## 2019-04-15 PROBLEM — E21.0 PRIMARY HYPERPARATHYROIDISM (HCC): Status: ACTIVE | Noted: 2019-04-15

## 2019-04-16 ENCOUNTER — TELEPHONE (OUTPATIENT)
Dept: SURGERY | Age: 59
End: 2019-04-16

## 2019-04-16 DIAGNOSIS — E21.0 PRIMARY HYPERPARATHYROIDISM (HCC): Primary | ICD-10-CM

## 2019-04-16 NOTE — TELEPHONE ENCOUNTER
Pt was looking at her mychart from her visit on 4/9/2019 and states she noticed it says for her to stop taking her losartan, but was never told to stop taking it. She is unsure if she should continue to take it or not. She also states that Dr. Jayy Barone was going to message  Dr. Donna Saldivar (endocrinologist) to see if she needs to be seen there. She has not heard anything.

## 2019-04-16 NOTE — PROGRESS NOTES
[employer] General Surgery History and Physical    Chief Complaint: Primary hyperparathyroidism    History of Present Illness:      Destinee Pena is a 66 y.o. female who was kindly referred by Yelena Carter MD for evaluation of hypercalcemia and possible primary hyperparathyroidism. The patient states she was having routine lab work and was noted to have an elevated Ca level up to 11.1. She had a PTH level checked after this and this was elevated at 141. She had a neck U/S that showed a 1 cm hypoechoic nodule at the left lower lobe that was consistent with possible parathyroid adenoma. She has never had a bone density scan and has no known history of osteoporosis or osteopenia. She denies any history of kidney stones. She has some mild renal dysfunction with a baseline Cr of 1.18 and GFR of 60.         Past Medical History:   Diagnosis Date    Arthritis     Chronic pain     3 herniated discs in lower back    Diabetes (HCC)     GERD (gastroesophageal reflux disease)     High cholesterol     Hypertension     MRSA infection     Other ill-defined conditions(799.89)     herniated disc to back    PUD (peptic ulcer disease)     Seasonal allergies        Past Surgical History:   Procedure Laterality Date    COLONOSCOPY N/A 5/31/2017    [facility]COPY    HX GYN      tubal ligation, hysterectomy    HX HYSTERECTOMY      HX ORTHOPAEDIC      left hand ligament removed    HX OTHER SURGICAL      corticosteroid injections - back    HX TONSILLECTOMY         Social History     Socioeconomic History    Marital status:      Spouse name: Not on file    Number of children: Not on file    Years of education: Not on file    Highest education level: Not on file   Occupational History    Not on file   Social Needs    Financial resource strain: Not on file    Food insecurity:     Worry: Not on file     Inability: Not on file    Transportation needs: Medical: Not on file     Non-medical: Not on file   Tobacco Use    Smoking status: Former Smoker     Types: Cigarettes     Last attempt to quit: 10/30/2012     Years since quittin.4    Smokeless tobacco: Never Used   Substance and Sexual Activity    Alcohol use: Yes     Alcohol/week: 3.0 oz     Types: 1 Shots of liquor, 4 Cans of beer per week     Comment: weekends    Drug use: No    Sexual activity: Yes     Partners: Male   Lifestyle    Physical activity:     Days per week: Not on file     Minutes per session: Not on file    Stress: Not on file   Relationships    Social connections:     Talks on phone: Not on file     Gets together: Not on file     Attends Latter day service: Not on file     Active member of club or organization: Not on file     Attends meetings of clubs or organizations: Not on file     Relationship status: Not on file    Intimate partner violence:     Fear of current or ex partner: Not on file     Emotionally abused: Not on file     Physically abused: Not on file     Forced sexual activity: Not on file   Other Topics Concern     Service Not Asked    Blood Transfusions Not Asked    Caffeine Concern Not Asked    Occupational Exposure Not Asked   [employer] Hazards Not Asked    Sleep Concern Not Asked    Stress Concern Not Asked    Weight Concern Not Asked    Special Diet Not Asked    Back Care Not Asked    Exercise Not Asked    Bike Helmet Not Asked    Seat Belt Not Asked    Self-Exams Not Asked   Social History Narrative    Not on file       Family History   Problem Relation Age of Onset    Hypertension Mother     Diabetes Father          Current Outpatient Medications:     oxyCODONE-acetaminophen (PERCOCET) 5-325 mg per tablet, , Disp: , Rfl: 0    losartan (COZAAR) 100 mg tablet, Take 1 Tab by mouth daily. , Disp: 90 Tab, Rfl: 1    hydroCHLOROthiazide (HYDRODIURIL) 25 mg tablet, Take 1 Tab by mouth daily. , Disp: 90 Tab, Rfl: 1    ibuprofen (MOTRIN) 800 mg tablet, TAKE 1 TABLET BY MOUTH EVERY 8 HOURS AS NEEDED FOR PAIN, Disp: 180 Tab, Rfl: 0    simvastatin (ZOCOR) 20 mg tablet, TAKE 1 TABLET BY MOUTH AT NIGHT, Disp: 90 Tab, Rfl: 1    metFORMIN ER (GLUCOPHAGE XR) 500 mg tablet, TAKE TWO TABLETS BY MOUTH TWICE DAILY, Disp: 360 Tab, Rfl: 1    Nebulizer & Compressor machine, Nebulizer machine x 1 Nebulizer Kit, Disp: 1 Each, Rfl: 0    albuterol (PROVENTIL VENTOLIN) 2.5 mg /3 mL (0.083 %) nebulizer solution, 3 mL by Nebulization route every four (4) hours as needed for Wheezing., Disp: 1 Package, Rfl: 5    levocetirizine (XYZAL) 5 mg tablet, TAKE ONE TABLET BY MOUTH ONCE DAILY, Disp: 90 Tab, Rfl: 1    fluticasone (FLONASE) 50 mcg/actuation nasal spray, 2 Sprays by Both Nostrils route daily. , Disp: 3 Bottle, Rfl: 1    albuterol (PROVENTIL HFA, VENTOLIN HFA, PROAIR HFA) 90 mcg/actuation inhaler, Take 2 Puffs by inhalation every six (6) hours as needed for Wheezing., Disp: 1 Inhaler, Rfl: 3    glucose blood VI test strips (ASCENSIA AUTODISC VI, ONE TOUCH ULTRA TEST VI) strip, Check glucose twice daily, Disp: 100 Strip, Rfl: 11    Insulin Needles, Disposable, (REJI PEN NEEDLE) 32 gauge x 5/32\" ndle, Use daily, Disp: 100 Pen Needle, Rfl: 11    Allergies   Allergen Reactions    Vicodin [Hydrocodone-Acetaminophen] Hives       ROS   Constitutional: negative  Ears, Nose, Mouth, Throat, and Face: negative  Respiratory: negative  Cardiovascular: negative  Gastrointestinal: negative  Genitourinary:negative  Integument/Breast: negative  Hematologic/Lymphatic: negative  Behavioral/Psychiatric: negative  Allergic/Immunologic: negative      Physical Exam:     Visit Vitals  /80 (BP 1 Location: Left arm, BP Patient Position: Sitting)   Pulse 78   Temp 98.2 °F (36.8 °C) (Oral)   Resp 18   Ht 5' 8\" (1.727 m)   Wt 227 lb (103 kg)   SpO2 98%   BMI 34.52 kg/m²       General - alert and oriented, no apparent distress  HEENT - NC/AT. No scleral icterus.   Thyroid mobile, not enlarged. No discrete nodules identified. Pulm - CTAB, normal inspiratory effort  CV - RRR, no M/R/G  Abd - soft, NT, ND. No masses. Ext - warm, well perfused, no edema  Lymphatics - no cervical, supraclavicular, or inguinal adenopathy noted. Skin - supple, no rashes  Psychiatric - normal affect, good mood    Labs  See HPI    Imaging  4/1/19 U/S thyroid/parathyroid:   FINDINGS:  There is a 12 x 10 x 7 mm hyperechoic solid vascular nodule in the upper pole of  the left gland. There is an 11 x 6 x 7 mm heterogeneous nodule with  calcifications in the lower pole of the left gland. At the base of the left  thyroid gland is a 1.0 x 0.7 x 0.7 cm homogeneous hypoechoic nodule.     The right lobe measures 4.4 x 1.9 x 1.1 cm and the left lobe measures 4.3 x 1.8  x 1.4 cm. The isthmus measures 2 mm.     IMPRESSION  IMPRESSION: 1.0 cm homogeneous hypoechoic nodule at the base of the left thyroid  gland is compatible with a parathyroid adenoma. Partially calcified nodule lower  pole left gland. Solid nodule mid pole left gland. Correlation with nuclear  scintigraphy recommended. I have reviewed and agree with all of the pertinent images    Assessment:     Mey Dejesus is a 66 y.o. female with what appears to be primary hyperparathyroidism. Recommendations:     1. I will order a sestamibi scan to ensure localization of a possible parathyroid adenoma to the left lower gland. I would also like her to see Endocrinology prior to any surgical intervention. I will plan to see her back after these are completed to discuss potential surgery. 25 mins of time was spent with the patient of which > 50% of the time involved face-to-face counseling of the patient regarding the proposed treatment plan.       Jass Reyes MD  4/9/2019    CC: Lennie Bernstein MD

## 2019-04-17 ENCOUNTER — TELEPHONE (OUTPATIENT)
Dept: SURGERY | Age: 59
End: 2019-04-17

## 2019-04-17 DIAGNOSIS — E21.0 PRIMARY HYPERPARATHYROIDISM (HCC): Primary | ICD-10-CM

## 2019-04-17 RX ORDER — LORAZEPAM 0.5 MG/1
TABLET ORAL
Qty: 1 TAB | Refills: 0 | OUTPATIENT
Start: 2019-04-17 | End: 2019-05-31

## 2019-04-17 NOTE — TELEPHONE ENCOUNTER
Patient called stating she wanted to speak with nurse regarding her the dye with her CT scan next week as well a calcium check.

## 2019-04-17 NOTE — TELEPHONE ENCOUNTER
Patient identified with two patient identifiers. Patient returning call states she changed date of CT scan to this Friday 4/19/19. Patient concerned about getting IV contrast so close together, she wants to know if Dr. Lj Truong thinks it is ok. Patient also requesting Calcium level be added to lab order. Informed patient I will send message to Dr. Lj Truong and return call with answer.

## 2019-04-17 NOTE — TELEPHONE ENCOUNTER
Patient identified with two patient identifiers. Patient informed of information below per Dr. Leida Martinez. Patient upset states she feels she shouldn't have to call to get information on next steps patient states she was expecting someone to call her since last week. Patient informed Dr. Leida Martinez was waiting for response from Dr. Kym Phillip. Patient crying stating she is worried since she now has to do additional test.  Reason explained to patient she expressed understanding and calmed down. Patient has seen by behavioral health Mickey Funes LCSW for evaluation to help deal with increase in anxiety since diagnosis. Patient concerned about how long it will take for her to get on the surgery schedule and the cost and time of additional test.  Patient informed Dr. Leida Martinez will be reaching out to her as soon as he has results for CT and lab. Offered for Dr. Leida Martinez to reach out to her to discuss. Patient states he will just tell her everything I just said which she understands. Patient states she will go to PCP office to have blood work drawn they have a [facility] in the office. Patient given number to coordination of care to schedule CT as ordered. Informed patient [facility] should be able to pull up order for vitamin D, but I will fax over order to PCP's office. Patient questioning if she should have stopped her Losartan as listed on her AVS due to diagnosis. Informed patient I will consult with Dr. Leida Martinez. She would also like for us to prescribed something for her to take prior to CT scan to help with anxiety. Went over everything with patient once more to confirm she will proceed and understands. Patient returned direction verbally was thankful for information and informed I will call her back regarding medications.

## 2019-04-17 NOTE — TELEPHONE ENCOUNTER
----- Message from Rakan Ryan MD sent at 4/16/2019  5:47 PM EDT -----  I spoke with Dr. Laurence Ghosh -   He recommends checking a Vit D level prior to surgery as this may be pretty depleted as well and would need to be supplemented for several weeks prior to surgery. I would also like to get a neck CT as her sestamibi scan did not localize her adenoma. Would you let her know I am ordering those tests and we will follow up with her afterwards regarding if she needs to take Vit D supplements and at that point we will start working on scheduling her surgery? Thanks!     Marnie Vera

## 2019-04-17 NOTE — TELEPHONE ENCOUNTER
Vitamin D level was very low. I discussed with Dr. Chan Crane of Endocrinology who recommended up to 12 weeks of 50,000 U per week supplementation prior to parathyroidectomy. I discussed this with the patient who had a lot of anxiety about waiting that long for surgery. I will see her back in 1 month and recheck Vit D level at that time. I have given her a prescription for Ergocalciferol 50,000 units once per week. Andrews Acuña MD  4/23/2019          Patient identified with two patient identifiers. Patient informed per Dr. Deshaun Chavez order for calcium placed and prescription called in for Ativan to take prior to CT scan. Patient informed if vitamin d level is low she will need supplement prior to surgery. Patient informed Dr. Deshaun hCavez did not discontinue or instruct her to stop losartan, patient states she believe it was on her paperwork for prior to her procedure to stop but she will confirm with PCP. Informed per Dr. Deshaun Chavez there is no concern of her having CT with contrast at this time due to time in between last test.  Reassured patient states she feels a better now will proceed with orders as directed and follow up with us after test and labs are completed. Patient instructed to return call if needed and she can also request to speak with surgeon if she needs further reassurance.

## 2019-04-19 ENCOUNTER — HOSPITAL ENCOUNTER (OUTPATIENT)
Dept: CT IMAGING | Age: 59
Discharge: HOME | End: 2019-04-19
Attending: SURGERY
Payer: MEDICARE

## 2019-04-19 DIAGNOSIS — E21.0 PRIMARY HYPERPARATHYROIDISM (HCC): ICD-10-CM

## 2019-04-19 PROCEDURE — 70498 CT ANGIOGRAPHY NECK: CPT

## 2019-04-19 PROCEDURE — 74011636320 HC RX REV CODE- 636/320: Performed by: RADIOLOGY

## 2019-04-19 PROCEDURE — 70491 CT SOFT TISSUE NECK W/DYE: CPT

## 2019-04-19 RX ADMIN — IOPAMIDOL 100 ML: 755 INJECTION, SOLUTION INTRAVENOUS at 17:47

## 2019-04-20 LAB
25(OH)D3+25(OH)D2 SERPL-MCNC: 9.6 NG/ML (ref 30–100)
CALCIUM SERPL-MCNC: 10.5 MG/DL (ref 8.7–10.2)

## 2019-04-23 DIAGNOSIS — E21.0 PRIMARY HYPERPARATHYROIDISM (HCC): ICD-10-CM

## 2019-04-23 DIAGNOSIS — E55.9 VITAMIN D DEFICIENCY: ICD-10-CM

## 2019-04-23 DIAGNOSIS — E21.0 HYPERPARATHYROIDISM, PRIMARY (HCC): Primary | ICD-10-CM

## 2019-04-23 RX ORDER — ERGOCALCIFEROL 1.25 MG/1
50000 CAPSULE ORAL
Qty: 12 CAP | Refills: 0 | Status: SHIPPED | OUTPATIENT
Start: 2019-04-23 | End: 2019-07-22

## 2019-05-20 ENCOUNTER — TELEPHONE (OUTPATIENT)
Dept: SURGERY | Age: 59
End: 2019-05-20

## 2019-05-20 RX ORDER — LEVOCETIRIZINE DIHYDROCHLORIDE 5 MG/1
TABLET, FILM COATED ORAL
Qty: 90 TAB | Refills: 1 | Status: SHIPPED | OUTPATIENT
Start: 2019-05-20 | End: 2019-09-03 | Stop reason: SDUPTHER

## 2019-05-21 NOTE — TELEPHONE ENCOUNTER
MACKENZIE john Spoke to patient who was concerned about keeping her appointment on Thursday. The patient stated she has a lot going on with having to move, etc. She wanted to know if she could cancel her appointment for Thursday and reschedule for July when she feels like she can be in a better position to get rest and recover after she has surgery. She was very apologetic for canceling as she feels like she gave us a hard time. I explained to the patient we are here for her and we want to do what is best for her. Encouraged her to reschedule her appointment for July and call us if she needs anything before that time. Patient was extremely thankful for the return call, our kindness, and Dr. Jae singh. Transferred patient to [facility] to reschedule.

## 2019-05-31 ENCOUNTER — OFFICE VISIT (OUTPATIENT)
Dept: FAMILY MEDICINE CLINIC | Age: 59
End: 2019-05-31

## 2019-05-31 VITALS
HEART RATE: 96 BPM | WEIGHT: 226.6 LBS | TEMPERATURE: 98.1 F | BODY MASS INDEX: 34.34 KG/M2 | SYSTOLIC BLOOD PRESSURE: 151 MMHG | RESPIRATION RATE: 18 BRPM | DIASTOLIC BLOOD PRESSURE: 84 MMHG | HEIGHT: 68 IN

## 2019-05-31 DIAGNOSIS — I10 ESSENTIAL HYPERTENSION WITH GOAL BLOOD PRESSURE LESS THAN 140/90: ICD-10-CM

## 2019-05-31 DIAGNOSIS — F41.9 ANXIETY: ICD-10-CM

## 2019-05-31 DIAGNOSIS — D35.1 PARATHYROID ADENOMA: ICD-10-CM

## 2019-05-31 PROBLEM — E11.21 TYPE 2 DIABETES WITH NEPHROPATHY (HCC): Status: ACTIVE | Noted: 2019-05-31

## 2019-05-31 LAB — HBA1C MFR BLD HPLC: 5.9 %

## 2019-05-31 RX ORDER — LOSARTAN POTASSIUM AND HYDROCHLOROTHIAZIDE 12.5; 5 MG/1; MG/1
2 TABLET ORAL DAILY
Qty: 605 TAB | Refills: 0
Start: 2019-05-31 | End: 2019-11-06 | Stop reason: SDUPTHER

## 2019-05-31 RX ORDER — LOSARTAN POTASSIUM AND HYDROCHLOROTHIAZIDE 12.5; 5 MG/1; MG/1
TABLET ORAL
Refills: 0 | COMMUNITY
Start: 2019-04-04 | End: 2019-05-31

## 2019-05-31 RX ORDER — ALPRAZOLAM 0.25 MG/1
0.25 TABLET ORAL
Qty: 20 TAB | Refills: 0 | Status: ON HOLD | OUTPATIENT
Start: 2019-05-31 | End: 2022-01-21

## 2019-05-31 NOTE — PROGRESS NOTES
Patient seen during PCP appointment for follow up of DM. Current regimen: Metformin  mg 2 tablets twice daily. Trulicity was stopped at her last visit in March due to abdominal pains, gas, nausea that she attributed to the Trulicity. Since Trulicity was stopped those symptoms have resolved. Patient has not been checking her blood sugars due to being overwhelmed with upcoming surgery and becoming an empty [place]ura. She has been continuing to watch her diet and has been walking with her great nephew most mornings. Provided encouragement to keep up with her lifestyle changes since A1C continues to be below goal on just metformin therapy.     Eufemia Junior, PharmD, [facility]

## 2019-05-31 NOTE — LETTER
5/31/2019 9:25 AM 
 
Ms. Tone Ring Current Outpatient Medications Medication Sig  
 losartan-hydroCHLOROthiazide (HYZAAR) 50-12.5 mg per tablet  levocetirizine (XYZAL) 5 mg tablet TAKE 1 TABLET BY MOUTH ONCE DAILY  ergocalciferol (ERGOCALCIFEROL) 50,000 unit capsule Take 1 Cap by mouth every seven (7) days for 90 days.  oxyCODONE-acetaminophen (PERCOCET) 5-325 mg per tablet  ibuprofen (MOTRIN) 800 mg tablet TAKE 1 TABLET BY MOUTH EVERY 8 HOURS AS NEEDED FOR PAIN  
 simvastatin (ZOCOR) 20 mg tablet TAKE 1 TABLET BY MOUTH AT NIGHT  metFORMIN ER (GLUCOPHAGE XR) 500 mg tablet TAKE TWO TABLETS BY MOUTH TWICE DAILY  Nebulizer & Compressor machine Nebulizer machine x 1 Nebulizer Kit  albuterol (PROVENTIL VENTOLIN) 2.5 mg /3 mL (0.083 %) nebulizer solution 3 mL by Nebulization route every four (4) hours as needed for Wheezing.  fluticasone (FLONASE) 50 mcg/actuation nasal spray 2 Sprays by Both Nostrils route daily.  albuterol (PROVENTIL HFA, VENTOLIN HFA, PROAIR HFA) 90 mcg/actuation inhaler Take 2 Puffs by inhalation every six (6) hours as needed for Wheezing.  LORazepam (ATIVAN) 0.5 mg tablet 1 tablet by mouth 30 minutes prior to CT scan  losartan (COZAAR) 100 mg tablet Take 1 Tab by mouth daily.  hydroCHLOROthiazide (HYDRODIURIL) 25 mg tablet Take 1 Tab by mouth daily.  glucose blood VI test strips (ASCENSIA AUTODISC VI, ONE TOUCH ULTRA TEST VI) strip Check glucose twice daily  Insulin Needles, Disposable, (REJI PEN NEEDLE) 32 gauge x 5/32\" ndle Use daily No current facility-administered medications for this visit.

## 2019-05-31 NOTE — PATIENT INSTRUCTIONS
Home Blood Pressure Test: About This Test  What is it? A home blood pressure test allows you to keep track of your blood pressure at home. Blood pressure is a measure of the force of blood against the walls of your arteries. Blood pressure readings include two numbers, such as 130/80 (say \"130 over 80\"). The first number is the systolic pressure. The second number is the diastolic pressure. Why is this test done? You may do this test at home to:  · Find out if you have high blood pressure. · Track your blood pressure if you have high blood pressure. · Track how well medicine is working to reduce high blood pressure. · Check how lifestyle changes, such as weight loss and exercise, are affecting blood pressure. How can you prepare for the test?  · Do not use caffeine, tobacco, or medicines known to raise blood pressure (such as nasal decongestant sprays) for at least 30 minutes before taking your blood pressure. · Do not exercise for at least 30 minutes before taking your blood pressure. What happens before the test?  Take your blood pressure while you feel comfortable and relaxed. Sit quietly with both feet on the floor for at least 5 minutes before the test.  What happens during the test?  · Sit with your arm slightly bent and resting on a table so that your upper arm is at the same level as your heart. · Roll up your sleeve or take off your shirt to expose your upper arm. · Wrap the blood pressure cuff around your upper arm so that the lower edge of the cuff is about 1 inch above the bend of your elbow. Proceed with the following steps depending on if you are using an automatic or manual pressure monitor. Automatic blood pressure monitors  · Press the on/off button on the automatic monitor and wait until the ready-to-measure \"heart\" symbol appears next to zero in the display window. · Press the start button. The cuff will inflate and deflate by itself.   · Your blood pressure numbers will appear on the screen. · Write your numbers in your log book, along with the date and time. Manual blood pressure monitors  · Place the earpieces of a stethoscope in your ears, and place the bell of the stethoscope over the artery, just below the cuff. · Close the valve on the rubber inflating bulb. · Squeeze the bulb rapidly with your opposite hand to inflate the cuff until the dial or column of mercury reads about 30 mm Hg higher than your usual systolic pressure. If you do not know your usual pressure, inflate the cuff to 210 mm Hg or until the pulse at your wrist disappears. · Open the pressure valve just slightly by twisting or pressing the valve on the bulb. · As you watch the pressure slowly fall, note the level on the dial at which you first start to hear a pulsing or tapping sound through the stethoscope. This is your systolic blood pressure. · Continue letting the air out slowly. The sounds will become muffled and will finally disappear. Note the pressure when the sounds completely disappear. This is your diastolic blood pressure. Let out all the remaining air. · Write your numbers in your log book, along with the date and time. What else should you know about the test?  It is more accurate to take the average of several readings made throughout the day than to rely on a single reading. It's normal for blood pressure to go up and down throughout the day. Follow-up care is a key part of your treatment and safety. Be sure to make and go to all appointments, and call your doctor if you are having problems. It's also a good idea to keep a list of the medicines you take. Where can you learn more? Go to http://pooja-shon.info/. Enter C427 in the search box to learn more about \"Home Blood Pressure Test: About This Test.\"  Current as of: July 22, 2018  Content Version: 11.9  © 5830-9165 [employer].  Care instructions adapted under license by [employer] (which disclaims liability or warranty for this information). If you have questions about a medical condition or this instruction, always ask your healthcare professional. [address] any warranty or liability for your use of this information.

## 2019-05-31 NOTE — PROGRESS NOTES
HPI  Natali Breen 66 y.o. female  presents to the office today for DM follow up. Blood pressure 151/84, pulse 96, temperature 98.1 °F (36.7 °C), resp. rate 18, height 5' 8\" (1.727 m), weight 226 lb 9.6 oz (102.8 kg). Body mass index is 34.45 kg/m². Chief Complaint   Patient presents with    Diabetes     f/u    Follow-up     f/u adenoma      DM2: A1c per POC today 5.9, increased from A1c of 5.7 on 11/19/18. Pt continues with Metformin 1000 mg/BID. She notes she is exercising and monitoring diet closely. Hypertension: BP elevated today in office. Pt reports pharmacy had a recall on Losartan and she has not been taking BP medications consistently. She notes she has been walking and trying to monitor diet. We discussed she should resume taking all medications at this time and she should return in about 1 week for BP check. Anxiety/Depression: Pt reports she has been feeling overly anxious and depress. She states she followed up with counselor and was told mood is situational due to major life change. Pt states she feels overwhelmed and feels she has too many things to do. She states she does not want to be on a daily medication because she did not like how it made her feel in the past. Discussed we could try PRN medication such as alprazolam and she agrees with this plan. Advised pt that she must not take more than 2 tablets/week. Parathyroid Adenoma: Pt reports she has followed up with surgeon and will have surgery in the next few months. Current Outpatient Medications   Medication Sig Dispense Refill    ALPRAZolam (XANAX) 0.25 mg tablet Take 1 Tab by mouth nightly as needed for Anxiety. Max Daily Amount: 0.25 mg. 20 Tab 0    losartan-hydroCHLOROthiazide (HYZAAR) 50-12.5 mg per tablet Take 2 Tabs by mouth daily.  605 Tab 0    levocetirizine (XYZAL) 5 mg tablet TAKE 1 TABLET BY MOUTH ONCE DAILY 90 Tab 1    ergocalciferol (ERGOCALCIFEROL) 50,000 unit capsule Take 1 Cap by mouth every seven (7) days for 90 days. 12 Cap 0    ibuprofen (MOTRIN) 800 mg tablet TAKE 1 TABLET BY MOUTH EVERY 8 HOURS AS NEEDED FOR PAIN 180 Tab 0    simvastatin (ZOCOR) 20 mg tablet TAKE 1 TABLET BY MOUTH AT NIGHT 90 Tab 1    metFORMIN ER (GLUCOPHAGE XR) 500 mg tablet TAKE TWO TABLETS BY MOUTH TWICE DAILY 360 Tab 1    glucose blood VI test strips (ASCENSIA AUTODISC VI, ONE TOUCH ULTRA TEST VI) strip Check glucose twice daily 100 Strip 11    Nebulizer & Compressor machine Nebulizer machine x 1 Nebulizer Kit 1 Each 0    albuterol (PROVENTIL VENTOLIN) 2.5 mg /3 mL (0.083 %) nebulizer solution 3 mL by Nebulization route every four (4) hours as needed for Wheezing. 1 Package 5    fluticasone (FLONASE) 50 mcg/actuation nasal spray 2 Sprays by Both Nostrils route daily. 3 Bottle 1    Insulin Needles, Disposable, (REJI PEN NEEDLE) 32 gauge x 5/32\" ndle Use daily 100 Pen Needle 11    albuterol (PROVENTIL HFA, VENTOLIN HFA, PROAIR HFA) 90 mcg/actuation inhaler Take 2 Puffs by inhalation every six (6) hours as needed for Wheezing.  1 Inhaler 3     Allergies   Allergen Reactions    Vicodin [Hydrocodone-Acetaminophen] Hives     Past Medical History:   Diagnosis Date    Arthritis     Chronic pain     3 herniated discs in lower back    Diabetes (HCC)     GERD (gastroesophageal reflux disease)     High cholesterol     Hypertension     MRSA infection     Other ill-defined conditions(799.89)     herniated disc to back    PUD (peptic ulcer disease)     Seasonal allergies      Past Surgical History:   Procedure Laterality Date    COLONOSCOPY N/A 5/31/2017    COLONOSCOPY performed by Schuyler Biggs MD at [facility] ENDOSCOPY    HX GYN      tubal ligation, hysterectomy    HX HYSTERECTOMY      HX ORTHOPAEDIC      left hand ligament removed    HX OTHER SURGICAL      corticosteroid injections - back    HX TONSILLECTOMY       Family History   Problem Relation Age of Onset    Hypertension Mother     Diabetes Father      Social History Tobacco Use    Smoking status: Former Smoker     Types: Cigarettes     Last attempt to quit: 10/30/2012     Years since quittin.5    Smokeless tobacco: Never Used   Substance Use Topics    Alcohol use: Yes     Alcohol/week: 3.0 oz     Types: 1 Shots of liquor, 4 Cans of beer per week     Comment: weekends        Review of Systems   Constitutional: Negative for chills and fever. HENT: Negative for hearing loss and tinnitus. Eyes: Negative for blurred vision and double vision. Respiratory: Negative for cough and shortness of breath. Cardiovascular: Negative for chest pain and palpitations. Gastrointestinal: Negative for nausea and vomiting. Genitourinary: Negative for dysuria and frequency. Musculoskeletal: Negative for back pain and falls. Skin: Negative for itching and rash. Neurological: Negative for dizziness, loss of consciousness and headaches. Psychiatric/Behavioral: Negative for depression. The patient is not nervous/anxious. Physical Exam   Constitutional: She is oriented to person, place, and time. She appears well-developed and well-nourished. HENT:   Head: Normocephalic and atraumatic. Right Ear: External ear normal.   Left Ear: External ear normal.   Nose: Nose normal.   Mouth/Throat: Oropharynx is clear and moist.   Eyes: Conjunctivae and EOM are normal.   Neck: Normal range of motion. Neck supple. Cardiovascular: Normal rate, regular rhythm, normal heart sounds and intact distal pulses. Pulmonary/Chest: Effort normal and breath sounds normal.   Abdominal: Soft. Bowel sounds are normal.   Musculoskeletal: Normal range of motion. Neurological: She is alert and oriented to person, place, and time. Skin: Skin is warm and dry. Psychiatric: She has a normal mood and affect. Her behavior is normal. Judgment and thought content normal.   Nursing note and vitals reviewed. ASSESSMENT and PLAN  Diagnoses and all orders for this visit:    1.  Uncontrolled type 2 diabetes mellitus without complication, with long-term current use of insulin (HCC)  A1c 5.9 today in office, stable. Advised pt she should continue Metformin 1000 mg/BID, monitoring diet, and exercising regularly. -     AMB POC HEMOGLOBIN A1C    2. Essential hypertension with goal blood pressure less than 140/90  BP is not at goal today in office, elevated. Advised pt to resume all medication, monitor diet, exercise, and follow up in 1 week for BP check with PharmD.   -     losartan-hydroCHLOROthiazide (HYZAAR) 50-12.5 mg per tablet; Take 2 Tabs by mouth daily. 3. Parathyroid adenoma  Pt will follow up for surgery with Dr. Ivan Centeno. Will continue to monitor at this time. 4. Anxiety  Advised pt she should start on alprazolam 0.25 mg. Cautioned pt with respect to addictive properties of the medication and advised her to take 1-2 per week. If she takes more she will need to consider daily medication.   -     ALPRAZolam (XANAX) 0.25 mg tablet; Take 1 Tab by mouth nightly as needed for Anxiety. Max Daily Amount: 0.25 mg. Follow-up and Dispositions    · Return in about 3 months (around 8/31/2019) for hypertension, diabetes, cholesterol follow up. Medication risks/benefits/costs/interactions/alternatives discussed with patient. Advised patient to call back or return to office if symptoms worsen/change/persist.  If patient cannot reach us or should anything more severe/urgent arise he/she should proceed directly to the nearest emergency department. Discussed expected course/resolution/complications of diagnosis in detail with patient. Patient given a written after visit summary which includes her diagnoses, current medications and vitals. Patient expressed understanding with the diagnosis and plan. Written by liliam Montes, as dictated by Yanely Maradiaga M.D.    10:26 AM - 10:44 AM    Total time spent with the patient 18 minutes, greater than 50% of time spent counseling patient.

## 2019-05-31 NOTE — PROGRESS NOTES
Chief Complaint   Patient presents with    Diabetes     f/u    Follow-up     f/u adenoma       Reviewed Record in preparation for visit and have obtained necessary documentation. Identified pt with two pt identifiers (Name @ )    Health Maintenance Due   Topic    Pneumococcal 0-64 years (1 of  - PPSV23)    MEDICARE YEARLY EXAM          1. Have you been to the ER, urgent care clinic since your last visit? Hospitalized since your last visit? no  2. Have you seen or consulted any other health care providers outside of the [address] [place] [address] since your last visit? Include any pap smears or colon screening.  no

## 2019-06-15 ENCOUNTER — OFFICE VISIT (OUTPATIENT)
Dept: URGENT CARE | Age: 59
End: 2019-06-15

## 2019-06-15 VITALS
RESPIRATION RATE: 16 BRPM | HEART RATE: 78 BPM | HEIGHT: 68 IN | OXYGEN SATURATION: 97 % | WEIGHT: 223 LBS | BODY MASS INDEX: 33.8 KG/M2 | DIASTOLIC BLOOD PRESSURE: 74 MMHG | TEMPERATURE: 98.1 F | SYSTOLIC BLOOD PRESSURE: 153 MMHG

## 2019-06-15 DIAGNOSIS — H10.32 ACUTE BACTERIAL CONJUNCTIVITIS OF LEFT EYE: Primary | ICD-10-CM

## 2019-06-15 RX ORDER — ERYTHROMYCIN 5 MG/G
1 OINTMENT OPHTHALMIC EVERY 6 HOURS
Qty: 40 TUBE | Refills: 0 | Status: SHIPPED | OUTPATIENT
Start: 2019-06-15 | End: 2019-06-25

## 2019-06-15 NOTE — PATIENT INSTRUCTIONS
Pinkeye: Care Instructions  Your Care Instructions    Pinkeye is redness and swelling of the eye surface and the conjunctiva (the lining of the eyelid and the covering of the white part of the eye). Pinkeye is also called conjunctivitis. Pinkeye is often caused by infection with bacteria or a virus. Dry air, allergies, smoke, and chemicals are other common causes. Pinkeye often clears on its own in 7 to 10 days. Antibiotics only help if the pinkeye is caused by bacteria. Pinkeye caused by infection spreads easily. If an allergy or chemical is causing pinkeye, it will not go away unless you can avoid whatever is causing it. Follow-up care is a key part of your treatment and safety. Be sure to make and go to all appointments, and call your doctor if you are having problems. It's also a good idea to know your test results and keep a list of the medicines you take. How can you care for yourself at home? · Wash your hands often. Always wash them before and after you treat pinkeye or touch your eyes or face. · Use moist cotton or a clean, wet cloth to remove crust. Wipe from the inside corner of the eye to the outside. Use a clean part of the cloth for each wipe. · Put cold or warm wet cloths on your eye a few times a day if the eye hurts. · Do not wear contact lenses or eye makeup until the pinkeye is gone. Throw away any eye makeup you were using when you got pinkeye. Clean your contacts and storage case. If you wear disposable contacts, use a new pair when your eye has cleared and it is safe to wear contacts again. · If the doctor gave you antibiotic ointment or eyedrops, use them as directed. Use the medicine for as long as instructed, even if your eye starts looking better soon. Keep the bottle tip clean, and do not let it touch the eye area. · To put in eyedrops or ointment:  ? Tilt your head back, and pull your lower eyelid down with one finger. ?  Drop or squirt the medicine inside the lower lid.  ? Close your eye for 30 to 60 seconds to let the drops or ointment move around. ? Do not touch the ointment or dropper tip to your eyelashes or any other surface. · Do not share towels, pillows, or washcloths while you have pinkeye. When should you call for help? Call your doctor now or seek immediate medical care if:    · You have pain in your eye, not just irritation on the surface.     · You have a change in vision or loss of vision.     · You have an increase in discharge from the eye.     · Your eye has not started to improve or begins to get worse within 48 hours after you start using antibiotics.     · Pinkeye lasts longer than 7 days.    Watch closely for changes in your health, and be sure to contact your doctor if you have any problems. Where can you learn more? Go to http://pooja-shon.info/. Enter Y392 in the search box to learn more about \"Pinkeye: Care Instructions. \"  Current as of: September 23, 2018  Content Version: 11.9  © 0656-0134 [employer]. Care instructions adapted under license by [employer] (which disclaims liability or warranty for this information). If you have questions about a medical condition or this instruction, always ask your healthcare professional. [address] any warranty or liability for your use of this information.

## 2019-06-15 NOTE — PROGRESS NOTES
65 yo female presents to  with her grandson. She reports a 2 day history of increasing itching, redness, and swelling of left eye. 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 She also reports that she is exposed to changing diapers, and may have limited access to handwashing. She is concerned about exposure to the other children in her care. She has been using neosporin on her eye and covering with a bandaid. She denies any visual changes associated. Of note, she reports that she is \"aggravated\" by her child and that she had to wait in the waiting room. Her blood pressure is elevated, but she reports that she is asymptomatic. She does not want to stay for full evaluation as she reports that she has somewhere to be.               Past Medical History:   Diagnosis Date    Arthritis     Chronic pain     3 herniated discs in lower back    Diabetes (HCC)     GERD (gastroesophageal reflux disease)     High cholesterol     Hypertension     MRSA infection     Other ill-defined conditions(799.89)     herniated disc to back    PUD (peptic ulcer disease)     Seasonal allergies         Past Surgical History:   Procedure Laterality Date    COLONOSCOPY N/A 5/31/2017    COLONOSCOPY performed by Robyn Manzo MD at [facility] ENDOSCOPY    HX GYN      tubal ligation, hysterectomy    HX HYSTERECTOMY      HX ORTHOPAEDIC      left hand ligament removed    HX OTHER SURGICAL      corticosteroid injections - back    HX TONSILLECTOMY           Family History   Problem Relation Age of Onset    Hypertension Mother     Diabetes Father         Social History     Socioeconomic History    Marital status:      Spouse name: Not on file    Number of children: Not on file    Years of education: Not on file    Highest education level: Not on file   Occupational History    Not on file   Social Needs    Financial resource strain: Not on file    Food insecurity: Worry: Not on file     Inability: Not on file    Transportation needs:     Medical: Not on file     Non-medical: Not on file   Tobacco Use    Smoking status: Former Smoker     Types: Cigarettes     Last attempt to quit: 10/30/2012     Years since quittin.6    Smokeless tobacco: Never Used   Substance and Sexual Activity    Alcohol use: Yes     Alcohol/week: 3.0 oz     Types: 1 Shots of liquor, 4 Cans of beer per week     Comment: weekends    Drug use: No    Sexual activity: Yes     Partners: Male   Lifestyle    Physical activity:     Days per week: Not on file     Minutes per session: Not on file    Stress: Not on file   Relationships    Social connections:     Talks on phone: Not on file     Gets together: Not on file     Attends Zoroastrian service: Not on file     Active member of club or organization: Not on file     Attends meetings of clubs or organizations: Not on file     Relationship status: Not on file    Intimate partner violence:     Fear of current or ex partner: Not on file     Emotionally abused: Not on file     Physically abused: Not on file     Forced sexual activity: Not on file   Other Topics Concern     Service Not Asked    Blood Transfusions Not Asked    Caffeine Concern Not Asked    Occupational Exposure Not Asked   Ashley Pinna Hazards Not Asked    Sleep Concern Not Asked    Stress Concern Not Asked    Weight Concern Not Asked    Special Diet Not Asked    Back Care Not Asked    Exercise Not Asked    Bike Helmet Not Asked    [address] Not Asked    Self-Exams Not Asked   Social History Narrative    Not on file                ALLERGIES: Vicodin [hydrocodone-acetaminophen]    Review of Systems   Constitutional: Negative. Eyes: Positive for discharge, redness and itching. Negative for visual disturbance. All other systems reviewed and are negative.       Vitals:    06/15/19 1335 06/15/19 1407   BP: 184/87 153/74   Pulse: 78    Resp: 16    Temp: 98.1 °F (36.7 °C) SpO2: 97%    Weight: 223 lb (101.2 kg)    Height: 5' 8\" (1.727 m)        Physical Exam   Constitutional: She appears well-developed and well-nourished. No distress. HENT:   Head: Normocephalic and atraumatic. Eyes: Pupils are equal, round, and reactive to light. EOM are normal. Lids are everted and swept, no foreign bodies found. Right eye exhibits no discharge. Left eye exhibits discharge. Left conjunctiva is injected. No scleral icterus. Left eye exhibits normal extraocular motion and no nystagmus. Skin: She is not diaphoretic. Nursing note and vitals reviewed. MDM     Differential Diagnosis; Clinical Impression; Plan:     Conjunctivitis of left eye  -recommended hand washing  -rx prescribed for anti-itch and antibiotic treatment  -encouraged to not use neosporin as not indicated for use in eyes  -can use warm compresses or cool compresses for symptom management.  -refused further ophthalmic exam.     Orders Placed This Encounter      erythromycin (ILOTYCIN) ophthalmic ointment          Sig: Administer 1 g to left eye every six (6) hours for 10 days. Dispense:  40 Tube          Refill:  0    The patients condition was discussed with the patient and they understand. The patient is to follow up with PCP. If signs and symptoms become worse the pt is to go to the ER. The patient is to take medications as prescribed.          Procedures

## 2019-06-17 ENCOUNTER — TELEPHONE (OUTPATIENT)
Dept: FAMILY MEDICINE CLINIC | Age: 59
End: 2019-06-17

## 2019-06-17 RX ORDER — POLYMYXIN B SULFATE AND TRIMETHOPRIM 1; 10000 MG/ML; [USP'U]/ML
1 SOLUTION OPHTHALMIC
Qty: 1 BOTTLE | Refills: 0 | Status: SHIPPED | OUTPATIENT
Start: 2019-06-17 | End: 2019-06-24

## 2019-06-17 NOTE — TELEPHONE ENCOUNTER
Pt went to the [facility] for pink eye and was given eye cream and it is not working would like to have another eye drop called in for her.       LOV 05/31/19      [facility] [address]

## 2019-06-17 NOTE — TELEPHONE ENCOUNTER
[phone] notified patient needs to be seen for another medication per patient she took care of it patient doesn't need anything from us

## 2019-06-17 NOTE — PROGRESS NOTES
Patient calls urgent care requesting drops instead of erythromycin ointment. Says ointment is to irritating. Was seen by AMY Pond NP 2 days ago and treated for bacterial conjunctivitis. Will send in polytrim for patient. If there is new worsening or changes or non improvement should be re evaluated in office.

## 2019-07-11 ENCOUNTER — TELEPHONE (OUTPATIENT)
Dept: SURGERY | Age: 59
End: 2019-07-11

## 2019-07-16 ENCOUNTER — OFFICE VISIT (OUTPATIENT)
Dept: SURGERY | Age: 59
End: 2019-07-16

## 2019-07-16 VITALS
OXYGEN SATURATION: 98 % | BODY MASS INDEX: 33.27 KG/M2 | RESPIRATION RATE: 18 BRPM | TEMPERATURE: 98.6 F | SYSTOLIC BLOOD PRESSURE: 150 MMHG | WEIGHT: 219.5 LBS | HEART RATE: 105 BPM | HEIGHT: 68 IN | DIASTOLIC BLOOD PRESSURE: 88 MMHG

## 2019-07-16 DIAGNOSIS — E21.0 PRIMARY HYPERPARATHYROIDISM (HCC): Primary | ICD-10-CM

## 2019-07-16 NOTE — PROGRESS NOTES
1. Have you been to the ER, urgent care clinic since your last visit? Hospitalized since your last visit? No    2. Have you seen or consulted any other health care providers outside of the [address] [place] [address] since your last visit? Include any pap smears or colon screening.   No

## 2019-07-31 ENCOUNTER — HOSPITAL ENCOUNTER (OUTPATIENT)
Dept: PREADMISSION TESTING | Age: 59
Discharge: HOME | End: 2019-07-31
Payer: MEDICARE

## 2019-07-31 ENCOUNTER — HOSPITAL ENCOUNTER (OUTPATIENT)
Dept: GENERAL RADIOLOGY | Age: 59
Discharge: HOME | End: 2019-07-31
Attending: SURGERY
Payer: MEDICARE

## 2019-07-31 VITALS
WEIGHT: 223.5 LBS | RESPIRATION RATE: 18 BRPM | DIASTOLIC BLOOD PRESSURE: 85 MMHG | BODY MASS INDEX: 33.87 KG/M2 | HEART RATE: 84 BPM | SYSTOLIC BLOOD PRESSURE: 132 MMHG | TEMPERATURE: 98.6 F | HEIGHT: 68 IN

## 2019-07-31 LAB
ABO + RH BLD: NORMAL
ANION GAP SERPL CALC-SCNC: 6 MMOL/L (ref 5–15)
APTT PPP: 26.8 SEC (ref 22.1–32)
ATRIAL RATE: 69 BPM
BLOOD GROUP ANTIBODIES SERPL: NORMAL
BUN SERPL-MCNC: 23 MG/DL (ref 6–20)
BUN/CREAT SERPL: 16 (ref 12–20)
CALCIUM SERPL-MCNC: 9.9 MG/DL (ref 8.5–10.1)
CALCULATED P AXIS, ECG09: 36 DEGREES
CALCULATED R AXIS, ECG10: -10 DEGREES
CALCULATED T AXIS, ECG11: 2 DEGREES
CHLORIDE SERPL-SCNC: 102 MMOL/L (ref 97–108)
CO2 SERPL-SCNC: 25 MMOL/L (ref 21–32)
CREAT SERPL-MCNC: 1.41 MG/DL (ref 0.55–1.02)
DIAGNOSIS, 93000: NORMAL
ERYTHROCYTE [DISTWIDTH] IN BLOOD BY AUTOMATED COUNT: 11.6 % (ref 11.5–14.5)
GLUCOSE SERPL-MCNC: 297 MG/DL (ref 65–100)
HCT VFR BLD AUTO: 37.4 % (ref 35–47)
HGB BLD-MCNC: 11.8 G/DL (ref 11.5–16)
INR PPP: 1 (ref 0.9–1.1)
MCH RBC QN AUTO: 29.6 PG (ref 26–34)
MCHC RBC AUTO-ENTMCNC: 31.6 G/DL (ref 30–36.5)
MCV RBC AUTO: 93.7 FL (ref 80–99)
NRBC # BLD: 0 K/UL (ref 0–0.01)
NRBC BLD-RTO: 0 PER 100 WBC
P-R INTERVAL, ECG05: 128 MS
PLATELET # BLD AUTO: 261 K/UL (ref 150–400)
PMV BLD AUTO: 11.5 FL (ref 8.9–12.9)
POTASSIUM SERPL-SCNC: 4.4 MMOL/L (ref 3.5–5.1)
PROTHROMBIN TIME: 9.8 SEC (ref 9–11.1)
Q-T INTERVAL, ECG07: 366 MS
QRS DURATION, ECG06: 84 MS
QTC CALCULATION (BEZET), ECG08: 392 MS
RBC # BLD AUTO: 3.99 M/UL (ref 3.8–5.2)
SODIUM SERPL-SCNC: 133 MMOL/L (ref 136–145)
SPECIMEN EXP DATE BLD: NORMAL
THERAPEUTIC RANGE,PTTT: NORMAL SECS (ref 58–77)
VENTRICULAR RATE, ECG03: 69 BPM
WBC # BLD AUTO: 6.6 K/UL (ref 3.6–11)

## 2019-07-31 PROCEDURE — 86900 BLOOD TYPING SEROLOGIC ABO: CPT

## 2019-07-31 PROCEDURE — 71046 X-RAY EXAM CHEST 2 VIEWS: CPT

## 2019-07-31 PROCEDURE — 93005 ELECTROCARDIOGRAM TRACING: CPT

## 2019-07-31 PROCEDURE — 36415 COLL VENOUS BLD VENIPUNCTURE: CPT

## 2019-07-31 PROCEDURE — 85610 PROTHROMBIN TIME: CPT

## 2019-07-31 PROCEDURE — 80048 BASIC METABOLIC PNL TOTAL CA: CPT

## 2019-07-31 PROCEDURE — 85027 COMPLETE CBC AUTOMATED: CPT

## 2019-07-31 PROCEDURE — 85730 THROMBOPLASTIN TIME PARTIAL: CPT

## 2019-07-31 NOTE — PROGRESS NOTES
[facility]      Clinic Note - Follow up    Aidan Huang returns for scheduled follow up today. She is known to me for a history of primary hyperparathyroidism. She had low vitamin D levels upon initial diagnosis and recommendation from Endocrinology was for 12 weeks of replacement therapy prior to scheduling surgery to help avoid post-op hypocalcemia. The patient has been taking the Vit D as prescribed but states she is struggling with anxiety and has required some psychotherapy. She has started alprazolam for anxiety. She has no new complaints related to her hypercalcemia and hyperparathyroidism. She had a sestamibi scan that did not localize on 4/11/19 and subsequently had a CT neck on 4/19/19. This showed no definite parathyroid adenomas, however there were bilateral candidates noted that were extrathyroidal.  Her prior ultrasound was suggestive of a left sided parathyroid adenoma. The patient denies any changes to the [facility], PSHx, SHx or FHx since their last visit except as mentioned above. ROS is negative except as mentioned in the HPI. Objective     Visit Vitals  /88 (BP 1 Location: Left arm, BP Patient Position: Sitting)   Pulse (!) 105   Temp 98.6 °F (37 °C) (Oral)   Resp 18   Ht 5' 8\" (1.727 m)   Wt 219 lb 8 oz (99.6 kg)   SpO2 98%   BMI 33.37 kg/m²         PE  GEN - Awake, alert, communicating appropriately. NAD  Neck - no cervical or supraclavicular adenopathy. No masses. Trachea midline. Thyroid mobile. Pulm - CTAB  CV - RRR  Abd - soft, NT, ND  Ext - warm, well perfused. Labs  None new    Imaging  4/19/19 CT Neck: FINDINGS:     Inferior to the left lobe of the thyroid, there is an 8.4 x 7.2 mm enhancing  extra thyroidal nodule seen on axial image 277. This is fed by the inferior  thyroidal artery. On the right, there is a 10.2 x 6.2 mm extrathyroidal mass on  image 281.  Neither of these meet strict criteria for a parathyroid adenoma by CT  imaging.      The carotid and jugular vessels enhance normally.      The thyroid gland, submandibular salivary glands and parotid glands are normal.     No nasopharyngeal, pharyngeal or laryngeal mass is identified.     No abnormalities are identified in the visualized portions of the brain or  orbits.      The visualized mastoid air cells and paranasal sinuses are clear.     The visualized portions of the lung apices are clear.     IMPRESSION  IMPRESSION: Bilateral extrathyroidal masses as described without typical CT  characteristics of parathyroid adenoma. However, given their position and  extrathyroidal location, both are candidates for parathyroid adenomata      Assessment     Jaime Arzate is a 66 y. o.yr old female with hypercalcemia, primary hyperparathyroidism and low vitamin D.      Plan     I discussed plan for parathyroidectomy with plans to assess left side first based upon prior U/S. We will monitor PTH levels intraoperatively and may require bilateral neck exploration if her levels do not drop appropriately. A complete discussion of the risks, benefits and alternatives to surgery were discussed with the patient who was keen to proceed. 30 mins of time was spent with the patient of which > 50% of the time involved face-to-face counseling of the patient regarding the proposed treatment plan.       Juan Miguel Van MD  7/16/19    CC: MD Dr. Abby Barragan

## 2019-07-31 NOTE — PERIOP NOTES
Patient given surgical site infection FAQs handout and hand hygiene tips sheet. Pre-operative instructions reviewed and patient verbalizes understanding of instructions. Patient has been given the opportunity to ask additional questions. CHG wipes given to patient and patient instructed in use. PATIENT GIVEN WRITTEN INSTRUCTIONS TO GO TO THE IMAGING CENTER/CANCER CENTER [address] [place] [address] [address] TO HAVE CHEST XRAY COMPLETED.

## 2019-08-01 ENCOUNTER — DOCUMENTATION ONLY (OUTPATIENT)
Dept: SURGERY | Age: 59
End: 2019-08-01

## 2019-08-01 LAB
BACTERIA SPEC CULT: NORMAL
BACTERIA SPEC CULT: NORMAL
SERVICE CMNT-IMP: NORMAL

## 2019-08-01 NOTE — PERIOP NOTES
Lee ELAM OF NA LEVEL  . Lilli ELAM OF GLUCOSE LEVEL 297 . PATIENT HAS DIAGNOSIS OF TYPE 2 DIABETES.     Meet Dalton RN

## 2019-08-01 NOTE — PROGRESS NOTES
Received CC message from [employer]. Dr. Ciara Bro is aware. Chyrel Mortimer, RN Raul Lime, LPN   Cc: Chyrel Mortimer, RN Rayford Nordmann DR. MICHAEL WHITE OF GLUCOSE LEVEL 297 . PATIENT HAS DIAGNOSIS OF TYPE 2 DIABETES. Bernida Millers, RN Chyrel Mortimer, RN Raul Lime, LPN   Cc: Chyrel Mortimer, RN Rayford Nordmann DR. MICHAEL WHITE OF NA LEVEL  .

## 2019-08-06 ENCOUNTER — HOSPITAL ENCOUNTER (OUTPATIENT)
Age: 59
Setting detail: OUTPATIENT SURGERY
Discharge: HOME | End: 2019-08-06
Attending: SURGERY | Admitting: SURGERY
Payer: MEDICARE

## 2019-08-06 ENCOUNTER — ANESTHESIA EVENT (OUTPATIENT)
Dept: SURGERY | Age: 59
End: 2019-08-06
Payer: MEDICARE

## 2019-08-06 ENCOUNTER — ANESTHESIA (OUTPATIENT)
Dept: SURGERY | Age: 59
End: 2019-08-06
Payer: MEDICARE

## 2019-08-06 VITALS
HEART RATE: 72 BPM | TEMPERATURE: 97.7 F | RESPIRATION RATE: 16 BRPM | BODY MASS INDEX: 34.03 KG/M2 | DIASTOLIC BLOOD PRESSURE: 80 MMHG | SYSTOLIC BLOOD PRESSURE: 168 MMHG | OXYGEN SATURATION: 96 % | WEIGHT: 223.8 LBS

## 2019-08-06 DIAGNOSIS — E21.0 PRIMARY HYPERPARATHYROIDISM (HCC): Primary | ICD-10-CM

## 2019-08-06 LAB
GLUCOSE BLD STRIP.AUTO-MCNC: 266 MG/DL (ref 65–100)
GLUCOSE BLD STRIP.AUTO-MCNC: 277 MG/DL (ref 65–100)
GLUCOSE BLD STRIP.AUTO-MCNC: 310 MG/DL (ref 65–100)
INTRA-OP PTH, IPTHRT: 147.7 PG/ML (ref 18.4–88)
INTRA-OP PTH, IPTHRT: 74.6 PG/ML (ref 18.4–88)
PTH-INTACT IO % DIF SERPL: NORMAL %
PTH-INTACT P EXCISION SERPL-MCNC: 61.8 PG/ML (ref 18.4–88)
SERVICE CMNT-IMP: ABNORMAL
SPECIMEN DRAWN SERPL: 1235
SPECIMEN DRAWN SERPL: 1242
SPECIMEN DRAWN SERPL: 943

## 2019-08-06 PROCEDURE — 74011000272 HC RX REV CODE- 272: Performed by: SURGERY

## 2019-08-06 PROCEDURE — 77030011267 HC ELECTRD BLD COVD -A: Performed by: SURGERY

## 2019-08-06 PROCEDURE — 76210000026 HC REC RM PH II 1 TO 1.5 HR: Performed by: SURGERY

## 2019-08-06 PROCEDURE — 74011000250 HC RX REV CODE- 250

## 2019-08-06 PROCEDURE — 77030039266 HC ADH SKN EXOFIN S2SG -A: Performed by: SURGERY

## 2019-08-06 PROCEDURE — 76060000035 HC ANESTHESIA 2 TO 2.5 HR: Performed by: SURGERY

## 2019-08-06 PROCEDURE — 77030018836 HC SOL IRR NACL ICUM -A: Performed by: SURGERY

## 2019-08-06 PROCEDURE — 74011000250 HC RX REV CODE- 250: Performed by: SURGERY

## 2019-08-06 PROCEDURE — 74011636637 HC RX REV CODE- 636/637: Performed by: ANESTHESIOLOGY

## 2019-08-06 PROCEDURE — 77030002996 HC SUT SLK J&J -A: Performed by: SURGERY

## 2019-08-06 PROCEDURE — 77030040361 HC SLV COMPR DVT MDII -B: Performed by: SURGERY

## 2019-08-06 PROCEDURE — 36415 COLL VENOUS BLD VENIPUNCTURE: CPT

## 2019-08-06 PROCEDURE — 77030011640 HC PAD GRND REM COVD -A: Performed by: SURGERY

## 2019-08-06 PROCEDURE — 74011250636 HC RX REV CODE- 250/636: Performed by: NURSE ANESTHETIST, CERTIFIED REGISTERED

## 2019-08-06 PROCEDURE — 77030019655 HC PRB STIM CRAN MEDT -B: Performed by: SURGERY

## 2019-08-06 PROCEDURE — 83970 ASSAY OF PARATHORMONE: CPT

## 2019-08-06 PROCEDURE — 82962 GLUCOSE BLOOD TEST: CPT

## 2019-08-06 PROCEDURE — 76210000016 HC OR PH I REC 1 TO 1.5 HR: Performed by: SURGERY

## 2019-08-06 PROCEDURE — 76010000171 HC OR TIME 2 TO 2.5 HR INTENSV-TIER 1: Performed by: SURGERY

## 2019-08-06 PROCEDURE — 74011250637 HC RX REV CODE- 250/637: Performed by: ANESTHESIOLOGY

## 2019-08-06 PROCEDURE — 77030040356 HC CORD BPLR FRCP COVD -A: Performed by: SURGERY

## 2019-08-06 PROCEDURE — 77030020782 HC GWN BAIR PAWS FLX 3M -B

## 2019-08-06 PROCEDURE — 88331 PATH CONSLTJ SURG 1 BLK 1SPC: CPT

## 2019-08-06 PROCEDURE — 77030026438 HC STYL ET INTUB CARD -A: Performed by: NURSE ANESTHETIST, CERTIFIED REGISTERED

## 2019-08-06 PROCEDURE — 77030010516 HC APPL HEMA CLP TELE -B: Performed by: SURGERY

## 2019-08-06 PROCEDURE — 77030002933 HC SUT MCRYL J&J -A: Performed by: SURGERY

## 2019-08-06 PROCEDURE — 88305 TISSUE EXAM BY PATHOLOGIST: CPT

## 2019-08-06 PROCEDURE — 74011000250 HC RX REV CODE- 250: Performed by: NURSE ANESTHETIST, CERTIFIED REGISTERED

## 2019-08-06 PROCEDURE — 77030031139 HC SUT VCRL2 J&J -A: Performed by: SURGERY

## 2019-08-06 PROCEDURE — 77030008698 HC TU ET REINF MEDT -D: Performed by: NURSE ANESTHETIST, CERTIFIED REGISTERED

## 2019-08-06 PROCEDURE — 74011250636 HC RX REV CODE- 250/636: Performed by: SURGERY

## 2019-08-06 PROCEDURE — 77030010938 HC CLP LIG TELE -A: Performed by: SURGERY

## 2019-08-06 PROCEDURE — 77030012893

## 2019-08-06 PROCEDURE — 74011250636 HC RX REV CODE- 250/636: Performed by: ANESTHESIOLOGY

## 2019-08-06 RX ORDER — SODIUM CHLORIDE 0.9 % (FLUSH) 0.9 %
5-40 SYRINGE (ML) INJECTION AS NEEDED
Status: DISCONTINUED | OUTPATIENT
Start: 2019-08-06 | End: 2019-08-06 | Stop reason: HOSPADM

## 2019-08-06 RX ORDER — BUPIVACAINE HYDROCHLORIDE AND EPINEPHRINE 5; 5 MG/ML; UG/ML
INJECTION, SOLUTION EPIDURAL; INTRACAUDAL; PERINEURAL AS NEEDED
Status: DISCONTINUED | OUTPATIENT
Start: 2019-08-06 | End: 2019-08-06 | Stop reason: HOSPADM

## 2019-08-06 RX ORDER — ONDANSETRON 2 MG/ML
4 INJECTION INTRAMUSCULAR; INTRAVENOUS AS NEEDED
Status: DISCONTINUED | OUTPATIENT
Start: 2019-08-06 | End: 2019-08-06 | Stop reason: HOSPADM

## 2019-08-06 RX ORDER — SODIUM CHLORIDE, SODIUM LACTATE, POTASSIUM CHLORIDE, CALCIUM CHLORIDE 600; 310; 30; 20 MG/100ML; MG/100ML; MG/100ML; MG/100ML
125 INJECTION, SOLUTION INTRAVENOUS CONTINUOUS
Status: DISCONTINUED | OUTPATIENT
Start: 2019-08-06 | End: 2019-08-06 | Stop reason: HOSPADM

## 2019-08-06 RX ORDER — KETAMINE HYDROCHLORIDE 10 MG/ML
INJECTION, SOLUTION INTRAMUSCULAR; INTRAVENOUS AS NEEDED
Status: DISCONTINUED | OUTPATIENT
Start: 2019-08-06 | End: 2019-08-06 | Stop reason: HOSPADM

## 2019-08-06 RX ORDER — SODIUM CHLORIDE 0.9 % (FLUSH) 0.9 %
5-40 SYRINGE (ML) INJECTION EVERY 8 HOURS
Status: DISCONTINUED | OUTPATIENT
Start: 2019-08-06 | End: 2019-08-06 | Stop reason: HOSPADM

## 2019-08-06 RX ORDER — LIDOCAINE HYDROCHLORIDE 20 MG/ML
INJECTION, SOLUTION EPIDURAL; INFILTRATION; INTRACAUDAL; PERINEURAL AS NEEDED
Status: DISCONTINUED | OUTPATIENT
Start: 2019-08-06 | End: 2019-08-06 | Stop reason: HOSPADM

## 2019-08-06 RX ORDER — SODIUM CHLORIDE, SODIUM LACTATE, POTASSIUM CHLORIDE, CALCIUM CHLORIDE 600; 310; 30; 20 MG/100ML; MG/100ML; MG/100ML; MG/100ML
75 INJECTION, SOLUTION INTRAVENOUS CONTINUOUS
Status: DISCONTINUED | OUTPATIENT
Start: 2019-08-06 | End: 2019-08-06 | Stop reason: HOSPADM

## 2019-08-06 RX ORDER — CEFAZOLIN SODIUM/WATER 2 G/20 ML
2 SYRINGE (ML) INTRAVENOUS ONCE
Status: COMPLETED | OUTPATIENT
Start: 2019-08-06 | End: 2019-08-06

## 2019-08-06 RX ORDER — ONDANSETRON 2 MG/ML
INJECTION INTRAMUSCULAR; INTRAVENOUS AS NEEDED
Status: DISCONTINUED | OUTPATIENT
Start: 2019-08-06 | End: 2019-08-06 | Stop reason: HOSPADM

## 2019-08-06 RX ORDER — SODIUM CHLORIDE, SODIUM LACTATE, POTASSIUM CHLORIDE, CALCIUM CHLORIDE 600; 310; 30; 20 MG/100ML; MG/100ML; MG/100ML; MG/100ML
INJECTION, SOLUTION INTRAVENOUS
Status: DISCONTINUED | OUTPATIENT
Start: 2019-08-06 | End: 2019-08-06 | Stop reason: HOSPADM

## 2019-08-06 RX ORDER — SODIUM CHLORIDE 9 MG/ML
25 INJECTION, SOLUTION INTRAVENOUS CONTINUOUS
Status: DISCONTINUED | OUTPATIENT
Start: 2019-08-06 | End: 2019-08-06 | Stop reason: HOSPADM

## 2019-08-06 RX ORDER — OXYCODONE AND ACETAMINOPHEN 5; 325 MG/1; MG/1
TABLET ORAL
Refills: 0 | Status: ON HOLD | COMMUNITY
Start: 2019-05-30 | End: 2019-08-06 | Stop reason: SDUPTHER

## 2019-08-06 RX ORDER — SUCCINYLCHOLINE CHLORIDE 20 MG/ML
INJECTION INTRAMUSCULAR; INTRAVENOUS AS NEEDED
Status: DISCONTINUED | OUTPATIENT
Start: 2019-08-06 | End: 2019-08-06 | Stop reason: HOSPADM

## 2019-08-06 RX ORDER — ROPIVACAINE HYDROCHLORIDE 5 MG/ML
30 INJECTION, SOLUTION EPIDURAL; INFILTRATION; PERINEURAL ONCE
Status: DISCONTINUED | OUTPATIENT
Start: 2019-08-06 | End: 2019-08-06 | Stop reason: HOSPADM

## 2019-08-06 RX ORDER — HYDROMORPHONE HYDROCHLORIDE 1 MG/ML
0.2 INJECTION, SOLUTION INTRAMUSCULAR; INTRAVENOUS; SUBCUTANEOUS
Status: DISCONTINUED | OUTPATIENT
Start: 2019-08-06 | End: 2019-08-06 | Stop reason: HOSPADM

## 2019-08-06 RX ORDER — OXYCODONE AND ACETAMINOPHEN 5; 325 MG/1; MG/1
1 TABLET ORAL
Qty: 30 TAB | Refills: 0 | Status: SHIPPED | OUTPATIENT
Start: 2019-08-06 | End: 2019-08-13

## 2019-08-06 RX ORDER — FENTANYL CITRATE 50 UG/ML
25 INJECTION, SOLUTION INTRAMUSCULAR; INTRAVENOUS
Status: DISCONTINUED | OUTPATIENT
Start: 2019-08-06 | End: 2019-08-06 | Stop reason: HOSPADM

## 2019-08-06 RX ORDER — DEXAMETHASONE SODIUM PHOSPHATE 4 MG/ML
INJECTION, SOLUTION INTRA-ARTICULAR; INTRALESIONAL; INTRAMUSCULAR; INTRAVENOUS; SOFT TISSUE AS NEEDED
Status: DISCONTINUED | OUTPATIENT
Start: 2019-08-06 | End: 2019-08-06 | Stop reason: HOSPADM

## 2019-08-06 RX ORDER — MIDAZOLAM HYDROCHLORIDE 1 MG/ML
1 INJECTION, SOLUTION INTRAMUSCULAR; INTRAVENOUS AS NEEDED
Status: DISCONTINUED | OUTPATIENT
Start: 2019-08-06 | End: 2019-08-06 | Stop reason: HOSPADM

## 2019-08-06 RX ORDER — FENTANYL CITRATE 50 UG/ML
INJECTION, SOLUTION INTRAMUSCULAR; INTRAVENOUS AS NEEDED
Status: DISCONTINUED | OUTPATIENT
Start: 2019-08-06 | End: 2019-08-06 | Stop reason: HOSPADM

## 2019-08-06 RX ORDER — LIDOCAINE HYDROCHLORIDE 10 MG/ML
0.1 INJECTION, SOLUTION EPIDURAL; INFILTRATION; INTRACAUDAL; PERINEURAL AS NEEDED
Status: DISCONTINUED | OUTPATIENT
Start: 2019-08-06 | End: 2019-08-06 | Stop reason: HOSPADM

## 2019-08-06 RX ORDER — MIDAZOLAM HYDROCHLORIDE 1 MG/ML
0.5 INJECTION, SOLUTION INTRAMUSCULAR; INTRAVENOUS
Status: DISCONTINUED | OUTPATIENT
Start: 2019-08-06 | End: 2019-08-06 | Stop reason: HOSPADM

## 2019-08-06 RX ORDER — ROCURONIUM BROMIDE 10 MG/ML
INJECTION, SOLUTION INTRAVENOUS AS NEEDED
Status: DISCONTINUED | OUTPATIENT
Start: 2019-08-06 | End: 2019-08-06 | Stop reason: HOSPADM

## 2019-08-06 RX ORDER — DEXMEDETOMIDINE HYDROCHLORIDE 4 UG/ML
INJECTION, SOLUTION INTRAVENOUS AS NEEDED
Status: DISCONTINUED | OUTPATIENT
Start: 2019-08-06 | End: 2019-08-06 | Stop reason: HOSPADM

## 2019-08-06 RX ORDER — MORPHINE SULFATE 10 MG/ML
2 INJECTION, SOLUTION INTRAMUSCULAR; INTRAVENOUS
Status: DISCONTINUED | OUTPATIENT
Start: 2019-08-06 | End: 2019-08-06 | Stop reason: HOSPADM

## 2019-08-06 RX ORDER — PROPOFOL 10 MG/ML
INJECTION, EMULSION INTRAVENOUS AS NEEDED
Status: DISCONTINUED | OUTPATIENT
Start: 2019-08-06 | End: 2019-08-06 | Stop reason: HOSPADM

## 2019-08-06 RX ORDER — FENTANYL CITRATE 50 UG/ML
50 INJECTION, SOLUTION INTRAMUSCULAR; INTRAVENOUS AS NEEDED
Status: DISCONTINUED | OUTPATIENT
Start: 2019-08-06 | End: 2019-08-06 | Stop reason: HOSPADM

## 2019-08-06 RX ORDER — LABETALOL HYDROCHLORIDE 5 MG/ML
INJECTION, SOLUTION INTRAVENOUS
Status: COMPLETED
Start: 2019-08-06 | End: 2019-08-06

## 2019-08-06 RX ORDER — MIDAZOLAM HYDROCHLORIDE 1 MG/ML
INJECTION, SOLUTION INTRAMUSCULAR; INTRAVENOUS AS NEEDED
Status: DISCONTINUED | OUTPATIENT
Start: 2019-08-06 | End: 2019-08-06 | Stop reason: HOSPADM

## 2019-08-06 RX ORDER — LABETALOL HYDROCHLORIDE 5 MG/ML
10 INJECTION, SOLUTION INTRAVENOUS AS NEEDED
Status: COMPLETED | OUTPATIENT
Start: 2019-08-06 | End: 2019-08-06

## 2019-08-06 RX ORDER — PHENYLEPHRINE HCL IN 0.9% NACL 0.4MG/10ML
SYRINGE (ML) INTRAVENOUS AS NEEDED
Status: DISCONTINUED | OUTPATIENT
Start: 2019-08-06 | End: 2019-08-06 | Stop reason: HOSPADM

## 2019-08-06 RX ORDER — ACETAMINOPHEN 325 MG/1
650 TABLET ORAL ONCE
Status: COMPLETED | OUTPATIENT
Start: 2019-08-06 | End: 2019-08-06

## 2019-08-06 RX ORDER — DIPHENHYDRAMINE HYDROCHLORIDE 50 MG/ML
12.5 INJECTION, SOLUTION INTRAMUSCULAR; INTRAVENOUS AS NEEDED
Status: DISCONTINUED | OUTPATIENT
Start: 2019-08-06 | End: 2019-08-06 | Stop reason: HOSPADM

## 2019-08-06 RX ADMIN — SODIUM CHLORIDE, POTASSIUM CHLORIDE, SODIUM LACTATE AND CALCIUM CHLORIDE: 600; 310; 30; 20 INJECTION, SOLUTION INTRAVENOUS at 09:30

## 2019-08-06 RX ADMIN — LIDOCAINE HYDROCHLORIDE 80 MG: 20 INJECTION, SOLUTION EPIDURAL; INFILTRATION; INTRACAUDAL; PERINEURAL at 11:07

## 2019-08-06 RX ADMIN — ONDANSETRON 4 MG: 2 INJECTION INTRAMUSCULAR; INTRAVENOUS at 14:21

## 2019-08-06 RX ADMIN — FENTANYL CITRATE 25 MCG: 50 INJECTION INTRAMUSCULAR; INTRAVENOUS at 13:50

## 2019-08-06 RX ADMIN — ROCURONIUM BROMIDE 10 MG: 10 SOLUTION INTRAVENOUS at 11:07

## 2019-08-06 RX ADMIN — DEXMEDETOMIDINE HYDROCHLORIDE 10 MCG: 4 INJECTION, SOLUTION INTRAVENOUS at 10:58

## 2019-08-06 RX ADMIN — PROPOFOL 160 MG: 10 INJECTION, EMULSION INTRAVENOUS at 11:07

## 2019-08-06 RX ADMIN — DEXAMETHASONE SODIUM PHOSPHATE 4 MG: 4 INJECTION, SOLUTION INTRAMUSCULAR; INTRAVENOUS at 11:15

## 2019-08-06 RX ADMIN — KETAMINE HYDROCHLORIDE 30 MG: 10 INJECTION, SOLUTION INTRAMUSCULAR; INTRAVENOUS at 11:20

## 2019-08-06 RX ADMIN — LABETALOL HYDROCHLORIDE 10 MG: 5 INJECTION INTRAVENOUS at 13:43

## 2019-08-06 RX ADMIN — Medication 2 G: at 11:22

## 2019-08-06 RX ADMIN — FENTANYL CITRATE 50 MCG: 50 INJECTION, SOLUTION INTRAMUSCULAR; INTRAVENOUS at 11:39

## 2019-08-06 RX ADMIN — ONDANSETRON HYDROCHLORIDE 4 MG: 2 INJECTION, SOLUTION INTRAMUSCULAR; INTRAVENOUS at 13:07

## 2019-08-06 RX ADMIN — ACETAMINOPHEN 650 MG: 325 TABLET ORAL at 09:02

## 2019-08-06 RX ADMIN — LABETALOL HYDROCHLORIDE 10 MG: 5 INJECTION, SOLUTION INTRAVENOUS at 13:43

## 2019-08-06 RX ADMIN — PHENYLEPHRINE HYDROCHLORIDE 30 MCG/MIN: 10 INJECTION INTRAVENOUS at 11:22

## 2019-08-06 RX ADMIN — FENTANYL CITRATE 100 MCG: 50 INJECTION, SOLUTION INTRAMUSCULAR; INTRAVENOUS at 11:07

## 2019-08-06 RX ADMIN — Medication 80 MCG: at 11:26

## 2019-08-06 RX ADMIN — DEXMEDETOMIDINE HYDROCHLORIDE 10 MCG: 4 INJECTION, SOLUTION INTRAVENOUS at 11:07

## 2019-08-06 RX ADMIN — HUMAN INSULIN 5 UNITS: 100 INJECTION, SOLUTION SUBCUTANEOUS at 09:24

## 2019-08-06 RX ADMIN — PROPOFOL 40 MG: 10 INJECTION, EMULSION INTRAVENOUS at 11:13

## 2019-08-06 RX ADMIN — KETAMINE HYDROCHLORIDE 20 MG: 10 INJECTION, SOLUTION INTRAMUSCULAR; INTRAVENOUS at 11:14

## 2019-08-06 RX ADMIN — Medication 80 MCG: at 11:22

## 2019-08-06 RX ADMIN — MIDAZOLAM 2 MG: 1 INJECTION INTRAMUSCULAR; INTRAVENOUS at 10:58

## 2019-08-06 RX ADMIN — SUCCINYLCHOLINE CHLORIDE 160 MG: 20 INJECTION, SOLUTION INTRAMUSCULAR; INTRAVENOUS at 11:08

## 2019-08-06 RX ADMIN — SODIUM CHLORIDE, SODIUM LACTATE, POTASSIUM CHLORIDE, AND CALCIUM CHLORIDE 125 ML/HR: 600; 310; 30; 20 INJECTION, SOLUTION INTRAVENOUS at 09:07

## 2019-08-06 RX ADMIN — FENTANYL CITRATE 50 MCG: 50 INJECTION, SOLUTION INTRAMUSCULAR; INTRAVENOUS at 12:59

## 2019-08-06 RX ADMIN — MIDAZOLAM 2 MG: 1 INJECTION INTRAMUSCULAR; INTRAVENOUS at 11:00

## 2019-08-06 RX ADMIN — FENTANYL CITRATE 50 MCG: 50 INJECTION, SOLUTION INTRAMUSCULAR; INTRAVENOUS at 13:17

## 2019-08-06 RX ADMIN — FENTANYL CITRATE 25 MCG: 50 INJECTION INTRAMUSCULAR; INTRAVENOUS at 13:55

## 2019-08-06 NOTE — PERIOP NOTES
, spoke with  [facility] CURLY, do not treat in PACU. When pt is discharged - have her take home medications.

## 2019-08-06 NOTE — PERIOP NOTES
1500 pt nauseous in pacu, continuing IV fluids, Zofran IV given by [employer].  1545 pt is not nauseous or having surgical pain at this time, Ready for discharge.  Anesthesia notified

## 2019-08-06 NOTE — DISCHARGE INSTRUCTIONS
Patient Discharge Instructions    Veor Alan / [id number] : 1960    Admitted 2019 Discharged: 2019       · It is important that you take the medication exactly as they are prescribed. · Keep your medication in the bottles provided by the pharmacist and keep a list of the medication names, dosages, and times to be taken in your wallet. · Do not take other medications without consulting your doctor. What to do at Home    Recommended diet: Diabetic diet    Recommended activity: No strenuous activity for 1 week after surgery. No Driving While Taking narcotic pain medications. May Take Shower 1 day after surgery. Do not submerge your incision under water for at least 10 days to allow your incision to heal.      If you experience any of the following symptoms Fevers, Chills, Nausea, Vomitting, Redness or Drainage at Surgical Site(s) or Any Other Questions or Concerns Please Call -  [phone]. Follow-up with Dr. Vince Kingsley in ~ 14 days. Information obtained by :  I understand that if any problems occur once I am at home I am to contact my physician. I understand and acknowledge receipt of the instructions indicated above. Physician's or R.N.'s Signature                                                                  Date/Time                                                                                                                                              Patient or Representative Signature                                                          Date/Time        Patient Education        Parathyroidectomy: What to Expect at Home  Your Recovery    Parathyroidectomy is the removal of one or more of the four parathyroid glands in the neck.  These small glands, located on the thyroid gland, help control the amount of calcium in the body. When they are too active, these glands cause high levels of calcium. This is called hyperparathyroidism (say \"hy-per-pair-kp-JTF-qddc-iz-um\"). You may leave the hospital with stitches in the cut the doctor made (incision). Your doctor will tell you if you need to come back to have these removed. You may still have a tube called a drain in your neck. Your doctor will take this out a few days after your surgery. You may have some trouble chewing and swallowing after you go home. Your voice probably will be hoarse, and you may have trouble talking. For most people, these problems get better within a few weeks, but it can take longer. In some cases, this surgery causes permanent problems with chewing, speaking, or swallowing. This care sheet gives you a general idea about how long it will take for you to recover. But each person recovers at a different pace. Follow the steps below to get better as quickly as possible. How can you care for yourself at home? Activity    · Rest when you feel tired. Getting enough sleep will help you recover. When you lie down, put two or three pillows under your head to keep it raised.     · Try to walk each day. Start by walking a little more than you did the day before. Bit by bit, increase the amount you walk. Walking boosts blood flow and helps prevent pneumonia and constipation.     · Avoid strenuous physical activity and lifting heavy objects for 3 weeks after surgery or until your doctor says it is okay.     · Do not over-extend your neck backwards for 2 weeks after surgery.     · Ask your doctor when you can drive again.     · You may take a shower, unless you still have a drain. Pat the incision dry. If you have a drain coming out of your incision, follow your doctor's instructions to care for it.    Diet    · If swallowing is painful, start out with cold drinks, flavored ice pops, and ice cream. Next, try soft foods like pudding, yogurt, canned or cooked fruit, scrambled eggs, and mashed potatoes. Avoid eating hard or scratchy foods like chips or raw vegetables. Avoid orange or tomato juice and other acidic foods that can sting the throat.     · If you cough right after drinking, try drinking thicker liquids, such as a smoothie.     · You may notice that your bowel movements are not regular right after your surgery. This is common. Try to avoid constipation and straining with bowel movements. You may want to take a fiber supplement every day. If you have not had a bowel movement after a couple of days, ask your doctor about taking a mild laxative. Medicines    · Your doctor will tell you if and when you can restart your medicines. He or she will also give you instructions about taking any new medicines.     · If you take blood thinners, such as warfarin (Coumadin), clopidogrel (Plavix), or aspirin, be sure to talk to your doctor. He or she will tell you if and when to start taking those medicines again. Make sure that you understand exactly what your doctor wants you to do.     · Be safe with medicines. Take pain medicines exactly as directed. ? If the doctor gave you a prescription medicine for pain, take it as prescribed. ? If you are not taking a prescription pain medicine, ask your doctor if you can take an over-the-counter medicine.     · If you think your pain medicine is making you sick to your stomach:  ? Take your medicine after meals (unless your doctor has told you not to). ? Ask your doctor for a different pain medicine.     · Your doctor may prescribe calcium to prevent problems after surgery from low calcium. Not having enough calcium can cause symptoms such as tingling around your mouth or in your hands and feet.     · Your doctor may have prescribed antibiotics. Take them as directed. Do not stop taking them just because you feel better. You need to take the full course of antibiotics.    Incision care    · If your doctor told you how to care for your incision, follow your doctor's instructions. If you did not get instructions, follow this general advice:  ? After the first 24 to 48 hours, wash around the incision with clean water 2 times a day. Don't use hydrogen peroxide or alcohol, which can slow healing.     · You may have a drain near your incision. Your doctor will tell you how to take care of it. Follow-up care is a key part of your treatment and safety. Be sure to make and go to all appointments, and call your doctor if you are having problems. It's also a good idea to know your test results and keep a list of the medicines you take. When should you call for help? Call 911 anytime you think you may need emergency care. For example, call if:    · You passed out (lost consciousness).     · You have sudden chest pain and shortness of breath, or you cough up blood.     · You have severe trouble breathing.    Call your doctor now or seek immediate medical care if:    · You have loose stitches, or your incision comes open.     · You have blood leaking from your incision.     · You have signs of infection, such as:  ? Increased pain, swelling, warmth, or redness. ? Red streaks leading from the incision. ? Pus draining from the incision. ? A fever.     · You have a tingling feeling around your mouth.     · You have cramping or tingling in your hands and feet.    Watch closely for changes in your health, and be sure to contact your doctor if:    · You have trouble talking.     · You are sick to your stomach or cannot keep fluids down.     · You do not have a bowel movement after taking a laxative. Where can you learn more? Go to http://pooja-shon.info/. Enter M010 in the search box to learn more about \"Parathyroidectomy: What to Expect at Home. \"  Current as of: November 6, 2018  Content Version: 12.1  © 9028-9454 [employer].  Care instructions adapted under license by [employer] (which disclaims liability or warranty for this information). If you have questions about a medical condition or this instruction, always ask your healthcare professional. [address] any warranty or liability for your use of this information. ______________________________________________________________________    Anesthesia Discharge Instructions    After general anesthesia or intervenous sedation, for 24 hours or while taking prescription Narcotics:  · Limit your activities  · Do not drive or operate hazardous machinery  · If you have not urinated within 8 hours after discharge, please contact your surgeon on call. · Do not make important personal or business decisions  · Do not drink alcoholic beverages    Report the following to your surgeon:  · Excessive pain, swelling, redness or odor of or around the surgical area  · Temperature over 100.5 degrees  · Nausea and vomiting lasting longer than 4 hours or if unable to take medication  · Any signs of decreased circulation or nerve impairment to extremity:  Change in color, persistent numbness, tingling, coldness or increased pain.   · Any questions      **YOU HAD 1g OF TYLENOL AT 9:02AM

## 2019-08-06 NOTE — BRIEF OP NOTE
BRIEF OPERATIVE NOTE    Date of Procedure: 8/6/2019   Preoperative Diagnosis: PRIMARY HYPERPARATHYROIDISM  Postoperative Diagnosis: PRIMARY HYPERPARATHYROIDISM    Procedure(s):  LEFT LOWER PARATHYROIDECTOMY WITH INTRAOPERATIVE PTH MONITORING  Surgeon(s) and Role:     * Jesus Quinones MD - Primary         Surgical Assistant: Frandy Joseph    Surgical Staff:  Circ-1: Thee Santiago RN  Circ-Relief: Amanda Coleman RN  Scrub Tech-1: Johnnie [facility]  Scrub RN-1: Ashu Chacon  Surg Asst-1: Candace Chavarria  Surg Asst-Relief: Salazar CASTRO  Event Time In Time Out   Incision Start 1141    Incision Close 1312      Anesthesia: General   Estimated Blood Loss: 3 mL  Specimens:   ID Type Source Tests Collected by Time Destination   1 : left lower parathyroid, possible parathyroid adenoma Frozen Section Parathyroid  Sidney Morrell MD 8/6/2019 1218 Pathology   1 :     Sidney Morrell MD 8/6/2019 1225 Microbiology   1 :     Sidney Morrell MD 8/6/2019 1225 Cytology   1 :     Sidney Morrell MD 8/6/2019 1225 Other (See Notes)      Findings: Left lower parathyroid gland enlarged and consistent with parathyroid adenoma on frozen section. Normal appearing left upper parathyroid gland left in situ. PTH levels dropped appropriately at 10 and 15 minutes after removal of the left lower parathyroid.     Complications: None  Implants: * No implants in log *

## 2019-08-06 NOTE — OP NOTES
[address]   OPERATIVE REPORT    Name:  Brittney Montez  MR#:  [id number]  :  1960  ACCOUNT #:  [de-identified]  DATE OF SERVICE:  2019    PREOPERATIVE DIAGNOSIS:  Primary hyperparathyroidism. POSTOPERATIVE DIAGNOSIS:  Primary hyperparathyroidism. PROCEDURE PERFORMED:  Left lower parathyroidectomy with intraoperative PTH monitoring. SURGEON:  Makenna Abarca. Charisse Hooper MD    ASSISTANT:  Elvis Elias, surgical assistant. ANESTHESIA:  General.    COMPLICATIONS:  None. SPECIMENS REMOVED:  Left lower parathyroid gland. DISPOSITION OF SPECIMEN:  To Pathology. IMPLANTS:  None. ESTIMATED BLOOD LOSS:  3 mL. FINDINGS:  The patient's left lower parathyroid gland was enlarged and consistent with a parathyroid adenoma. This was confirmed on frozen section. The patient's left upper parathyroid gland appeared normalized and was left in situ. Intraoperative PTH levels dropped appropriately at 10 and 15 minutes after removal of the left lower parathyroid gland. INDICATIONS:  The patient is a 72-year-old female, who was referred secondary to hypercalcemia with findings of elevated parathyroid hormone levels. She also had extremely low vitamin D levels, and this was replaced over the course of a few months per recommendations of Endocrinology. Once this was adequately replaced, the patient was thus brought to operating room for surgery for a parathyroid adenoma and primary hyperparathyroidism. Preoperative imaging localization with ultrasound suggested that this was in the left side; however, sestamibi did not localize and a CT scan of the neck also could not confirm mobilization. Plan was made for left-sided neck exploration and resection of parathyroid adenoma if this was identified and possible bilateral neck exploration. A complete discussion of risks, benefits and alternatives of surgery were had with the patient. She was in agreement to proceed.   Informed consent was obtained. DESCRIPTION OF OPERATION:  After informed consent was obtained, the patient was brought back to the operating room. She was placed under general endotracheal anesthesia in the supine position on the operating room table. Both arms were tucked. A shoulder roll was placed, and the neck was extended. [facility]aryngeal nerve monitoring system was used and then appropriate endotracheal tube was placed and needle probes were placed into the trapezius muscles bilaterally after the skin was prepped with alcohol. Once this was completed, we prepped the neck in the usual sterile fashion and this was draped sterilely. A proper time-out was then performed. The left side had been preoperatively marked and identified as the first site for exploration. With this completed, we injected local anesthetic into the skin and subcutaneous tissue of the lower neck. This was done there approximately 2 fingerbreadths above the sternal notch. A 4.5-cm incision was made, and we dissected down through the subcutaneous tissue and platysma using electrocautery. Subplatysmal flaps were raised both cranial and caudally, and a Gelpi retractor was then placed. We then divided the midline raphe between the strap muscles. We did not inspect the right side. The left-sided strap muscles were dissected away from the anterior surface of the thyroid using careful dissection using right angle and electrocautery. As we were mobilizing the thyroid, we encountered the middle thyroid vein on the left side. This was dissected free circumferentially and divided between 3-0 silk ties. This allowed better mobilization of the thyroid. At this point, we could identify fullness in the area of the left lower parathyroid gland. There was some fatty tissue around this. We dissected this free circumferentially. The recurrent laryngeal nerve was identified and was distant from this area.   Once the left lower parathyroid gland was dissected free circumferentially except for the vascular pedicle at the base, this was divided between 3-0 silk ties. The specimen was inspected. This was clearly an enlarged parathyroid gland consistent with an adenoma. I sent this off for frozen section to confirm which later did show parathyroid adenoma. The patient had intraoperative PTH levels drawn at 10 and 15 minutes postoperatively and these demonstrated an appropriate drop in the PTH levels from her preoperative levels. We inspected and had good hemostasis. A small piece of Gelfoam was placed over the resection site and again the recurrent laryngeal nerve was identified and this appeared uninjured and preserved throughout its course. The midline raphe of the strap muscles was then closed using a 3-0 Vicryl suture in interrupted fashion. We then closed the platysma muscle in a running fashion using a 3-0 Vicryl suture. A 4-0 Monocryl subcuticular stitch was used to close the skin. The skin incision was covered with Exofin skin adhesive. The patient was then awakened, extubated and taken to the postanesthesia care unit in stable condition. All needle and instrument counts were correct at the completion of the case. I was present and scrubbed throughout the entirety of the case. There were no immediate complications. DISPOSITION:  To PACU.       MD SANDIP [email or web address]  D:  08/06/2019 13:30  T:  08/06/2019 13:35  JOB #:  [id number]

## 2019-08-06 NOTE — H&P
Date of Surgery Update:  Amanuel Wilhelm was seen and examined. History and physical has been reviewed. The patient has been examined. There have been no significant clinical changes since the completion of the originally dated History and Physical.  Patient with primary hyperparathyroidism. This appeared to localize to the left on U/S but was not localized well on sestamibi or CT of the neck. Will plan for left neck exploration with parathyroidectomy and intraoperative PTH monitoring, possible bilateral neck exploration. A complete discussion of the risks, benefits and alternatives to surgery were discussed with the patient who was keen to proceed. Signed By: Jorge Almanza MD     August 6, 2019 10:41 AM         Please note from the office and include the additional information below:    Past Medical History  Past Medical History:   Diagnosis Date    Adverse effect of anesthesia     slow to wake up at dentist office    Anxiety     Arthritis     Chronic pain     3 herniated discs in lower back    Depression     Diabetes ([address]     type 2    GERD (gastroesophageal reflux disease)     High cholesterol     Hypertension     MRSA infection 2008    left leg [facility]    Other ill-defined conditions(799.89)     herniated disc to back    PUD (peptic ulcer disease)     Seasonal allergies     Sleep apnea     kerwin        Past Surgical History  Past Surgical History:   Procedure Laterality Date    COLONOSCOPY N/A 5/31/2017    COLONOSCOPY performed by Brendan Rahman MD at [facility] ENDOSCOPY    HX GYN      tubal ligation, hysterectomy    HX HYSTERECTOMY      HX ORTHOPAEDIC      left hand ligament removed    HX OTHER SURGICAL      corticosteroid injections - back    HX TONSILLECTOMY          Social History  The patient Amanuel Wilhelm  reports that she quit smoking about 6 years ago. Her smoking use included cigarettes. She has a 30.00 pack-year smoking history.  She has never used smokeless tobacco. She reports that she drinks about 10.0 standard drinks of alcohol per week. She reports that she does not use drugs.      Family History  Family History   Problem Relation Age of Onset    Hypertension Mother     Diabetes Father     Hypertension Sister     No Known Problems Brother     Lung Disease Sister         copd    Anesth Problems Neg Hx           Nasim Schroeder MD

## 2019-08-06 NOTE — ROUTINE PROCESS
Patient: Victor Hugo Maloney MRN: [id number]  SSN: [id number]   YOB: 1960  Age: 66 y.o. Sex: female     Patient is status post Procedure(s):  LEFT PARATHYROIDECTOMY INTRAOPERATIVE PTH MONITORING RECURRENT LARYNGEAL NERVE MONITORING. Surgeon(s) and Role:     * Palomo Ryan MD - Primary    Local/Dose/Irrigation:                    Peripheral IV 08/06/19 Left;Posterior Hand (Active)   Site Assessment Clean, dry, & intact 8/6/2019  9:05 AM   Phlebitis Assessment 0 8/6/2019  9:05 AM   Dressing Status Clean, dry, & intact 8/6/2019  9:05 AM   Hub Color/Line Status Pink; Infusing 8/6/2019  9:05 AM       Peripheral IV 08/06/19 Left Antecubital (Active)   Site Assessment Clean, dry, & intact 8/6/2019  9:07 AM   Phlebitis Assessment 0 8/6/2019  9:07 AM   Dressing Status Clean, dry, & intact 8/6/2019  9:07 AM   Hub Color/Line Status Green;Capped 8/6/2019  9:07 AM            Airway - Endotracheal Tube 08/06/19 Oral (Active)                   Dressing/Packing:       Splint/Cast:  ]    Other:

## 2019-08-06 NOTE — ANESTHESIA PREPROCEDURE EVALUATION
Relevant Problems   No relevant active problems       Anesthetic History   No history of anesthetic complications            Review of Systems / Medical History  Patient summary reviewed, nursing notes reviewed and pertinent labs reviewed    Pulmonary          Smoker  Asthma : well controlled       Neuro/Psych   Within defined limits           Cardiovascular    Hypertension                   GI/Hepatic/Renal     GERD: well controlled           Endo/Other    Diabetes: well controlled, type 2    Obesity  Pertinent negatives: No morbid obesity   Other Findings              Physical Exam    Airway  Mallampati: II  TM Distance: > 6 cm  Neck ROM: normal range of motion   Mouth opening: Normal     Cardiovascular  Regular rate and rhythm,  S1 and S2 normal,  no murmur, click, rub, or gallop             Dental  No notable dental hx       Pulmonary  Breath sounds clear to auscultation               Abdominal  GI exam deferred       Other Findings            Anesthetic Plan    ASA: 3  Anesthesia type: general          Induction: Intravenous  Anesthetic plan and risks discussed with: Patient

## 2019-08-07 ENCOUNTER — TELEPHONE (OUTPATIENT)
Dept: SURGERY | Age: 59
End: 2019-08-07

## 2019-08-07 NOTE — TELEPHONE ENCOUNTER
MACKENZIE verified. Returned patients call. Patient has several questions regarding her surgery. I explained to her I would ask Dr. Jessica Gearrdo to give her a call. Patient in agreement.

## 2019-08-07 NOTE — ANESTHESIA POSTPROCEDURE EVALUATION
Procedure(s):  LEFT LOWER PARATHYROIDECTOMY INTRAOPERATIVE PTH MONITORING. general    <BSHSIANPOST>    Vitals Value Taken Time   /88 8/6/2019  2:30 PM   Temp 36.5 °C (97.7 °F) 8/6/2019  2:05 PM   Pulse 77 8/6/2019  2:38 PM   Resp 13 8/6/2019  2:38 PM   SpO2 99 % 8/6/2019  2:38 PM   Vitals shown include unvalidated device data.

## 2019-08-20 ENCOUNTER — OFFICE VISIT (OUTPATIENT)
Dept: SURGERY | Age: 59
End: 2019-08-20

## 2019-08-20 VITALS
RESPIRATION RATE: 18 BRPM | DIASTOLIC BLOOD PRESSURE: 80 MMHG | OXYGEN SATURATION: 98 % | SYSTOLIC BLOOD PRESSURE: 130 MMHG | HEART RATE: 83 BPM | BODY MASS INDEX: 35.09 KG/M2 | WEIGHT: 231.5 LBS | HEIGHT: 68 IN | TEMPERATURE: 98.2 F

## 2019-08-20 DIAGNOSIS — E21.0 PRIMARY HYPERPARATHYROIDISM (HCC): Primary | ICD-10-CM

## 2019-08-20 NOTE — PROGRESS NOTES
1. Have you been to the ER, urgent care clinic since your last visit? Hospitalized since your last visit? no    2. Have you seen or consulted any other health care providers outside of the [address] [place] [address] since your last visit? Include any pap smears or colon screening.   no

## 2019-08-20 NOTE — PROGRESS NOTES
"SCRIBE #1 NOTE: I, Chelsy Barillas, am scribing for, and in the presence of,ARTURO Franco  . I have scribed the entire note.      History      Chief Complaint   Patient presents with    Nasal Congestion     with rash on nose since tuesday       Review of patient's allergies indicates:  No Known Allergies     HPI   HPI    5/18/2017, 7:52 PM   History obtained from the patient and mother       History of Present Illness: Marietta Stearns is a 8 y.o. female patient who presents to the Emergency Department for "rash" on nose which onset gradually 2 days ago.  Sxs are constant and moderate in severity. There are no mitigating or exacerbating factors noted. Associated sxs include runny nose and congestion. Mother denies any fever, chills, neck pain/stiffness, visual disturbance/photophobia, extremity weakness/numbness, dizziness and all other sxs at this time. No further complaints or concerns at this time.           Arrival mode: Personal vehicle     PCP: Unknown      Past Medical History:  No pertinent  past medical history on file.    Past Surgical History:  No pertinent past surgical history on file.      Family History:  No pertinent family history on file.    Social History:  Social History     Social History Main Topics    Smoking status: No    Smokeless tobacco: No    Alcohol use No    Drug use: No    Sexual activity: No       ROS   Review of Systems   Constitutional: Negative for chills and fever.   HENT: Positive for congestion and rhinorrhea. Negative for sore throat.    Eyes: Negative for photophobia and visual disturbance.   Respiratory: Negative for shortness of breath.    Cardiovascular: Negative for chest pain.   Gastrointestinal: Negative for nausea.   Genitourinary: Negative for dysuria.   Musculoskeletal: Negative for back pain, neck pain and neck stiffness.   Skin: Negative for rash.   Neurological: Negative for dizziness and weakness.        -numbness     Hematological: Does not bruise/bleed " [facility]      Clinic Note - Follow up    Aidan Huang returns for scheduled follow up today. She is s/p left lower parathyroidectomy with intraoperative PTH monitoring on 8/6/19. She had an enlarged, hypercellular left lower parathyroid gland consistent with an adenoma on final pathology and her intraop PTH levels dropped appropriately after gland removal suggesting this as the only abnormal gland. She has done well since surgery. She has some mild post-operative swelling around her incision but no signs of infection, hematoma or seroma. She has a mild 'globus' sensation with swallowing but no difficulty breathing and no choking. No hoarseness. She is overall doing well. Objective     Visit Vitals  /80 (BP 1 Location: Left arm, BP Patient Position: Sitting)   Pulse 83   Temp 98.2 °F (36.8 °C) (Oral)   Resp 18   Ht 5' 8\" (1.727 m)   Wt 231 lb 8 oz (105 kg)   SpO2 98%   BMI 35.20 kg/m²         PE  GEN - Awake, alert, communicating appropriately. NAD  Neck - incision healing nicely. No signs of infection, hematoma or seroma. Mild post-operative induration remains. Assessment     Sherrell Chang is a 66 y. o.yr old female with primary hyperparathyroidism now s/p removal of left lower parathyroid adenoma. Plan     She is doing well. I will plan to see her back as needed. She is planning to have routine blood work done with her PCP in the future.       Detra Claude, MD  8/20/2019    CC: Alix Ivey MD easily.       Physical Exam    Initial Vitals   BP Pulse Resp Temp SpO2   -- 05/18/17 1911 05/18/17 1911 05/18/17 1911 05/18/17 1911    85 19 98.7 °F (37.1 °C) 98 %      Physical Exam  Nursing Notes and Vital Signs Reviewed.  Constitutional: Patient is in no apparent distress. Well-developed and well-nourished.  Head: Atraumatic. Normocephalic.  Eyes: PERRL. EOM intact. Conjunctivae are not pale. No scleral icterus.  ENT: Mucous membranes are moist. Oropharynx is clear and symmetric. Nasal congestion.  Neck: Supple. Full ROM. No lymphadenopathy.  Cardiovascular: Regular rate. Regular rhythm. No murmurs, rubs, or gallops.   Pulmonary/Chest: No respiratory distress. Clear to auscultation bilaterally. No wheezing, rales, or rhonchi.  Abdominal: Soft and non-distended.  There is no tenderness.  No rebound, guarding, or rigidity. Good bowel sounds.  Musculoskeletal: Moves all extremities. No obvious deformities.   Skin: Warm and dry. Skin irritation of L nare.   Neurological:  Alert, awake, and appropriate.  Normal speech.  No acute focal neurological deficits are appreciated.  Psychiatric: Normal affect. Good eye contact. Appropriate in content.    ED Course    Procedures  ED Vital Signs:  Vitals:    05/18/17 1911   Pulse: 85   Resp: 19   Temp: 98.7 °F (37.1 °C)   TempSrc: Oral   SpO2: 98%   Weight: 22.8 kg (50 lb 5 oz)              The Emergency Provider reviewed the vital signs and test results, which are outlined above.    ED Discussion   7:57 PM: Discussed with pt and mother all pertinent ED information and results. Discussed pt dx and plan of tx. Gave pt all f/u and return to the ED instructions. All questions and concerns were addressed at this time. Pt and mother expresses understanding of information and instructions, and is comfortable with plan to discharge. Pt is stable for discharge.      Follow-up Information     Follow up with [facility] In 2 days.    Specialty:  Pediatrics    Contact information:     [address]  [facility] [address]  [phone]    Additional information:    (off [facility]) 1st floor            Medical Decision Making              Scribe Attestation:   Scribe #1: I performed the above scribed service and the documentation accurately describes the services I performed. I attest to the accuracy of the note.    Attending:   Physician Attestation Statement for Scribe #1: I, ARTURO Franco  , personally performed the services described in this documentation, as scribed by Chelsy Barillas, in my presence, and it is both accurate and complete.          Clinical Impression       ICD-10-CM ICD-9-CM   1. Nasal congestion R09.81 478.19       Disposition:   Disposition: Discharged  Condition: Stable         ARTURO Puentes-JARED  05/19/17 0132

## 2019-08-20 NOTE — LETTER
8/20/19 Patient: Sayda Reich YOB: 1960 Date of Visit: 8/20/2019 Hector Valle MD 
[address] [facility] [address] [phone] VIA In Basket Dear Hector Valle MD, Thank you for referring Ms. Jelani Dominique to [facility] for evaluation. My notes for this consultation are attached. If you have questions, please do not hesitate to call me. I look forward to following your patient along with you.  
 
 
Sincerely, 
 
Fracisco Sharma MD

## 2019-08-28 ENCOUNTER — OFFICE VISIT (OUTPATIENT)
Dept: FAMILY MEDICINE CLINIC | Age: 59
End: 2019-08-28

## 2019-08-28 VITALS
TEMPERATURE: 97.2 F | BODY MASS INDEX: 34.92 KG/M2 | WEIGHT: 230.4 LBS | SYSTOLIC BLOOD PRESSURE: 138 MMHG | DIASTOLIC BLOOD PRESSURE: 88 MMHG | HEART RATE: 84 BPM | RESPIRATION RATE: 18 BRPM | OXYGEN SATURATION: 99 % | HEIGHT: 68 IN

## 2019-08-28 DIAGNOSIS — D35.1 PARATHYROID ADENOMA: ICD-10-CM

## 2019-08-28 DIAGNOSIS — E66.01 SEVERE OBESITY (HCC): ICD-10-CM

## 2019-08-28 DIAGNOSIS — E78.2 MIXED HYPERLIPIDEMIA: ICD-10-CM

## 2019-08-28 DIAGNOSIS — Z00.00 ENCOUNTER FOR MEDICARE ANNUAL WELLNESS EXAM: ICD-10-CM

## 2019-08-28 DIAGNOSIS — I10 ESSENTIAL HYPERTENSION WITH GOAL BLOOD PRESSURE LESS THAN 140/90: ICD-10-CM

## 2019-08-28 LAB — HBA1C MFR BLD HPLC: 8.6 %

## 2019-08-28 NOTE — PROGRESS NOTES
Chief Complaint   Patient presents with    Annual Wellness Visit    Diabetes    Hypertension    Cholesterol Problem       Reviewed Record in preparation for visit and have obtained necessary documentation. Identified pt with two pt identifiers (Name @ )    Health Maintenance Due   Topic    MEDICARE YEARLY EXAM     Influenza Age 5 to Adult     BREAST CANCER SCRN MAMMOGRAM          1. Have you been to the ER, urgent care clinic since your last visit? Hospitalized since your last visit? Went to [facility] x 3 weeks ago parathyroid surgery. 2. Have you seen or consulted any other health care providers outside of the [address] [place] [address] since your last visit? Include any pap smears or colon screening.  no

## 2019-08-28 NOTE — PROGRESS NOTES
This is the Subsequent Medicare Annual Wellness Exam, performed 12 months or more after the Initial AWV or the last Subsequent AWV    I have reviewed the patient's medical history in detail and updated the computerized patient record. History     Past Medical History:   Diagnosis Date    Adverse effect of anesthesia     slow to wake up at dentist office    Anxiety     Arthritis     Chronic pain     3 herniated discs in lower back    Depression     Diabetes ([address]     type 2    GERD (gastroesophageal reflux disease)     High cholesterol     Hypertension     MRSA infection 2008    left leg treated with antibiotics at [facility]    Other ill-defined conditions(799.89)     herniated disc to back    PUD (peptic ulcer disease)     Seasonal allergies     Sleep apnea     kerwin      Past Surgical History:   Procedure Laterality Date    COLONOSCOPY N/A 5/31/2017    COLONOSCOPY performed by James Juan MD at [facility]    HX GYN      tubal ligation, hysterectomy    HX HYSTERECTOMY      HX ORTHOPAEDIC      left hand ligament removed    HX OTHER SURGICAL      corticosteroid injections - back    HX OTHER SURGICAL  08/06/2019    Left lower parathyroidectomy with intraoperative PTH monitoring. -[facility]-Dr. Espino Show    HX TONSILLECTOMY       Current Outpatient Medications   Medication Sig Dispense Refill    ALPRAZolam (XANAX) 0.25 mg tablet Take 1 Tab by mouth nightly as needed for Anxiety. Max Daily Amount: 0.25 mg. 20 Tab 0    losartan-hydroCHLOROthiazide (HYZAAR) 50-12.5 mg per tablet Take 2 Tabs by mouth daily. (Patient taking differently: Take 1 Tab by mouth every morning.) 605 Tab 0    levocetirizine (XYZAL) 5 mg tablet TAKE 1 TABLET BY MOUTH ONCE DAILY (Patient taking differently: Take 5 mg by mouth every morning.  TAKE 1 TABLET BY MOUTH ONCE DAILY) 90 Tab 1    ibuprofen (MOTRIN) 800 mg tablet TAKE 1 TABLET BY MOUTH EVERY 8 HOURS AS NEEDED FOR PAIN 180 Tab 0    simvastatin (ZOCOR) 20 mg tablet TAKE 1 TABLET BY MOUTH AT NIGHT (Patient taking differently: Take 20 mg by mouth every morning. TAKE 1 TABLET BY MOUTH AT NIGHT) 90 Tab 1    metFORMIN ER (GLUCOPHAGE XR) 500 mg tablet TAKE TWO TABLETS BY MOUTH TWICE DAILY (Patient taking differently: Take 500 mg by mouth two (2) times a day.) 360 Tab 1    Nebulizer & Compressor machine Nebulizer machine x 1 Nebulizer Kit 1 Each 0    albuterol (PROVENTIL VENTOLIN) 2.5 mg /3 mL (0.083 %) nebulizer solution 3 mL by Nebulization route every four (4) hours as needed for Wheezing. 1 Package 5    fluticasone (FLONASE) 50 mcg/actuation nasal spray 2 Sprays by Both Nostrils route daily. (Patient taking differently: 2 Sprays by Both Nostrils route as needed.) 3 Bottle 1    albuterol (PROVENTIL HFA, VENTOLIN HFA, PROAIR HFA) 90 mcg/actuation inhaler Take 2 Puffs by inhalation every six (6) hours as needed for Wheezing. 1 Inhaler 3    glucose blood VI test strips (ASCENSIA AUTODISC VI, ONE TOUCH ULTRA TEST VI) strip Check glucose twice daily 100 Strip 11     Allergies   Allergen Reactions    Vicodin [Hydrocodone-Acetaminophen] Hives     Family History   Problem Relation Age of Onset    Hypertension Mother     Diabetes Father     Hypertension Sister     No Known Problems Brother     Lung Disease Sister         copd    Anesth Problems Neg Hx      Social History     Tobacco Use    Smoking status: Former Smoker     Packs/day: 1.00     Years: 30.00     Pack years: 30.00     Types: Cigarettes     Last attempt to quit: 10/30/2012     Years since quittin.8    Smokeless tobacco: Never Used   Substance Use Topics    Alcohol use:  Yes     Alcohol/week: 10.0 standard drinks     Types: 10 Glasses of wine per week     Comment: 1-2 drinks/night     Patient Active Problem List   Diagnosis Code    Lumbar herniated disc M51.26    Back pain M54.9    Anxiety F41.9    HTN (hypertension) I10    Depression F32.9    Vitamin D deficiency E55.9    Asthma J45.909    Obesity, Class I, BMI 30-34.9 E66.9    Hyperlipidemia with target LDL less than 100 E78.5    Controlled type 2 diabetes mellitus without complication, with long-term current use of insulin (HCC) E11.9, Z79.4    Primary hyperparathyroidism (HCC) E21.0    Type 2 diabetes with nephropathy ([facility] [address] E11.21    Severe obesity ([facility] E66.01       Depression Risk Factor Screening:     3 most recent PHQ Screens 8/28/2019   Little interest or pleasure in doing things Not at all   Feeling down, depressed, irritable, or hopeless Not at all   Total Score PHQ 2 0   Trouble falling or staying asleep, or sleeping too much -   Feeling tired or having little energy -   Poor appetite, weight loss, or overeating -   Feeling bad about yourself - or that you are a failure or have let yourself or your family down -   Trouble concentrating on things such as school, work, reading, or watching TV -   Moving or speaking so slowly that other people could have noticed; or the opposite being so fidgety that others notice -   Thoughts of being better off dead, or hurting yourself in some way -   PHQ 9 Score -   How difficult have these problems made it for you to do your work, take care of your home and get along with others -     Alcohol Risk Factor Screening: You do not drink alcohol or very rarely. Functional Ability and Level of Safety:   Hearing Loss  Hearing is good. Activities of Daily Living  The home contains: no safety equipment. Patient does total self care    Fall Risk  Fall Risk Assessment, last 12 mths 8/28/2019   Able to walk? Yes   Fall in past 12 months?  No       Abuse Screen  Patient is not abused    Cognitive Screening   Evaluation of Cognitive Function:  Has your family/caregiver stated any concerns about your memory: no      Patient Care Team   Patient Care Team:  Louis Green MD as PCP - General (Family Practice)  Usha Breaux MD (Cardiology)    Assessment/Plan   Education and counseling provided:  Are appropriate based on today's review and evaluation    Diagnoses and all orders for this visit:    1. Uncontrolled type 2 diabetes mellitus without complication, with long-term current use of insulin (HCC)  -     AMB POC HEMOGLOBIN A1C    2. Essential hypertension with goal blood pressure less than 140/90    3. Mixed hyperlipidemia    4. Parathyroid adenoma    5.  Severe obesity [facility])        Health Maintenance Due   Topic Date Due    MEDICARE YEARLY EXAM  06/26/2019

## 2019-08-28 NOTE — PROGRESS NOTES
History of Present Illness  Hilario Fonseca is a 66 y.o. female who presents to the office today for follow up of routine medical issues. Hyperparathyroidism: Pt underwent a left lower parathyroidectomy on 08/06/2019 with Dr. Trevor Cormier. The pathology report was c/w adenoma. She has been recovering well. DM2: A1c per POC today 8.6, increased from A1c of 5.9 on 05/31/2019. Pt continues with metformin 500mg. Pt notes that she was not compliant with her diet around the time of her surgery, but has now returned to this. Hyperlipidemia: Lipid panel on 11/19/2018 notable for total cholesterol 167, HDL 90, , and triglycerides 89. Pt continues with simvastatin 20mg. Depression: Pt reports that she saw Bartolome Black prior to her surgery and would like to see her again for counseling. Pt reports that she has been feeling more herself since the surgery and feels less depressed. Hypertension: BP at office today 158/83 and 138/88 on manual recheck. Pt continues with losartan-HCTZ 50-12.5mg, though she notes that she did not take her meds today until 1600hrs. She reports that her BP has been 120s/80s at home and other doctor's offices. Routine health maintenance: Pt reports that she will get her mammogram done at [facility] in 10/2019. /88   Pulse 84   Temp 97.2 °F (36.2 °C) (Oral)   Resp 18   Ht 5' 8\" (1.727 m)   Wt 230 lb 6.4 oz (104.5 kg)   SpO2 99%   BMI 35.03 kg/m²     Current Outpatient Medications   Medication Sig Dispense Refill    ALPRAZolam (XANAX) 0.25 mg tablet Take 1 Tab by mouth nightly as needed for Anxiety. Max Daily Amount: 0.25 mg. 20 Tab 0    losartan-hydroCHLOROthiazide (HYZAAR) 50-12.5 mg per tablet Take 2 Tabs by mouth daily. (Patient taking differently: Take 1 Tab by mouth every morning.) 605 Tab 0    levocetirizine (XYZAL) 5 mg tablet TAKE 1 TABLET BY MOUTH ONCE DAILY (Patient taking differently: Take 5 mg by mouth every morning.  TAKE 1 TABLET BY MOUTH ONCE DAILY) 90 Tab 1    ibuprofen (MOTRIN) 800 mg tablet TAKE 1 TABLET BY MOUTH EVERY 8 HOURS AS NEEDED FOR PAIN 180 Tab 0    simvastatin (ZOCOR) 20 mg tablet TAKE 1 TABLET BY MOUTH AT NIGHT (Patient taking differently: Take 20 mg by mouth every morning. TAKE 1 TABLET BY MOUTH AT NIGHT) 90 Tab 1    metFORMIN ER (GLUCOPHAGE XR) 500 mg tablet TAKE TWO TABLETS BY MOUTH TWICE DAILY (Patient taking differently: Take 500 mg by mouth two (2) times a day.) 360 Tab 1    Nebulizer & Compressor machine Nebulizer machine x 1 Nebulizer Kit 1 Each 0    albuterol (PROVENTIL VENTOLIN) 2.5 mg /3 mL (0.083 %) nebulizer solution 3 mL by Nebulization route every four (4) hours as needed for Wheezing. 1 Package 5    fluticasone (FLONASE) 50 mcg/actuation nasal spray 2 Sprays by Both Nostrils route daily. (Patient taking differently: 2 Sprays by Both Nostrils route as needed.) 3 Bottle 1    albuterol (PROVENTIL HFA, VENTOLIN HFA, PROAIR HFA) 90 mcg/actuation inhaler Take 2 Puffs by inhalation every six (6) hours as needed for Wheezing.  1 Inhaler 3    glucose blood VI test strips (ASCENSIA AUTODISC VI, ONE TOUCH ULTRA TEST VI) strip Check glucose twice daily 100 Strip 11     Allergies   Allergen Reactions    Vicodin [Hydrocodone-Acetaminophen] Hives     Past Medical History:   Diagnosis Date    Adverse effect of anesthesia     slow to wake up at dentist office    Anxiety     Arthritis     Chronic pain     3 herniated discs in lower back    Depression     Diabetes (HCC)     type 2    GERD (gastroesophageal reflux disease)     High cholesterol     Hypertension     MRSA infection 2008    left leg treated with antibiotics at [facility]    Other ill-defined conditions(799.89)     herniated disc to back    PUD (peptic ulcer disease)     Seasonal allergies     Sleep apnea     kerwin     Past Surgical History:   Procedure Laterality Date    COLONOSCOPY N/A 5/31/2017    COLONOSCOPY performed by James Juan MD at [facility]    HX GYN      tubal ligation, hysterectomy    HX HYSTERECTOMY      HX ORTHOPAEDIC      left hand ligament removed    HX OTHER SURGICAL      corticosteroid injections - back    HX OTHER SURGICAL  2019    Left lower parathyroidectomy with intraoperative PTH monitoring. -[facility]-Dr. Ananda Sharpe    HX TONSILLECTOMY       Family History   Problem Relation Age of Onset    Hypertension Mother     Diabetes Father     Hypertension Sister     No Known Problems Brother     Lung Disease Sister         copd    Anesth Problems Neg Hx      Social History     Tobacco Use    Smoking status: Former Smoker     Packs/day: 1.00     Years: 30.00     Pack years: 30.00     Types: Cigarettes     Last attempt to quit: 10/30/2012     Years since quittin.8    Smokeless tobacco: Never Used   Substance Use Topics    Alcohol use: Yes     Alcohol/week: 10.0 standard drinks     Types: 10 Glasses of wine per week     Comment: 1-2 drinks/night        Review of Systems   Constitutional: Negative for chills and fever. HENT: Negative for hearing loss and tinnitus. Eyes: Negative for blurred vision and double vision. Respiratory: Negative for shortness of breath. Cardiovascular: Negative for chest pain and palpitations. Gastrointestinal: Negative for nausea and vomiting. Genitourinary: Negative for dysuria and frequency. Musculoskeletal: Negative for back pain and falls. Skin: Negative for itching and rash. Neurological: Negative for dizziness and headaches. Psychiatric/Behavioral: Positive for depression (improved). The patient is not nervous/anxious. Physical Exam   Constitutional: She is oriented to person, place, and time and well-developed, well-nourished, and in no distress. HENT:   Head: Normocephalic and atraumatic.    Right Ear: External ear normal.   Left Ear: External ear normal.   Nose: Nose normal.   Mouth/Throat: Oropharynx is clear and moist.   Eyes: Conjunctivae and EOM are normal.   Neck: Normal range of motion. Neck supple. +anterior neck surgical incision well-healing   Cardiovascular: Normal rate, regular rhythm, normal heart sounds and intact distal pulses. Pulmonary/Chest: Effort normal and breath sounds normal.   Abdominal: Soft. Bowel sounds are normal.   Musculoskeletal: Normal range of motion. Neurological: She is alert and oriented to person, place, and time. Skin: Skin is warm and dry. Psychiatric: Mood, affect and judgment normal.   Nursing note and vitals reviewed. Diagnoses and all orders for this visit:    1. Uncontrolled type 2 diabetes mellitus without complication, with long-term current use of insulin (HCC)  -     AMB POC HEMOGLOBIN I7A  -     METABOLIC PANEL, COMPREHENSIVE; Future  -     HEMOGLOBIN A1C WITH EAG; Future    2. Essential hypertension with goal blood pressure less than 648/90  -     METABOLIC PANEL, COMPREHENSIVE; Future    3. Mixed hyperlipidemia  -     METABOLIC PANEL, COMPREHENSIVE; Future  -     LIPID PANEL; Future    4. Parathyroid adenoma    5. Severe obesity ([address]    1. Uncontrolled DM2  Continue current medications. Advised pt to avoid sugars, starches, and alcohols when possible. Recheck in 3 months. Recheck CMP. 2. Hypertension  BP seems to be well controlled. I recommended continuing current dose of losartan-HCTZ, eating a low sodium diet, and increasing exercise. Recheck CMP. 3. Hyperlipidemia   Lipid panel shows cholesterol seems to be well controlled. I recommended continuing current dose of simvastatin, eating a low fat diet, and increasing exercise. Recheck lipid panel and CMP. 4. Parathyroid adenoma  Continue to f/u with Dr. Caitie Smith as needed. 5. Obesity  I have reviewed/discussed the above normal BMI with the patient. I have recommended the following interventions: dietary management education, guidance, and counseling, encourage exercise and monitor weight .       Medication risks/benefits/costs/interactions/alternatives discussed with patient. Advised patient to call back or return to office if symptoms worsen/change/persist.  If patient cannot reach us or should anything more severe/urgent arise she should proceed directly to the nearest emergency department. Discussed expected course/resolution/complications of diagnosis in detail with patient. Patient given a written after visit summary which includes her diagnoses, current medications and vitals. Patient expressed understanding with the diagnosis and plan. Written by Orly Cunha, as dictated by Nicole Garay M.D.     5:05 PM - 5:20 PM     Total time spent with the patient was 15 minutes, greater than 50% of time spent counseling patient.

## 2019-09-05 RX ORDER — LEVOCETIRIZINE DIHYDROCHLORIDE 5 MG/1
TABLET, FILM COATED ORAL
Qty: 90 TAB | Refills: 1 | Status: SHIPPED | OUTPATIENT
Start: 2019-09-05 | End: 2020-03-30 | Stop reason: SDUPTHER

## 2019-10-03 RX ORDER — METFORMIN HYDROCHLORIDE 500 MG/1
TABLET, EXTENDED RELEASE ORAL
Qty: 360 TAB | Refills: 1 | Status: SHIPPED | OUTPATIENT
Start: 2019-10-03 | End: 2020-01-11 | Stop reason: SDUPTHER

## 2019-10-11 ENCOUNTER — HOSPITAL ENCOUNTER (OUTPATIENT)
Dept: MAMMOGRAPHY | Age: 59
Discharge: HOME | End: 2019-10-11
Attending: FAMILY MEDICINE
Payer: MEDICARE

## 2019-10-11 DIAGNOSIS — Z12.31 VISIT FOR SCREENING MAMMOGRAM: ICD-10-CM

## 2019-10-11 PROCEDURE — 77063 BREAST TOMOSYNTHESIS BI: CPT

## 2019-10-11 NOTE — PROGRESS NOTES
IMPRESSION:  BI-RADS 1: Negative. No mammographic evidence of malignancy.     RECOMMENDATIONS:  Next screening mammogram is recommended in one year.

## 2019-10-17 DIAGNOSIS — E78.2 MIXED HYPERLIPIDEMIA: ICD-10-CM

## 2019-10-17 RX ORDER — SIMVASTATIN 20 MG/1
TABLET, FILM COATED ORAL
Qty: 90 TAB | Refills: 1 | Status: SHIPPED | OUTPATIENT
Start: 2019-10-17 | End: 2020-04-25

## 2019-10-17 NOTE — TELEPHONE ENCOUNTER
LOV 8/28/2019  Last refill 4/2019    Lab Results   Component Value Date/Time    Cholesterol, total 167 11/19/2018 12:24 PM    HDL Cholesterol 90 11/19/2018 12:24 PM    LDL,Direct 148 (H) 04/25/2016 12:13 PM    LDL, calculated 59 11/19/2018 12:24 PM    VLDL, calculated 18 11/19/2018 12:24 PM    Triglyceride 89 11/19/2018 12:24 PM

## 2019-11-06 DIAGNOSIS — I10 ESSENTIAL HYPERTENSION WITH GOAL BLOOD PRESSURE LESS THAN 140/90: ICD-10-CM

## 2019-11-07 RX ORDER — LOSARTAN POTASSIUM AND HYDROCHLOROTHIAZIDE 12.5; 5 MG/1; MG/1
2 TABLET ORAL DAILY
Qty: 180 TAB | Refills: 1 | Status: SHIPPED | OUTPATIENT
Start: 2019-11-07 | End: 2019-11-11

## 2019-11-11 ENCOUNTER — OFFICE VISIT (OUTPATIENT)
Dept: FAMILY MEDICINE CLINIC | Age: 59
End: 2019-11-11

## 2019-11-11 ENCOUNTER — HOSPITAL ENCOUNTER (OUTPATIENT)
Dept: LAB | Age: 59
Discharge: HOME | End: 2019-11-11
Payer: MEDICARE

## 2019-11-11 VITALS
HEART RATE: 93 BPM | SYSTOLIC BLOOD PRESSURE: 130 MMHG | RESPIRATION RATE: 19 BRPM | HEIGHT: 68 IN | OXYGEN SATURATION: 96 % | TEMPERATURE: 98.5 F | BODY MASS INDEX: 35.19 KG/M2 | DIASTOLIC BLOOD PRESSURE: 80 MMHG | WEIGHT: 232.2 LBS

## 2019-11-11 DIAGNOSIS — I10 ESSENTIAL HYPERTENSION: ICD-10-CM

## 2019-11-11 DIAGNOSIS — E66.9 OBESITY, CLASS I, BMI 30-34.9: ICD-10-CM

## 2019-11-11 DIAGNOSIS — E78.5 HYPERLIPIDEMIA WITH TARGET LDL LESS THAN 100: ICD-10-CM

## 2019-11-11 LAB — HBA1C MFR BLD HPLC: 8.1 %

## 2019-11-11 PROCEDURE — 36415 COLL VENOUS BLD VENIPUNCTURE: CPT

## 2019-11-11 PROCEDURE — 80061 LIPID PANEL: CPT

## 2019-11-11 PROCEDURE — 82043 UR ALBUMIN QUANTITATIVE: CPT

## 2019-11-11 PROCEDURE — 85025 COMPLETE CBC W/AUTO DIFF WBC: CPT

## 2019-11-11 PROCEDURE — 80053 COMPREHEN METABOLIC PANEL: CPT

## 2019-11-11 RX ORDER — LOSARTAN POTASSIUM 100 MG/1
100 TABLET ORAL DAILY
Qty: 90 TAB | Refills: 1 | Status: SHIPPED | OUTPATIENT
Start: 2019-11-11 | End: 2020-05-27 | Stop reason: SDUPTHER

## 2019-11-11 RX ORDER — OXYCODONE AND ACETAMINOPHEN 5; 325 MG/1; MG/1
TABLET ORAL
COMMUNITY
Start: 2019-11-08 | End: 2020-06-29

## 2019-11-11 NOTE — PROGRESS NOTES
JAMIL Jennings 64 y.o. female  presents to the office today for follow up on chronic conditions. Blood pressure 130/80, pulse 93, temperature 98.5 °F (36.9 °C), temperature source Oral, resp. rate 19, height 5' 8\" (1.727 m), weight 232 lb 3.2 oz (105.3 kg), SpO2 96 %. Body mass index is 35.31 kg/m². Chief Complaint   Patient presents with    Diabetes     follow up         DM2: A1c per POC today 8.1, decreased from A1c of 8.6 on 8/28/19. Pt is currently taking Metformin  mg/BID. Pt used to do Trulicity injections, but it caused stomach cramps so she discontinued it. Hypertension: BP at office today 140/73 and 130/80 on manual recheck. Pt is currently on 2 tabs Hyzaar 50-12.5 mg/d; pt has not been taking her BP medication due to back-order. Pt requests for medication without diuretic component due to it causing urinary frequency and dizziness. Pt does not check her BP regularly at home, but her BP was around 104/86 at doctor office when she was still on the medication. Hyperlipidemia: Lipid panel on 11/19/18 notable for total cholesterol 167, HDL 90, LDL 59, and triglycerides 89. Pt continues with Simvastatin 20 mg/d. Obesity: I have reviewed/discussed the above normal BMI with the patient. Health Maintenance: Pt's pain specialist Dr. Mony Ferrer requests certain labs to be done and results be sent to his office. Current Outpatient Medications   Medication Sig Dispense Refill    oxyCODONE-acetaminophen (PERCOCET) 5-325 mg per tablet       losartan (COZAAR) 100 mg tablet Take 1 Tab by mouth daily. 90 Tab 1    semaglutide (OZEMPIC) 0.25 mg/0.2 mL (2 mg/1.5 mL) sub-q pen 0.25 mg by SubCUTAneous route every seven (7) days.  12 Box 0    simvastatin (ZOCOR) 20 mg tablet TAKE 1 TABLET BY MOUTH AT NIGHT 90 Tab 1    metFORMIN ER (GLUCOPHAGE XR) 500 mg tablet TAKE TWO TABLETS BY MOUTH TWICE DAILY 360 Tab 1    levocetirizine (XYZAL) 5 mg tablet TAKE 1 TABLET BY MOUTH ONCE DAILY 90 Tab 1    ALPRAZolam (XANAX) 0.25 mg tablet Take 1 Tab by mouth nightly as needed for Anxiety. Max Daily Amount: 0.25 mg. 20 Tab 0    ibuprofen (MOTRIN) 800 mg tablet TAKE 1 TABLET BY MOUTH EVERY 8 HOURS AS NEEDED FOR PAIN 180 Tab 0    glucose blood VI test strips (ASCENSIA AUTODISC VI, ONE TOUCH ULTRA TEST VI) strip Check glucose twice daily 100 Strip 11    Nebulizer & Compressor machine Nebulizer machine x 1 Nebulizer Kit 1 Each 0    albuterol (PROVENTIL VENTOLIN) 2.5 mg /3 mL (0.083 %) nebulizer solution 3 mL by Nebulization route every four (4) hours as needed for Wheezing. 1 Package 5    fluticasone (FLONASE) 50 mcg/actuation nasal spray 2 Sprays by Both Nostrils route daily. (Patient taking differently: 2 Sprays by Both Nostrils route as needed.) 3 Bottle 1    albuterol (PROVENTIL HFA, VENTOLIN HFA, PROAIR HFA) 90 mcg/actuation inhaler Take 2 Puffs by inhalation every six (6) hours as needed for Wheezing.  1 Inhaler 3     Allergies   Allergen Reactions    Vicodin [Hydrocodone-Acetaminophen] Hives     Past Medical History:   Diagnosis Date    Adverse effect of anesthesia     slow to wake up at dentist office    Anxiety     Arthritis     Chronic pain     3 herniated discs in lower back    Depression     Diabetes ([address]     type 2    GERD (gastroesophageal reflux disease)     High cholesterol     Hypertension     MRSA infection 2008    left leg [facility] at [facility]    Other ill-defined conditions(799.89)     herniated disc to back    PUD (peptic ulcer disease)     Seasonal allergies     Sleep apnea     kerwin     Past Surgical History:   Procedure Laterality Date    COLONOSCOPY N/A 5/31/2017    COLONOSCOPY performed by Mynor Chavez MD at [facility] ENDOSCOPY    HX GYN      tubal ligation, hysterectomy    HX HYSTERECTOMY      HX ORTHOPAEDIC      left hand ligament removed    HX OTHER SURGICAL      corticosteroid injections - back    HX OTHER SURGICAL  08/06/2019    Left lower parathyroidectomy with intraoperative PTH monitoring. -[facility]-Dr. Denise Sanchez    HX TONSILLECTOMY       Family History   Problem Relation Age of Onset    Hypertension Mother     Diabetes Father     Hypertension Sister     No Known Problems Brother     Lung Disease Sister         copd    Anesth Problems Neg Hx      Social History     Tobacco Use    Smoking status: Former Smoker     Packs/day: 1.00     Years: 30.00     Pack years: 30.00     Types: Cigarettes     Last attempt to quit: 10/30/2012     Years since quittin.0    Smokeless tobacco: Never Used   Substance Use Topics    Alcohol use: Yes     Alcohol/week: 10.0 standard drinks     Types: 10 Glasses of wine per week     Comment: 1-2 drinks/night        Review of Systems   Constitutional: Negative for chills and fever. HENT: Negative for hearing loss and tinnitus. Eyes: Negative for blurred vision and double vision. Respiratory: Negative for cough and shortness of breath. Cardiovascular: Negative for chest pain and palpitations. Gastrointestinal: Negative for nausea and vomiting. Genitourinary: Negative for dysuria and frequency. Musculoskeletal: Negative for back pain and falls. Skin: Negative for itching and rash. Neurological: Negative for dizziness, loss of consciousness and headaches. Psychiatric/Behavioral: Negative for depression. The patient is not nervous/anxious. Physical Exam   Constitutional: She is oriented to person, place, and time. She appears well-developed and well-nourished. HENT:   Head: Normocephalic and atraumatic. Right Ear: External ear normal.   Left Ear: External ear normal.   Nose: Nose normal.   Mouth/Throat: Oropharynx is clear and moist.   Eyes: Conjunctivae and EOM are normal.   Neck: Normal range of motion. Neck supple. Cardiovascular: Normal rate, regular rhythm, normal heart sounds and intact distal pulses. Pulmonary/Chest: Effort normal and breath sounds normal.   Abdominal: Soft.  Bowel sounds are normal.   Musculoskeletal: Normal range of motion. Neurological: She is alert and oriented to person, place, and time. Skin: Skin is warm and dry. Psychiatric: She has a normal mood and affect. Her behavior is normal. Judgment and thought content normal.   Nursing note and vitals reviewed. Diabetic foot exam:     Left Foot:   Visual Exam: normal    Pulse DP: 2+ (normal)   Filament test: normal sensation       Right Foot:   Visual Exam: normal    Pulse DP: 2+ (normal)   Filament test: normal sensation       ASSESSMENT and PLAN  Diagnoses and all orders for this visit:    1. Uncontrolled type 2 diabetes mellitus without complication, with long-term current use of insulin ([facility])  A1c today 8.1. Pt continues with Metformin  mg/BID. Discussed with pt that she will also benefit from starting Ozempic 0.25 mg injections to further control A1c. Pt will speak with PharmD after this encounter to discuss medication regimen. -     AMB POC HEMOGLOBIN N4G  -     METABOLIC PANEL, COMPREHENSIVE  -     MICROALBUMIN, UR, RAND W/ MICROALB/CREAT RATIO  -      DIABETES FOOT EXAM  -     semaglutide (OZEMPIC) 0.25 mg/0.2 mL (2 mg/1.5 mL) sub-q pen; 0.25 mg by SubCUTAneous route every seven (7) days. 2. Essential hypertension  BP is at goal today in office. Pt will now take Cozaar 100 mg/d. Pt will speak with PharmD after this encounter to discuss medication regimen.   -     losartan (COZAAR) 100 mg tablet; Take 1 Tab by mouth daily.  -     CBC WITH AUTOMATED DIFF    3. Hyperlipidemia with target LDL less than 100  Last LDL at goal. Presumed stable, will assess levels today. Pt continues with Simvastatin 20 mg/d.   -     LIPID PANEL    4. Obesity, Class I, BMI 30-34.9  I have reviewed/discussed the above normal BMI with the patient. I have recommended the following interventions: dietary management education, guidance, and counseling, encourage exercise and monitor weight .      Follow-up and Dispositions · Return in about 3 months (around 2/11/2020) for diabetes follow up. Medication risks/benefits/costs/interactions/alternatives discussed with patient. Advised patient to call back or return to office if symptoms worsen/change/persist.  If patient cannot reach us or should anything more severe/urgent arise he/she should proceed directly to the nearest emergency department. Discussed expected course/resolution/complications of diagnosis in detail with patient. Patient given a written after visit summary which includes her diagnoses, current medications and vitals. Patient expressed understanding with the diagnosis and plan. Written by liliam Anne, as dictated by J Carlos Lock M.D.    10:13 AM - 10:32 AM    Total time spent with the patient 20 minutes, greater than 50% of time spent counseling patient.

## 2019-11-11 NOTE — PROGRESS NOTES
Met with patient to discuss starting ozempic. Patient has previously been on Trulicity which was titrated up to 1.5 mg weekly. Initially she tolerated it well, but then started to experience nausea/gas/ abdominal pain a couple days after injecting which resolved on discontinuation. Patient states that she has not been watching her diet as well and on Sundays drinks beer along with  while watching foot ball. She also has been eating her larger meal later at night. Reviewed ozempic therapy with patient and told her that she may not initally see a lot of change in her sugar with this dose, but with the slow titration may allow her to tolerate it better than the Trulicity. Encouraged her to either decrease the amount of beer she is drinking or switch to a lower carb beer (I will provide patient with names) also to go back to eating her larger meal at lunch like she was doing previously. Advised her to let Dr Yosvany Quiles know is she isn't tolerating the ozempic prior to her next visit and reminded her that smaller meals more frequently helps with tolerability. Reminded patient about ozempic savings card. Patient verbalized understanding.     Eileen Baca, PharmD, [place]

## 2019-11-11 NOTE — PATIENT INSTRUCTIONS
Counting Carbohydrates: Care Instructions  Your Care Instructions    You don't have to eat special foods when you have diabetes. You just have to be careful to eat healthy foods. Carbohydrates (carbs) raise blood sugar higher and quicker than any other nutrient. Carbs are found in desserts, breads and cereals, and fruit. They're also in starchy vegetables. These include potatoes, corn, and grains such as rice and pasta. Carbs are also in milk and yogurt. The more carbs you eat at one time, the higher your blood sugar will rise. Spreading carbs all through the day helps keep your blood sugar levels within your target range. Counting carbs is one of the best ways to keep your blood sugar under control. If you use insulin, counting carbs helps you match the right amount of insulin to the number of grams of carbs in a meal. Then you can change your diet and insulin dose as needed. Testing your blood sugar several times a day can help you learn how carbs affect your blood sugar. A registered dietitian or certified diabetes educator can help you plan meals and snacks. Follow-up care is a key part of your treatment and safety. Be sure to make and go to all appointments, and call your doctor if you are having problems. It's also a good idea to know your test results and keep a list of the medicines you take. How can you care for yourself at home? Know your daily amount of carbohydrates  Your daily amount depends on several things, such as your weight, how active you are, which diabetes medicines you take, and what your goals are for your blood sugar levels. A registered dietitian or certified diabetes educator can help you plan how many carbs to include in each meal and snack. For most adults, a guideline for the daily amount of carbs is:  · 45 to 60 grams at each meal. That's about the same as 3 to 4 carbohydrate servings. · 15 to 20 grams at each snack. That's about the same as 1 carbohydrate serving.   Count carbs  Counting carbs lets you know how much rapid-acting insulin to take before you eat. If you use an insulin pump, you get a constant rate of insulin during the day. So the pump must be programmed at meals. This gives you extra insulin to cover the rise in blood sugar after meals. If you take insulin:  · Learn your own insulin-to-carb ratio. You and your diabetes health professional will figure out the ratio. You can do this by testing your blood sugar after meals. For example, you may need a certain amount of insulin for every 15 grams of carbs. · Add up the carb grams in a meal. Then you can figure out how many units of insulin to take based on your insulin-to-carb ratio. · Exercise lowers blood sugar. You can use less insulin than you would if you were not doing exercise. Keep in mind that timing matters. If you exercise within 1 hour after a meal, your body may need less insulin for that meal than it would if you exercised 3 hours after the meal. Test your blood sugar to find out how exercise affects your need for insulin. If you do or don't take insulin:  · Look at labels on packaged foods. This can tell you how many carbs are in a serving. You can also use guides from the American Diabetes Association. · Be aware of portions, or serving sizes. If a package has two servings and you eat the whole package, you need to double the number of grams of carbohydrate listed for one serving. · Protein, fat, and fiber do not raise blood sugar as much as carbs do. If you eat a lot of these nutrients in a meal, your blood sugar will rise more slowly than it would otherwise. Eat from all food groups  · Eat at least three meals a day. · Plan meals to include food from all the food groups. The food groups include grains, fruits, dairy, proteins, and vegetables. · Talk to your dietitian or diabetes educator about ways to add limited amounts of sweets into your meal plan. · If you drink alcohol, talk to your doctor. It may not be recommended when you are taking certain diabetes medicines. Where can you learn more? Go to http://pooja-shon.info/. Enter W785 in the search box to learn more about \"Counting Carbohydrates: Care Instructions. \"  Current as of: April 16, 2019  Content Version: 12.2  © 9001-6191 [employer], [employer]. Care instructions adapted under license by [employer] (which disclaims liability or warranty for this information). If you have questions about a medical condition or this instruction, always ask your healthcare professional. [address] any warranty or liability for your use of this information.

## 2019-11-11 NOTE — PROGRESS NOTES
Chief Complaint   Patient presents with    Diabetes     follow up        1. Have you been to the ER, urgent care clinic since your last visit? Hospitalized since your last visit? No    2. Have you seen or consulted any other health care providers outside of the [facility] since your last visit? Include any pap smears or colon screening.  No

## 2019-11-12 LAB
ALBUMIN SERPL-MCNC: 4.8 G/DL (ref 3.5–5.5)
ALBUMIN/CREAT UR: 31.9 MG/G CREAT (ref 0–30)
ALBUMIN/GLOB SERPL: 1.7 {RATIO} (ref 1.2–2.2)
ALP SERPL-CCNC: 97 IU/L (ref 39–117)
ALT SERPL-CCNC: 24 IU/L (ref 0–32)
AST SERPL-CCNC: 15 IU/L (ref 0–40)
BASOPHILS # BLD AUTO: 0 X10E3/UL (ref 0–0.2)
BASOPHILS NFR BLD AUTO: 1 %
BILIRUB SERPL-MCNC: 0.3 MG/DL (ref 0–1.2)
BUN SERPL-MCNC: 17 MG/DL (ref 6–24)
BUN/CREAT SERPL: 13 (ref 9–23)
CALCIUM SERPL-MCNC: 9.4 MG/DL (ref 8.7–10.2)
CHLORIDE SERPL-SCNC: 99 MMOL/L (ref 96–106)
CHOLEST SERPL-MCNC: 172 MG/DL (ref 100–199)
CO2 SERPL-SCNC: 18 MMOL/L (ref 20–29)
CREAT SERPL-MCNC: 1.36 MG/DL (ref 0.57–1)
CREAT UR-MCNC: 98.1 MG/DL
EOSINOPHIL # BLD AUTO: 0.1 X10E3/UL (ref 0–0.4)
EOSINOPHIL NFR BLD AUTO: 1 %
ERYTHROCYTE [DISTWIDTH] IN BLOOD BY AUTOMATED COUNT: 11.5 % (ref 12.3–15.4)
GLOBULIN SER CALC-MCNC: 2.8 G/DL (ref 1.5–4.5)
GLUCOSE SERPL-MCNC: 222 MG/DL (ref 65–99)
HCT VFR BLD AUTO: 36.3 % (ref 34–46.6)
HDLC SERPL-MCNC: 69 MG/DL
HGB BLD-MCNC: 11.7 G/DL (ref 11.1–15.9)
IMM GRANULOCYTES # BLD AUTO: 0 X10E3/UL (ref 0–0.1)
IMM GRANULOCYTES NFR BLD AUTO: 0 %
INTERPRETATION, 910389: NORMAL
INTERPRETATION: NORMAL
LDLC SERPL CALC-MCNC: 74 MG/DL (ref 0–99)
LYMPHOCYTES # BLD AUTO: 1.9 X10E3/UL (ref 0.7–3.1)
LYMPHOCYTES NFR BLD AUTO: 31 %
MCH RBC QN AUTO: 29.3 PG (ref 26.6–33)
MCHC RBC AUTO-ENTMCNC: 32.2 G/DL (ref 31.5–35.7)
MCV RBC AUTO: 91 FL (ref 79–97)
MICROALBUMIN UR-MCNC: 31.3 UG/ML
MONOCYTES # BLD AUTO: 0.6 X10E3/UL (ref 0.1–0.9)
MONOCYTES NFR BLD AUTO: 10 %
NEUTROPHILS # BLD AUTO: 3.6 X10E3/UL (ref 1.4–7)
NEUTROPHILS NFR BLD AUTO: 57 %
PDF IMAGE, 910387: NORMAL
PLATELET # BLD AUTO: 298 X10E3/UL (ref 150–450)
POTASSIUM SERPL-SCNC: 5 MMOL/L (ref 3.5–5.2)
PROT SERPL-MCNC: 7.6 G/DL (ref 6–8.5)
RBC # BLD AUTO: 3.99 X10E6/UL (ref 3.77–5.28)
SODIUM SERPL-SCNC: 135 MMOL/L (ref 134–144)
TRIGL SERPL-MCNC: 143 MG/DL (ref 0–149)
VLDLC SERPL CALC-MCNC: 29 MG/DL (ref 5–40)
WBC # BLD AUTO: 6.2 X10E3/UL (ref 3.4–10.8)

## 2019-11-20 NOTE — PROGRESS NOTES
Pt has to get her Diabetes under tighter control  The A1C needs to be less than 7%    Cholesterol at goal      Renal function slightly elevated  Pt needs to be well hydrated  Will recheck at next office visit

## 2019-11-21 ENCOUNTER — TELEPHONE (OUTPATIENT)
Dept: FAMILY MEDICINE CLINIC | Age: 59
End: 2019-11-21

## 2019-11-21 NOTE — TELEPHONE ENCOUNTER
Spoke to patient Identified pt with two pt identifiers (Name @ )  Notified patient of her results and understand

## 2019-11-21 NOTE — TELEPHONE ENCOUNTER
----- Message from Scooter Borges sent at 11/21/2019  4:13 PM EST -----  Regarding: Dr. Lorena Palacios  Contact: [phone]  Caller's first and last name and relationship (if not the patient): Sharla Seymour contact number(s):  [phone]  Whose call is being returned: Pt is returning provider's nurse's call, would like for the nurse to give her a call back.   Details to clarify the request: n/a

## 2019-11-21 NOTE — PROGRESS NOTES
Patient called back Spoke to patient Identified pt with two pt identifiers (Name @ )  Notified patient of her results and understand

## 2020-01-10 RX ORDER — METFORMIN HYDROCHLORIDE 500 MG/1
TABLET, EXTENDED RELEASE ORAL
Qty: 360 TAB | Refills: 1 | Status: CANCELLED | OUTPATIENT
Start: 2020-01-10

## 2020-01-10 RX ORDER — IBUPROFEN 800 MG/1
800 TABLET ORAL
Qty: 180 TAB | Refills: 0 | Status: CANCELLED | OUTPATIENT
Start: 2020-01-10

## 2020-01-10 NOTE — TELEPHONE ENCOUNTER
Patient last office visit and labs on 11-11-19  Motrin last filled 4-2-19 180 pills no refills  Metformin last filled 10/3/19  360 pills 1 refill

## 2020-01-13 NOTE — TELEPHONE ENCOUNTER
Last office visit  11/11/19  Last lab - 11/11/19  Lat refill  10/3/19 on Glucophage # 360 with 1 refill  Last refill on Motrin 4/2/19  180  With 0 refills

## 2020-01-14 RX ORDER — METFORMIN HYDROCHLORIDE 500 MG/1
TABLET, EXTENDED RELEASE ORAL
Qty: 360 TAB | Refills: 1 | Status: SHIPPED | OUTPATIENT
Start: 2020-01-14 | End: 2020-08-24

## 2020-01-14 RX ORDER — IBUPROFEN 800 MG/1
800 TABLET ORAL
Qty: 180 TAB | Refills: 0 | Status: SHIPPED | OUTPATIENT
Start: 2020-01-14 | End: 2020-08-24 | Stop reason: SDUPTHER

## 2020-01-14 RX ORDER — METFORMIN HYDROCHLORIDE 500 MG/1
TABLET, EXTENDED RELEASE ORAL
Qty: 360 TAB | Refills: 1 | Status: CANCELLED | OUTPATIENT
Start: 2020-01-14

## 2020-01-14 NOTE — TELEPHONE ENCOUNTER
----- Message from Stephanie Tran sent at 1/14/2020  8:11 AM EST -----  Regarding: LEV Stafford/ RX REQUEST  Contact: [phone]  Medication Refill    Best contact number(s): [phone]      Name of medication and dosage if known: METFORMIN      Is patient out of this medication (yes/no): YES      Pharmacy name: [address] [place] [address] 40, [address] [facility], [address] [place] [address]    Pharmacy listed in chart? (yes/no): YES  Pharmacy phone number: [phone], [phone]      Details to clarify the request: patient  has been requesting for her metformin since 1.10.20, she has not been taking it either. The patient is in need of her  medication. She would like a call as soon as possible because the pharmacy is stating to her they do not have it.      Stephanie Tran    Requested Prescriptions     Pending Prescriptions Disp Refills    metFORMIN ER (GLUCOPHAGE XR) 500 mg tablet 360 Tab 1     Sig: TAKE TWO TABLETS BY MOUTH TWICE DAILY

## 2020-02-07 ENCOUNTER — OFFICE VISIT (OUTPATIENT)
Dept: FAMILY MEDICINE CLINIC | Age: 60
End: 2020-02-07

## 2020-02-07 VITALS
DIASTOLIC BLOOD PRESSURE: 80 MMHG | HEART RATE: 71 BPM | BODY MASS INDEX: 34.86 KG/M2 | WEIGHT: 230 LBS | TEMPERATURE: 98.3 F | OXYGEN SATURATION: 97 % | HEIGHT: 68 IN | RESPIRATION RATE: 18 BRPM | SYSTOLIC BLOOD PRESSURE: 138 MMHG

## 2020-02-07 DIAGNOSIS — E78.5 HYPERLIPIDEMIA WITH TARGET LDL LESS THAN 100: ICD-10-CM

## 2020-02-07 DIAGNOSIS — I10 ESSENTIAL HYPERTENSION: ICD-10-CM

## 2020-02-07 DIAGNOSIS — E66.01 SEVERE OBESITY (HCC): ICD-10-CM

## 2020-02-07 DIAGNOSIS — E11.21 TYPE 2 DIABETES WITH NEPHROPATHY (HCC): ICD-10-CM

## 2020-02-07 DIAGNOSIS — Z79.4 CONTROLLED TYPE 2 DIABETES MELLITUS WITHOUT COMPLICATION, WITH LONG-TERM CURRENT USE OF INSULIN (HCC): Primary | ICD-10-CM

## 2020-02-07 DIAGNOSIS — E11.9 CONTROLLED TYPE 2 DIABETES MELLITUS WITHOUT COMPLICATION, WITH LONG-TERM CURRENT USE OF INSULIN (HCC): Primary | ICD-10-CM

## 2020-02-07 PROBLEM — E21.0 PRIMARY HYPERPARATHYROIDISM (HCC): Status: RESOLVED | Noted: 2019-04-15 | Resolved: 2020-02-07

## 2020-02-07 LAB — HBA1C MFR BLD HPLC: 5.9 %

## 2020-02-07 NOTE — PROGRESS NOTES
HPI  Jas Cee 64 y.o. female  presents to the office today for follow up on chronic conditions. Blood pressure 138/80, pulse 71, temperature 98.3 °F (36.8 °C), temperature source Oral, resp. rate 18, height 5' 8\" (1.727 m), weight 230 lb (104.3 kg), SpO2 97 %. Body mass index is 34.97 kg/m². Chief Complaint   Patient presents with    Diabetes     Patient is in the office today for a follow up. DM2: A1c per POC today 5.9, decreased from A1c of 8.1 on 11/11/19. Pt continues with Metformin ER 1000 mg/BID and weekly Ozempic injection. Pt states that the Ozempic is really helping her body \"naturally\"; weight went down from 232 lbs in Nov to 230 lbs today in office. Pt endorses that she does not experience any side effects such as stomach cramps with Ozempic as she did with Trulicity in the past. Pt also states that she has been doing smaller meals throughout the day so she is never full but also never hungry. Hypertension: BP at office today 162/92 and 138/80 on manual recheck. Pt continues with Cozaar 100 mg/d. Pt states that she just learned this AM that her childhood best friend's mother passed away, and she thinks that the news caused her BP to elevate. Hyperlipidemia: Lipid panel on 11/11/19 notable for total cholesterol 172, HDL 69, LDL 74, and triglycerides 143. Pt continues with Simvastatin 20 mg/d. Obesity: I have reviewed/discussed the above normal BMI with the patient. Health Maintenance: Pt declines flu vaccine. Current Outpatient Medications   Medication Sig Dispense Refill    ibuprofen (MOTRIN) 800 mg tablet Take 1 Tab by mouth every eight (8) hours as needed for Pain. 180 Tab 0    metFORMIN ER (GLUCOPHAGE XR) 500 mg tablet TAKE TWO TABLETS BY MOUTH TWICE DAILY 360 Tab 1    oxyCODONE-acetaminophen (PERCOCET) 5-325 mg per tablet       losartan (COZAAR) 100 mg tablet Take 1 Tab by mouth daily.  90 Tab 1    semaglutide (OZEMPIC) 0.25 mg/0.2 mL (2 mg/1.5 mL) sub-q pen 0.25 mg by SubCUTAneous route every seven (7) days. 12 Box 0    simvastatin (ZOCOR) 20 mg tablet TAKE 1 TABLET BY MOUTH AT NIGHT 90 Tab 1    levocetirizine (XYZAL) 5 mg tablet TAKE 1 TABLET BY MOUTH ONCE DAILY 90 Tab 1    ALPRAZolam (XANAX) 0.25 mg tablet Take 1 Tab by mouth nightly as needed for Anxiety. Max Daily Amount: 0.25 mg. 20 Tab 0    glucose blood VI test strips (ASCENSIA AUTODISC VI, ONE TOUCH ULTRA TEST VI) strip Check glucose twice daily 100 Strip 11    Nebulizer & Compressor machine Nebulizer machine x 1 Nebulizer Kit 1 Each 0    albuterol (PROVENTIL VENTOLIN) 2.5 mg /3 mL (0.083 %) nebulizer solution 3 mL by Nebulization route every four (4) hours as needed for Wheezing. 1 Package 5    fluticasone (FLONASE) 50 mcg/actuation nasal spray 2 Sprays by Both Nostrils route daily. (Patient taking differently: 2 Sprays by Both Nostrils route as needed.) 3 Bottle 1    albuterol (PROVENTIL HFA, VENTOLIN HFA, PROAIR HFA) 90 mcg/actuation inhaler Take 2 Puffs by inhalation every six (6) hours as needed for Wheezing.  1 Inhaler 3     Allergies   Allergen Reactions    Vicodin [Hydrocodone-Acetaminophen] Hives     Past Medical History:   Diagnosis Date    Adverse effect of anesthesia     slow to wake up at dentist office    Anxiety     Arthritis     Chronic pain     3 herniated discs in lower back    Depression     Diabetes ([address]     type 2    GERD (gastroesophageal reflux disease)     High cholesterol     Hypertension     MRSA infection 2008    left leg [facility]    Other ill-defined conditions(799.89)     herniated disc to back    PUD (peptic ulcer disease)     Seasonal allergies     Sleep apnea     kerwin     Past Surgical History:   Procedure Laterality Date    COLONOSCOPY N/A 5/31/2017    COLONOS[facility] performed by Schuyler [facility]gs [facility] at [facility] ENDOSCOPY    HX GYN      tubal ligation, hysterectomy    HX HYSTERECTOMY      HX ORTHOPAEDIC      left hand ligament removed    HX OTHER SURGICAL      corticosteroid injections - back    HX OTHER SURGICAL  2019    Left lower parathyroidectomy with intraoperative PTH monitoring. -[facility]-Dr. Maritza Ponce    HX TONSILLECTOMY       Family History   Problem Relation Age of Onset    Hypertension Mother     Diabetes Father     Hypertension Sister     No Known Problems Brother     Lung Disease Sister         copd    Anesth Problems Neg Hx      Social History     Tobacco Use    Smoking status: Former Smoker     Packs/day: 1.00     Years: 30.00     Pack years: 30.00     Types: Cigarettes     Last attempt to quit: 10/30/2012     Years since quittin.2    Smokeless tobacco: Never Used   Substance Use Topics    Alcohol use: Yes     Alcohol/week: 10.0 standard drinks     Types: 10 Glasses of wine per week     Comment: 1-2 drinks/night        Review of Systems   Constitutional: Negative for chills and fever. HENT: Negative for hearing loss and tinnitus. Eyes: Negative for blurred vision and double vision. Respiratory: Negative for cough and shortness of breath. Cardiovascular: Negative for chest pain and palpitations. Gastrointestinal: Negative for nausea and vomiting. Genitourinary: Negative for dysuria and frequency. Musculoskeletal: Negative for back pain and falls. Skin: Negative for itching and rash. Neurological: Negative for dizziness, loss of consciousness and headaches. Psychiatric/Behavioral: Negative for depression. The patient is not nervous/anxious. Physical Exam  Vitals signs and nursing note reviewed. Constitutional:       Appearance: Normal appearance. She is well-developed. HENT:      Head: Normocephalic and atraumatic. Right Ear: External ear normal.      Left Ear: External ear normal.      Nose: Nose normal.   Eyes:      Conjunctiva/sclera: Conjunctivae normal.      Pupils: Pupils are equal, round, and reactive to light.    Neck:      Musculoskeletal: Normal range of motion and neck supple. Cardiovascular:      Rate and Rhythm: Normal rate and regular rhythm. Pulses: Normal pulses. Heart sounds: Normal heart sounds. Pulmonary:      Effort: Pulmonary effort is normal.      Breath sounds: Normal breath sounds. Abdominal:      General: Bowel sounds are normal.      Palpations: Abdomen is soft. Musculoskeletal: Normal range of motion. Skin:     General: Skin is warm and dry. Neurological:      Mental Status: She is alert and oriented to person, place, and time. Psychiatric:         Speech: Speech normal.         Behavior: Behavior normal.         Thought Content: Thought content normal.         Judgment: Judgment normal.           ASSESSMENT and PLAN  Diagnoses and all orders for this visit:    1. Controlled type 2 diabetes mellitus without complication, with long-term current use of insulin (HCC)  A1c today 5.9. Pt continues with Metformin ER 1000 mg/BID and weekly Ozempic injection. Advised pt to continue dietary change. -     AMB POC HEMOGLOBIN A1C    2. Hyperlipidemia with target LDL less than 100  Pt continues with Simvastatin 20 mg/d. 3. Type 2 diabetes with nephropathy (HCC)  A1c today 5.9. Pt continues with Metformin ER 1000 mg/BID and weekly Ozempic injection. Advised pt to continue dietary change. 4. Severe obesity ([address]  I have reviewed/discussed the above normal BMI with the patient. I have recommended the following interventions: dietary management education, guidance, and counseling, encourage exercise and monitor weight . 5. Essential hypertension  BP is at goal today in office on manual recheck. Advised pt to continue with Cozaar 100 mg/d. Follow-up and Dispositions    · Return in about 3 months (around 5/7/2020) for hypertension, diabetes, cholesterol follow up. Medication risks/benefits/costs/interactions/alternatives discussed with patient.   Advised patient to call back or return to office if symptoms worsen/change/persist. If patient cannot reach us or should anything more severe/urgent arise he/she should proceed directly to the nearest emergency department. Discussed expected course/resolution/complications of diagnosis in detail with patient. Patient given a written after visit summary which includes diagnoses, current medications and vitals. Patient expressed understanding with the diagnosis and plan. Written by liliam Chappell, as dictated by Tammy Albert M.D.    10:25 AM - 10:34 AM    Total time spent with the patient 9 minutes, greater than 50% of time spent counseling patient.

## 2020-02-07 NOTE — PROGRESS NOTES
Chief Complaint   Patient presents with    Diabetes     Patient is in the office today for a follow up. 1. Have you been to the ER, urgent care clinic since your last visit? Hospitalized since your last visit? No    2. Have you seen or consulted any other health care providers outside of the [address] [place] [address] since your last visit? Include any pap smears or colon screening.  No

## 2020-02-07 NOTE — PATIENT INSTRUCTIONS
Counting Carbohydrates: Care Instructions  Your Care Instructions    You don't have to eat special foods when you have diabetes. You just have to be careful to eat healthy foods. Carbohydrates (carbs) raise blood sugar higher and quicker than any other nutrient. Carbs are found in desserts, breads and cereals, and fruit. They're also in starchy vegetables. These include potatoes, corn, and grains such as rice and pasta. Carbs are also in milk and yogurt. The more carbs you eat at one time, the higher your blood sugar will rise. Spreading carbs all through the day helps keep your blood sugar levels within your target range. Counting carbs is one of the best ways to keep your blood sugar under control. If you use insulin, counting carbs helps you match the right amount of insulin to the number of grams of carbs in a meal. Then you can change your diet and insulin dose as needed. Testing your blood sugar several times a day can help you learn how carbs affect your blood sugar. A registered dietitian or certified diabetes educator can help you plan meals and snacks. Follow-up care is a key part of your treatment and safety. Be sure to make and go to all appointments, and call your doctor if you are having problems. It's also a good idea to know your test results and keep a list of the medicines you take. How can you care for yourself at home? Know your daily amount of carbohydrates  Your daily amount depends on several things, such as your weight, how active you are, which diabetes medicines you take, and what your goals are for your blood sugar levels. A registered dietitian or certified diabetes educator can help you plan how many carbs to include in each meal and snack. For most adults, a guideline for the daily amount of carbs is:  · 45 to 60 grams at each meal. That's about the same as 3 to 4 carbohydrate servings. · 15 to 20 grams at each snack. That's about the same as 1 carbohydrate serving.   Count carbs  Counting carbs lets you know how much rapid-acting insulin to take before you eat. If you use an insulin pump, you get a constant rate of insulin during the day. So the pump must be programmed at meals. This gives you extra insulin to cover the rise in blood sugar after meals. If you take insulin:  · Learn your own insulin-to-carb ratio. You and your diabetes health professional will figure out the ratio. You can do this by testing your blood sugar after meals. For example, you may need a certain amount of insulin for every 15 grams of carbs. · Add up the carb grams in a meal. Then you can figure out how many units of insulin to take based on your insulin-to-carb ratio. · Exercise lowers blood sugar. You can use less insulin than you would if you were not doing exercise. Keep in mind that timing matters. If you exercise within 1 hour after a meal, your body may need less insulin for that meal than it would if you exercised 3 hours after the meal. Test your blood sugar to find out how exercise affects your need for insulin. If you do or don't take insulin:  · Look at labels on packaged foods. This can tell you how many carbs are in a serving. You can also use guides from the American Diabetes Association. · Be aware of portions, or serving sizes. If a package has two servings and you eat the whole package, you need to double the number of grams of carbohydrate listed for one serving. · Protein, fat, and fiber do not raise blood sugar as much as carbs do. If you eat a lot of these nutrients in a meal, your blood sugar will rise more slowly than it would otherwise. Eat from all food groups  · Eat at least three meals a day. · Plan meals to include food from all the food groups. The food groups include grains, fruits, dairy, proteins, and vegetables. · Talk to your dietitian or diabetes educator about ways to add limited amounts of sweets into your meal plan. · If you drink alcohol, talk to your doctor. It may not be recommended when you are taking certain diabetes medicines. Where can you learn more? Go to http://pooja-shon.info/. Enter L968 in the search box to learn more about \"Counting Carbohydrates: Care Instructions. \"  Current as of: April 16, 2019  Content Version: 12.2  © 0178-6475 [employer]. Care instructions adapted under license by [employer] (which disclaims liability or warranty for this information). If you have questions about a medical condition or this instruction, always ask your healthcare professional. [address] any warranty or liability for your use of this information.

## 2020-02-24 ENCOUNTER — TELEPHONE (OUTPATIENT)
Dept: FAMILY MEDICINE CLINIC | Age: 60
End: 2020-02-24

## 2020-02-24 NOTE — TELEPHONE ENCOUNTER
Patient is calling requesting a call back from [employer]. Pt stated that she is taking metFORMIN ER (GLUCOPHAGE XR) 500 mg tablet, semaglutide (OZEMPIC) 0.25 mg/0.2 mL (2 mg/1.5 mL) sub-q pen. Pt stated that her blood sugar has been down. Pt has a few questions that she would like to ask. No additional information was given.          Best callback:[phone]  LOV: Friday, February 07, 2020

## 2020-02-24 NOTE — TELEPHONE ENCOUNTER
Placed outgoing to patient to discuss medication question (2 identifiers used). Patient states that yesterday morning she felt nauseated and stomach started elisa \"like she was going to have a baby\" and ended up vomiting while at [employer]. That morning she had eaten 1/2 a grilled cheese sandwich and some coffee along with all of her medications. Last ozempic dose was the previous Tuesday. She denies any sick contacts and believes it was the metformin that made her vomit. She has only taken 1 tablet of metformin this morning and she has not felt sick since. Patient reports this episode was exactly like the one she had with the Trulicity, but she doesn't believe it is the ozempic as she has taken it for several months without any problems. Patient states that she would like to cut her metformin dose back to 1 tablet twice daily to see if that controls her blood sugars. Patient is not checking blood sugars. Discussed with patient that it could be either the metformin or ozempic or the combination of the meds. Told her I would let Dr. Crane Officer know and get back with her.

## 2020-03-02 ENCOUNTER — TELEPHONE (OUTPATIENT)
Dept: FAMILY MEDICINE CLINIC | Age: 60
End: 2020-03-02

## 2020-03-02 NOTE — TELEPHONE ENCOUNTER
Placed an outgoing call to patient to follow-up on DM medications since her metformin dose was reduced last week. Current DM regimen:  Metformin  mg twice daily and Ozempic 0.25 gm weekly (Tuesdays). Patient denies any more n/v episodes since her metformin dose was reduced. She also states overall she has less abdominal tightness and feels less gassy. Advised patient that I would let Dr. Kerry Gay know but that if she has a recurrence of those episodes to call the office. Patient verbalized understanding.     Roney Santos, PharmD, [facility]

## 2020-03-04 ENCOUNTER — TELEPHONE (OUTPATIENT)
Dept: FAMILY MEDICINE CLINIC | Age: 60
End: 2020-03-04

## 2020-03-04 NOTE — TELEPHONE ENCOUNTER
Pt is calling stating thats she is still throwing up, have cramps in her stomach. She just want Dr Natalya Ann know, if he wants to stop some medications.   BCB# [phone]  LOV : February 07, 2020 10:00 AMv

## 2020-03-31 DIAGNOSIS — J30.1 ALLERGIC RHINITIS DUE TO POLLEN, UNSPECIFIED SEASONALITY: ICD-10-CM

## 2020-03-31 RX ORDER — LEVOCETIRIZINE DIHYDROCHLORIDE 5 MG/1
TABLET, FILM COATED ORAL
Qty: 90 TAB | Refills: 5 | Status: CANCELLED | OUTPATIENT
Start: 2020-03-31

## 2020-03-31 RX ORDER — FLUTICASONE PROPIONATE 50 MCG
2 SPRAY, SUSPENSION (ML) NASAL DAILY
Qty: 3 BOTTLE | Refills: 1 | Status: CANCELLED | OUTPATIENT
Start: 2020-03-31

## 2020-04-25 DIAGNOSIS — E78.2 MIXED HYPERLIPIDEMIA: ICD-10-CM

## 2020-04-25 RX ORDER — SIMVASTATIN 20 MG/1
TABLET, FILM COATED ORAL
Qty: 90 TAB | Refills: 1 | Status: SHIPPED | OUTPATIENT
Start: 2020-04-25 | End: 2020-11-16 | Stop reason: SDUPTHER

## 2020-04-29 DIAGNOSIS — E78.2 MIXED HYPERLIPIDEMIA: ICD-10-CM

## 2020-04-29 RX ORDER — SIMVASTATIN 20 MG/1
TABLET, FILM COATED ORAL
Qty: 90 TAB | Refills: 1 | Status: CANCELLED | OUTPATIENT
Start: 2020-04-29

## 2020-05-06 ENCOUNTER — TELEPHONE (OUTPATIENT)
Dept: FAMILY MEDICINE CLINIC | Age: 60
End: 2020-05-06

## 2020-05-06 RX ORDER — GABAPENTIN 100 MG/1
CAPSULE ORAL DAILY
COMMUNITY
End: 2020-08-24

## 2020-05-06 RX ORDER — FAMOTIDINE 20 MG/1
20 TABLET, FILM COATED ORAL DAILY
COMMUNITY
End: 2021-01-22 | Stop reason: SDUPTHER

## 2020-05-06 NOTE — TELEPHONE ENCOUNTER
[phone] attempted to call patient no answer left message to call me back in regards to her medication    NOTE: Needs to make sure if she's still taking OZempic

## 2020-05-06 NOTE — TELEPHONE ENCOUNTER
----- Message from Jami Junior sent at 5/6/2020 10:42 AM EDT -----  Regarding: Dr. Kathryn Cai Call  [facility] (if not patient): pt    Best number to [phone]    Whose call is being returned unknown    Details: n/a

## 2020-05-06 NOTE — TELEPHONE ENCOUNTER
Patient called back she is still taking ozempic and Metformin and not problems with any side effects

## 2020-05-06 NOTE — TELEPHONE ENCOUNTER
Patient wants to let us know she is not taking gabapentin 100 mg and famotidine 20 mg I advised will update medication records

## 2020-05-27 DIAGNOSIS — I10 ESSENTIAL HYPERTENSION: ICD-10-CM

## 2020-05-27 NOTE — TELEPHONE ENCOUNTER
Last visit 02/07/2020 MD Leonor Slade   Next appointment 06/29/2020 MD Gamboa   Previous refill encounter(s) 11/10/2019 Cozaar #90 with 1 refill     Requested Prescriptions     Pending Prescriptions Disp Refills    losartan (COZAAR) 100 mg tablet 90 Tab 0     Sig: Take 1 Tab by mouth daily.

## 2020-05-28 RX ORDER — LOSARTAN POTASSIUM 100 MG/1
100 TABLET ORAL DAILY
Qty: 90 TAB | Refills: 0 | Status: SHIPPED | OUTPATIENT
Start: 2020-05-28 | End: 2020-08-16

## 2020-06-29 ENCOUNTER — VIRTUAL VISIT (OUTPATIENT)
Dept: FAMILY MEDICINE CLINIC | Age: 60
End: 2020-06-29

## 2020-06-29 DIAGNOSIS — Z79.4 CONTROLLED TYPE 2 DIABETES MELLITUS WITHOUT COMPLICATION, WITH LONG-TERM CURRENT USE OF INSULIN (HCC): Primary | ICD-10-CM

## 2020-06-29 DIAGNOSIS — J30.9 ALLERGIC RHINITIS, UNSPECIFIED SEASONALITY, UNSPECIFIED TRIGGER: ICD-10-CM

## 2020-06-29 DIAGNOSIS — E66.01 SEVERE OBESITY (HCC): ICD-10-CM

## 2020-06-29 DIAGNOSIS — I10 ESSENTIAL HYPERTENSION: ICD-10-CM

## 2020-06-29 DIAGNOSIS — E11.9 CONTROLLED TYPE 2 DIABETES MELLITUS WITHOUT COMPLICATION, WITH LONG-TERM CURRENT USE OF INSULIN (HCC): Primary | ICD-10-CM

## 2020-06-29 DIAGNOSIS — E78.5 HYPERLIPIDEMIA WITH TARGET LDL LESS THAN 100: ICD-10-CM

## 2020-06-29 RX ORDER — METFORMIN HYDROCHLORIDE 500 MG/1
500 TABLET, EXTENDED RELEASE ORAL 2 TIMES DAILY
Qty: 180 TAB | Refills: 0 | Status: SHIPPED | OUTPATIENT
Start: 2020-06-29 | End: 2020-09-22

## 2020-06-29 RX ORDER — LEVOCETIRIZINE DIHYDROCHLORIDE 5 MG/1
TABLET, FILM COATED ORAL
Qty: 90 TAB | Refills: 5 | Status: SHIPPED | OUTPATIENT
Start: 2020-06-29 | End: 2021-07-07

## 2020-06-29 NOTE — PROGRESS NOTES
Tao Presley is a 64 y.o. female who was seen by synchronous (real-time) audio-video technology on 6/29/2020 through [employer] telemedicine application. Consent:  Services were provided through a video synchronous discussion virtually to substitute for in-person appointment. She and/or her healthcare decision maker is aware that this patient-initiated Telehealth encounter is a billable service, with coverage as determined by her insurance carrier. She is aware that she may receive a bill and has provided verbal consent to proceed: Yes    I was in the office while conducting this encounter. Subjective:   Tao Presley was seen for follow up of chronic conditions. Hypertension: Presumed stable. Pt continues with Cozaar 100mg/d. Hyperlipidemia: Lipid panel on 11/11/2019 notable for total cholesterol 172, HDL 69, , and triglycerides 143. Pt continues with Zocor 20mg/d. DM2: Last a1c was 5.9 on 2/7/2020. Pt continues with Glucophage XR 500mg/BID and Ozempic injections every other week. Pt was inquiring about different medication than Glucophage XR because it causes her stomach cramps and weight gain. Obesity: I have reviewed/discussed the above normal BMI with the patient. Pt reports she has gained some weight. Reports that she started taking 1 tsp of sea salazar. Pt reports she currently takes antacid medication Famotidine to treat stomach cramps. Pt reports she has a lot of gas. Prior to Admission medications    Medication Sig Start Date End Date Taking? Authorizing Provider   metFORMIN ER (GLUCOPHAGE XR) 500 mg tablet Take 1 Tab by mouth two (2) times a day. 6/29/20  Yes Gema Castro MD   levocetirizine (XYZAL) 5 mg tablet TAKE 1 TABLET BY MOUTH ONCE DAILY 6/29/20  Yes Gema Castro MD   losartan (COZAAR) 100 mg tablet Take 1 Tab by mouth daily. 5/28/20  Yes Gema Castro MD   gabapentin (NEURONTIN) 100 mg capsule Take  by mouth daily.  Pain Management is prescribing the medication Yes Provider, Historical   famotidine (PEPCID) 20 mg tablet Take 20 mg by mouth two (2) times a day. Pain Management is prescribing the medication   Yes Provider, Historical   semaglutide (OZEMPIC) 0.25 mg/0.2 mL (2 mg/1.5 mL) sub-q pen 0.25 mg by SubCUTAneous route every seven (7) days. 5/3/20  Yes Salvatore Gil MD   simvastatin (ZOCOR) 20 mg tablet TAKE 1 TABLET BY MOUTH AT NIGHT 4/25/20  Yes Salvatore Gil MD   fluticasone propionate (FLONASE) 50 mcg/actuation nasal spray 2 Sprays by Both Nostrils route daily. 3/31/20  Yes Salvatore Gil MD   ibuprofen (MOTRIN) 800 mg tablet Take 1 Tab by mouth every eight (8) hours as needed for Pain. 1/14/20  Yes Salvatore Gil MD   metFORMIN ER (GLUCOPHAGE XR) 500 mg tablet TAKE TWO TABLETS BY MOUTH TWICE DAILY 1/14/20  Yes Salvatore Gil MD   ALPRAZolam (XANAX) 0.25 mg tablet Take 1 Tab by mouth nightly as needed for Anxiety. Max Daily Amount: 0.25 mg. 5/31/19  Yes Salvatore Gil MD   glucose blood VI test strips (ASCENSIA AUTODISC VI, ONE TOUCH ULTRA TEST VI) strip Check glucose twice daily 4/2/19  Yes Salvatore Gil MD   Nebulizer & Compressor machine Nebulizer machine x 1 Nebulizer Kit 12/30/18  Yes Chance Gamboa MD   albuterol (PROVENTIL VENTOLIN) 2.5 mg /3 mL (0.083 %) nebulizer solution 3 mL by Nebulization route every four (4) hours as needed for Wheezing. 12/15/18  Yes Salvatore Gil MD   albuterol (PROVENTIL HFA, VENTOLIN HFA, PROAIR HFA) 90 mcg/actuation inhaler Take 2 Puffs by inhalation every six (6) hours as needed for Wheezing.  5/16/16  Yes Salvatore Gil MD   levocetirizine (XYZAL) 5 mg tablet TAKE 1 TABLET BY MOUTH ONCE DAILY 3/31/20 6/29/20  Salvatore Gil MD   oxyCODONE-acetaminophen (PERCOCET) 5-325 mg per tablet  11/8/19 6/29/20  Provider, Historical     Allergies   Allergen Reactions    Vicodin [Hydrocodone-Acetaminophen] Hives     Past Medical History:   Diagnosis Date    Adverse effect of anesthesia     slow to wake up at dentist office    Anxiety     Arthritis     Chronic pain     3 herniated discs in lower back    Depression     Diabetes ([address]     type 2    GERD (gastroesophageal reflux disease)     High cholesterol     Hypertension     MRSA infection 2008    left leg [facility] at [facility]    Other ill-defined conditions(799.89)     herniated disc to back    PUD (peptic ulcer disease)     Seasonal allergies     Sleep apnea     kerwin     Past Surgical History:   Procedure Laterality Date    COLONOSCOPY N/A 2017    COL[facility] ENDOSCOPY    HX GYN      tubal ligation, hysterectomy    HX HYSTERECTOMY      HX ORTHOPAEDIC      left hand ligament removed    HX OTHER SURGICAL      corticosteroid injections - back    HX OTHER SURGICAL  2019    Left lower parathyroidectomy with intraoperative PTH monitoring. -[facility]-Dr. Maria A Dueñas    HX TONSILLECTOMY       Family History   Problem Relation Age of Onset    Hypertension Mother     Diabetes Father     Hypertension Sister     No Known Problems Brother     Lung Disease Sister         copd    Anesth Problems Neg Hx      Social History     Tobacco Use    Smoking status: Former Smoker     Packs/day: 1.00     Years: 30.00     Pack years: 30.00     Types: Cigarettes     Last attempt to quit: 10/30/2012     Years since quittin.6    Smokeless tobacco: Never Used   Substance Use Topics    Alcohol use: Yes     Alcohol/week: 10.0 standard drinks     Types: 10 Glasses of wine per week     Comment: 1-2 drinks/night        Review of Systems   Constitutional: Negative for chills and fever. HENT: Negative for hearing loss and tinnitus. Eyes: Negative for blurred vision and double vision. Respiratory: Negative for cough and shortness of breath. Cardiovascular: Negative for chest pain and palpitations. Gastrointestinal: Negative for nausea and vomiting.    Genitourinary: Negative for dysuria and frequency. Musculoskeletal: Negative for back pain and falls. Skin: Negative for itching and rash. Neurological: Negative for dizziness, loss of consciousness and headaches. Endo/Heme/Allergies: Positive for environmental allergies. Psychiatric/Behavioral: Negative for depression. The patient is not nervous/anxious. PHYSICAL EXAMINATION:  Vital Signs: (As obtained by patient/caregiver at home)  There were no vitals taken for this visit. Constitutional: [x] Appears well-developed and well-nourished [x] No apparent distress      [] Abnormal -     Mental status: [x] Alert and awake  [x] Oriented to person/place/time [x] Able to follow commands    [] Abnormal -     Eyes:   EOM    [x]  Normal    [] Abnormal -   Sclera  [x]  Normal    [] Abnormal -          Discharge [x]  None visible   [] Abnormal -     HENT: [x] Normocephalic, atraumatic  [] Abnormal -   [x] Mouth/Throat: Mucous membranes are moist    External Ears [x] Normal  [] Abnormal -    Neck: [x] No visualized mass [] Abnormal -     Pulmonary/Chest: [x] Respiratory effort normal   [x] No visualized signs of difficulty breathing or respiratory distress        [] Abnormal -      Musculoskeletal:   [x] Normal gait with no signs of ataxia         [x] Normal range of motion of neck        [] Abnormal -     Neurological:        [x] No Facial Asymmetry (Cranial nerve 7 motor function) (limited exam due to video visit)          [x] No gaze palsy        [] Abnormal -          Skin:        [x] No significant exanthematous lesions or discoloration noted on facial skin         [] Abnormal -            Psychiatric:       [x] Normal Affect [] Abnormal -        [x] No Hallucinations    Other pertinent observable physical exam findings:-    Assessment & Plan:   Diagnoses and all orders for this visit:    1. Controlled type 2 diabetes mellitus without complication, with long-term current use of insulin (HCC)  Presumed stable.  Last a1c was 5.9 Advised to continue with Glucophage XR 500mg/BID and Ozempic injections every other week. Pt was inquiring about different medication than Glucophage XR because it causes her stomach cramps and weight gain. Advised pt that we should update her labs, and see if we can lower frequency of medication to daily instead of BID. For now, advised pt to continue with Glucophage XR 500mg/BID until new a1c levels are reassessed. Labwork ordered and will be completed when pt schedules appointment to come into clinic. Advised pt to follow proper safety precautions against COVID-19 when coming to clinic.   -     METABOLIC PANEL, COMPREHENSIVE  -     HEMOGLOBIN A1C WITH EAG  -     metFORMIN ER (GLUCOPHAGE XR) 500 mg tablet; Take 1 Tab by mouth two (2) times a day. 2. Hyperlipidemia with target LDL less than 100  Presumed stable. Last LDL not at goal. Advised to continue with Zocor 20mg/d. Anthony Watkins ordered and will be completed when pt schedules appointment to come into clinic. Advised pt to follow proper safety precautions against COVID-19 when coming to clinic.   -     LIPID PANEL    3. Severe obesity ([address]  I have reviewed/discussed the above normal BMI with the patient. I have recommended the following interventions: dietary management education, guidance, and counseling, encourage exercise and monitor weight . 4. Essential hypertension  Presumed stable. Advised to continue with Cozaar 100mg/d. Labwork ordered and will be completed when pt schedules appointment to come into clinic. Advised pt to follow proper safety precautions against COVID-19 when coming to clinic.   -     METABOLIC PANEL, COMPREHENSIVE    5. Allergic rhinitis, unspecified seasonality, unspecified trigger  Refill request completed for Xyzal 5mg/d.  -     levocetirizine (XYZAL) 5 mg tablet; TAKE 1 TABLET BY MOUTH ONCE DAILY      Follow-up and Dispositions    · Return in about 3 months (around 9/29/2020) for hypertension, diabetes, cholesterol follow up. We discussed the expected course, resolution and complications of the diagnosis(es) in detail. Medication risks, benefits, costs, interactions, and alternatives were discussed as indicated. I advised her to contact the office if her condition worsens, changes or fails to improve as anticipated. She expressed understanding with the diagnosis(es) and plan. Pursuant to the emergency declaration under the [address] [place] [address] waiver authority and the [employer] Act, this Virtual Visit was conducted, with patient's consent, to reduce the patient's risk of exposure to COVID-19 and provide continuity of care for an established patient. Services were provided through a video synchronous discussion virtually to substitute for in-person clinic visit. Written by Glorious Snellen, scibe, as dictated by Oswald Lefort, M.D.    4:05 PM - 4:20 PM     Total time spent with the patient 15 minutes, greater than 50% of time spent counseling patient.

## 2020-07-07 ENCOUNTER — HOSPITAL ENCOUNTER (OUTPATIENT)
Dept: LAB | Age: 60
Discharge: HOME | End: 2020-07-07
Payer: MEDICARE

## 2020-07-07 PROCEDURE — 80053 COMPREHEN METABOLIC PANEL: CPT

## 2020-07-07 PROCEDURE — 83036 HEMOGLOBIN GLYCOSYLATED A1C: CPT

## 2020-07-07 PROCEDURE — 36415 COLL VENOUS BLD VENIPUNCTURE: CPT

## 2020-07-07 PROCEDURE — 80061 LIPID PANEL: CPT

## 2020-07-08 LAB
ALBUMIN SERPL-MCNC: 4.3 G/DL (ref 3.8–4.9)
ALBUMIN/GLOB SERPL: 1.7 {RATIO} (ref 1.2–2.2)
ALP SERPL-CCNC: 80 IU/L (ref 39–117)
ALT SERPL-CCNC: 14 IU/L (ref 0–32)
AST SERPL-CCNC: 13 IU/L (ref 0–40)
BILIRUB SERPL-MCNC: 0.3 MG/DL (ref 0–1.2)
BUN SERPL-MCNC: 26 MG/DL (ref 6–24)
BUN/CREAT SERPL: 20 (ref 9–23)
CALCIUM SERPL-MCNC: 9.4 MG/DL (ref 8.7–10.2)
CHLORIDE SERPL-SCNC: 103 MMOL/L (ref 96–106)
CHOLEST SERPL-MCNC: 179 MG/DL (ref 100–199)
CO2 SERPL-SCNC: 22 MMOL/L (ref 20–29)
CREAT SERPL-MCNC: 1.27 MG/DL (ref 0.57–1)
EST. AVERAGE GLUCOSE BLD GHB EST-MCNC: 146 MG/DL
GLOBULIN SER CALC-MCNC: 2.5 G/DL (ref 1.5–4.5)
GLUCOSE SERPL-MCNC: 201 MG/DL (ref 65–99)
HBA1C MFR BLD: 6.7 % (ref 4.8–5.6)
HDLC SERPL-MCNC: 60 MG/DL
INTERPRETATION, 910389: NORMAL
INTERPRETATION: NORMAL
LDLC SERPL CALC-MCNC: 72 MG/DL (ref 0–99)
PDF IMAGE, 910387: NORMAL
POTASSIUM SERPL-SCNC: 4.9 MMOL/L (ref 3.5–5.2)
PROT SERPL-MCNC: 6.8 G/DL (ref 6–8.5)
SODIUM SERPL-SCNC: 137 MMOL/L (ref 134–144)
TRIGL SERPL-MCNC: 237 MG/DL (ref 0–149)
VLDLC SERPL CALC-MCNC: 47 MG/DL (ref 5–40)

## 2020-07-19 NOTE — PROGRESS NOTES
Inform pt to go to my chart to see results and recommendations    Random glucose is 200+ that's too high  Please keep an eye on diet, also will ask Yi to do touch points with you.     A1C is stable, LDL cholesterol at goal, renal and liver function is stable    Any questions let me know

## 2020-08-15 DIAGNOSIS — I10 ESSENTIAL HYPERTENSION: ICD-10-CM

## 2020-08-16 RX ORDER — LOSARTAN POTASSIUM 100 MG/1
TABLET ORAL
Qty: 90 TAB | Refills: 0 | Status: SHIPPED | OUTPATIENT
Start: 2020-08-16 | End: 2020-11-17

## 2020-08-24 ENCOUNTER — OFFICE VISIT (OUTPATIENT)
Dept: FAMILY MEDICINE CLINIC | Age: 60
End: 2020-08-24

## 2020-08-24 VITALS — BODY MASS INDEX: 35.43 KG/M2 | WEIGHT: 233 LBS

## 2020-08-24 DIAGNOSIS — Z71.89 DIABETES EDUCATION, ENCOUNTER FOR: ICD-10-CM

## 2020-08-24 DIAGNOSIS — E11.9 CONTROLLED TYPE 2 DIABETES MELLITUS WITHOUT COMPLICATION, WITHOUT LONG-TERM CURRENT USE OF INSULIN (HCC): ICD-10-CM

## 2020-08-24 DIAGNOSIS — Z71.89 ENCOUNTER FOR MEDICATION REVIEW AND COUNSELING: Primary | ICD-10-CM

## 2020-08-24 RX ORDER — IBUPROFEN 800 MG/1
800 TABLET ORAL
Qty: 180 TAB | Refills: 0 | Status: SHIPPED | OUTPATIENT
Start: 2020-08-24 | End: 2021-02-24 | Stop reason: SDUPTHER

## 2020-08-24 NOTE — Clinical Note
GI adverse effects have resolved with decrease in metformin. She increased her ozempic back up to weekly and no ADR. Has VV with you next month.

## 2020-08-24 NOTE — PATIENT INSTRUCTIONS
1)  Start checking a blood sugar first thing in the morning to see how your blood sugars are doing and what effect your 11pm snack is having. Goal:     2)  Try decreasing the portions of your 11pm snack (open face PBJ) or try some of these:  Low carb snack ideas (15 grams total carb or less):     String cheese or babybel with 6 crackers   4 peanut butter crackers   3 cups of popcorn   1 cup raw vegetables with hummus or ranch dip (just need to watch how much dip you use)   Nuts   2 rice cakes   Celery with peanut butter or cream cheese   String cheese with 1 serving of fruit   Greek yogurt (look at label to make sure < 15 gram carb)   Plain greek yogurt with fresh berries added   Nature valley protein bar   Whisps parmesan cheese crisps   Hard boiled egg   Cottage cheese   Tuna salad lettuce roll-ups   Deli meat roll-ups with slice of cheese   Sugar Free Jello   Glucerna shake (16 grams)    Glucerna hunger smart shake (16 grams)   Ensure protein max shake    Be careful with the glucerna products as they differ in the total carbs depending on the product (some are intended as meal replacements not snacks). Make sure you look at the total carbs on the label as products can differ. 3)  Get walking or some exercise while you are watching TV. Think of this as a medication to help your blood sugar and cholesterol    4) Start using your flonase daily and stop the OTC allergy decongestant pills.

## 2020-08-24 NOTE — PROGRESS NOTES
Pharmacy Note:  Diabetes    S/O:  Rafael Kang is a 64 y.o. female who was seen today for diabetes. Current DM regimen (last 2 months)  Ozempic 0.25 mg weekly  Metformin  mg bid meals    She reports her \"gassy\" feelings and abdominal pains have resolved since decreasing her metformin from 1000 mg bid to 500 mg bid. As a result she increased her ozempic back up to weekly vs every other week which has not caused any GI problems. She has a glucometer but is not checking sugars. She denies any feelings of hypoglycemia. Diet: Can't eat a lot in one sitting so often just eats half of her meals. She does feel like she needs to work on it as she is gaining weight. B: cup of coffee with splenda and carnation evap milk  L: hamburger with salad or fries OR sandwich on a whole wheat wrap or Letty hoagie roll sometimes chips in small snack size bag  D: Meat with vegetables (salad, collards) small amount of potato  Snack (11pm):  PBJ whole sandwich or PBJ on english muffin, or 4 peanut butter crackers (prepackaged)-she has always done this and looks forward to it  Beverages: natural light beer (couple with dinner some nights), coffee, water    Exercise: has been trying to exercise (walking/running) in her house. States that she has been having HA, which initially thought it was a sinus headache so has been taking OTC diphenhydramine/phenylephrine/APAP but now thinks it could be her BP since she has had some SBP readings in the 180's at times.     She requests refills on ibuprofen and ozempic  Vitals  Wt Readings from Last 3 Encounters:   08/24/20 233 lb (105.7 kg)   02/07/20 230 lb (104.3 kg)   11/11/19 232 lb 3.2 oz (105.3 kg)     Temp Readings from Last 3 Encounters:   02/07/20 98.3 °F (36.8 °C) (Oral)   11/11/19 98.5 °F (36.9 °C) (Oral)   08/28/19 97.2 °F (36.2 °C) (Oral)     BP Readings from Last 3 Encounters:   02/07/20 138/80   11/11/19 130/80   08/28/19 138/88     Pulse Readings from Last 3 Encounters: 02/07/20 71   11/11/19 93   08/28/19 84       Past Medical History:   Diagnosis Date    Adverse effect of anesthesia     slow to wake up at dentist office    Anxiety     Arthritis     Chronic pain     3 herniated discs in lower back    Depression     Diabetes ([address]     type 2    GERD (gastroesophageal reflux disease)     High cholesterol     Hypertension     MRSA infection 2008    [facility]    Other ill-defined conditions(799.89)     herniated disc to back    PUD (peptic ulcer disease)     Seasonal allergies     Sleep apnea     kerwin       Allergies   Allergen Reactions    Vicodin [Hydrocodone-Acetaminophen] Hives       Current Outpatient Medications   Medication Sig    losartan (COZAAR) 100 mg tablet TAKE 1 TABLET BY MOUTH DAILY    metFORMIN ER (GLUCOPHAGE XR) 500 mg tablet Take 1 Tab by mouth two (2) times a day.  levocetirizine (XYZAL) 5 mg tablet TAKE 1 TABLET BY MOUTH ONCE DAILY    famotidine (PEPCID) 20 mg tablet Take 20 mg by mouth daily. Pain Management is prescribing the medication     semaglutide (OZEMPIC) 0.25 mg/0.2 mL (2 mg/1.5 mL) sub-q pen 0.25 mg by SubCUTAneous route every seven (7) days.  simvastatin (ZOCOR) 20 mg tablet TAKE 1 TABLET BY MOUTH AT NIGHT    fluticasone propionate (FLONASE) 50 mcg/actuation nasal spray 2 Sprays by Both Nostrils route daily.  ibuprofen (MOTRIN) 800 mg tablet Take 1 Tab by mouth every eight (8) hours as needed for Pain.  Nebulizer & Compressor machine Nebulizer machine x 1 Nebulizer Kit    albuterol (PROVENTIL VENTOLIN) 2.5 mg /3 mL (0.083 %) nebulizer solution 3 mL by Nebulization route every four (4) hours as needed for Wheezing.  albuterol (PROVENTIL HFA, VENTOLIN HFA, PROAIR HFA) 90 mcg/actuation inhaler Take 2 Puffs by inhalation every six (6) hours as needed for Wheezing.  ALPRAZolam (XANAX) 0.25 mg tablet Take 1 Tab by mouth nightly as needed for Anxiety.  Max Daily Amount: 0.25 mg.    glucose blood VI test strips (ASCENSIA AUTODISC VI, ONE TOUCH ULTRA TEST VI) strip Check glucose twice daily     No current facility-administered medications for this visit. Medications Discontinued During This Encounter   Medication Reason    metFORMIN ER (GLUCOPHAGE XR) 500 mg tablet Not A Current Medication    gabapentin (NEURONTIN) 100 mg capsule Not A Current Medication        A/P:   1. Diabetes:  Current control unknown since patient is not checking her sugars, but last A1C showed she was below goal, but had high fasting BG. Advised her to check her fasting sugar even if once a week to see if her fasting sugars are above goal, especially with her higher carb 11pm snack and if they improve with changes to this snack. If A1C is elevated at next OV, would try increasing ozempic to 0.5 mg dose to see if she can tolerate. Recommended the following:    · Decrease either portion of her 11pm snack, consider making open faced sandwich, or try a low carb snack (list provided)  · Start exercising even if in the house to help with sugars, BP, and weight  · Refer to calorie tj if she is eating out to help her pick lower carb choices (goals provided)     2. Hypertension:  · Advised her to stop using OTC allergy-D product as this can increase her BP and to try taking her flonase NS on regular basis to help control allergy symptoms  · Patient to start monitoring BP     Medication reconciliation was completed during the visit. Patient verbalized understanding of the information presented and all of the patients questions were answered. AVS was handed to the patient. Patient advised to call the office with any additional questions or concerns.     Follow-up: pt to send readings when she checks some sugars  Notifications of recommendations will be sent to Dr. Salima Barillas MD for review    Thank you for the consult,  Sukumar Ochoa, PHARMD [facility]    Time Spent:  45 min    CLINICAL PHARMACY CONSULT: MED RECONCILIATION/REVIEW ADDENDUM    For Pharmacy Admin Tracking Only    PHSO: PHSO Patient?: Yes  Total # of Interventions Recommended: Count: 4  - Discontinued Medication #: 1 Discontinue Reason(s): JENNYFER  Total Interventions Accepted: 4  Time Spent (min): 45    Chrystal Venegas, PHARMD, BCACP

## 2020-08-24 NOTE — TELEPHONE ENCOUNTER
Patient requested the following prescriptions at her OV today. Requested Prescriptions     Pending Prescriptions Disp Refills    ibuprofen (MOTRIN) 800 mg tablet 180 Tab 0     Sig: Take 1 Tab by mouth every eight (8) hours as needed for Pain.  semaglutide (OZEMPIC) 0.25 mg/0.2 mL (2 mg/1.5 mL) sub-q pen 12 Box 0     Si.25 mg by SubCUTAneous route every seven (7) days.      Tenzin TamayoD, BCACP      CLINICAL PHARMACY CONSULT: MED RECONCILIATION/REVIEW ADDENDUM    For Pharmacy Admin Tracking Only    PHSO: PHSO Patient?: Yes  Total # of Interventions Recommended: Count: 1  Total Interventions Accepted: 1  Time Spent (min): 5    TENZIN BarrD, BCACP

## 2020-08-28 ENCOUNTER — TELEPHONE (OUTPATIENT)
Dept: FAMILY MEDICINE CLINIC | Age: 60
End: 2020-08-28

## 2020-08-28 ENCOUNTER — HOSPITAL ENCOUNTER (EMERGENCY)
Age: 60
Discharge: LEFT BEFORE DISCHARGE | End: 2020-08-28
Admitting: EMERGENCY MEDICINE
Payer: MEDICARE

## 2020-08-28 PROCEDURE — 75810000275 HC EMERGENCY DEPT VISIT NO LEVEL OF CARE

## 2020-08-28 NOTE — TELEPHONE ENCOUNTER
Pt called level one. Her BP for the past 3 days has been in the range of 183/103.  Transferred the call to [facility].

## 2020-08-28 NOTE — TELEPHONE ENCOUNTER
Spoke with patient. Patient states that blood pressure was 183/103 on yesterday. And blood pressure has been running high all week. Patient states that she has been taking her blood pressure medication. Advised patient that she may want to go to ER or Urgent Care. Patient request in office appointment today. Appointment scheduled.

## 2020-08-28 NOTE — ED TRIAGE NOTES
After starting to get pt's vitals, pt saw that her BP was 127/92 and stated that she wanted to leave to go to her appt with her PCP at 1:30. Will arrive pt in error.

## 2020-09-15 ENCOUNTER — TELEPHONE (OUTPATIENT)
Dept: FAMILY MEDICINE CLINIC | Age: 60
End: 2020-09-15

## 2020-09-19 DIAGNOSIS — E11.9 CONTROLLED TYPE 2 DIABETES MELLITUS WITHOUT COMPLICATION, WITH LONG-TERM CURRENT USE OF INSULIN (HCC): ICD-10-CM

## 2020-09-19 DIAGNOSIS — Z79.4 CONTROLLED TYPE 2 DIABETES MELLITUS WITHOUT COMPLICATION, WITH LONG-TERM CURRENT USE OF INSULIN (HCC): ICD-10-CM

## 2020-09-22 RX ORDER — METFORMIN HYDROCHLORIDE 500 MG/1
TABLET, EXTENDED RELEASE ORAL
Qty: 180 TAB | Refills: 0 | Status: SHIPPED | OUTPATIENT
Start: 2020-09-22 | End: 2021-01-05

## 2020-09-29 ENCOUNTER — OFFICE VISIT (OUTPATIENT)
Dept: FAMILY MEDICINE CLINIC | Age: 60
End: 2020-09-29
Payer: MEDICARE

## 2020-09-29 DIAGNOSIS — E66.01 SEVERE OBESITY (HCC): ICD-10-CM

## 2020-09-29 DIAGNOSIS — Z00.00 ENCOUNTER FOR MEDICARE ANNUAL WELLNESS EXAM: ICD-10-CM

## 2020-09-29 DIAGNOSIS — E78.5 HYPERLIPIDEMIA WITH TARGET LDL LESS THAN 100: ICD-10-CM

## 2020-09-29 DIAGNOSIS — I10 ESSENTIAL HYPERTENSION: ICD-10-CM

## 2020-09-29 DIAGNOSIS — E11.8 CONTROLLED DIABETES MELLITUS TYPE 2 WITH COMPLICATIONS, UNSPECIFIED WHETHER LONG TERM INSULIN USE (HCC): Primary | ICD-10-CM

## 2020-09-29 LAB
HBA1C MFR BLD HPLC: 6.6 %
MICROALBUMIN UR TEST STR-MCNC: 150 MG/L

## 2020-09-29 PROCEDURE — G9717 DOC PT DX DEP/BP F/U NT REQ: HCPCS | Performed by: FAMILY MEDICINE

## 2020-09-29 PROCEDURE — 83036 HEMOGLOBIN GLYCOSYLATED A1C: CPT | Performed by: FAMILY MEDICINE

## 2020-09-29 PROCEDURE — G8753 SYS BP > OR = 140: HCPCS | Performed by: FAMILY MEDICINE

## 2020-09-29 PROCEDURE — 99214 OFFICE O/P EST MOD 30 MIN: CPT | Performed by: FAMILY MEDICINE

## 2020-09-29 PROCEDURE — G0439 PPPS, SUBSEQ VISIT: HCPCS | Performed by: FAMILY MEDICINE

## 2020-09-29 PROCEDURE — G0463 HOSPITAL OUTPT CLINIC VISIT: HCPCS | Performed by: FAMILY MEDICINE

## 2020-09-29 PROCEDURE — 2022F DILAT RTA XM EVC RTNOPTHY: CPT | Performed by: FAMILY MEDICINE

## 2020-09-29 PROCEDURE — 3017F COLORECTAL CA SCREEN DOC REV: CPT | Performed by: FAMILY MEDICINE

## 2020-09-29 PROCEDURE — G8755 DIAS BP > OR = 90: HCPCS | Performed by: FAMILY MEDICINE

## 2020-09-29 PROCEDURE — G8427 DOCREV CUR MEDS BY ELIG CLIN: HCPCS | Performed by: FAMILY MEDICINE

## 2020-09-29 PROCEDURE — G8417 CALC BMI ABV UP PARAM F/U: HCPCS | Performed by: FAMILY MEDICINE

## 2020-09-29 PROCEDURE — G9899 SCRN MAM PERF RSLTS DOC: HCPCS | Performed by: FAMILY MEDICINE

## 2020-09-29 PROCEDURE — 3044F HG A1C LEVEL LT 7.0%: CPT | Performed by: FAMILY MEDICINE

## 2020-09-29 PROCEDURE — 82044 UR ALBUMIN SEMIQUANTITATIVE: CPT | Performed by: FAMILY MEDICINE

## 2020-09-29 NOTE — PROGRESS NOTES
This is the Subsequent Medicare Annual Wellness Exam, performed 12 months or more after the Initial AWV or the last Subsequent AWV    I have reviewed the patient's medical history in detail and updated the computerized patient record. History     Patient Active Problem List   Diagnosis Code    Lumbar herniated disc M51.26    Back pain M54.9    Anxiety F41.9    HTN (hypertension) I10    Depression F32.9    Vitamin D deficiency E55.9    Asthma J45.909    Obesity, Class I, BMI 30-34.9 E66.9    Hyperlipidemia with target LDL less than 100 E78.5    Controlled type 2 diabetes mellitus without complication, with long-term current use of insulin (HCC) E11.9, Z79.4    Severe obesity (HCC) E66.01    Type 2 diabetes with nephropathy (HCC) E11.21     Past Medical History:   Diagnosis Date    Adverse effect of anesthesia     slow to wake up at dentist office    Anxiety     Arthritis     Chronic pain     3 herniated discs in lower back    Depression     Diabetes ([facility] [address]     type 2    GERD (gastroesophageal reflux disease)     High cholesterol     Hypertension     MRSA infection 2008    left leg [facility] at [facility]    Other ill-defined conditions(799.89)     herniated disc to back    PUD (peptic ulcer disease)     Seasonal allergies     Sleep apnea     kerwin      Past Surgical History:   Procedure Laterality Date    COLONOSCOPY N/A 5/31/2017    COLONOSCOPY performed by Edson Cid MD at [facility] ENDOSCOPY    HX GYN      tubal ligation, hysterectomy    HX HYSTERECTOMY      HX ORTHOPAEDIC      left hand ligament removed    HX OTHER SURGICAL      corticosteroid injections - back    HX OTHER SURGICAL  08/06/2019    Left lower parathyroidectomy with intraoperative PTH monitoring. -[facility]-Dr. Celestina Elise    HX TONSILLECTOMY       Current Outpatient Medications   Medication Sig Dispense Refill    metFORMIN ER (GLUCOPHAGE XR) 500 mg tablet TAKE 1 TABLET BY MOUTH TWICE DAILY 180 Tab 0    ibuprofen (MOTRIN) 800 mg tablet Take 1 Tab by mouth every eight (8) hours as needed for Pain. 180 Tab 0    semaglutide (OZEMPIC) 0.25 mg/0.2 mL (2 mg/1.5 mL) sub-q pen 0.25 mg by SubCUTAneous route every seven (7) days. 12 Box 0    losartan (COZAAR) 100 mg tablet TAKE 1 TABLET BY MOUTH DAILY 90 Tab 0    levocetirizine (XYZAL) 5 mg tablet TAKE 1 TABLET BY MOUTH ONCE DAILY 90 Tab 5    famotidine (PEPCID) 20 mg tablet Take 20 mg by mouth daily. Pain Management is prescribing the medication       simvastatin (ZOCOR) 20 mg tablet TAKE 1 TABLET BY MOUTH AT NIGHT 90 Tab 1    fluticasone propionate (FLONASE) 50 mcg/actuation nasal spray 2 Sprays by Both Nostrils route daily. 3 Bottle 1    ALPRAZolam (XANAX) 0.25 mg tablet Take 1 Tab by mouth nightly as needed for Anxiety. Max Daily Amount: 0.25 mg. 20 Tab 0    glucose blood VI test strips (ASCENSIA AUTODISC VI, ONE TOUCH ULTRA TEST VI) strip Check glucose twice daily 100 Strip 11    Nebulizer & Compressor machine Nebulizer machine x 1 Nebulizer Kit 1 Each 0    albuterol (PROVENTIL VENTOLIN) 2.5 mg /3 mL (0.083 %) nebulizer solution 3 mL by Nebulization route every four (4) hours as needed for Wheezing. 1 Package 5    albuterol (PROVENTIL HFA, VENTOLIN HFA, PROAIR HFA) 90 mcg/actuation inhaler Take 2 Puffs by inhalation every six (6) hours as needed for Wheezing.  1 Inhaler 3     Allergies   Allergen Reactions    Vicodin [Hydrocodone-Acetaminophen] Hives       Family History   Problem Relation Age of Onset    Hypertension Mother     Diabetes Father     Hypertension Sister     No Known Problems Brother     Lung Disease Sister         copd    Anesth Problems Neg Hx      Social History     Tobacco Use    Smoking status: Former Smoker     Packs/day: 1.00     Years: 30.00     Pack years: 30.00     Types: Cigarettes     Last attempt to quit: 10/30/2012     Years since quittin.9    Smokeless tobacco: Never Used   Substance Use Topics    Alcohol use: Yes     Alcohol/week: 10.0 standard drinks     Types: 10 Glasses of wine per week     Comment: 1-2 drinks/night       Depression Risk Factor Screening:     3 most recent PHQ Screens 9/29/2020   Little interest or pleasure in doing things Not at all   Feeling down, depressed, irritable, or hopeless Not at all   Total Score PHQ 2 0   Trouble falling or staying asleep, or sleeping too much -   Feeling tired or having little energy -   Poor appetite, weight loss, or overeating -   Feeling bad about yourself - or that you are a failure or have let yourself or your family down -   Trouble concentrating on things such as school, work, reading, or watching TV -   Moving or speaking so slowly that other people could have noticed; or the opposite being so fidgety that others notice -   Thoughts of being better off dead, or hurting yourself in some way -   PHQ 9 Score -   How difficult have these problems made it for you to do your work, take care of your home and get along with others -       Alcohol Risk Screen   Do you average more than 1 drink per night or more than 7 drinks a week:  No    On any one occasion in the past three months have you have had more than 3 drinks containing alcohol:  No        Functional Ability and Level of Safety:   Hearing: Hearing is good. Activities of Daily Living: The home contains: no safety equipment. Patient does total self care     Ambulation: with no difficulty     Fall Risk:  Fall Risk Assessment, last 12 mths 9/29/2020   Able to walk? Yes   Fall in past 12 months?  No     Abuse Screen:  Patient is not abused       Cognitive Screening   Has your family/caregiver stated any concerns about your memory: no         Patient Care Team   Patient Care Team:  Mary Fletcher MD as PCP - General (Family Medicine)  Mary Fletcher MD [facility] P[facility]  Tomas Bloom MD (Cardiology)    Assessment/Plan   Education and counseling provided:  Are appropriate based on today's review and evaluation    Diagnoses and all orders for this visit:    1.  Controlled diabetes mellitus type 2 with complications, unspecified whether long term insulin use (HCC)  -     AMB POC HEMOGLOBIN A1C  -     AMB POC URINE, MICROALBUMIN, SEMIQUANT (1 RESULT)        Health Maintenance Due   Topic Date Due    Pneumococcal 0-64 years (1 of 1 - PPSV23) 10[birth year]/1968    Shingrix Vaccine Age 50> (1 of 2) 10/28/2012    Medicare Yearly Exam  08/28/2020    Flu Vaccine (1) 09/01/2020    Eye Exam Retinal or Dilated  10/04/2020    PAP AKA CERVICAL CYTOLOGY  11/22/2020

## 2020-09-29 NOTE — PROGRESS NOTES
Chief Complaint   Patient presents with    Hypertension    Diabetes    Cholesterol Problem     1. Have you been to the ER, urgent care clinic since your last visit? [facility]. 2. Have you seen or consulted any other health care providers outside of the [address] [place] [address] since your last visit? Include any pap smears or colon screening.  Pain Management

## 2020-09-29 NOTE — PROGRESS NOTES
HPI  Zena Mariano 64 y.o. female  presents to the office today for routine care of chronic conditions. Blood pressure 136/82, pulse 76, temperature 98 °F (36.7 °C), temperature source Temporal, resp. rate 16, height 5' 8\" (1.727 m), weight 242 lb (109.8 kg), SpO2 98 %. Body mass index is 36.8 kg/m². Chief Complaint   Patient presents with    Hypertension    Diabetes    Cholesterol Problem        DM2: A1c per POC today 6.6, no change from A1c of 6.7 on 7/7/2020. Pt continues with metformin  mg/BID and Ozempic 0.25-0.2 mL injection every 7 days. Health maintenance: I advised pt to receive her Shingles, pneumonia, and flu vaccinations. Medicare questionnaire discussed and responses reviewed today in office. Pt presents today with a form from her pain management specialist that I need to sign; it consents contact between me and them and the sharing of pt information to further assist the pt. Current Outpatient Medications   Medication Sig Dispense Refill    metFORMIN ER (GLUCOPHAGE XR) 500 mg tablet TAKE 1 TABLET BY MOUTH TWICE DAILY 180 Tab 0    ibuprofen (MOTRIN) 800 mg tablet Take 1 Tab by mouth every eight (8) hours as needed for Pain. 180 Tab 0    semaglutide (OZEMPIC) 0.25 mg/0.2 mL (2 mg/1.5 mL) sub-q pen 0.25 mg by SubCUTAneous route every seven (7) days. 12 Box 0    losartan (COZAAR) 100 mg tablet TAKE 1 TABLET BY MOUTH DAILY 90 Tab 0    levocetirizine (XYZAL) 5 mg tablet TAKE 1 TABLET BY MOUTH ONCE DAILY 90 Tab 5    famotidine (PEPCID) 20 mg tablet Take 20 mg by mouth daily. Pain Management is prescribing the medication       simvastatin (ZOCOR) 20 mg tablet TAKE 1 TABLET BY MOUTH AT NIGHT 90 Tab 1    fluticasone propionate (FLONASE) 50 mcg/actuation nasal spray 2 Sprays by Both Nostrils route daily. 3 Bottle 1    ALPRAZolam (XANAX) 0.25 mg tablet Take 1 Tab by mouth nightly as needed for Anxiety.  Max Daily Amount: 0.25 mg. 20 Tab 0    glucose blood VI test strips (ASCENSIA AUTODISC VI, ONE TOUCH ULTRA TEST VI) strip Check glucose twice daily 100 Strip 11    Nebulizer & Compressor machine Nebulizer machine x 1 Nebulizer Kit 1 Each 0    albuterol (PROVENTIL VENTOLIN) 2.5 mg /3 mL (0.083 %) nebulizer solution 3 mL by Nebulization route every four (4) hours as needed for Wheezing. 1 Package 5    albuterol (PROVENTIL HFA, VENTOLIN HFA, PROAIR HFA) 90 mcg/actuation inhaler Take 2 Puffs by inhalation every six (6) hours as needed for Wheezing. 1 Inhaler 3     Allergies   Allergen Reactions    Vicodin [Hydrocodone-Acetaminophen] Hives     Past Medical History:   Diagnosis Date    Adverse effect of anesthesia     slow to wake up at dentist office    Anxiety     Arthritis     Chronic pain     3 herniated discs in lower back    Depression     Diabetes ([facility] [address]     type 2    GERD (gastroesophageal reflux disease)     High cholesterol     Hypertension     MRSA infection 2008    left leg treated with antibiotics at [facility]    Other ill-defined conditions(799.89)     herniated disc to back    PUD (peptic ulcer disease)     Seasonal allergies     Sleep apnea     kerwin     Past Surgical History:   Procedure Laterality Date    COLONOSCOPY N/A 5/31/2017    COLONOSCOPY performed by Tricia Bustamante MD at [address] [place] [address] ENDOSCOPY    HX GYN      tubal ligation, hysterectomy    HX HYSTERECTOMY      HX ORTHOPAEDIC      left hand ligament removed    HX OTHER SURGICAL      corticosteroid injections - back    HX OTHER SURGICAL  08/06/2019    Left lower parathyroidectomy with intraoperative PTH monitoring. -[facility]-Dr. Rashaad Nunez    HX TONSILLECTOMY       Family History   Problem Relation Age of Onset    Hypertension Mother     Diabetes Father     Hypertension Sister     No Known Problems Brother     Lung Disease Sister         copd    Anesth Problems Neg Hx      Social History     Tobacco Use    Smoking status: Former Smoker     Packs/day: 1.00     Years: 30.00     Pack years: 30.00 Types: Cigarettes     Last attempt to quit: 10/30/2012     Years since quittin.9    Smokeless tobacco: Never Used   Substance Use Topics    Alcohol use: Yes     Alcohol/week: 10.0 standard drinks     Types: 10 Glasses of wine per week     Comment: 1-2 drinks/night        Review of Systems   Constitutional: Negative for chills and fever. HENT: Negative for hearing loss and tinnitus. Eyes: Negative for blurred vision and double vision. Respiratory: Negative for cough and shortness of breath. Cardiovascular: Negative for chest pain and palpitations. Gastrointestinal: Negative for nausea and vomiting. Genitourinary: Negative for dysuria and frequency. Musculoskeletal: Negative for back pain and falls. Skin: Negative for itching and rash. Neurological: Negative for dizziness, loss of consciousness and headaches. Endo/Heme/Allergies: Negative. Psychiatric/Behavioral: Negative for depression. The patient is not nervous/anxious. Physical Exam  Constitutional:       Appearance: Normal appearance. HENT:      Head: Normocephalic and atraumatic. Right Ear: Tympanic membrane, ear canal and external ear normal.      Left Ear: Tympanic membrane, ear canal and external ear normal.      Nose: Nose normal.      Mouth/Throat:      Mouth: Mucous membranes are moist.      Pharynx: Oropharynx is clear. Eyes:      Extraocular Movements: Extraocular movements intact. Conjunctiva/sclera: Conjunctivae normal.      Pupils: Pupils are equal, round, and reactive to light. Cardiovascular:      Rate and Rhythm: Normal rate and regular rhythm. Pulses: Normal pulses. Heart sounds: Normal heart sounds. Pulmonary:      Effort: Pulmonary effort is normal.      Breath sounds: Normal breath sounds. Abdominal:      General: Abdomen is flat. Bowel sounds are normal.      Palpations: Abdomen is soft. Genitourinary:     General: Normal vulva.       Rectum: Normal.   Musculoskeletal: Normal range of motion. Skin:     General: Skin is warm and dry. Neurological:      General: No focal deficit present. Mental Status: She is alert and oriented to person, place, and time. Mental status is at baseline. Psychiatric:         Mood and Affect: Mood normal.         Behavior: Behavior normal.         Judgment: Judgment normal.           ASSESSMENT and PLAN  Diagnoses and all orders for this visit:    1. Controlled diabetes mellitus type 2 with complications, unspecified whether long term insulin use (HCC)   A1c per POC today 6.6, no change from A1c of 6.7 on 7/7/2020. Pt continues with metformin  mg/BID and Ozempic 0.25-0.2 mL injection every 7 days. I have placed orders for pt to have labwork performed at a local [facility].  -     AMB POC HEMOGLOBIN A1C  -     AMB POC URINE, MICROALBUMIN, SEMIQUANT (1 RESULT)  Diagnoses and all orders for this visit:      2. Essential hypertension  stable  3. Hyperlipidemia with target LDL less than 100  stable  4. Severe obesity ([address]  I have reviewed/discussed the above normal BMI with the patient. I have recommended the following interventions: dietary management education, guidance, and counseling and encourage exercise . Jonny Suresh Follow-up and Dispositions    · Return in about 3 months (around 12/29/2020) for diabetes follow up. Medication risks/benefits/costs/interactions/alternatives discussed with patient. Advised patient to call back or return to office if symptoms worsen/change/persist.  If patient cannot reach us or should anything more severe/urgent arise he/she should proceed directly to the nearest emergency department. Discussed expected course/resolution/complications of diagnosis in detail with patient. Patient given a written after visit summary which includes diagnoses, current medications and vitals. Patient expressed understanding with the diagnosis and plan.     Written by liliam Dasilva, as dictated by Sharee Luo Bryce Trevino M.D.    5:24 PM - 5:35 PM    Total time spent with the patient 11 minutes, greater than 50% of time spent counseling patient.

## 2020-09-30 VITALS
BODY MASS INDEX: 36.68 KG/M2 | RESPIRATION RATE: 16 BRPM | HEART RATE: 76 BPM | HEIGHT: 68 IN | DIASTOLIC BLOOD PRESSURE: 82 MMHG | OXYGEN SATURATION: 98 % | SYSTOLIC BLOOD PRESSURE: 136 MMHG | WEIGHT: 242 LBS | TEMPERATURE: 98 F

## 2020-10-08 ENCOUNTER — TRANSCRIBE ORDER (OUTPATIENT)
Dept: SCHEDULING | Age: 60
End: 2020-10-08

## 2020-10-08 DIAGNOSIS — Z12.31 VISIT FOR SCREENING MAMMOGRAM: Primary | ICD-10-CM

## 2020-10-30 ENCOUNTER — HOSPITAL ENCOUNTER (OUTPATIENT)
Dept: MAMMOGRAPHY | Age: 60
Discharge: HOME | End: 2020-10-30
Attending: FAMILY MEDICINE
Payer: MEDICARE

## 2020-10-30 DIAGNOSIS — Z12.31 VISIT FOR SCREENING MAMMOGRAM: ICD-10-CM

## 2020-10-30 PROCEDURE — 77063 BREAST TOMOSYNTHESIS BI: CPT

## 2020-11-15 DIAGNOSIS — I10 ESSENTIAL HYPERTENSION: ICD-10-CM

## 2020-11-16 DIAGNOSIS — E78.2 MIXED HYPERLIPIDEMIA: ICD-10-CM

## 2020-11-16 NOTE — TELEPHONE ENCOUNTER
Last Visit: 9/29/20  MD Gamboa  Next Appointment: 12/28/20  MD Gamboa  Previous Refill Encounter(s): 4/25/20  90 + 1    Requested Prescriptions     Pending Prescriptions Disp Refills    simvastatin (ZOCOR) 20 mg tablet 90 Tab 1     Sig: Take 1 Tab by mouth nightly.

## 2020-11-17 RX ORDER — SIMVASTATIN 20 MG/1
20 TABLET, FILM COATED ORAL
Qty: 90 TAB | Refills: 1 | Status: SHIPPED | OUTPATIENT
Start: 2020-11-17 | End: 2021-02-24 | Stop reason: SDUPTHER

## 2020-11-17 RX ORDER — LOSARTAN POTASSIUM 100 MG/1
TABLET ORAL
Qty: 90 TAB | Refills: 0 | Status: SHIPPED | OUTPATIENT
Start: 2020-11-17 | End: 2021-02-25

## 2020-12-28 ENCOUNTER — TELEPHONE (OUTPATIENT)
Dept: FAMILY MEDICINE CLINIC | Age: 60
End: 2020-12-28

## 2020-12-28 NOTE — TELEPHONE ENCOUNTER
TC to Patient. ID verified. Advises son in law and daughter tested positive 10 days ago fever free for 3 days. Kids are symptom free. Wants to know if she can keep the kids now. Advised as long a everyone has been fever free for 3 days and no sx currently. Yes.

## 2020-12-28 NOTE — TELEPHONE ENCOUNTER
Pt came in contact with COVID positive person and has been in quarantine for past 12 days. Wants nurse to call her back to see what she needs to do or when she can be seen.     BCB: [phone]

## 2021-01-03 DIAGNOSIS — E11.9 CONTROLLED TYPE 2 DIABETES MELLITUS WITHOUT COMPLICATION, WITH LONG-TERM CURRENT USE OF INSULIN (HCC): ICD-10-CM

## 2021-01-03 DIAGNOSIS — Z79.4 CONTROLLED TYPE 2 DIABETES MELLITUS WITHOUT COMPLICATION, WITH LONG-TERM CURRENT USE OF INSULIN (HCC): ICD-10-CM

## 2021-01-05 RX ORDER — METFORMIN HYDROCHLORIDE 500 MG/1
TABLET, EXTENDED RELEASE ORAL
Qty: 180 TAB | Refills: 0 | Status: SHIPPED | OUTPATIENT
Start: 2021-01-05 | End: 2021-04-12

## 2021-01-10 DIAGNOSIS — E11.9 CONTROLLED TYPE 2 DIABETES MELLITUS WITHOUT COMPLICATION, WITHOUT LONG-TERM CURRENT USE OF INSULIN (HCC): ICD-10-CM

## 2021-01-12 RX ORDER — SEMAGLUTIDE 1.34 MG/ML
INJECTION, SOLUTION SUBCUTANEOUS
Qty: 4.5 ML | Refills: 3 | Status: SHIPPED | OUTPATIENT
Start: 2021-01-12 | End: 2022-04-27

## 2021-01-22 ENCOUNTER — PATIENT MESSAGE (OUTPATIENT)
Dept: FAMILY MEDICINE CLINIC | Age: 61
End: 2021-01-22

## 2021-01-22 RX ORDER — FAMOTIDINE 20 MG/1
20 TABLET, FILM COATED ORAL DAILY
Qty: 90 TAB | Refills: 1 | Status: SHIPPED | OUTPATIENT
Start: 2021-01-22 | End: 2021-08-05

## 2021-01-22 NOTE — TELEPHONE ENCOUNTER
From: Breanne Becerra  To: Patricia Chahal MD  Sent: 1/22/2021 8:30 AM EST  Subject: Prescription Question    I was given fatimidine for the gas I get from my oxempic and/or metformin but was given it by my Pain management dr can u plz add this to my prescribed medications from dr Ayaka Lala and refill it.  Im getting pain from gas and bloating again

## 2021-01-22 NOTE — TELEPHONE ENCOUNTER
Please contact patient for scheduling. Thanks      Historical medication: Pepcid 20 mg      Last visit 09/29/2020 MD Eriberto Tran   Next appointment Pt canceled 12/28/2020 - Nothing rescheduled       Requested Prescriptions     Pending Prescriptions Disp Refills    famotidine (PEPCID) 20 mg tablet 90 Tab 1     Sig: Take 1 Tab by mouth daily.  Pain Management is prescribing the medication

## 2021-01-28 LAB — SARS-COV-2 AB, IGG, CORG1M: NEGATIVE

## 2021-02-24 ENCOUNTER — PATIENT MESSAGE (OUTPATIENT)
Dept: FAMILY MEDICINE CLINIC | Age: 61
End: 2021-02-24

## 2021-02-24 DIAGNOSIS — E78.2 MIXED HYPERLIPIDEMIA: ICD-10-CM

## 2021-02-24 RX ORDER — IBUPROFEN 800 MG/1
800 TABLET ORAL
Qty: 30 TAB | Refills: 0 | Status: SHIPPED | OUTPATIENT
Start: 2021-02-24 | End: 2021-03-01 | Stop reason: SDUPTHER

## 2021-02-24 RX ORDER — SIMVASTATIN 20 MG/1
20 TABLET, FILM COATED ORAL
Qty: 30 TAB | Refills: 0 | Status: SHIPPED | OUTPATIENT
Start: 2021-02-24 | End: 2021-02-25

## 2021-02-25 DIAGNOSIS — I10 ESSENTIAL HYPERTENSION: ICD-10-CM

## 2021-02-25 DIAGNOSIS — E78.2 MIXED HYPERLIPIDEMIA: ICD-10-CM

## 2021-02-25 RX ORDER — LOSARTAN POTASSIUM 100 MG/1
TABLET ORAL
Qty: 90 TAB | Refills: 0 | Status: SHIPPED | OUTPATIENT
Start: 2021-02-25 | End: 2021-05-10 | Stop reason: SDUPTHER

## 2021-02-25 RX ORDER — SIMVASTATIN 20 MG/1
TABLET, FILM COATED ORAL
Qty: 90 TAB | Refills: 0 | Status: SHIPPED | OUTPATIENT
Start: 2021-02-25 | End: 2021-06-06

## 2021-02-25 NOTE — TELEPHONE ENCOUNTER
TC to Patient. ID verified. Calling to discuss need for appointment and labs. Patient advises she just had labs with another Provider. Advised she may fax them to us and we can see what else would be needed. Patient advised since she is Diabetic we should see her every 3 months but sometimes DR. Gamboa will do 6 months depending on labs. Advised we have not seen her since 12/2020. Patient scheduled for VV for Monday insists she will not fill a 30 day RX must have 90 day. Patient provided with fax # to fax labs for Mondays visit. Advised if we need additional labs she will need to come in for lab appointment.

## 2021-02-25 NOTE — TELEPHONE ENCOUNTER
From: Candance Brown, LPN  To: Jesse Mensah  Sent: 2/24/2021 9:17 AM EST  Subject: Follow up needed    Please schedule appointment in the next 30 days for follow up and labs.

## 2021-03-01 ENCOUNTER — VIRTUAL VISIT (OUTPATIENT)
Dept: FAMILY MEDICINE CLINIC | Age: 61
End: 2021-03-01
Payer: MEDICARE

## 2021-03-01 DIAGNOSIS — I10 ESSENTIAL HYPERTENSION: ICD-10-CM

## 2021-03-01 DIAGNOSIS — E66.01 SEVERE OBESITY (HCC): ICD-10-CM

## 2021-03-01 DIAGNOSIS — E78.2 MIXED HYPERLIPIDEMIA: ICD-10-CM

## 2021-03-01 DIAGNOSIS — G89.29 CHRONIC BILATERAL LOW BACK PAIN WITHOUT SCIATICA: ICD-10-CM

## 2021-03-01 DIAGNOSIS — E11.9 CONTROLLED TYPE 2 DIABETES MELLITUS WITHOUT COMPLICATION, WITHOUT LONG-TERM CURRENT USE OF INSULIN (HCC): Primary | ICD-10-CM

## 2021-03-01 DIAGNOSIS — M54.50 CHRONIC BILATERAL LOW BACK PAIN WITHOUT SCIATICA: ICD-10-CM

## 2021-03-01 PROCEDURE — 3017F COLORECTAL CA SCREEN DOC REV: CPT | Performed by: FAMILY MEDICINE

## 2021-03-01 PROCEDURE — G0463 HOSPITAL OUTPT CLINIC VISIT: HCPCS | Performed by: FAMILY MEDICINE

## 2021-03-01 PROCEDURE — 99214 OFFICE O/P EST MOD 30 MIN: CPT | Performed by: FAMILY MEDICINE

## 2021-03-01 PROCEDURE — G9717 DOC PT DX DEP/BP F/U NT REQ: HCPCS | Performed by: FAMILY MEDICINE

## 2021-03-01 PROCEDURE — 2022F DILAT RTA XM EVC RTNOPTHY: CPT | Performed by: FAMILY MEDICINE

## 2021-03-01 PROCEDURE — G9899 SCRN MAM PERF RSLTS DOC: HCPCS | Performed by: FAMILY MEDICINE

## 2021-03-01 PROCEDURE — G8756 NO BP MEASURE DOC: HCPCS | Performed by: FAMILY MEDICINE

## 2021-03-01 PROCEDURE — G8427 DOCREV CUR MEDS BY ELIG CLIN: HCPCS | Performed by: FAMILY MEDICINE

## 2021-03-01 PROCEDURE — 3046F HEMOGLOBIN A1C LEVEL >9.0%: CPT | Performed by: FAMILY MEDICINE

## 2021-03-01 RX ORDER — IBUPROFEN 800 MG/1
800 TABLET ORAL
Qty: 90 TAB | Refills: 1 | Status: SHIPPED | OUTPATIENT
Start: 2021-03-01 | End: 2021-12-07

## 2021-03-01 NOTE — PROGRESS NOTES
Bala Serrano is a 64 y.o. female who was seen by synchronous (real-time) audio-video technology on 3/1/2021. Consent:  Services were provided through a video synchronous discussion virtually to substitute for in-person appointment. She and/or her healthcare decision maker is aware that this patient-initiated Telehealth encounter is a billable service, with coverage as determined by her insurance carrier. She is aware that she may receive a bill and has provided verbal consent to proceed: Yes    I was in the office while conducting this encounter. Subjective:   Bala Serrano was seen for Follow-up    Hyperlipidemia: Lipid panel on 7/7/2020 notable for total cholesterol 179, HDL 60, , and triglycerides 237. Pt continues with Zocor 20 mg/day. Hypertension: Pt continues with Cozaar 100 mg/day. Pt has been filling out a blood pressure flow chart. She will bring it in during her next visit. Pt requests a refill of their medication, which I have granted. DM2: Pt's A1c value was 6.6 on 9/29/2020. Pt continues with Ozempic injections and metformin  mg/BID. Anxiety: Pt continue with Xanax 0.25 mg/day PRN for anxiety. Obesity: I have reviewed/discussed the above normal BMI with the patient. Pt and I reviewed her most recent labwork during today's visit. She had provided the office with a hard copy. Prior to Admission medications    Medication Sig Start Date End Date Taking? Authorizing Provider   ibuprofen (MOTRIN) 800 mg tablet Take 1 Tab by mouth every eight (8) hours as needed for Pain. 3/1/21  Yes Marisela Coleman MD   simvastatin (ZOCOR) 20 mg tablet TAKE 1 TABLET BY MOUTH EVERY NIGHT 2/25/21  Yes Marisela Coleman MD   losartan (COZAAR) 100 mg tablet TAKE 1 TABLET BY MOUTH DAILY 2/25/21  Yes Marisela Coleman MD   famotidine (PEPCID) 20 mg tablet Take 1 Tab by mouth daily.  Pain Management is prescribing the medication 1/22/21  Yes Mariseal Coleman MD   Ozempic 0.25 mg or 0.5 mg(2 mg/1.5 mL) sub-q pen INJECT 0.25MG UNDERNEATH THE SKIN EVERY 7 DAYS 1/12/21  Yes Meredith Hardin MD   metFORMIN ER (GLUCOPHAGE XR) 500 mg tablet TAKE 1 TABLET BY MOUTH TWICE DAILY 1/5/21  Yes Meredith Hardin MD   levocetirizine (XYZAL) 5 mg tablet TAKE 1 TABLET BY MOUTH ONCE DAILY 6/29/20  Yes Meredith Hardin MD   fluticasone propionate (FLONASE) 50 mcg/actuation nasal spray 2 Sprays by Both Nostrils route daily. 3/31/20  Yes Meredith Hardin MD   ALPRAZolam (XANAX) 0.25 mg tablet Take 1 Tab by mouth nightly as needed for Anxiety. Max Daily Amount: 0.25 mg. 5/31/19  Yes Meredith Hardin MD   glucose blood VI test strips (ASCENSIA AUTODISC VI, ONE TOUCH ULTRA TEST VI) strip Check glucose twice daily 4/2/19  Yes Meredith Hardin MD   Nebulizer & Compressor machine Nebulizer machine x 1 Nebulizer Kit 12/30/18  Yes Chance Gamboa MD   albuterol (PROVENTIL VENTOLIN) 2.5 mg /3 mL (0.083 %) nebulizer solution 3 mL by Nebulization route every four (4) hours as needed for Wheezing. 12/15/18  Yes Meredith Hardin MD   albuterol (PROVENTIL HFA, VENTOLIN HFA, PROAIR HFA) 90 mcg/actuation inhaler Take 2 Puffs by inhalation every six (6) hours as needed for Wheezing. 5/16/16  Yes Meredith Hardin MD   ibuprofen (MOTRIN) 800 mg tablet Take 1 Tab by mouth every eight (8) hours as needed for Pain.  2/24/21 3/1/21  Meredith Hardin MD     Allergies   Allergen Reactions    Vicodin [Hydrocodone-Acetaminophen] Hives     Past Medical History:   Diagnosis Date    Adverse effect of anesthesia     slow to wake up at dentist office    Anxiety     Arthritis     Chronic pain     3 herniated discs in lower back    Depression     Diabetes ([address]     type 2    GERD (gastroesophageal reflux disease)     High cholesterol     Hypertension     MRSA infection 2008    left leg treated with antibiotics at [facility]    Other ill-defined conditions(164.64)     herniated disc to back    PUD (peptic ulcer disease)     Seasonal allergies     Sleep apnea     kerwin     Past Surgical History:   Procedure Laterality Date    COLONOSCOPY N/A 2017    COLONOS[facility] by Winn[facility] at [facility] ENDOSCOPY    HX GYN      tubal ligation, hysterectomy    HX HYSTERECTOMY      HX ORTHOPAEDIC      left hand ligament removed    HX OTHER SURGICAL      corticosteroid injections - back    HX OTHER SURGICAL  2019    Left lower parathyroidectomy with intraoperative PTH monitoring. -[facility]-Dr. Danelle Saldaña    HX TONSILLECTOMY       Family History   Problem Relation Age of Onset    Hypertension Mother     Diabetes Father     Hypertension Sister     No Known Problems Brother     Lung Disease Sister         copd    Anesth Problems Neg Hx      Social History     Tobacco Use    Smoking status: Former Smoker     Packs/day: 1.00     Years: 30.00     Pack years: 30.00     Types: Cigarettes     Quit date: 10/30/2012     Years since quittin.3    Smokeless tobacco: Never Used   Substance Use Topics    Alcohol use: Yes     Alcohol/week: 10.0 standard drinks     Types: 10 Glasses of wine per week     Comment: 1-2 drinks/night        Review of Systems   Constitutional: Negative for chills and fever. HENT: Negative for hearing loss and tinnitus. Eyes: Negative for blurred vision and double vision. Respiratory: Negative for cough and shortness of breath. Cardiovascular: Negative for chest pain and palpitations. Gastrointestinal: Negative for nausea and vomiting. Genitourinary: Negative for dysuria and frequency. Musculoskeletal: Negative for back pain and falls. Skin: Negative for itching and rash. Neurological: Negative for dizziness, loss of consciousness and headaches. Endo/Heme/Allergies: Negative. Psychiatric/Behavioral: Negative for depression. The patient is not nervous/anxious.         PHYSICAL EXAMINATION:  Vital Signs: (As obtained by patient/caregiver at home)  There were no vitals taken for this visit. Constitutional: [x] Appears well-developed and well-nourished [x] No apparent distress      [] Abnormal -     Mental status: [x] Alert and awake  [x] Oriented to person/place/time [x] Able to follow commands    [] Abnormal -     Eyes:   EOM    [x]  Normal    [] Abnormal -   Sclera  [x]  Normal    [] Abnormal -          Discharge [x]  None visible   [] Abnormal -     HENT: [x] Normocephalic, atraumatic  [] Abnormal -   [x] Mouth/Throat: Mucous membranes are moist    External Ears [x] Normal  [] Abnormal -    Neck: [x] No visualized mass [] Abnormal -     Pulmonary/Chest: [x] Respiratory effort normal   [x] No visualized signs of difficulty breathing or respiratory distress        [] Abnormal -      Musculoskeletal:   [x] Normal gait with no signs of ataxia         [x] Normal range of motion of neck        [] Abnormal -     Neurological:        [x] No Facial Asymmetry (Cranial nerve 7 motor function) (limited exam due to video visit)          [x] No gaze palsy        [] Abnormal -          Skin:        [x] No significant exanthematous lesions or discoloration noted on facial skin         [] Abnormal -            Psychiatric:       [x] Normal Affect [] Abnormal -        [x] No Hallucinations    Other pertinent observable physical exam findings:-    Assessment & Plan:   Diagnoses and all orders for this visit:    1. Controlled type 2 diabetes mellitus without complication, without long-term current use of insulin (HCC)  Pt's A1c value was 6.6 on 9/29/2020. Pt continues with Ozempic injections and metformin  mg/BID. Labwork ordered and will be completed when pt schedules appointment to come into clinic. Advised pt to follow proper safety precautions against COVID-19 when coming to clinic.  -     AMB POC HEMOGLOBIN A1C    2. Essential hypertension  Pt continues with Cozaar 100 mg/day. Pt has been filling out a blood pressure flow chart. She will bring it in during her next visit.  Pt requests a refill of their medication, which I have granted. 3. Mixed hyperlipidemia   Lipid panel on 7/7/2020 notable for total cholesterol 179, HDL 60, , and triglycerides 237. Pt continues with Zocor 20 mg/day. 4. Severe obesity ([address]  I have recommended the following interventions: dietary management education, guidance, and counseling, encourage exercise and monitor weight . 5. Chronic bilateral low back pain without sciatica  Pt requests a refill of their medication, which I have granted. -     ibuprofen (MOTRIN) 800 mg tablet; Take 1 Tab by mouth every eight (8) hours as needed for Pain. Follow-up and Dispositions    · Return in about 6 months (around 9/1/2021). We discussed the expected course, resolution and complications of the diagnosis(es) in detail. Medication risks, benefits, costs, interactions, and alternatives were discussed as indicated. I advised her to contact the office if her condition worsens, changes or fails to improve as anticipated. She expressed understanding with the diagnosis(es) and plan. Pursuant to the emergency declaration under the [address]  [facility] [place] [address] waiver authority and the [employer] Act, this Virtual Visit was conducted, with patient's consent, to reduce the patient's risk of exposure to COVID-19 and provide continuity of care for an established patient. Services were provided through a video synchronous discussion virtually to substitute for in-person clinic visit. Written by salina Delgado, as dictated by Erica Banda M.D.    6:04 PM - 6:22 PM    Total time spent with the patient 18 minutes, greater than 50% of time spent counseling patient.

## 2021-03-05 ENCOUNTER — TELEPHONE (OUTPATIENT)
Dept: FAMILY MEDICINE CLINIC | Age: 61
End: 2021-03-05

## 2021-03-05 NOTE — TELEPHONE ENCOUNTER
TC to Patient. ID verified. Calling to request Patient come by clinic at 12:45 on Monday so I can do a AMB POC A1c    Patient advises she has been having a cough for about 2 weeks has been using Mucinex and Cough Syrup  And Nebulizer PRN. Patient advises she is starting to feel better but still has a rattle in chest.    Patient advised to continue Plain Mucinex and Increase Nebulizer Treatments to every 4 hours while awake for 2 days. Patient has never changed the Tubing to her Machine. She does not wash out the Tubing. Will discuss proper Nebulizer Care when I see her on Monday.  Will request Nebulizer Kits refills

## 2021-03-08 ENCOUNTER — CLINICAL SUPPORT (OUTPATIENT)
Dept: FAMILY MEDICINE CLINIC | Age: 61
End: 2021-03-08
Payer: MEDICARE

## 2021-03-08 DIAGNOSIS — J45.20 MILD INTERMITTENT ASTHMA WITHOUT COMPLICATION: ICD-10-CM

## 2021-03-08 DIAGNOSIS — E11.9 CONTROLLED TYPE 2 DIABETES MELLITUS WITHOUT COMPLICATION, WITH LONG-TERM CURRENT USE OF INSULIN (HCC): Primary | ICD-10-CM

## 2021-03-08 DIAGNOSIS — Z79.4 CONTROLLED TYPE 2 DIABETES MELLITUS WITHOUT COMPLICATION, WITH LONG-TERM CURRENT USE OF INSULIN (HCC): Primary | ICD-10-CM

## 2021-03-08 LAB — HBA1C MFR BLD HPLC: 7.8 %

## 2021-03-08 PROCEDURE — 83036 HEMOGLOBIN GLYCOSYLATED A1C: CPT | Performed by: FAMILY MEDICINE

## 2021-03-08 RX ORDER — ALBUTEROL SULFATE 0.83 MG/ML
2.5 SOLUTION RESPIRATORY (INHALATION)
Qty: 25 NEBULE | Refills: 1 | Status: SHIPPED | OUTPATIENT
Start: 2021-03-08 | End: 2021-03-11 | Stop reason: SDUPTHER

## 2021-03-11 DIAGNOSIS — J45.20 MILD INTERMITTENT ASTHMA WITHOUT COMPLICATION: ICD-10-CM

## 2021-03-12 DIAGNOSIS — J45.20 MILD INTERMITTENT ASTHMA WITHOUT COMPLICATION: ICD-10-CM

## 2021-03-12 RX ORDER — ALBUTEROL SULFATE 0.83 MG/ML
2.5 SOLUTION RESPIRATORY (INHALATION)
Qty: 25 NEBULE | Refills: 1 | Status: SHIPPED | OUTPATIENT
Start: 2021-03-12

## 2021-03-14 RX ORDER — ALBUTEROL SULFATE 0.83 MG/ML
2.5 SOLUTION RESPIRATORY (INHALATION)
Qty: 25 NEBULE | Refills: 1 | Status: SHIPPED | OUTPATIENT
Start: 2021-03-14 | End: 2022-01-31 | Stop reason: ALTCHOICE

## 2021-03-14 RX ORDER — ALBUTEROL SULFATE 0.83 MG/ML
2.5 SOLUTION RESPIRATORY (INHALATION)
Qty: 25 NEBULE | Refills: 1 | Status: SHIPPED | OUTPATIENT
Start: 2021-03-14 | End: 2021-05-10 | Stop reason: SDUPTHER

## 2021-03-15 DIAGNOSIS — J45.20 MILD INTERMITTENT ASTHMA WITHOUT COMPLICATION: ICD-10-CM

## 2021-03-15 DIAGNOSIS — J45.909 ASTHMA: ICD-10-CM

## 2021-03-17 RX ORDER — ALBUTEROL SULFATE 90 UG/1
2 AEROSOL, METERED RESPIRATORY (INHALATION)
Qty: 1 INHALER | Refills: 3 | Status: SHIPPED | OUTPATIENT
Start: 2021-03-17

## 2021-03-17 RX ORDER — ALBUTEROL SULFATE 90 UG/1
AEROSOL, METERED RESPIRATORY (INHALATION)
Qty: 18 G | Refills: 0 | Status: SHIPPED | OUTPATIENT
Start: 2021-03-17 | End: 2021-05-10 | Stop reason: SDUPTHER

## 2021-03-17 RX ORDER — NEBULIZER AND COMPRESSOR
EACH MISCELLANEOUS
Qty: 1 EACH | Refills: 0 | Status: SHIPPED | OUTPATIENT
Start: 2021-03-17

## 2021-03-17 NOTE — TELEPHONE ENCOUNTER
Pt is calling stating she needs the actual nebulizer machine to put the liquid in.  Pt states she doesn't think she can wait until Monday when rodolfo is in to get this machine because of the pain she is having in her chest     It looks like the albuterol liquid has been sent 3 different times but she is needing the machine so that way she can use the liquid    Pt would like a call back asap from [facility]# [phone]

## 2021-03-17 NOTE — TELEPHONE ENCOUNTER
See note below. Patient needs the machine which is attached. Asking for call back from [place].   Thanks, Elana HUMPHRIES 3/1/21 MD Alfonso Soto

## 2021-03-26 ENCOUNTER — VIRTUAL VISIT (OUTPATIENT)
Dept: FAMILY MEDICINE CLINIC | Age: 61
End: 2021-03-26
Payer: MEDICARE

## 2021-03-26 DIAGNOSIS — J40 SINOBRONCHITIS: Primary | ICD-10-CM

## 2021-03-26 DIAGNOSIS — J45.901 ACUTE BRONCHITIS WITH ASTHMA WITH ACUTE EXACERBATION: ICD-10-CM

## 2021-03-26 DIAGNOSIS — J32.9 SINOBRONCHITIS: Primary | ICD-10-CM

## 2021-03-26 DIAGNOSIS — J20.9 ACUTE BRONCHITIS WITH ASTHMA WITH ACUTE EXACERBATION: ICD-10-CM

## 2021-03-26 PROCEDURE — G9899 SCRN MAM PERF RSLTS DOC: HCPCS | Performed by: FAMILY MEDICINE

## 2021-03-26 PROCEDURE — G0463 HOSPITAL OUTPT CLINIC VISIT: HCPCS | Performed by: FAMILY MEDICINE

## 2021-03-26 PROCEDURE — G9717 DOC PT DX DEP/BP F/U NT REQ: HCPCS | Performed by: FAMILY MEDICINE

## 2021-03-26 PROCEDURE — 3017F COLORECTAL CA SCREEN DOC REV: CPT | Performed by: FAMILY MEDICINE

## 2021-03-26 PROCEDURE — G8427 DOCREV CUR MEDS BY ELIG CLIN: HCPCS | Performed by: FAMILY MEDICINE

## 2021-03-26 PROCEDURE — G8756 NO BP MEASURE DOC: HCPCS | Performed by: FAMILY MEDICINE

## 2021-03-26 PROCEDURE — 99213 OFFICE O/P EST LOW 20 MIN: CPT | Performed by: FAMILY MEDICINE

## 2021-03-26 RX ORDER — BENZONATATE 200 MG/1
200 CAPSULE ORAL
Qty: 21 CAP | Refills: 0 | Status: SHIPPED | OUTPATIENT
Start: 2021-03-26 | End: 2021-04-02

## 2021-03-26 RX ORDER — AZITHROMYCIN 250 MG/1
TABLET, FILM COATED ORAL
Qty: 6 TAB | Refills: 0 | Status: SHIPPED | OUTPATIENT
Start: 2021-03-26 | End: 2021-03-31

## 2021-03-26 RX ORDER — PREDNISONE 20 MG/1
40 TABLET ORAL
Qty: 10 TAB | Refills: 0 | Status: SHIPPED | OUTPATIENT
Start: 2021-03-26 | End: 2021-03-31

## 2021-04-01 DIAGNOSIS — J30.1 ALLERGIC RHINITIS DUE TO POLLEN, UNSPECIFIED SEASONALITY: ICD-10-CM

## 2021-04-01 RX ORDER — FLUTICASONE PROPIONATE 50 MCG
2 SPRAY, SUSPENSION (ML) NASAL DAILY
Qty: 3 BOTTLE | Refills: 1 | Status: SHIPPED | OUTPATIENT
Start: 2021-04-01

## 2021-04-01 NOTE — TELEPHONE ENCOUNTER
[facility]21 MD Gamboa  Next Appointment: Not scheduled  Previous Refill Encounter(s): 3/31/20 3 + 1    Requested Prescriptions     Pending Prescriptions Disp Refills    fluticasone propionate (FLONASE) 50 mcg/actuation nasal spray 3 Bottle 1     Si Sprays by Both Nostrils route daily.

## 2021-04-12 DIAGNOSIS — Z79.4 CONTROLLED TYPE 2 DIABETES MELLITUS WITHOUT COMPLICATION, WITH LONG-TERM CURRENT USE OF INSULIN (HCC): ICD-10-CM

## 2021-04-12 DIAGNOSIS — E11.9 CONTROLLED TYPE 2 DIABETES MELLITUS WITHOUT COMPLICATION, WITH LONG-TERM CURRENT USE OF INSULIN (HCC): ICD-10-CM

## 2021-04-12 RX ORDER — METFORMIN HYDROCHLORIDE 500 MG/1
TABLET, EXTENDED RELEASE ORAL
Qty: 180 TAB | Refills: 0 | Status: SHIPPED | OUTPATIENT
Start: 2021-04-12 | End: 2021-07-07

## 2021-05-10 ENCOUNTER — OFFICE VISIT (OUTPATIENT)
Dept: FAMILY MEDICINE CLINIC | Age: 61
End: 2021-05-10
Payer: MEDICARE

## 2021-05-10 VITALS
OXYGEN SATURATION: 95 % | RESPIRATION RATE: 18 BRPM | SYSTOLIC BLOOD PRESSURE: 134 MMHG | BODY MASS INDEX: 36.31 KG/M2 | HEIGHT: 68 IN | TEMPERATURE: 98.9 F | DIASTOLIC BLOOD PRESSURE: 88 MMHG | WEIGHT: 239.6 LBS | HEART RATE: 78 BPM

## 2021-05-10 DIAGNOSIS — H66.001 ACUTE SUPPURATIVE OTITIS MEDIA OF RIGHT EAR WITHOUT SPONTANEOUS RUPTURE OF TYMPANIC MEMBRANE, RECURRENCE NOT SPECIFIED: ICD-10-CM

## 2021-05-10 DIAGNOSIS — I10 ESSENTIAL HYPERTENSION: Primary | ICD-10-CM

## 2021-05-10 DIAGNOSIS — J30.1 ALLERGIC RHINITIS DUE TO POLLEN, UNSPECIFIED SEASONALITY: ICD-10-CM

## 2021-05-10 PROCEDURE — G9899 SCRN MAM PERF RSLTS DOC: HCPCS | Performed by: FAMILY MEDICINE

## 2021-05-10 PROCEDURE — G8417 CALC BMI ABV UP PARAM F/U: HCPCS | Performed by: FAMILY MEDICINE

## 2021-05-10 PROCEDURE — 3017F COLORECTAL CA SCREEN DOC REV: CPT | Performed by: FAMILY MEDICINE

## 2021-05-10 PROCEDURE — G8427 DOCREV CUR MEDS BY ELIG CLIN: HCPCS | Performed by: FAMILY MEDICINE

## 2021-05-10 PROCEDURE — G8752 SYS BP LESS 140: HCPCS | Performed by: FAMILY MEDICINE

## 2021-05-10 PROCEDURE — G0463 HOSPITAL OUTPT CLINIC VISIT: HCPCS | Performed by: FAMILY MEDICINE

## 2021-05-10 PROCEDURE — 99214 OFFICE O/P EST MOD 30 MIN: CPT | Performed by: FAMILY MEDICINE

## 2021-05-10 PROCEDURE — G8754 DIAS BP LESS 90: HCPCS | Performed by: FAMILY MEDICINE

## 2021-05-10 PROCEDURE — G9717 DOC PT DX DEP/BP F/U NT REQ: HCPCS | Performed by: FAMILY MEDICINE

## 2021-05-10 RX ORDER — LOSARTAN POTASSIUM 100 MG/1
TABLET ORAL
Qty: 90 TAB | Refills: 0 | Status: SHIPPED | OUTPATIENT
Start: 2021-05-10 | End: 2021-08-16 | Stop reason: SDUPTHER

## 2021-05-10 RX ORDER — MONTELUKAST SODIUM 10 MG/1
10 TABLET ORAL DAILY
Qty: 90 TAB | Refills: 0 | Status: ON HOLD | OUTPATIENT
Start: 2021-05-10 | End: 2022-01-21

## 2021-05-10 RX ORDER — AMOXICILLIN AND CLAVULANATE POTASSIUM 875; 125 MG/1; MG/1
1 TABLET, FILM COATED ORAL EVERY 12 HOURS
Qty: 20 TAB | Refills: 0 | Status: SHIPPED | OUTPATIENT
Start: 2021-05-10 | End: 2021-05-20

## 2021-05-10 NOTE — PROGRESS NOTES
Chief Complaint   Patient presents with    Ear Fullness     with some right ear pain    Other     Patient is having some post nasal drip       1. Have you been to the ER, urgent care clinic since your last visit? Hospitalized since your last visit? No    2. Have you seen or consulted any other health care providers outside of the [address] [place] [address] since your last visit? Include any pap smears or colon screening.  No    3 most recent PHQ Screens 3/1/2021   Little interest or pleasure in doing things Not at all   Feeling down, depressed, irritable, or hopeless Not at all   Total Score PHQ 2 0   Trouble falling or staying asleep, or sleeping too much -   Feeling tired or having little energy -   Poor appetite, weight loss, or overeating -   Feeling bad about yourself - or that you are a failure or have let yourself or your family down -   Trouble concentrating on things such as school, work, reading, or watching TV -   Moving or speaking so slowly that other people could have noticed; or the opposite being so fidgety that others notice -   Thoughts of being better off dead, or hurting yourself in some way -   PHQ 9 Score -   How difficult have these problems made it for you to do your work, take care of your home and get along with others -

## 2021-05-10 NOTE — PROGRESS NOTES
[facility] Note    Assessment/ Plan:   Diagnoses and all orders for this visit:    1. Essential hypertension  -     METABOLIC PANEL, COMPREHENSIVE; Future  -     losartan (COZAAR) 100 mg tablet; TAKE 1 TABLET BY MOUTH DAILY    2. Acute suppurative otitis media of right ear without spontaneous rupture of tympanic membrane, recurrence not specified  -     amoxicillin-clavulanate (AUGMENTIN) 875-125 mg per tablet; Take 1 Tab by mouth every twelve (12) hours for 10 days. 3. Allergic rhinitis due to pollen, unspecified seasonality  -     montelukast (SINGULAIR) 10 mg tablet; Take 1 Tab by mouth daily. Recommendations based on history, physical exam and review of pertinent labs, studies and medications:   Blood pressure is well controlled. Continue current regimen. DASH Diet recommended. Recheck in office in 1 month. Elevated creatinine present since 2019. Concern for CKD3 in setting of HTNand DMII. Need lab to monitor. Will refill medication. Left ear infection with allergies. May be allergy mediated vs bacterial ear infection but pain out of proportion with typical allergies. Add Singulair and trial amoxicillin to treat ear infection. Educated patient on red flag symptoms to warrant return to clinic or emergency room visit. I have discussed the diagnosis with the patient and the intended plan as seen in the above orders. The patient has been offered or received an after-visit summary and questions were answered concerning future plans. I have discussed medication side effects and warnings with the patient as well. Follow-up and Dispositions    · Return in about 3 months (around 8/10/2021).        Subjective:     Chief Complaint   Patient presents with    Ear Fullness     with some right ear pain    Other     Patient is having some post nasal drip     Faith Mccray is a 64y.o. year old female who presents for evaluation of the following:    Ear Fullness: Onset 2 weeks   Tx: otc sinus medicine  Recent antibtc t and steroid treatmetn for chest congestion with ipmroved but not resolved sym  Ringin in ear after exaination today    Hypertension with DMII:  Chronic. Home monitoring: None  Treatment:   Key CAD CHF Meds             simvastatin (ZOCOR) 20 mg tablet (Taking) TAKE 1 TABLET BY MOUTH EVERY NIGHT    losartan (COZAAR) 100 mg tablet (Taking) TAKE 1 TABLET BY MOUTH DAILY      Not adhering to strict low salt diet  Complications unknown  Co-morbidities: Obesity  BP Readings from Last 3 Encounters:   05/10/21 134/88   09/30/20 136/82   02/07/20 138/80         Review of Systems   Pertinent positives and negative per HPI. All other systems  reviewed are negative for a Comprehensive ROS (10+). Past medical history, social history, family history reviewed. Medications reconciled. Objective:     Vitals:    05/10/21 1615   BP: 134/88   Pulse: 78   Resp: 18   Temp: 98.9 °F (37.2 °C)   TempSrc: Temporal   SpO2: 95%   Weight: 239 lb 9.6 oz (108.7 kg)   Height: 5' 8\" (1.727 m)       Physical Examination:  General: Alert, cooperative, no distress, appears stated age. Obese  Eyes: Conjunctivae clear. Pupils equally round and reactive to light, Extraocular muscles intact. Ears: Cerumen in right ear canal limiting exam  - Right TM with air fluid level and erythema  - Left TM clear  Nose: Nares normal. Septum midline. Mucosa normal. No drainage or sinus tenderness. Mouth/Throat: Lips, mucosa, and tongue normal. No oropharyngeal erythema. No tonsillar enlargement or exudate. Neck: Supple, symmetrical, trachea midline, no adenopathy. No thyroid enlargement/tenderness/nodules  Respiratory: Breathing comfortably, in no acute respiratory distress. Clear to auscultation bilaterally. Normal inspiratory and expiratory ratio. Cardiovascular: Regular rate and rhythm, S1, S2 normal, no murmur, click, rub or gallop.   -Extremities no edema.  Pulses 2+ and symmetric radial and dorsalis pedis   Abdomen: Soft, non-tender, not distended. Bowel sounds normal  MSK: Extremities normal appearing, atraumatic, no effusion. Gait steady and unassisted. Skin: Skin color, texture, turgor normal. No rashes or lesions on exposed skin. Lymph nodes: Cervical, supraclavicular nodes normal.  Neurologic: Cranial nerves II-XII intact. Strength 5/5 grossly. Sensation and reflexes normal throughout.   Psychiatric: Affect euthymic,       Signed,    Raghu Gallego MD  5/10/2021

## 2021-05-10 NOTE — PATIENT INSTRUCTIONS
DASH Diet: Care Instructions  Your Care Instructions     The DASH diet is an eating plan that can help lower your blood pressure. DASH stands for Dietary Approaches to Stop Hypertension. Hypertension is high blood pressure. The DASH diet focuses on eating foods that are high in calcium, potassium, and magnesium. These nutrients can lower blood pressure. The foods that are highest in these nutrients are fruits, vegetables, low-fat dairy products, nuts, seeds, and legumes. But taking calcium, potassium, and magnesium supplements instead of eating foods that are high in those nutrients does not have the same effect. The DASH diet also includes whole grains, fish, and poultry. The DASH diet is one of several lifestyle changes your doctor may recommend to lower your high blood pressure. Your doctor may also want you to decrease the amount of sodium in your diet. Lowering sodium while following the DASH diet can lower blood pressure even further than just the DASH diet alone. Follow-up care is a key part of your treatment and safety. Be sure to make and go to all appointments, and call your doctor if you are having problems. It's also a good idea to know your test results and keep a list of the medicines you take. How can you care for yourself at home? Following the DASH diet  · Eat 4 to 5 servings of fruit each day. A serving is 1 medium-sized piece of fruit, ½ cup chopped or canned fruit, 1/4 cup dried fruit, or 4 ounces (½ cup) of fruit juice. Choose fruit more often than fruit juice. · Eat 4 to 5 servings of vegetables each day. A serving is 1 cup of lettuce or raw leafy vegetables, ½ cup of chopped or cooked vegetables, or 4 ounces (½ cup) of vegetable juice. Choose vegetables more often than vegetable juice. · Get 2 to 3 servings of low-fat and fat-free dairy each day. A serving is 8 ounces of milk, 1 cup of yogurt, or 1 ½ ounces of cheese. · Eat 6 to 8 servings of grains each day.  A serving is 1 slice of bread, 1 ounce of dry cereal, or ½ cup of cooked rice, pasta, or cooked cereal. Try to choose whole-grain products as much as possible. · Limit lean meat, poultry, and fish to 2 servings each day. A serving is 3 ounces, about the size of a deck of cards. · Eat 4 to 5 servings of nuts, seeds, and legumes (cooked dried beans, lentils, and split peas) each week. A serving is 1/3 cup of nuts, 2 tablespoons of seeds, or ½ cup of cooked beans or peas. · Limit fats and oils to 2 to 3 servings each day. A serving is 1 teaspoon of vegetable oil or 2 tablespoons of salad dressing. · Limit sweets and added sugars to 5 servings or less a week. A serving is 1 tablespoon jelly or jam, ½ cup sorbet, or 1 cup of lemonade. · Eat less than 2,300 milligrams (mg) of sodium a day. If you limit your sodium to 1,500 mg a day, you can lower your blood pressure even more. · Be aware that all of these are the suggested number of servings for people who eat 1,800 to 2,000 calories a day. Your recommended number of servings may be different if you need more or fewer calories. Tips for success  · Start small. Do not try to make dramatic changes to your diet all at once. You might feel that you are missing out on your favorite foods and then be more likely to not follow the plan. Make small changes, and stick with them. Once those changes become habit, add a few more changes. · Try some of the following:  ? Make it a goal to eat a fruit or vegetable at every meal and at snacks. This will make it easy to get the recommended amount of fruits and vegetables each day. ? Try yogurt topped with fruit and nuts for a snack or healthy dessert. ? Add lettuce, tomato, cucumber, and onion to sandwiches. ? Combine a ready-made pizza crust with low-fat mozzarella cheese and lots of vegetable toppings. Try using tomatoes, squash, spinach, broccoli, carrots, cauliflower, and onions. ?  Have a variety of cut-up vegetables with a low-fat dip as an appetizer instead of chips and dip. ? Sprinkle sunflower seeds or chopped almonds over salads. Or try adding chopped walnuts or almonds to cooked vegetables. ? Try some vegetarian meals using beans and peas. Add garbanzo or kidney beans to salads. Make burritos and tacos with mashed vargas beans or black beans. Where can you learn more? Go to http://www.brown.com/  Enter H967 in the search box to learn more about \"DASH Diet: Care Instructions. \"  Current as of: August 31, 2020               Content Version: 12.8  © 8351-6783 [employer]. Care instructions adapted under license by [employer] (which disclaims liability or warranty for this information). If you have questions about a medical condition or this instruction, always ask your healthcare professional. [address] any warranty or liability for your use of this information. Ear Infection (Otitis Media): Care Instructions  Overview     An ear infection may start with a cold and affect the middle ear (otitis media). It can hurt a lot. Most ear infections clear up on their own in a couple of days and do not need antibiotics. Also, antibiotics do not work against viruses, which may be the cause of your infection. Regular doses of pain relievers are the best way to reduce your fever and help you feel better. Follow-up care is a key part of your treatment and safety. Be sure to make and go to all appointments, and call your doctor if you are having problems. It's also a good idea to know your test results and keep a list of the medicines you take. How can you care for yourself at home? · Take pain medicines exactly as directed. ? If the doctor gave you a prescription medicine for pain, take it as prescribed. ? If you are not taking a prescription pain medicine, take an over-the-counter medicine, such as acetaminophen (Tylenol), ibuprofen (Advil, Motrin), or naproxen (Aleve). Read and follow all instructions on the label. ? Do not take two or more pain medicines at the same time unless the doctor told you to. Many pain medicines have acetaminophen, which is Tylenol. Too much acetaminophen (Tylenol) can be harmful. · Plan to take a full dose of pain reliever before bedtime. Getting enough sleep will help you get better. · Try a warm, moist washcloth on the ear. It may help relieve pain. · If your doctor prescribed antibiotics, take them as directed. Do not stop taking them just because you feel better. You need to take the full course of antibiotics. When should you call for help? Call your doctor now or seek immediate medical care if:    · You have new or increasing ear pain.     · You have new or increasing pus or blood draining from your ear.     · You have a fever with a stiff neck or a severe headache. Watch closely for changes in your health, and be sure to contact your doctor if:    · You have new or worse symptoms.     · You are not getting better after taking an antibiotic for 2 days. Where can you learn more? Go to http://www.gray.com/  Enter [id number] in the search box to learn more about \"Ear Infection (Otitis Media): Care Instructions. \"  Current as of: December 2, 2020               Content Version: 12.8  © 0588-2220 [employer]. Care instructions adapted under license by [employer] (which disclaims liability or warranty for this information). If you have questions about a medical condition or this instruction, always ask your healthcare professional. [place] [address] any warranty or liability for your use of this information.

## 2021-06-03 DIAGNOSIS — E78.2 MIXED HYPERLIPIDEMIA: ICD-10-CM

## 2021-06-06 RX ORDER — SIMVASTATIN 20 MG/1
TABLET, FILM COATED ORAL
Qty: 90 TABLET | Refills: 0 | Status: SHIPPED | OUTPATIENT
Start: 2021-06-06 | End: 2021-09-06

## 2021-06-25 ENCOUNTER — OFFICE VISIT (OUTPATIENT)
Dept: FAMILY MEDICINE CLINIC | Age: 61
End: 2021-06-25
Payer: MEDICARE

## 2021-06-25 VITALS
HEART RATE: 98 BPM | DIASTOLIC BLOOD PRESSURE: 70 MMHG | OXYGEN SATURATION: 98 % | WEIGHT: 239.9 LBS | TEMPERATURE: 98.3 F | HEIGHT: 68 IN | RESPIRATION RATE: 18 BRPM | BODY MASS INDEX: 36.36 KG/M2 | SYSTOLIC BLOOD PRESSURE: 140 MMHG

## 2021-06-25 DIAGNOSIS — M51.26 HERNIATED INTERVERTEBRAL DISC OF LUMBAR SPINE: ICD-10-CM

## 2021-06-25 DIAGNOSIS — M54.31 SCIATICA OF RIGHT SIDE: Primary | ICD-10-CM

## 2021-06-25 DIAGNOSIS — M51.37 DDD (DEGENERATIVE DISC DISEASE), LUMBOSACRAL: ICD-10-CM

## 2021-06-25 PROCEDURE — G9717 DOC PT DX DEP/BP F/U NT REQ: HCPCS | Performed by: NURSE PRACTITIONER

## 2021-06-25 PROCEDURE — G8754 DIAS BP LESS 90: HCPCS | Performed by: NURSE PRACTITIONER

## 2021-06-25 PROCEDURE — 3017F COLORECTAL CA SCREEN DOC REV: CPT | Performed by: NURSE PRACTITIONER

## 2021-06-25 PROCEDURE — G0463 HOSPITAL OUTPT CLINIC VISIT: HCPCS | Performed by: NURSE PRACTITIONER

## 2021-06-25 PROCEDURE — G8753 SYS BP > OR = 140: HCPCS | Performed by: NURSE PRACTITIONER

## 2021-06-25 PROCEDURE — G8417 CALC BMI ABV UP PARAM F/U: HCPCS | Performed by: NURSE PRACTITIONER

## 2021-06-25 PROCEDURE — G9899 SCRN MAM PERF RSLTS DOC: HCPCS | Performed by: NURSE PRACTITIONER

## 2021-06-25 PROCEDURE — G8427 DOCREV CUR MEDS BY ELIG CLIN: HCPCS | Performed by: NURSE PRACTITIONER

## 2021-06-25 PROCEDURE — 99214 OFFICE O/P EST MOD 30 MIN: CPT | Performed by: NURSE PRACTITIONER

## 2021-06-25 RX ORDER — OXYCODONE AND ACETAMINOPHEN 5; 325 MG/1; MG/1
1 TABLET ORAL
COMMUNITY
Start: 2021-06-18

## 2021-06-25 RX ORDER — METHYLPREDNISOLONE 4 MG/1
TABLET ORAL
Qty: 1 DOSE PACK | Refills: 0 | Status: ON HOLD | OUTPATIENT
Start: 2021-06-25 | End: 2022-01-21

## 2021-06-25 NOTE — PROGRESS NOTES
[facility] Note  Subjective:      Halley Preciado is a 64 y.o. female who presents for an acute visit with the following chief complaints. Chief Complaint   Patient presents with    Leg Swelling     Right        She complains of right leg pain. Onset was greater than 1 week ago, coming and going, overall getting worse. Associated generalized swelling of the leg, now resolved. Associated numbness and tingling down the right leg. Known DDD of lumbar spine and chronic low back pain. Treatmetn: elevated feet with little relief. Back pain is managed by pain management specialist.  She has not seen ortho in several years. Current Outpatient Medications   Medication Sig Dispense Refill    oxyCODONE-acetaminophen (PERCOCET) 5-325 mg per tablet       methylPREDNISolone (MEDROL DOSEPACK) 4 mg tablet Per package instructions 1 Dose Pack 0    simvastatin (ZOCOR) 20 mg tablet TAKE 1 TABLET BY MOUTH EVERY NIGHT 90 Tablet 0    nystatin (MYCOSTATIN) topical cream Apply  to affected area two (2) times a day. 60 g 0    montelukast (SINGULAIR) 10 mg tablet Take 1 Tab by mouth daily. 90 Tab 0    losartan (COZAAR) 100 mg tablet TAKE 1 TABLET BY MOUTH DAILY 90 Tab 0    metFORMIN ER (GLUCOPHAGE XR) 500 mg tablet TAKE 1 TABLET BY MOUTH TWICE DAILY 180 Tab 0    fluticasone propionate (FLONASE) 50 mcg/actuation nasal spray 2 Sprays by Both Nostrils route daily. 3 Bottle 1    albuterol (PROVENTIL HFA, VENTOLIN HFA, PROAIR HFA) 90 mcg/actuation inhaler Take 2 Puffs by inhalation every six (6) hours as needed for Wheezing. 1 Inhaler 3    Nebulizer & Compressor machine Nebulizer machine x 1 Nebulizer Kit 1 Each 0    albuterol (PROVENTIL VENTOLIN) 2.5 mg /3 mL (0.083 %) nebu 3 mL by Nebulization route every four (4) hours as needed for Wheezing.  25 Nebule 1    albuterol (PROVENTIL VENTOLIN) 2.5 mg /3 mL (0.083 %) nebu 3 mL by Nebulization route every four (4) hours as needed for Wheezing. 25 Nebule 1    Nebulizer Accessories kit Use kit with Nebulizer every 4 hours as needed  Wash out kit after each use and air dry  Change kit every month when in use. 1 Kit 5    ibuprofen (MOTRIN) 800 mg tablet Take 1 Tab by mouth every eight (8) hours as needed for Pain. 90 Tab 1    famotidine (PEPCID) 20 mg tablet Take 1 Tab by mouth daily. Pain Management is prescribing the medication 90 Tab 1    Ozempic 0.25 mg or 0.5 mg(2 mg/1.5 mL) sub-q pen INJECT 0.25MG UNDERNEATH THE SKIN EVERY 7 DAYS 4.5 mL 3    levocetirizine (XYZAL) 5 mg tablet TAKE 1 TABLET BY MOUTH ONCE DAILY 90 Tab 5    ALPRAZolam (XANAX) 0.25 mg tablet Take 1 Tab by mouth nightly as needed for Anxiety. Max Daily Amount: 0.25 mg. 20 Tab 0    glucose blood VI test strips (ASCENSIA AUTODISC VI, ONE TOUCH ULTRA TEST VI) strip Check glucose twice daily 100 Strip 11     Allergies   Allergen Reactions    Vicodin [Hydrocodone-Acetaminophen] Hives     Past Medical History:   Diagnosis Date    Adverse effect of anesthesia     slow to wake up at dentist office    Anxiety     Arthritis     Chronic pain     3 herniated discs in lower back    Depression     Diabetes (HCC)     type 2    GERD (gastroesophageal reflux disease)     High cholesterol     Hypertension     MRSA infection 2008    left leg treated with antibiotics at [facility]    Other ill-defined conditions(799.89)     herniated disc to back    PUD (peptic ulcer disease)     Seasonal allergies     Sleep apnea     kerwin     Past Surgical History:   Procedure Laterality Date    COLONOSCOPY N/A 5/31/2017    COLONOSCOPY performed by Aurelio Davis MD at [facility] ENDOSCOPY    HX GYN      tubal ligation, hysterectomy    HX HYSTERECTOMY      HX ORTHOPAEDIC      left hand ligament removed    HX OTHER SURGICAL      corticosteroid injections - back    HX OTHER SURGICAL  08/06/2019    Left lower parathyroidectomy with intraoperative PTH monitoring. -[facility]-Dr. Conor Camarena    HX TONSILLECTOMY           ROS:   Complete review of systems was reviewed with pertinent information listed in HPI. Review of Systems   Constitutional: Negative for chills, diaphoresis, fever, malaise/fatigue and weight loss. HENT: Negative for tinnitus. Eyes: Negative for blurred vision and double vision. Respiratory: Negative for cough, hemoptysis, sputum production, shortness of breath and wheezing. Cardiovascular: Positive for leg swelling. Negative for chest pain, palpitations, orthopnea, claudication and PND. Gastrointestinal: Negative for abdominal pain, blood in stool, constipation, diarrhea, nausea and vomiting. No bowel or bladder incontinence   Genitourinary: Negative for dysuria, frequency, hematuria and urgency. Musculoskeletal: Positive for back pain. Negative for falls, joint pain, myalgias and neck pain. Skin: Negative for rash. Neurological: Positive for tingling. Negative for dizziness, tremors, sensory change, speech change, focal weakness, weakness and headaches. Psychiatric/Behavioral: Negative for depression. The patient is not nervous/anxious and does not have insomnia. Objective:     Visit Vitals  BP (!) 140/70 (BP 1 Location: Right upper arm, BP Patient Position: Sitting, BP Cuff Size: Adult)   Pulse 98   Temp 98.3 °F (36.8 °C) (Temporal)   Resp 18   Ht 5' 8\" (1.727 m)   Wt 239 lb 14.4 oz (108.8 kg)   SpO2 98%   BMI 36.48 kg/m²       Vitals and Nurse Documentation reviewed. Physical Exam  Vitals and nursing note reviewed. Constitutional:       General: She is not in acute distress. Appearance: Normal appearance. She is obese. She is not ill-appearing, toxic-appearing or diaphoretic. HENT:      Head: Normocephalic and atraumatic. Eyes:      Conjunctiva/sclera: Conjunctivae normal.      Pupils: Pupils are equal, round, and reactive to light. Cardiovascular:      Rate and Rhythm: Normal rate.    Pulmonary:      Effort: Pulmonary effort is normal. Musculoskeletal:         General: No deformity. Normal range of motion. Cervical back: Normal range of motion and neck supple. Thoracic back: No swelling, deformity, signs of trauma, lacerations, spasms, tenderness or bony tenderness. Normal range of motion. No scoliosis. Lumbar back: Tenderness present. No swelling, edema, deformity, signs of trauma, lacerations, spasms or bony tenderness. Normal range of motion. Negative right straight leg raise test and negative left straight leg raise test. No scoliosis. Back:       Right hip: No deformity, lacerations, tenderness, bony tenderness or crepitus. Normal range of motion. Normal strength. Left hip: No deformity, lacerations, tenderness, bony tenderness or crepitus. Normal range of motion. Normal strength. Right knee: No swelling, deformity, effusion, erythema, ecchymosis, lacerations, bony tenderness or crepitus. Normal range of motion. No tenderness. Normal alignment. Left knee: No swelling, deformity, effusion, erythema, ecchymosis, lacerations, bony tenderness or crepitus. Normal range of motion. No tenderness. Normal alignment. Right lower leg: No swelling. No edema. Left lower leg: No swelling. No edema. Right ankle: No swelling, deformity, ecchymosis or lacerations. No tenderness. Normal range of motion. Anterior drawer test negative. Normal pulse. Left ankle: No swelling, deformity, ecchymosis or lacerations. No tenderness. Normal range of motion. Anterior drawer test negative. Normal pulse. Comments: Negative straight leg raise bilaterally. No saddle anesthesia. Lymphadenopathy:      Cervical: No cervical adenopathy. Neurological:      Mental Status: She is alert and oriented to person, place, and time. Cranial Nerves: Cranial nerves are intact. Sensory: Sensation is intact. Gait: Gait is intact. Gait normal.      Deep Tendon Reflexes: Reflexes are normal and symmetric. Assessment/Plan:     Diagnoses and all orders for this visit:    1. Sciatica of right side: acute on chronic low back pain with radicular symptoms of the right leg. Known DDD. No neurovascular deficits on exam. Follow-up with ortho for further evaluation. Discussed likely needing PT, declines referral today. Continue to see pain management for chronic pain. Discussed red flags that would prompt urgent return for evaluation.     -     REFERRAL TO ORTHOPEDICS  -     methylPREDNISolone (MEDROL DOSEPACK) 4 mg tablet; Per package instructions    2. DDD (degenerative disc disease), lumbosacral  -     REFERRAL TO ORTHOPEDICS  -     methylPREDNISolone (MEDROL DOSEPACK) 4 mg tablet; Per package instructions    3. Herniated intervertebral disc of lumbar spine  -     REFERRAL TO ORTHOPEDICS  -     methylPREDNISolone (MEDROL DOSEPACK) 4 mg tablet; Per package instructions          Pt expressed understanding with the diagnosis and plan    Follow-up and Dispositions    · Return if symptoms worsen or fail to improve.          Discussed expected course/resolution/complications of diagnosis in detail with patient.    Medication risks/benefits/costs/interactions/alternatives discussed with patient.    Pt was given an after visit summary which includes diagnoses, current medications & vitals.  Pt expressed understanding with the diagnosis and plan

## 2021-06-25 NOTE — PATIENT INSTRUCTIONS
Back Pain, Emergency or Urgent Symptoms: Care Instructions  Your Care Instructions     Many people have back pain at one time or another. In most cases, pain gets better with self-care that includes over-the-counter pain medicine, ice, heat, and exercises. Unless you have symptoms of a severe injury or heart attack, you may be able to give yourself a few days before you call a doctor. But some back problems are very serious. Do not ignore symptoms that need to be checked right away. Follow-up care is a key part of your treatment and safety. Be sure to make and go to all appointments, and call your doctor if you are having problems. It's also a good idea to know your test results and keep a list of the medicines you take. How can you care for yourself at home? · Sit or lie in positions that are most comfortable and that reduce your pain. Try one of these positions when you lie down:  ? Lie on your back with your knees bent and supported by large pillows. ? Lie on the floor with your legs on the seat of a sofa or chair. ? Lie on your side with your knees and hips bent and a pillow between your legs. ? Lie on your stomach if it does not make pain worse. · Do not sit up in bed, and avoid soft couches and twisted positions. Bed rest can help relieve pain at first, but it delays healing. Avoid bed rest after the first day. · Change positions every 30 minutes. If you must sit for long periods of time, take breaks from sitting. Get up and walk around, or lie flat. · Try using a heating pad on a low or medium setting, for 15 to 20 minutes every 2 or 3 hours. Try a warm shower in place of one session with the heating pad. You can also buy single-use heat wraps that last up to 8 hours. You can also try ice or cold packs on your back for 10 to 20 minutes at a time, several times a day. (Put a thin cloth between the ice pack and your skin.) This reduces pain and makes it easier to be active and exercise.   · Take pain medicines exactly as directed. ? If the doctor gave you a prescription medicine for pain, take it as prescribed. ? If you are not taking a prescription pain medicine, ask your doctor if you can take an over-the-counter medicine. When should you call for help? Call 911 anytime you think you may need emergency care. For example, call if:    · You are unable to move a leg at all.     · You have back pain with severe belly pain.     · You have symptoms of a heart attack. These may include:  ? Chest pain or pressure, or a strange feeling in the chest.  ? Sweating. ? Shortness of breath. ? Nausea or vomiting. ? Pain, pressure, or a strange feeling in the back, neck, jaw, or upper belly or in one or both shoulders or arms. ? Lightheadedness or sudden weakness. ? A fast or irregular heartbeat. After you call 911, the  may tell you to chew 1 adult-strength or 2 to 4 low-dose aspirin. Wait for an ambulance. Do not try to drive yourself. Call your doctor now or seek immediate medical care if:    · You have new or worse symptoms in your arms, legs, chest, belly, or buttocks. Symptoms may include:  ? Numbness or tingling. ? Weakness. ? Pain.     · You lose bladder or bowel control.     · You have back pain and:  ? You have injured your back while lifting or doing some other activity. Call if the pain is severe, has not gone away after 1 or 2 days, and you cannot do your normal daily activities. ? You have had a back injury before that needed treatment. ? Your pain has lasted longer than 4 weeks. ? You have had weight loss you cannot explain. ? You have a fever. ? You are age 48 or older. ? You have cancer now or have had it before. Watch closely for changes in your health, and be sure to contact your doctor if you are not getting better as expected. Where can you learn more?   Go to http://www.gray.com/  Enter B820 in the search box to learn more about \"Back Pain, Emergency or Urgent Symptoms: Care Instructions. \"  Current as of: February 26, 2020               Content Version: 12.8  © 2006-2021 [employer]. Care instructions adapted under license by [employer] (which disclaims liability or warranty for this information). If you have questions about a medical condition or this instruction, always ask your healthcare professional. [place] [address] any warranty or liability for your use of this information.

## 2021-06-25 NOTE — PROGRESS NOTES
Identified pt with two pt identifiers(name and ). Reviewed record in preparation for visit and have obtained necessary documentation. Chief Complaint   Patient presents with    Leg Swelling     Right         Vitals:    21 1446   Weight: 239 lb 14.4 oz (108.8 kg)   Height: 5' 8\" (1.727 m)   PainSc:   7   PainLoc: Leg       Health Maintenance Due   Topic    Pneumococcal 0-64 years (1 of 2 - PPSV23)    Shingrix Vaccine Age 50> (1 of 2)    Eye Exam Retinal or Dilated     Foot Exam Q1     PAP AKA CERVICAL CYTOLOGY        Coordination of Care Questionnaire:  :   1) Have you been to an emergency room, urgent care, or hospitalized since your last visit? If yes, where when, and reason for visit? no       2. Have seen or consulted any other health care provider since your last visit? If yes, where when, and reason for visit? NO      Patient is accompanied by self I have received verbal consent from Ragini Coughlin to discuss any/all medical information while they are present in the room.

## 2021-07-07 DIAGNOSIS — J30.9 ALLERGIC RHINITIS, UNSPECIFIED SEASONALITY, UNSPECIFIED TRIGGER: ICD-10-CM

## 2021-07-07 DIAGNOSIS — Z79.4 CONTROLLED TYPE 2 DIABETES MELLITUS WITHOUT COMPLICATION, WITH LONG-TERM CURRENT USE OF INSULIN (HCC): ICD-10-CM

## 2021-07-07 DIAGNOSIS — E11.9 CONTROLLED TYPE 2 DIABETES MELLITUS WITHOUT COMPLICATION, WITH LONG-TERM CURRENT USE OF INSULIN (HCC): ICD-10-CM

## 2021-07-07 RX ORDER — LEVOCETIRIZINE DIHYDROCHLORIDE 5 MG/1
TABLET, FILM COATED ORAL
Qty: 90 TABLET | Refills: 5 | Status: SHIPPED | OUTPATIENT
Start: 2021-07-07 | End: 2022-07-14 | Stop reason: SDUPTHER

## 2021-07-07 RX ORDER — METFORMIN HYDROCHLORIDE 500 MG/1
TABLET, EXTENDED RELEASE ORAL
Qty: 180 TABLET | Refills: 0 | Status: SHIPPED | OUTPATIENT
Start: 2021-07-07 | End: 2021-09-28

## 2021-07-08 ENCOUNTER — TELEPHONE (OUTPATIENT)
Dept: FAMILY MEDICINE CLINIC | Age: 61
End: 2021-07-08

## 2021-07-08 DIAGNOSIS — E78.5 HYPERLIPIDEMIA WITH TARGET LDL LESS THAN 100: ICD-10-CM

## 2021-07-08 DIAGNOSIS — E11.9 CONTROLLED TYPE 2 DIABETES MELLITUS WITHOUT COMPLICATION, WITH LONG-TERM CURRENT USE OF INSULIN (HCC): ICD-10-CM

## 2021-07-08 DIAGNOSIS — I10 ESSENTIAL HYPERTENSION: Primary | ICD-10-CM

## 2021-07-08 DIAGNOSIS — Z79.4 CONTROLLED TYPE 2 DIABETES MELLITUS WITHOUT COMPLICATION, WITH LONG-TERM CURRENT USE OF INSULIN (HCC): ICD-10-CM

## 2021-07-08 NOTE — TELEPHONE ENCOUNTER
----- Message from Chidi Chester sent at 7/8/2021 10:24 AM EDT -----  Regarding: Dr. Temi Delgado      General Message/Vendor Calls    Caller's first and last name: Self      Reason for call: Patient needs to know if she is due for labs. Callback required yes/no and why: Yes to assist.       Best contact number(s): [phone]      Details to clarify the request: Pt would like to know if she is due for lab work and is there an order ready for her to get labs done.        Chidi Chester

## 2021-08-05 RX ORDER — FAMOTIDINE 20 MG/1
TABLET, FILM COATED ORAL
Qty: 90 TABLET | Refills: 1 | Status: SHIPPED | OUTPATIENT
Start: 2021-08-05 | End: 2022-03-07 | Stop reason: SDUPTHER

## 2021-08-06 ENCOUNTER — LAB ONLY (OUTPATIENT)
Dept: FAMILY MEDICINE CLINIC | Age: 61
End: 2021-08-06

## 2021-08-06 DIAGNOSIS — I10 ESSENTIAL HYPERTENSION: ICD-10-CM

## 2021-08-06 DIAGNOSIS — E11.9 CONTROLLED TYPE 2 DIABETES MELLITUS WITHOUT COMPLICATION, WITH LONG-TERM CURRENT USE OF INSULIN (HCC): ICD-10-CM

## 2021-08-06 DIAGNOSIS — E78.5 HYPERLIPIDEMIA WITH TARGET LDL LESS THAN 100: ICD-10-CM

## 2021-08-06 DIAGNOSIS — Z79.4 CONTROLLED TYPE 2 DIABETES MELLITUS WITHOUT COMPLICATION, WITH LONG-TERM CURRENT USE OF INSULIN (HCC): ICD-10-CM

## 2021-08-06 LAB
ALBUMIN SERPL-MCNC: 4.2 G/DL (ref 3.5–5)
ALBUMIN/GLOB SERPL: 1.3 {RATIO} (ref 1.1–2.2)
ALP SERPL-CCNC: 95 U/L (ref 45–117)
ALT SERPL-CCNC: 33 U/L (ref 12–78)
ANION GAP SERPL CALC-SCNC: 7 MMOL/L (ref 5–15)
AST SERPL-CCNC: 15 U/L (ref 15–37)
BILIRUB SERPL-MCNC: 0.4 MG/DL (ref 0.2–1)
BUN SERPL-MCNC: 16 MG/DL (ref 6–20)
BUN/CREAT SERPL: 15 (ref 12–20)
CALCIUM SERPL-MCNC: 9.3 MG/DL (ref 8.5–10.1)
CHLORIDE SERPL-SCNC: 102 MMOL/L (ref 97–108)
CHOLEST SERPL-MCNC: 178 MG/DL
CO2 SERPL-SCNC: 24 MMOL/L (ref 21–32)
CREAT SERPL-MCNC: 1.1 MG/DL (ref 0.55–1.02)
ERYTHROCYTE [DISTWIDTH] IN BLOOD BY AUTOMATED COUNT: 12.1 % (ref 11.5–14.5)
EST. AVERAGE GLUCOSE BLD GHB EST-MCNC: 154 MG/DL
GLOBULIN SER CALC-MCNC: 3.3 G/DL (ref 2–4)
GLUCOSE SERPL-MCNC: 165 MG/DL (ref 65–100)
HBA1C MFR BLD: 7 % (ref 4–5.6)
HCT VFR BLD AUTO: 36.5 % (ref 35–47)
HDLC SERPL-MCNC: 70 MG/DL
HDLC SERPL: 2.5 {RATIO} (ref 0–5)
HGB BLD-MCNC: 11.6 G/DL (ref 11.5–16)
LDLC SERPL CALC-MCNC: 70 MG/DL (ref 0–100)
MCH RBC QN AUTO: 30.4 PG (ref 26–34)
MCHC RBC AUTO-ENTMCNC: 31.8 G/DL (ref 30–36.5)
MCV RBC AUTO: 95.5 FL (ref 80–99)
NRBC # BLD: 0 K/UL (ref 0–0.01)
NRBC BLD-RTO: 0 PER 100 WBC
PLATELET # BLD AUTO: 255 K/UL (ref 150–400)
PMV BLD AUTO: 11.7 FL (ref 8.9–12.9)
POTASSIUM SERPL-SCNC: 4.8 MMOL/L (ref 3.5–5.1)
PROT SERPL-MCNC: 7.5 G/DL (ref 6.4–8.2)
RBC # BLD AUTO: 3.82 M/UL (ref 3.8–5.2)
SODIUM SERPL-SCNC: 133 MMOL/L (ref 136–145)
TRIGL SERPL-MCNC: 190 MG/DL (ref ?–150)
VLDLC SERPL CALC-MCNC: 38 MG/DL
WBC # BLD AUTO: 7.2 K/UL (ref 3.6–11)

## 2021-08-09 NOTE — PROGRESS NOTES
Ms. Sheila Hernandez,  All your labs look good keep up the good work see you in a few months.   If you have any questions let me know

## 2021-08-13 ENCOUNTER — PATIENT MESSAGE (OUTPATIENT)
Dept: FAMILY MEDICINE CLINIC | Age: 61
End: 2021-08-13

## 2021-08-13 NOTE — TELEPHONE ENCOUNTER
From: Patti Hoskins  To: Gaby Rosas MD  Sent: 8/13/2021 12:33 PM EDT  Subject: Prescription Question    I was prescribed singular by another  but [facility] unable to refill although its listed. The pharmacy reached out on the 3rd can u plz add this medication to my list and refill or start a new prescription. This helped tremendously with my head congestion and I would like to continue to take it.

## 2021-08-15 RX ORDER — MONTELUKAST SODIUM 10 MG/1
10 TABLET ORAL DAILY
Qty: 90 TABLET | Refills: 0 | Status: SHIPPED | OUTPATIENT
Start: 2021-08-15 | End: 2021-11-10

## 2021-08-16 DIAGNOSIS — I10 ESSENTIAL HYPERTENSION: ICD-10-CM

## 2021-08-16 RX ORDER — LOSARTAN POTASSIUM 100 MG/1
TABLET ORAL
Qty: 90 TABLET | Refills: 1 | Status: SHIPPED | OUTPATIENT
Start: 2021-08-16 | End: 2022-08-02

## 2021-08-16 NOTE — TELEPHONE ENCOUNTER
Last Visit: 6/25/21 AMY Cullen  Next Appointment: Not scheduled  Previous Refill Encounter(s): 5/10/21 90    Requested Prescriptions     Pending Prescriptions Disp Refills    losartan (COZAAR) 100 mg tablet 90 Tablet 1     Sig: TAKE 1 TABLET BY MOUTH DAILY

## 2021-09-04 DIAGNOSIS — E78.2 MIXED HYPERLIPIDEMIA: ICD-10-CM

## 2021-09-06 RX ORDER — SIMVASTATIN 20 MG/1
TABLET, FILM COATED ORAL
Qty: 90 TABLET | Refills: 0 | Status: SHIPPED | OUTPATIENT
Start: 2021-09-06 | End: 2022-03-07 | Stop reason: SDUPTHER

## 2021-09-28 DIAGNOSIS — E11.9 CONTROLLED TYPE 2 DIABETES MELLITUS WITHOUT COMPLICATION, WITH LONG-TERM CURRENT USE OF INSULIN (HCC): ICD-10-CM

## 2021-09-28 DIAGNOSIS — Z79.4 CONTROLLED TYPE 2 DIABETES MELLITUS WITHOUT COMPLICATION, WITH LONG-TERM CURRENT USE OF INSULIN (HCC): ICD-10-CM

## 2021-09-28 RX ORDER — METFORMIN HYDROCHLORIDE 500 MG/1
TABLET, EXTENDED RELEASE ORAL
Qty: 180 TABLET | Refills: 0 | Status: SHIPPED | OUTPATIENT
Start: 2021-09-28 | End: 2022-01-06

## 2021-10-04 ENCOUNTER — TRANSCRIBE ORDER (OUTPATIENT)
Dept: SCHEDULING | Age: 61
End: 2021-10-04

## 2021-10-04 DIAGNOSIS — Z12.31 OTHER SCREENING MAMMOGRAM: Primary | ICD-10-CM

## 2021-11-09 ENCOUNTER — HOSPITAL ENCOUNTER (OUTPATIENT)
Dept: MAMMOGRAPHY | Age: 61
Discharge: HOME | End: 2021-11-09
Attending: FAMILY MEDICINE
Payer: MEDICARE

## 2021-11-09 DIAGNOSIS — Z12.31 OTHER SCREENING MAMMOGRAM: ICD-10-CM

## 2021-11-09 PROCEDURE — 77063 BREAST TOMOSYNTHESIS BI: CPT

## 2021-11-10 RX ORDER — MONTELUKAST SODIUM 10 MG/1
10 TABLET ORAL DAILY
Qty: 90 TABLET | Refills: 0 | Status: SHIPPED | OUTPATIENT
Start: 2021-11-10 | End: 2022-02-19

## 2021-12-06 DIAGNOSIS — M54.50 CHRONIC BILATERAL LOW BACK PAIN WITHOUT SCIATICA: ICD-10-CM

## 2021-12-06 DIAGNOSIS — G89.29 CHRONIC BILATERAL LOW BACK PAIN WITHOUT SCIATICA: ICD-10-CM

## 2021-12-07 RX ORDER — IBUPROFEN 800 MG/1
TABLET ORAL
Qty: 90 TABLET | Refills: 1 | Status: SHIPPED | OUTPATIENT
Start: 2021-12-07 | End: 2022-08-31

## 2022-01-03 ENCOUNTER — NURSE TRIAGE (OUTPATIENT)
Dept: OTHER | Facility: CLINIC | Age: 62
End: 2022-01-03

## 2022-01-03 NOTE — TELEPHONE ENCOUNTER
Received call from Angeles Mccord at [facility] with Red Flag Complaint. Subjective: Caller states \"I have bene having this numbness and tingling for quite some time. I have had it so long and starting to get afraid. \"  \"left arm generates from top goes down to fingertips\" \"was comes and goes and was getting massages, just keep coming back\"    Hx 3 herniated discs     Current Symptoms: lying down on bed, wasnt having symptoms now and but comes and goes, later in the call patient was having symptoms   Pt denies any chest pains, but did have a little shortness of breath over the past month. earlier today had worsening shortness of breath     Onset: over a month, becoming more frequent     Associated Symptoms: tingling and numbness     Pain Severity: pt denies pain    Temperature: pt denies     What has been tried: changing positions     LMP: years 2007 Pregnant: No    Recommended disposition: go to [address] [place] [address] Ave (pcp triage) pt declined this and requested to get appointment reviewed risks involved in delaying care- this made due to shortness of breath beyond baseline and numbness and tingling     Care advice provided, patient verbalizes understanding; denies any other questions or concerns; instructed to call back for any new or worsening symptoms. Writer provided warm transfer to Eric at [facility] for appointment scheduling    Attention Provider: Thank you for allowing me to participate in the care of your patient. The patient was connected to triage in response to information provided to the [facility]. Please do not respond through this encounter as the response is not directed to a shared pool.         Reason for Disposition   [1] Numbness (i.e., loss of sensation) of the face, arm / hand, or leg / foot on one side of the body AND [2] sudden onset AND [3] brief (now gone)    Protocols used: NEUROLOGIC DEFICIT-ADULT-AH

## 2022-01-05 DIAGNOSIS — Z79.4 CONTROLLED TYPE 2 DIABETES MELLITUS WITHOUT COMPLICATION, WITH LONG-TERM CURRENT USE OF INSULIN (HCC): ICD-10-CM

## 2022-01-05 DIAGNOSIS — E11.9 CONTROLLED TYPE 2 DIABETES MELLITUS WITHOUT COMPLICATION, WITH LONG-TERM CURRENT USE OF INSULIN (HCC): ICD-10-CM

## 2022-01-06 RX ORDER — METFORMIN HYDROCHLORIDE 500 MG/1
TABLET, EXTENDED RELEASE ORAL
Qty: 180 TABLET | Refills: 0 | Status: SHIPPED | OUTPATIENT
Start: 2022-01-06 | End: 2022-04-04

## 2022-01-17 ENCOUNTER — HOSPITAL ENCOUNTER (OUTPATIENT)
Dept: PREADMISSION TESTING | Age: 62
Discharge: HOME | End: 2022-01-17
Attending: INTERNAL MEDICINE
Payer: MEDICARE

## 2022-01-17 ENCOUNTER — TRANSCRIBE ORDER (OUTPATIENT)
Dept: REGISTRATION | Age: 62
End: 2022-01-17

## 2022-01-17 DIAGNOSIS — U07.1 COVID-19: Primary | ICD-10-CM

## 2022-01-17 DIAGNOSIS — U07.1 COVID-19: ICD-10-CM

## 2022-01-17 PROCEDURE — U0005 INFEC AGEN DETEC AMPLI PROBE: HCPCS

## 2022-01-18 LAB
SARS-COV-2, XPLCVT: NOT DETECTED
SOURCE, COVRS: NORMAL

## 2022-01-21 ENCOUNTER — HOSPITAL ENCOUNTER (OUTPATIENT)
Age: 62
Setting detail: OUTPATIENT SURGERY
Discharge: HOME | End: 2022-01-21
Attending: INTERNAL MEDICINE | Admitting: INTERNAL MEDICINE
Payer: MEDICARE

## 2022-01-21 ENCOUNTER — ANESTHESIA EVENT (OUTPATIENT)
Dept: ENDOSCOPY | Age: 62
End: 2022-01-21
Payer: MEDICARE

## 2022-01-21 ENCOUNTER — ANESTHESIA (OUTPATIENT)
Dept: ENDOSCOPY | Age: 62
End: 2022-01-21
Payer: MEDICARE

## 2022-01-21 VITALS
BODY MASS INDEX: 35.77 KG/M2 | HEART RATE: 71 BPM | DIASTOLIC BLOOD PRESSURE: 99 MMHG | SYSTOLIC BLOOD PRESSURE: 147 MMHG | RESPIRATION RATE: 18 BRPM | HEIGHT: 68 IN | WEIGHT: 236 LBS | TEMPERATURE: 98.6 F | OXYGEN SATURATION: 99 %

## 2022-01-21 PROCEDURE — 2709999900 HC NON-CHARGEABLE SUPPLY: Performed by: INTERNAL MEDICINE

## 2022-01-21 PROCEDURE — 74011250636 HC RX REV CODE- 250/636: Performed by: NURSE ANESTHETIST, CERTIFIED REGISTERED

## 2022-01-21 PROCEDURE — 77030013992 HC SNR POLYP ENDOSC BSC -B: Performed by: INTERNAL MEDICINE

## 2022-01-21 PROCEDURE — 88305 TISSUE EXAM BY PATHOLOGIST: CPT

## 2022-01-21 PROCEDURE — 76040000007: Performed by: INTERNAL MEDICINE

## 2022-01-21 PROCEDURE — 76060000032 HC ANESTHESIA 0.5 TO 1 HR: Performed by: INTERNAL MEDICINE

## 2022-01-21 PROCEDURE — 77030021593 HC FCPS BIOP ENDOSC BSC -A: Performed by: INTERNAL MEDICINE

## 2022-01-21 PROCEDURE — 74011000250 HC RX REV CODE- 250: Performed by: NURSE ANESTHETIST, CERTIFIED REGISTERED

## 2022-01-21 RX ORDER — NALOXONE HYDROCHLORIDE 0.4 MG/ML
0.4 INJECTION, SOLUTION INTRAMUSCULAR; INTRAVENOUS; SUBCUTANEOUS
Status: DISCONTINUED | OUTPATIENT
Start: 2022-01-21 | End: 2022-01-21 | Stop reason: HOSPADM

## 2022-01-21 RX ORDER — PROPOFOL 10 MG/ML
INJECTION, EMULSION INTRAVENOUS AS NEEDED
Status: DISCONTINUED | OUTPATIENT
Start: 2022-01-21 | End: 2022-01-21 | Stop reason: HOSPADM

## 2022-01-21 RX ORDER — SODIUM CHLORIDE 0.9 % (FLUSH) 0.9 %
5-40 SYRINGE (ML) INJECTION EVERY 8 HOURS
Status: DISCONTINUED | OUTPATIENT
Start: 2022-01-21 | End: 2022-01-21 | Stop reason: HOSPADM

## 2022-01-21 RX ORDER — SODIUM CHLORIDE 9 MG/ML
50 INJECTION, SOLUTION INTRAVENOUS CONTINUOUS
Status: DISCONTINUED | OUTPATIENT
Start: 2022-01-21 | End: 2022-01-21 | Stop reason: HOSPADM

## 2022-01-21 RX ORDER — DEXTROMETHORPHAN/PSEUDOEPHED 2.5-7.5/.8
1.2 DROPS ORAL
Status: DISCONTINUED | OUTPATIENT
Start: 2022-01-21 | End: 2022-01-21 | Stop reason: HOSPADM

## 2022-01-21 RX ORDER — SODIUM CHLORIDE 0.9 % (FLUSH) 0.9 %
5-40 SYRINGE (ML) INJECTION AS NEEDED
Status: DISCONTINUED | OUTPATIENT
Start: 2022-01-21 | End: 2022-01-21 | Stop reason: HOSPADM

## 2022-01-21 RX ORDER — SODIUM CHLORIDE 9 MG/ML
INJECTION, SOLUTION INTRAVENOUS
Status: DISCONTINUED | OUTPATIENT
Start: 2022-01-21 | End: 2022-01-21 | Stop reason: HOSPADM

## 2022-01-21 RX ORDER — LIDOCAINE HYDROCHLORIDE 20 MG/ML
INJECTION, SOLUTION EPIDURAL; INFILTRATION; INTRACAUDAL; PERINEURAL AS NEEDED
Status: DISCONTINUED | OUTPATIENT
Start: 2022-01-21 | End: 2022-01-21 | Stop reason: HOSPADM

## 2022-01-21 RX ORDER — FLUMAZENIL 0.1 MG/ML
0.2 INJECTION INTRAVENOUS
Status: DISCONTINUED | OUTPATIENT
Start: 2022-01-21 | End: 2022-01-21 | Stop reason: HOSPADM

## 2022-01-21 RX ORDER — ATROPINE SULFATE 0.1 MG/ML
0.5 INJECTION INTRAVENOUS
Status: DISCONTINUED | OUTPATIENT
Start: 2022-01-21 | End: 2022-01-21 | Stop reason: HOSPADM

## 2022-01-21 RX ORDER — EPINEPHRINE 0.1 MG/ML
1 INJECTION INTRACARDIAC; INTRAVENOUS
Status: DISCONTINUED | OUTPATIENT
Start: 2022-01-21 | End: 2022-01-21 | Stop reason: HOSPADM

## 2022-01-21 RX ADMIN — PROPOFOL 50 MG: 10 INJECTION, EMULSION INTRAVENOUS at 09:20

## 2022-01-21 RX ADMIN — PROPOFOL 20 MG: 10 INJECTION, EMULSION INTRAVENOUS at 09:06

## 2022-01-21 RX ADMIN — PROPOFOL 30 MG: 10 INJECTION, EMULSION INTRAVENOUS at 09:04

## 2022-01-21 RX ADMIN — PROPOFOL 50 MG: 10 INJECTION, EMULSION INTRAVENOUS at 09:17

## 2022-01-21 RX ADMIN — PROPOFOL 30 MG: 10 INJECTION, EMULSION INTRAVENOUS at 09:09

## 2022-01-21 RX ADMIN — PROPOFOL 50 MG: 10 INJECTION, EMULSION INTRAVENOUS at 09:21

## 2022-01-21 RX ADMIN — PROPOFOL 20 MG: 10 INJECTION, EMULSION INTRAVENOUS at 09:13

## 2022-01-21 RX ADMIN — SODIUM CHLORIDE: 900 INJECTION, SOLUTION INTRAVENOUS at 08:59

## 2022-01-21 RX ADMIN — PROPOFOL 30 MG: 10 INJECTION, EMULSION INTRAVENOUS at 09:23

## 2022-01-21 RX ADMIN — PROPOFOL 20 MG: 10 INJECTION, EMULSION INTRAVENOUS at 09:11

## 2022-01-21 RX ADMIN — PROPOFOL 40 MG: 10 INJECTION, EMULSION INTRAVENOUS at 09:26

## 2022-01-21 RX ADMIN — PROPOFOL 20 MG: 10 INJECTION, EMULSION INTRAVENOUS at 09:10

## 2022-01-21 RX ADMIN — PROPOFOL 40 MG: 10 INJECTION, EMULSION INTRAVENOUS at 09:15

## 2022-01-21 RX ADMIN — PROPOFOL 30 MG: 10 INJECTION, EMULSION INTRAVENOUS at 09:03

## 2022-01-21 RX ADMIN — PROPOFOL 20 MG: 10 INJECTION, EMULSION INTRAVENOUS at 09:07

## 2022-01-21 RX ADMIN — PROPOFOL 20 MG: 10 INJECTION, EMULSION INTRAVENOUS at 09:14

## 2022-01-21 RX ADMIN — PROPOFOL 20 MG: 10 INJECTION, EMULSION INTRAVENOUS at 09:05

## 2022-01-21 RX ADMIN — PROPOFOL 30 MG: 10 INJECTION, EMULSION INTRAVENOUS at 09:12

## 2022-01-21 RX ADMIN — PROPOFOL 20 MG: 10 INJECTION, EMULSION INTRAVENOUS at 09:08

## 2022-01-21 RX ADMIN — PROPOFOL 50 MG: 10 INJECTION, EMULSION INTRAVENOUS at 09:18

## 2022-01-21 RX ADMIN — LIDOCAINE HYDROCHLORIDE 100 MG: 20 INJECTION, SOLUTION EPIDURAL; INFILTRATION; INTRACAUDAL; PERINEURAL at 09:02

## 2022-01-21 RX ADMIN — PROPOFOL 80 MG: 10 INJECTION, EMULSION INTRAVENOUS at 09:02

## 2022-01-21 NOTE — ANESTHESIA PREPROCEDURE EVALUATION
Anesthetic History   No history of anesthetic complications            Review of Systems / Medical History  Patient summary reviewed, nursing notes reviewed and pertinent labs reviewed    Pulmonary            Asthma        Neuro/Psych         Psychiatric history     Cardiovascular    Hypertension              Exercise tolerance: >4 METS     GI/Hepatic/Renal     GERD      PUD    Comments: Blood in stool Endo/Other    Diabetes: type 2    Obesity and arthritis     Other Findings              Physical Exam    Airway  Mallampati: II  TM Distance: > 6 cm  Neck ROM: normal range of motion   Mouth opening: Normal     Cardiovascular    Rhythm: regular  Rate: normal         Dental    Dentition: Upper partial plate     Pulmonary  Breath sounds clear to auscultation               Abdominal  Abdominal exam normal       Other Findings            Anesthetic Plan    ASA: 3  Anesthesia type: MAC          Induction: Intravenous  Anesthetic plan and risks discussed with: Patient

## 2022-01-21 NOTE — ANESTHESIA POSTPROCEDURE EVALUATION
Post-Anesthesia Evaluation and Assessment    Patient: Chadwick Mace MRN: [id number]  SSN: [id number]    YOB: 1960  Age: 63 y.o. Sex: female      I have evaluated the patient and they are stable and ready for discharge from the PACU. Cardiovascular Function/Vital Signs  Visit Vitals  BP (!) 147/99   Pulse 71   Temp 37 °C (98.6 °F)   Resp 18   Ht 5' 8\" (1.727 m)   Wt 107 kg (236 lb)   SpO2 99%   Breastfeeding No   BMI 35.88 kg/m²       Patient is status post MAC anesthesia for Procedure(s):  COLONOSCOPY   :-  ENDOSCOPIC POLYPECTOMY. Nausea/Vomiting: None    Postoperative hydration reviewed and adequate. Pain:  Pain Scale 1: Numeric (0 - 10) (01/21/22 0950)  Pain Intensity 1: 0 (01/21/22 0950)   Managed    Neurological Status: At baseline    Mental Status, Level of Consciousness: Alert and  oriented to person, place, and time    Pulmonary Status:   O2 Device: None (Room air) (01/21/22 0950)   Adequate oxygenation and airway patent    Complications related to anesthesia: None    Post-anesthesia assessment completed. No concerns    Signed By: Arlyn Crain MD     January 21, 2022              Post-Anesthesia Evaluation and Assessment    Patient: Chadwick Mace MRN: [id number]  SSN: [id number]    YOB: 1960  Age: 63 y.o. Sex: female      I have evaluated the patient and they are stable and ready for discharge from the PACU. Cardiovascular Function/Vital Signs  Visit Vitals  BP (!) 147/99   Pulse 71   Temp 37 °C (98.6 °F)   Resp 18   Ht 5' 8\" (1.727 m)   Wt 107 kg (236 lb)   SpO2 99%   Breastfeeding No   BMI 35.88 kg/m²       Patient is status post MAC anesthesia for Procedure(s):  COLONOSCOPY   :-  ENDOSCOPIC POLYPECTOMY. Nausea/Vomiting: None    Postoperative hydration reviewed and adequate. Pain:  Pain Scale 1: Numeric (0 - 10) (01/21/22 0950)  Pain Intensity 1: 0 (01/21/22 0950)   Managed    Neurological Status:        At baseline    Mental Status, Level of Consciousness: Alert and  oriented to person, place, and time    Pulmonary Status:   O2 Device: None (Room air) (01/21/22 0950)   Adequate oxygenation and airway patent    Complications related to anesthesia: None    Post-anesthesia assessment completed. No concerns    Signed By: Jeremias Hernandez MD     January 21, 2022              Procedure(s):  COLONOSCOPY   :-  ENDOSCOPIC POLYPECTOMY. MAC    <BSHSIANPOST>    INITIAL Post-op Vital signs:   Vitals Value Taken Time   /99 01/21/22 0950   Temp 37 °C (98.6 °F) 01/21/22 0936   Pulse 72 01/21/22 0957   Resp 25 01/21/22 0957   SpO2 100 % 01/21/22 0957   Vitals shown include unvalidated device data.

## 2022-01-21 NOTE — H&P
[address] [facility].  [address] [facility] [place] [address] [facility], [address]  [phone]                                History and Physical     NAME: Antoine Kimbrough   :  1960   MRN:  [id number]     HPI:  The patient was seen and examined. Past Surgical History:   Procedure Laterality Date    COLONOSCOPY N/A 2017    COLONOSCOPY performed by Alejandra Johnson MD at [address] [place] [address] ENDOSCOPY    HX GYN      tubal ligation, hysterectomy    HX HYSTERECTOMY      HX ORTHOPAEDIC      left hand ligament removed    HX OTHER SURGICAL      corticosteroid injections - back    HX OTHER SURGICAL  2019    Left lower parathyroidectomy with intraoperative PTH monitoring. -[facility]-Dr. Antonio Pizarro    HX TONSILLECTOMY       Past Medical History:   Diagnosis Date    Adverse effect of anesthesia     slow to wake up at dentist office    Anxiety     Arthritis     Chronic pain     3 herniated discs in lower back    Depression     Diabetes ([address]     type 2    GERD (gastroesophageal reflux disease)     High cholesterol     Hypertension     MRSA infection     left leg treated with [facility] at [facility]    Other ill-defined conditions(799.89)     herniated disc to back    PUD (peptic ulcer disease)     Seasonal allergies     Sleep apnea     kerwin     Social History     Tobacco Use    Smoking status: Former Smoker     Packs/day: 1.00     Years: 30.00     Pack years: 30.00     Types: Cigarettes     Quit date: 10/30/2012     Years since quittin.2    Smokeless tobacco: Never Used   Substance Use Topics    Alcohol use:  Yes     Alcohol/week: 16.0 standard drinks     Types: 16 Cans of beer per week     Comment: 4-5 drinks a night x 4 times a week    Drug use: Yes     Types: Marijuana     Allergies   Allergen Reactions    Vicodin [Hydrocodone-Acetaminophen] Hives     Family History   Problem Relation Age of Onset    Hypertension Mother     Diabetes Father     Hypertension Sister     No Known Problems Brother     Lung Disease Sister         copd    Anesth Problems Neg Hx      Current Facility-Administered Medications   Medication Dose Route Frequency    0.9% sodium chloride infusion  50 mL/hr IntraVENous CONTINUOUS    sodium chloride (NS) flush 5-40 mL  5-40 mL IntraVENous Q8H    sodium chloride (NS) flush 5-40 mL  5-40 mL IntraVENous PRN    naloxone (NARCAN) injection 0.4 mg  0.4 mg IntraVENous Multiple    flumazeniL (ROMAZICON) 0.1 mg/mL injection 0.2 mg  0.2 mg IntraVENous Multiple    simethicone (MYLICON) 73DT/0.6QT oral drops 80 mg  1.2 mL Oral Multiple    atropine injection 0.5 mg  0.5 mg IntraVENous ONCE PRN    EPINEPHrine (ADRENALIN) 0.1 mg/mL syringe 1 mg  1 mg Endoscopically ONCE PRN         PHYSICAL EXAM:  General: WD, WN. Alert, cooperative, no acute distress    HEENT: NC, Atraumatic. PERRLA, EOMI. Anicteric sclerae. Lungs:  CTA Bilaterally. No Wheezing/Rhonchi/Rales. Heart:  Regular  rhythm,  No murmur, No Rubs, No Gallops  Abdomen: Soft, Non distended, Non tender. +Bowel sounds, no HSM  Extremities: No c/c/e  Neurologic:  CN 2-12 gi, Alert and oriented X 3. No acute neurological distress   Psych:   Good insight. Not anxious nor agitated. The heart, lungs and mental status were satisfactory for the administration of MAC sedation and for the procedure. Mallampati score: 2     The patient was counseled at length about the risks of elisa Covid-19 in the natacha-operative and post-operative states including the recovery window of their procedure. The patient was made aware that elisa Covid-19 after a surgical procedure may worsen their prognosis for recovering from the virus and lend to a higher morbidity and or mortality risk. The patient was given the options of postponing their procedure. All of the risks, benefits, and alternatives were discussed. The patient does  wish to proceed with the procedure.       Assessment:   · Personal history colon polyps  · FH colon cancer    Plan:   · Endoscopic procedure  · MAC sedation

## 2022-01-21 NOTE — DISCHARGE INSTRUCTIONS
[address] [address]  [address] [facility] [address], [address]  [address]  [id number]  1960    COLON DISCHARGE INSTRUCTIONS    DISCOMFORT:  Redness at IV site- apply warm compress to area; if redness or soreness persist- contact your physician  There may be a slight amount of blood passed from the rectum  Gaseous discomfort- walking, belching will help relieve any discomfort      DIET:   High fiber diet. - however -  remember your colon is empty and a heavy meal will produce gas. Avoid these foods:  vegetables, fried / greasy foods, carbonated drinks for today  You may not  drink alcoholic beverages for at least 12 hours     ACTIVITY:  It is recommended that you spend the remainder of the day resting -  avoid any strenuous activity. You may not operate a vehicle for 12 hours  You may not  engage in an occupation involving machinery or appliances for rest of today    Avoid making any critical decisions for at least 24 hour    CALL M.D. ANY SIGN OF:   Increasing pain, nausea, vomiting  Abdominal distension (swelling)  New increased bleeding (oral or rectal)  Fever (chills)  Pain in chest area  Bloody discharge from nose or mouth  Shortness of breath    You may not  take any Advil, Aspirin, Ibuprofen, Motrin, Aleve, or Goodys for 7 days, ONLY  Tylenol as needed for pain. Post procedure diagnosis: 1. Colon polyps  2. Internal hemorrhoids      Post-procedure recommendations:  -Await pathology. -Repeat colonoscopy in 3 years.  -High fiber diet.     -Resume normal medication(s). -NO aspirin for 7 days     Follow-up Instructions:    Call Dr. Han Venegas for any questions or problems. If we took a biopsy please call the office within 2 weeks to discuss your  pathology results. Telephone # [phone]         Learning About Coronavirus [phone])  Coronavirus [phone]): Overview  What is coronavirus (COVID-19)?   The coronavirus disease (COVID-19) is caused by a virus. It is an illness that was first found in [place], [place], in December 2019. It has since spread worldwide. The virus can cause fever, cough, and trouble breathing. In severe cases, it can cause pneumonia and make it hard to breathe without help. It can cause death. Coronaviruses are a large group of viruses. They cause the common cold. They also cause more serious illnesses like Middle East respiratory syndrome (MERS) and severe acute respiratory syndrome (SARS). COVID-19 is caused by a novel coronavirus. That means it's a new type that has not been seen in people before. This virus spreads person-to-person through droplets from coughing and sneezing. It can also spread when you are close to someone who is infected. And it can spread when you touch something that has the virus on it, such as a doorknob or a tabletop. What can you do to protect yourself from coronavirus (COVID-19)? The best way to protect yourself from getting sick is to:  · Avoid areas where there is an outbreak. · Avoid contact with people who may be infected. · Wash your hands often with soap or alcohol-based hand sanitizers. · Avoid crowds and try to stay at least 6 feet away from other people. · Wash your hands often, especially after you cough or sneeze. Use soap and water, and scrub for at least 20 seconds. If soap and water aren't available, use an alcohol-based hand . · Avoid touching your mouth, nose, and eyes. What can you do to avoid spreading the virus to others? To help avoid spreading the virus to others:  · Cover your mouth with a tissue when you cough or sneeze. Then throw the tissue in the trash. · Use a disinfectant to clean things that you touch often. · Stay home if you are sick or have been exposed to the virus. Don't go to school, work, or public areas. And don't use public transportation. · If you are sick:  ? Leave your home only if you need to get medical care.  But call the doctor's office first so they know you're coming. And wear a face mask, if you have one.  ? If you have a face mask, wear it whenever you're around other people. It can help stop the spread of the virus when you cough or sneeze. ? Clean and disinfect your home every day. Use household  and disinfectant wipes or sprays. Take special care to clean things that you grab with your hands. These include doorknobs, remote controls, phones, and handles on your refrigerator and microwave. And don't forget countertops, tabletops, bathrooms, and computer keyboards. When to call for help  Call 911 anytime you think you may need emergency care. For example, call if:  · You have severe trouble breathing. (You can't talk at all.)  · You have constant chest pain or pressure. · You are severely dizzy or lightheaded. · You are confused or can't think clearly. · Your face and lips have a blue color. · You pass out (lose consciousness) or are very hard to wake up. Call your doctor now if you develop symptoms such as:  · Shortness of breath. · Fever. · Cough. If you need to get care, call ahead to the doctor's office for instructions before you go. Make sure you wear a face mask, if you have one, to prevent exposing other people to the virus. Where can you get the latest information? The following health organizations are tracking and studying this virus. Their websites contain the most up-to-date information. Zee El also learn what to do if you think you may have been exposed to the virus. · U.S. Centers for Disease Control and Prevention (CDC): The CDC provides updated news about the disease and travel advice. The website also tells you how to prevent the spread of infection. www.cdc.gov  · World Health Organization [facility]): WHO offers information about the virus outbreaks.  WHO also has travel advice. www.who.int  Current as of: April 1, 2020               Content Version: 12.4  © 3316-8900 [employer], [employer]orporated. Care instructions adapted under license by your healthcare professional. If you have questions about a medical condition or this instruction, always ask your healthcare professional. [address] any warranty or liability for your use of this information.

## 2022-01-21 NOTE — PROGRESS NOTES

## 2022-01-21 NOTE — PROCEDURES
[address] [facility].  [address] [place] [address] [address], [address]  [phone]                              Colonoscopy Procedure Note      Indications:    Family history of coloretal cancer (screening only), Personal history of colon polyps (screening only)     :  Matthew Carter MD    Surgical Assistant: Endoscopy Technician-1: Hoang Velazquez  Endoscopy RN-1: Omari Orellana RN    Implants: none    Referring Provider: Johnny Linares MD    Sedation:  MAC anesthesia    Procedure Details:  After informed consent was obtained with all risks and benefits of procedure explained and preoperative exam completed, the patient was taken to the endoscopy suite and placed in the left lateral decubitus position. Upon sequential sedation as per above, a digital rectal exam was performed  And was normal.  The Olympus videocolonoscope  was inserted in the rectum and carefully advanced to the cecum, which was identified by the ileocecal valve and appendiceal orifice. The quality of preparation was good. The colonoscope was slowly withdrawn with careful evaluation between folds. Retroflexion in the rectum was performed and was normal..     Findings:   Rectum: no mucosal lesion appreciated  Grade 1 internal hemorrhoid(s); Sigmoid: 1  Sessile polyp(s), the largest 3 mm in size; Descending Colon: 1  Sessile polyp(s), the largest 6 mm in size;  Transverse Colon: no mucosal lesion appreciated  Ascending Colon: 1  Pedunculated polyp(s), the largest 15 mm in size;  Cecum: 1  Sessile polyp(s), the largest 5 mm in size;   Terminal Ileum: not intubated    Interventions:  2 complete polypectomy were performed at cecum and sigmoid colon using cold biopsy forceps and the polyps were  retrieved   1 complete polypectomy was performed in ascending colon using hot snare and polyp was retrieved  1 complete polypectomy was performed and descending colon using cold snare and the polyp was retrieved    Specimen Removed:    ID Type Source Tests Collected by Time Destination   1 : Cecal polyp Preservative Cecum  Richy Avery MD 2022 1003 Pathology   2 : Ascending colon polyp Preservative Colon, Ascending  Richy Avery MD 2022 7315 Pathology   3 : Descending colon polyp Preservative Colon, Descending  Richy Avery MD 2022 1046 Pathology   4 : Sigmoid colon polyp Preservative Sigmoid  Richy Avery MD 2022 3437 Pathology       Complications: None. EBL:  None. Recommendations: -Await pathology. -Repeat colonoscopy in 3 years.  -High fiber diet.     -Resume normal medication(s). -NO aspirin for 7 days     Discharge Disposition:  Home in the company of a  when able to ambulate.     Yoselyn Tilley MD  2022  9:29 AM

## 2022-01-21 NOTE — ROUTINE PROCESS
Rj Kayser  1960  [id number]    Situation:  Verbal report received from: Lucie Gonzalez  Procedure: Procedure(s):  COLONOSCOPY   :-  ENDOSCOPIC POLYPECTOMY    Background:    Preoperative diagnosis: FAMILY HX OF COLONIC POLYPS  Postoperative diagnosis: 1. Colon polyps  2.  Internal hemorrhoids    :  Dr. Marilu Cee  Assistant(s): Endoscopy Technician-1: Alexandria Gottlieb  Endoscopy RN-1: Manoj Tony RN    Specimens:   ID Type Source Tests Collected by Time Destination   1 : Cecal polyp Preservative Cecum  Enedina Moeller MD 1/21/2022 2623 Pathology   2 : Ascending colon polyp Preservative Colon, Ascending  Enedina Moeller MD 1/21/2022 6983 Pathology   3 : Descending colon polyp Preservative Colon, Descending  Enedina Moeller MD 1/21/2022 7998 Pathology   4 : Sigmoid colon polyp Preservative Sigmoid  Enedina Moeller MD 1/21/2022 4449 Pathology     H. Pylori  no    Assessment:  Intra-procedure medications     Anesthesia gave intra-procedure sedation and medications, see anesthesia flow sheet    Intravenous fluids: NS@ KVO     Vital signs stable     Abdominal assessment: round and soft     Recommendation:  Discharge patient per MD order  Family -yes  Permission to share finding with family  -yes

## 2022-01-31 ENCOUNTER — NURSE TRIAGE (OUTPATIENT)
Dept: OTHER | Facility: CLINIC | Age: 62
End: 2022-01-31

## 2022-01-31 ENCOUNTER — OFFICE VISIT (OUTPATIENT)
Dept: FAMILY MEDICINE CLINIC | Age: 62
End: 2022-01-31
Payer: MEDICARE

## 2022-01-31 VITALS
WEIGHT: 241 LBS | RESPIRATION RATE: 16 BRPM | OXYGEN SATURATION: 98 % | TEMPERATURE: 98.3 F | HEIGHT: 68 IN | BODY MASS INDEX: 36.53 KG/M2 | SYSTOLIC BLOOD PRESSURE: 180 MMHG | DIASTOLIC BLOOD PRESSURE: 110 MMHG | HEART RATE: 80 BPM

## 2022-01-31 DIAGNOSIS — E11.9 CONTROLLED TYPE 2 DIABETES MELLITUS WITHOUT COMPLICATION, WITH LONG-TERM CURRENT USE OF INSULIN (HCC): ICD-10-CM

## 2022-01-31 DIAGNOSIS — I10 ESSENTIAL HYPERTENSION: Primary | ICD-10-CM

## 2022-01-31 DIAGNOSIS — F41.9 ANXIETY: ICD-10-CM

## 2022-01-31 DIAGNOSIS — Z79.4 CONTROLLED TYPE 2 DIABETES MELLITUS WITHOUT COMPLICATION, WITH LONG-TERM CURRENT USE OF INSULIN (HCC): ICD-10-CM

## 2022-01-31 DIAGNOSIS — M54.12 CERVICAL RADICULOPATHY: ICD-10-CM

## 2022-01-31 DIAGNOSIS — E11.8 CONTROLLED DIABETES MELLITUS TYPE 2 WITH COMPLICATIONS, UNSPECIFIED WHETHER LONG TERM INSULIN USE (HCC): ICD-10-CM

## 2022-01-31 DIAGNOSIS — E78.2 MIXED HYPERLIPIDEMIA: ICD-10-CM

## 2022-01-31 DIAGNOSIS — Z00.00 ENCOUNTER FOR MEDICARE ANNUAL WELLNESS EXAM: ICD-10-CM

## 2022-01-31 DIAGNOSIS — E66.01 SEVERE OBESITY (BMI 35.0-35.9 WITH COMORBIDITY) (HCC): ICD-10-CM

## 2022-01-31 LAB — HBA1C MFR BLD HPLC: 7.9 %

## 2022-01-31 PROCEDURE — G8417 CALC BMI ABV UP PARAM F/U: HCPCS | Performed by: FAMILY MEDICINE

## 2022-01-31 PROCEDURE — G8427 DOCREV CUR MEDS BY ELIG CLIN: HCPCS | Performed by: FAMILY MEDICINE

## 2022-01-31 PROCEDURE — G9717 DOC PT DX DEP/BP F/U NT REQ: HCPCS | Performed by: FAMILY MEDICINE

## 2022-01-31 PROCEDURE — 99214 OFFICE O/P EST MOD 30 MIN: CPT | Performed by: FAMILY MEDICINE

## 2022-01-31 PROCEDURE — 3051F HG A1C>EQUAL 7.0%<8.0%: CPT | Performed by: FAMILY MEDICINE

## 2022-01-31 PROCEDURE — G8755 DIAS BP > OR = 90: HCPCS | Performed by: FAMILY MEDICINE

## 2022-01-31 PROCEDURE — 2022F DILAT RTA XM EVC RTNOPTHY: CPT | Performed by: FAMILY MEDICINE

## 2022-01-31 PROCEDURE — 3017F COLORECTAL CA SCREEN DOC REV: CPT | Performed by: FAMILY MEDICINE

## 2022-01-31 PROCEDURE — G0439 PPPS, SUBSEQ VISIT: HCPCS | Performed by: FAMILY MEDICINE

## 2022-01-31 PROCEDURE — G8753 SYS BP > OR = 140: HCPCS | Performed by: FAMILY MEDICINE

## 2022-01-31 PROCEDURE — G0463 HOSPITAL OUTPT CLINIC VISIT: HCPCS | Performed by: FAMILY MEDICINE

## 2022-01-31 PROCEDURE — G9899 SCRN MAM PERF RSLTS DOC: HCPCS | Performed by: FAMILY MEDICINE

## 2022-01-31 PROCEDURE — 83036 HEMOGLOBIN GLYCOSYLATED A1C: CPT | Performed by: FAMILY MEDICINE

## 2022-01-31 RX ORDER — AMLODIPINE BESYLATE 5 MG/1
5 TABLET ORAL DAILY
Qty: 30 TABLET | Refills: 2 | Status: SHIPPED | OUTPATIENT
Start: 2022-01-31 | End: 2022-03-07 | Stop reason: SDUPTHER

## 2022-01-31 NOTE — PROGRESS NOTES
This is the Subsequent Medicare Annual Wellness Exam, performed 12 months or more after the Initial AWV or the last Subsequent AWV    I have reviewed the patient's medical history in detail and updated the computerized patient record. Assessment/Plan   Education and counseling provided:  Are appropriate based on today's review and evaluation    1. Cervical radiculopathy  -     REFERRAL TO NEUROLOGY  -     XR SPINE CERV W OBL/FLEX/EXT MIN 6 V COMP; Future  2. Controlled type 2 diabetes mellitus without complication, with long-term current use of insulin (HCC)  -     AMB POC HEMOGLOBIN V1O  -     METABOLIC PANEL, COMPREHENSIVE; Future  -     MICROALBUMIN, UR, RAND W/ MICROALB/CREAT RATIO; Future  -      DIABETES FOOT EXAM  3. Controlled diabetes mellitus type 2 with complications, unspecified whether long term insulin use ([facility]  4. Severe obesity (BMI 35.0-35.9 with comorbidity) [facility]  5. Essential hypertension  -     amLODIPine (NORVASC) 5 mg tablet; Take 1 Tablet by mouth daily. , Normal, Disp-30 Tablet, R-2  6. Mixed hyperlipidemia  -     LIPID PANEL;  Future       Depression Risk Factor Screening     3 most recent PHQ Screens 3/1/2021   Little interest or pleasure in doing things Not at all   Feeling down, depressed, irritable, or hopeless Not at all   Total Score PHQ 2 0   Trouble falling or staying asleep, or sleeping too much -   Feeling tired or having little energy -   Poor appetite, weight loss, or overeating -   Feeling bad about yourself - or that you are a failure or have let yourself or your family down -   Trouble concentrating on things such as school, work, reading, or watching TV -   Moving or speaking so slowly that other people could have noticed; or the opposite being so fidgety that others notice -   Thoughts of being better off dead, or hurting yourself in some way -   PHQ 9 Score -   How difficult have these problems made it for you to do your work, take care of your home and get along with others -       Alcohol & Drug Abuse Risk Screen    Do you average more than 1 drink per night or more than 7 drinks a week:  No    On any one occasion in the past three months have you have had more than 3 drinks containing alcohol:  No          Functional Ability and Level of Safety    Hearing: Hearing is good. Activities of Daily Living: The home contains: no safety equipment. Patient does total self care      Ambulation: with no difficulty     Fall Risk:  Fall Risk Assessment, last 12 mths 1/31/2022   Able to walk? Yes   Fall in past 12 months? 0   Do you feel unsteady? 0   Are you worried about falling 0      Abuse Screen:  Patient is not abused       Cognitive Screening    Has your family/caregiver stated any concerns about your memory: no         Health Maintenance Due     Health Maintenance Due   Topic Date Due    Shingrix Vaccine Age 49> (1 of 2) Never done    Low dose CT lung screening  Never done    Eye Exam Retinal or Dilated  10/04/2020    MICROALBUMIN Q1  09/29/2021    Medicare Yearly Exam  09/30/2021       Patient Care Team   Patient Care Team:  Dat Bhatia MD as PCP - General (Family Medicine)  Dat Bhatia [facility]CP [facility]  Josselin Craven MD (Cardiology)    History     Patient Active Problem List   Diagnosis Code    Lumbar herniated disc M51.26    Back pain M54.9    Anxiety F41.9    HTN (hypertension) I10    Depression F32. A    Vitamin D deficiency E55.9    Asthma J45.909    Obesity, Class I, BMI 30-34.9 E66.9    Hyperlipidemia with target LDL less than 100 E78.5    Controlled type 2 diabetes mellitus without complication, with long-term current use of insulin (HCC) E11.9, Z79.4    Severe obesity (HCC) E66.01    Type 2 diabetes with nephropathy (HCC) E11.21     Past Medical History:   Diagnosis Date    Adverse effect of anesthesia     slow to wake up at dentist office    Anxiety     Arthritis     Chronic pain     3 herniated discs in lower back    Depression     Diabetes ([facility]     type 2    GERD (gastroesophageal reflux disease)     High cholesterol     Hypertension     MRSA infection 2008    left leg treated with antibiotics at [facility]    Other ill-defined conditions(799.89)     herniated disc to back    PUD (peptic ulcer disease)     Seasonal allergies     Sleep apnea     kerwin      Past Surgical History:   Procedure Laterality Date    COLONOSCOPY N/A 5/31/2017    COLONOSCOPY performed by Alejandra Johnson MD at [address] COLONOSCOPY N/A 1/21/2022    COLONOSCOPY   :- performed by Meryle Bing, MD at [facility] ENDOSCOPY    HX GYN      tubal ligation, hysterectomy    HX HYSTERECTOMY      HX ORTHOPAEDIC      left hand ligament removed    HX OTHER SURGICAL      corticosteroid injections - back    HX OTHER SURGICAL  08/06/2019    Left lower parathyroidectomy with intraoperative PTH monitoring. -[facility]-Dr. Antonio Pizarro    HX TONSILLECTOMY       Current Outpatient Medications   Medication Sig Dispense Refill    amLODIPine (NORVASC) 5 mg tablet Take 1 Tablet by mouth daily. 30 Tablet 2    metFORMIN ER (GLUCOPHAGE XR) 500 mg tablet TAKE 1 TABLET BY MOUTH TWICE DAILY 180 Tablet 0    ibuprofen (MOTRIN) 800 mg tablet TAKE 1 TABLET BY MOUTH EVERY 8 HOURS AS NEEDED FOR PAIN 90 Tablet 1    montelukast (SINGULAIR) 10 mg tablet TAKE 1 TABLET BY MOUTH DAILY 90 Tablet 0    simvastatin (ZOCOR) 20 mg tablet TAKE 1 TABLET BY MOUTH EVERY NIGHT 90 Tablet 0    losartan (COZAAR) 100 mg tablet TAKE 1 TABLET BY MOUTH DAILY 90 Tablet 1    famotidine (PEPCID) 20 mg tablet TAKE 1 TABLET BY MOUTH EVERY DAY 90 Tablet 1    levocetirizine (XYZAL) 5 mg tablet TAKE 1 TABLET BY MOUTH EVERY DAY 90 Tablet 5    oxyCODONE-acetaminophen (PERCOCET) 5-325 mg per tablet       nystatin (MYCOSTATIN) topical cream Apply  to affected area two (2) times a day.  60 g 0    fluticasone propionate (FLONASE) 50 mcg/actuation nasal spray 2 Sprays by Both Nostrils route daily. 3 Bottle 1    albuterol (PROVENTIL HFA, VENTOLIN HFA, PROAIR HFA) 90 mcg/actuation inhaler Take 2 Puffs by inhalation every six (6) hours as needed for Wheezing. 1 Inhaler 3    Nebulizer & Compressor machine Nebulizer machine x 1 Nebulizer Kit 1 Each 0    albuterol (PROVENTIL VENTOLIN) 2.5 mg /3 mL (0.083 %) nebu 3 mL by Nebulization route every four (4) hours as needed for Wheezing. 25 Nebule 1    Nebulizer Accessories kit Use kit with Nebulizer every 4 hours as needed  Wash out kit after each use and air dry  Change kit every month when in use. 1 Kit 5    Ozempic 0.25 mg or 0.5 mg(2 mg/1.5 mL) sub-q pen INJECT 0.25MG UNDERNEATH THE SKIN EVERY 7 DAYS 4.5 mL 3    glucose blood VI test strips (ASCENSIA AUTODISC VI, ONE TOUCH ULTRA TEST VI) strip Check glucose twice daily 100 Strip 11     Allergies   Allergen Reactions    Vicodin [Hydrocodone-Acetaminophen] Hives       Family History   Problem Relation Age of Onset    Hypertension Mother     Diabetes Father     Hypertension Sister     No Known Problems Brother     Lung Disease Sister         copd    Anesth Problems Neg Hx      Social History     Tobacco Use    Smoking status: Former Smoker     Packs/day: 1.00     Years: 30.00     Pack years: 30.00     Types: Cigarettes     Quit date: 10/30/2012     Years since quittin.2    Smokeless tobacco: Never Used   Substance Use Topics    Alcohol use:  Yes     Alcohol/week: 16.0 standard drinks     Types: 16 Cans of beer per week     Comment: 4-5 drinks a night x 4 times a week         Laure Okeefe MD

## 2022-01-31 NOTE — TELEPHONE ENCOUNTER
Received call from Wing Mace at [facility] with Red Flag Complaint. Subjective: Caller states \"I keep having this numb and tingling in my left hand, like right now it's in my hand. Extends to my shoulder, sometimes my  will massage my back, when he does it, I can feel the nerves going down to my fingers. It's there everyday. I am overweight, I sleep on that side. There was a while I was sleeping and the pillow was back there. I might need a nerve doctor to see what's back there. \"     Current Symptoms: Left hand numbness and tingling, starts at shoulder and goes down    Hx of 3 herniated discs and cervical nerve damage on left side/chronic pain, Diabetes with neuropathy    Pain management doctor did electronic nerve diagnostic report and recommended patient see a neurologist.    Onset: 2 months ago; worsening    Associated Symptoms: movement makes it worse, sitting straight up makes it go away, painful to lay on. Pain Severity: 3/10; tingling,numbing; constant    Temperature: Denies     What has been tried: massage, Ibuprofen- helps briefly    LMP: NA Pregnant: NA    Recommended disposition: Go to Office Now. Patient refusing office visit, stating she had an appointment for today and can't make it d/t having a 3year old child. Patient is asking if she should be seen by a neurologist for her symptoms. Writer reinforced being seen by PCP in the next four hours for current symptoms to determine if patient needs referral to another doctor. Patient stating she has been living with these symptoms for months and is still here. Writer offered to call PCP office since patient refusing disposition, patient agreeable. Care advice provided, patient verbalizes understanding; denies any other questions or concerns; instructed to call back for any new or worsening symptoms. Writer provided warm transfer to [place] at [address] beds at the [facility] [address] for refusal of disposition. Attention Provider:   Thank you for allowing me to participate in the care of your patient. The patient was connected to triage in response to information provided to the ECC. Please do not respond through this encounter as the response is not directed to a shared pool.     Reason for Disposition   Arm pains with exertion (e.g., occurs with walking; goes away on resting)    Protocols used: ARM PAIN-ADULT-[place]

## 2022-01-31 NOTE — PROGRESS NOTES
Chief Complaint   Patient presents with    Diabetes    Annual Wellness Visit   Numbness and Tingling arm/shoulder      1. Have you been to the ER, urgent care clinic since your last visit? Hospitalized since your last visit?no    2. Have you seen or consulted any other health care providers outside of the [address] [place] [address] since your last visit? Include any pap smears or colon screening.  Yes Pain Management     Eye- NO     All 3 covid vaccines completed   Declines flu     No Pap     Mammogram done

## 2022-01-31 NOTE — PROGRESS NOTES
JAMIL Montelongo 63 y.o. female  presents to the office today for Diabetes, AWV, Numbness and Tingling. Blood pressure (!) 180/110, pulse 80, temperature 98.3 °F (36.8 °C), temperature source Temporal, resp. rate 16, height 5' 8\" (1.727 m), weight 241 lb (109.3 kg), SpO2 98 %. Body mass index is 36.64 kg/m². Chief Complaint   Patient presents with    Diabetes    Annual Wellness Visit    Numbness    Tingling     Hypertension: BP at office today 180/110 and 170/100 on manual recheck. Pt continues with Cozaar 100mg take 1 tablet orally daily. I have rx'd the pt Norvasc 5mg take 1 tablet orally daily. DM2: A1c per POC today 7.9, elevated from A1c of 7.0 on 08/06/21. Pt continues with Metformin 500mg take 1 tablet orally BID. Pt reports that she plans on working on her diet. Cervical Radiculopathy: Pt c/o chest tightness but not significant. She denies of an \"elephant standing on chest\" sensation. She also reports of a intermittent tingling/numbness dull achy sensation near her back L shoulder area. The pt reports that she has trouble sleeping because of it and reports of a \"knot\" sensation near that area. She states that she takes Motrin 800mg take 1 tablet orally every 8 hours as needed for pain. I have ordered an X-RAY for the pt's cervical spine area. I have also referred the pt to Nancy Lock, Neurology. Hyperlipidemia: Lipid panel on 08/06/21 notable for total cholesterol 178, HDL 70, LDL 70, and triglycerides 190. Pt continues with Zocor 20mg take 1 tablet orally daily. Pt will have updated lab work performed during today's OV. Anxiety: I have referred the pt to Mickey Pal LCSW, for her anxiety. Severe Obesity: I have reviewed/discussed the above normal BMI with the patient. I have recommended the following interventions: dietary management education, guidance, and counseling, encourage exercise and monitor weight.       Health Maintenance: Pt reports that she has received her COVID-19 booster vaccination [employer]) on 10/29/21. Medicare questionnaire discussed and responses reviewed today in office. Pt reports that she drinks beer often. Current Outpatient Medications   Medication Sig Dispense Refill    amLODIPine (NORVASC) 5 mg tablet Take 1 Tablet by mouth daily. 30 Tablet 2    metFORMIN ER (GLUCOPHAGE XR) 500 mg tablet TAKE 1 TABLET BY MOUTH TWICE DAILY 180 Tablet 0    ibuprofen (MOTRIN) 800 mg tablet TAKE 1 TABLET BY MOUTH EVERY 8 HOURS AS NEEDED FOR PAIN 90 Tablet 1    montelukast (SINGULAIR) 10 mg tablet TAKE 1 TABLET BY MOUTH DAILY 90 Tablet 0    simvastatin (ZOCOR) 20 mg tablet TAKE 1 TABLET BY MOUTH EVERY NIGHT 90 Tablet 0    losartan (COZAAR) 100 mg tablet TAKE 1 TABLET BY MOUTH DAILY 90 Tablet 1    famotidine (PEPCID) 20 mg tablet TAKE 1 TABLET BY MOUTH EVERY DAY 90 Tablet 1    levocetirizine (XYZAL) 5 mg tablet TAKE 1 TABLET BY MOUTH EVERY DAY 90 Tablet 5    oxyCODONE-acetaminophen (PERCOCET) 5-325 mg per tablet       nystatin (MYCOSTATIN) topical cream Apply  to affected area two (2) times a day. 60 g 0    fluticasone propionate (FLONASE) 50 mcg/actuation nasal spray 2 Sprays by Both Nostrils route daily. 3 Bottle 1    albuterol (PROVENTIL HFA, VENTOLIN HFA, PROAIR HFA) 90 mcg/actuation inhaler Take 2 Puffs by inhalation every six (6) hours as needed for Wheezing. 1 Inhaler 3    Nebulizer & Compressor machine Nebulizer machine x 1 Nebulizer Kit 1 Each 0    albuterol (PROVENTIL VENTOLIN) 2.5 mg /3 mL (0.083 %) nebu 3 mL by Nebulization route every four (4) hours as needed for Wheezing. 25 Nebule 1    Nebulizer Accessories kit Use kit with Nebulizer every 4 hours as needed  Wash out kit after each use and air dry  Change kit every month when in use.  1 Kit 5    Ozempic 0.25 mg or 0.5 mg(2 mg/1.5 mL) sub-q pen INJECT 0.25MG UNDERNEATH THE SKIN EVERY 7 DAYS 4.5 mL 3    glucose blood VI test strips (ASCENSIA AUTODISC VI, ONE TOUCH ULTRA TEST VI) strip Check glucose twice daily 100 Strip 11     Allergies   Allergen Reactions    Vicodin [Hydrocodone-Acetaminophen] Hives     Past Medical History:   Diagnosis Date    Adverse effect of anesthesia     slow to wake up at dentist office    Anxiety     Arthritis     Chronic pain     3 herniated discs in lower back    Depression     Diabetes ([address]     type 2    GERD (gastroesophageal reflux disease)     High cholesterol     Hypertension     MRSA infection     left leg treated with antibiotics at [facility]    Other ill-defined conditions(799.89)     herniated disc to back    PUD (peptic ulcer disease)     Seasonal allergies     Sleep apnea     kerwin     Past Surgical History:   Procedure Laterality Date    COLONOSCOPY N/A 2017    COLON[facility]COPY performed by Deepa Martin [facility] at [facility] ENDOSCOPY    COLONOSCOPY N/A 2022    COLONOSCOPY   :- performed by Rosalind Garrido MD at [facility] ENDOSCOPY    HX GYN      tubal ligation, hysterectomy    HX HYSTERECTOMY      HX ORTHOPAEDIC      left hand ligament removed    HX OTHER SURGICAL      corticosteroid injections - back    HX OTHER SURGICAL  2019    Left lower parathyroidectomy with intraoperative PTH monitoring. -[facility]-Dr. [place] [facility]    HX TONSILLECTOMY       Family History   Problem Relation Age of Onset    Hypertension Mother     Diabetes Father     Hypertension Sister     No Known Problems Brother     Lung Disease Sister         copd    Anesth Problems Neg Hx      Social History     Tobacco Use    Smoking status: Former Smoker     Packs/day: 1.00     Years: 30.00     Pack years: 30.00     Types: Cigarettes     Quit date: 10/30/2012     Years since quittin.2    Smokeless tobacco: Never Used   Substance Use Topics    Alcohol use: Yes     Alcohol/week: 16.0 standard drinks     Types: 16 Cans of beer per week     Comment: 4-5 drinks a night x 4 times a week        Review of Systems   Constitutional: Negative for chills and fever.    HENT: Negative for hearing loss and tinnitus. Eyes: Negative for blurred vision and double vision. Respiratory: Negative for cough and shortness of breath. Cardiovascular: Negative for chest pain and palpitations. Gastrointestinal: Negative for nausea and vomiting. Genitourinary: Negative for dysuria and frequency. Musculoskeletal: Positive for neck pain. Negative for back pain and falls. Skin: Negative for itching and rash. Neurological: Negative for dizziness, loss of consciousness and headaches. Endo/Heme/Allergies: Negative. Psychiatric/Behavioral: Negative for depression. The patient is not nervous/anxious. Physical Exam  Constitutional:       Appearance: Normal appearance. HENT:      Head: Normocephalic and atraumatic. Right Ear: Tympanic membrane, ear canal and external ear normal.      Left Ear: Tympanic membrane, ear canal and external ear normal.      Nose: Nose normal.      Mouth/Throat:      Mouth: Mucous membranes are moist.      Pharynx: Oropharynx is clear. Eyes:      Extraocular Movements: Extraocular movements intact. Conjunctiva/sclera: Conjunctivae normal.      Pupils: Pupils are equal, round, and reactive to light. Cardiovascular:      Rate and Rhythm: Normal rate and regular rhythm. Pulses: Normal pulses. Heart sounds: Normal heart sounds. Pulmonary:      Effort: Pulmonary effort is normal.      Breath sounds: Normal breath sounds. Abdominal:      General: Abdomen is flat. Bowel sounds are normal.      Palpations: Abdomen is soft. Genitourinary:     General: Normal vulva. Rectum: Normal.   Musculoskeletal:         General: Normal range of motion.    Feet:      Comments: Diabetic foot exam:     Left Foot:   Visual Exam: normal    Pulse DP: 2+ (normal)   Filament test: normal sensation    Vibratory sensation: normal      Right Foot:   Visual Exam: normal    Pulse DP: 2+ (normal)   Filament test: normal sensation    Vibratory sensation: normal    Skin:     General: Skin is warm and dry. Neurological:      General: No focal deficit present. Mental Status: She is alert and oriented to person, place, and time. Mental status is at baseline. Psychiatric:         Mood and Affect: Mood normal.         Behavior: Behavior normal.         Judgment: Judgment normal.           ASSESSMENT and PLAN  Diagnoses and all orders for this visit:    1. Cervical radiculopathy   Pt c/o chest tightness but not significant. She denies of an \"elephant standing on chest\" sensation. She also reports of a intermittent tingling/numbness dull achy sensation near her back L shoulder area. The pt reports that she has trouble sleeping because of it and reports of a \"knot\" sensation near that area. She states that she takes Motrin 800mg take 1 tablet orally every 8 hours as needed for pain. I have ordered an X-RAY for the pt's cervical spine area. I have also referred the pt to , Neurology.  -     REFERRAL TO NEUROLOGY  -     XR SPINE CERV W OBL/FLEX/EXT MIN 6 V COMP; Future    2. Controlled type 2 diabetes mellitus without complication, with long-term current use of insulin ([facility])   A1c per POC today 7.9, elevated from A1c of 7.0 on 08/06/21. Pt continues with Metformin 500mg take 1 tablet orally BID. Pt reports that she plans on working on her diet. -     AMB POC HEMOGLOBIN P5G  -     METABOLIC PANEL, COMPREHENSIVE; Future  -     MICROALBUMIN, UR, RAND W/ MICROALB/CREAT RATIO; Future  -      DIABETES FOOT EXAM    3. Controlled diabetes mellitus type 2 with complications, unspecified whether long term insulin use ([facility]  Refer to #2. 4. Severe obesity (BMI 35.0-35.9 with comorbidity) ([facility]  I have reviewed/discussed the above normal BMI with the patient. I have recommended the following interventions: dietary management education, guidance, and counseling, encourage exercise and monitor weight.      5. Essential hypertension  Uncontrolled added amlodipine and advised to continue losartan  BP at office today 180/110 and 170/100 on manual recheck. Pt continues with Cozaar 100mg take 1 tablet orally daily. I have rx'd the pt Norvasc 5mg take 1 tablet orally daily. I have advised the pt to track her BP at home and to keep me updated. -     amLODIPine (NORVASC) 5 mg tablet; Take 1 Tablet by mouth daily. 6. Mixed hyperlipidemia  Lipid panel on 08/06/21 notable for total cholesterol 178, HDL 70, LDL 70, and triglycerides 190. Pt continues with Zocor 20mg take 1 tablet orally daily. Pt will have updated lab work performed during today's OV.   -     LIPID PANEL; Future      Follow-up and Dispositions    · Return in about 3 weeks (around 2/21/2022) for hypertension follow up. Medication risks/benefits/costs/interactions/alternatives discussed with patient. Advised patient to call back or return to office if symptoms worsen/change/persist.  If patient cannot reach us or should anything more severe/urgent arise he/she should proceed directly to the nearest emergency department. Discussed expected course/resolution/complications of diagnosis in detail with patient. Patient given a written after visit summary which includes diagnoses, current medications and vitals. Patient expressed understanding with the diagnosis and plan. Written by liliam Rendon, as dictated by Vince Benitez M.D.    11:57 AM - 12:14 PM    Total time spent with the patient 17 minutes, greater than 50% of time spent counseling patient.

## 2022-02-01 LAB
ALBUMIN SERPL-MCNC: 4 G/DL (ref 3.5–5)
ALBUMIN/GLOB SERPL: 1.1 {RATIO} (ref 1.1–2.2)
ALP SERPL-CCNC: 98 U/L (ref 45–117)
ALT SERPL-CCNC: 29 U/L (ref 12–78)
ANION GAP SERPL CALC-SCNC: 5 MMOL/L (ref 5–15)
AST SERPL-CCNC: 11 U/L (ref 15–37)
BILIRUB SERPL-MCNC: 0.4 MG/DL (ref 0.2–1)
BUN SERPL-MCNC: 17 MG/DL (ref 6–20)
BUN/CREAT SERPL: 13 (ref 12–20)
CALCIUM SERPL-MCNC: 9.4 MG/DL (ref 8.5–10.1)
CHLORIDE SERPL-SCNC: 104 MMOL/L (ref 97–108)
CHOLEST SERPL-MCNC: 198 MG/DL
CO2 SERPL-SCNC: 24 MMOL/L (ref 21–32)
CREAT SERPL-MCNC: 1.27 MG/DL (ref 0.55–1.02)
CREAT UR-MCNC: 49.5 MG/DL
GLOBULIN SER CALC-MCNC: 3.6 G/DL (ref 2–4)
GLUCOSE SERPL-MCNC: 179 MG/DL (ref 65–100)
HDLC SERPL-MCNC: 72 MG/DL
HDLC SERPL: 2.8 {RATIO} (ref 0–5)
LDLC SERPL CALC-MCNC: 79.8 MG/DL (ref 0–100)
MICROALBUMIN UR-MCNC: 70.5 MG/DL
MICROALBUMIN/CREAT UR-RTO: 1424 MG/G (ref 0–30)
POTASSIUM SERPL-SCNC: 4.7 MMOL/L (ref 3.5–5.1)
PROT SERPL-MCNC: 7.6 G/DL (ref 6.4–8.2)
SODIUM SERPL-SCNC: 133 MMOL/L (ref 136–145)
TRIGL SERPL-MCNC: 231 MG/DL (ref ?–150)
VLDLC SERPL CALC-MCNC: 46.2 MG/DL

## 2022-02-07 NOTE — PROGRESS NOTES
Called, spoke to pt. Two pt identifiers confirmed. Writer informed patient of abnormal labs and recommendation. Pt verbalized understanding of information discussed w/ no further questions at this time. Patient also wanted MD to know that she could not use the Pain Management MD that you referred, because she has one already, so she has to use that one.

## 2022-02-07 NOTE — PROGRESS NOTES
Ms. Pittman Form, your labs overall are looking okay 1 issue that I noticed is that your urine microalbumin levels have gone up a bit that may be due to your diabetes control. Your A1c as you know is 7.9% so we need to work on getting that down even further.     Renal function is also slightly elevated to please make sure that you are well-hydrated

## 2022-02-24 ENCOUNTER — TELEPHONE (OUTPATIENT)
Dept: FAMILY MEDICINE CLINIC | Age: 62
End: 2022-02-24

## 2022-02-24 NOTE — TELEPHONE ENCOUNTER
----- Message from Apolonia Lora sent at 2/21/2022 12:40 PM EST -----  Subject: Message to Provider    QUESTIONS  Information for Provider? The pt is unable to make her in office   appointment for 2/22/2022 at 3:45. She has rescheduled her appointment for   next available VV. She has questions about her blood pressure medicine. Please call her  ---------------------------------------------------------------------------  --------------  CALL BACK INFO  What is the best way for the office to contact you? OK to leave message on   voicemail  Preferred Call Back Phone Number? [phone]  ---------------------------------------------------------------------------  --------------  SCRIPT ANSWERS  Relationship to Patient?  Self

## 2022-02-24 NOTE — TELEPHONE ENCOUNTER
Patient advises Pain Management started her on gabapentin so they had to stop the Losartan due to possible Interaction. Patient advises the Losartan with Norvasc was not keeping her Blood Pressure down    Patient has increased her Norvasc on her own to 10mg daily and advises that is doing better for her blood pressure. Patient has a upcoming VV with  advised to continue to record Blood Pressures .

## 2022-03-01 NOTE — TELEPHONE ENCOUNTER
TC to Patient. ID verified. Calling to advise Dr. Tejinder Rosas has been made aware of change in medication. Continue Norvasc 10 mg daily. Blood pressures have been doing well. Under 140/90 advised if any changes to let us know. Verbalizes Understanding.

## 2022-03-07 ENCOUNTER — VIRTUAL VISIT (OUTPATIENT)
Dept: FAMILY MEDICINE CLINIC | Age: 62
End: 2022-03-07
Payer: MEDICARE

## 2022-03-07 DIAGNOSIS — E78.2 MIXED HYPERLIPIDEMIA: ICD-10-CM

## 2022-03-07 DIAGNOSIS — F17.200 SMOKING: ICD-10-CM

## 2022-03-07 DIAGNOSIS — F17.211 NICOTINE DEPENDENCE, CIGARETTES, IN REMISSION: ICD-10-CM

## 2022-03-07 DIAGNOSIS — I10 ESSENTIAL HYPERTENSION: Primary | ICD-10-CM

## 2022-03-07 DIAGNOSIS — K21.9 GASTROESOPHAGEAL REFLUX DISEASE WITHOUT ESOPHAGITIS: ICD-10-CM

## 2022-03-07 PROCEDURE — G8427 DOCREV CUR MEDS BY ELIG CLIN: HCPCS | Performed by: FAMILY MEDICINE

## 2022-03-07 PROCEDURE — G0463 HOSPITAL OUTPT CLINIC VISIT: HCPCS | Performed by: FAMILY MEDICINE

## 2022-03-07 PROCEDURE — G9717 DOC PT DX DEP/BP F/U NT REQ: HCPCS | Performed by: FAMILY MEDICINE

## 2022-03-07 PROCEDURE — G8756 NO BP MEASURE DOC: HCPCS | Performed by: FAMILY MEDICINE

## 2022-03-07 PROCEDURE — 99214 OFFICE O/P EST MOD 30 MIN: CPT | Performed by: FAMILY MEDICINE

## 2022-03-07 PROCEDURE — G9899 SCRN MAM PERF RSLTS DOC: HCPCS | Performed by: FAMILY MEDICINE

## 2022-03-07 PROCEDURE — 3017F COLORECTAL CA SCREEN DOC REV: CPT | Performed by: FAMILY MEDICINE

## 2022-03-07 RX ORDER — FAMOTIDINE 20 MG/1
20 TABLET, FILM COATED ORAL DAILY
Qty: 90 TABLET | Refills: 1 | Status: SHIPPED | OUTPATIENT
Start: 2022-03-07 | End: 2022-09-05

## 2022-03-07 RX ORDER — AMLODIPINE BESYLATE 10 MG/1
10 TABLET ORAL DAILY
Qty: 90 TABLET | Refills: 1 | Status: SHIPPED | OUTPATIENT
Start: 2022-03-07 | End: 2022-11-03

## 2022-03-07 RX ORDER — SIMVASTATIN 20 MG/1
20 TABLET, FILM COATED ORAL
Qty: 90 TABLET | Refills: 1 | Status: SHIPPED | OUTPATIENT
Start: 2022-03-07 | End: 2022-09-05

## 2022-03-07 NOTE — PROGRESS NOTES
Antoine Kimbrough is a 63 y.o. female who was seen by synchronous (real-time) audio-video technology on 3/7/2022. Hypertension: Pt is compliant (most days) taking amlodipine 2x 5mg daily and losartan 100mg daily. Pt reports medication has been working well to control her BP and she is feeling better with these medications. At home BP reading today was 137/73 and typically ranges 130s/80s. GERD: Pt compliant taking famotidine 20mg daily and reports it works well to control her symptoms. She is requesting refills. Hyperlipidemia: Lipid panel on 1/31/22 notable for total cholesterol 198, HDL 72, LDL 79.8, and triglycerides 231. Pt compliant taking simvastatin 20mg nightly. Lab Results   Component Value Date/Time    Cholesterol, total 198 01/31/2022 12:39 PM    HDL Cholesterol 72 01/31/2022 12:39 PM    LDL,Direct 148 (H) 04/25/2016 12:13 PM    LDL, calculated 79.8 01/31/2022 12:39 PM    VLDL, calculated 46.2 01/31/2022 12:39 PM    Triglyceride 231 (H) 01/31/2022 12:39 PM    CHOL/HDL Ratio 2.8 01/31/2022 12:39 PM     Mood: Pt denies depression/anxiety and reports her mood has been good. Smoking: Hx of tobacco use. She denies coughing, CP, SOB. She notes she hasn't had an asthma attack in years. Pt reports she is not interested in Pneumonia or Shingles vaccines. She is due for an eye exam and plans to schedule one. She is also due for a pap smear and plans to schedule here. Consent:  Services were provided through a video synchronous discussion virtually to substitute for in-person appointment. She and/or her healthcare decision maker is aware that this patient-initiated Telehealth encounter is a billable service, with coverage as determined by her insurance carrier. She is aware that she may receive a bill and has provided verbal consent to proceed: Yes    I was in the office while conducting this encounter.     Subjective:   Antoine Kimbrough was seen for Follow-up      Prior to Admission medications Medication Sig Start Date End Date Taking? Authorizing Provider   amLODIPine (NORVASC) 10 mg tablet Take 1 Tablet by mouth daily. 3/7/22  Yes Dat Bhatia MD   simvastatin (ZOCOR) 20 mg tablet Take 1 Tablet by mouth nightly. 3/7/22  Yes Dat Bhatia MD   famotidine (PEPCID) 20 mg tablet Take 1 Tablet by mouth daily. 3/7/22  Yes Dat Bhatia MD   montelukast (SINGULAIR) 10 mg tablet TAKE 1 TABLET BY MOUTH DAILY 2/19/22  Yes Dat Bhatia MD   metFORMIN ER (GLUCOPHAGE XR) 500 mg tablet TAKE 1 TABLET BY MOUTH TWICE DAILY 1/6/22  Yes Dat Bhatia MD   ibuprofen (MOTRIN) 800 mg tablet TAKE 1 TABLET BY MOUTH EVERY 8 HOURS AS NEEDED FOR PAIN 12/7/21  Yes Dat Bhatia MD   losartan (COZAAR) 100 mg tablet TAKE 1 TABLET BY MOUTH DAILY 8/16/21  Yes Dat Bhatia MD   levocetirizine (XYZAL) 5 mg tablet TAKE 1 TABLET BY MOUTH EVERY DAY 7/7/21  Yes Dat Bhatia MD   oxyCODONE-acetaminophen (PERCOCET) 5-325 mg per tablet  6/18/21  Yes Provider, Historical   nystatin (MYCOSTATIN) topical cream Apply  to affected area two (2) times a day. 5/26/21  Yes Dat Bhatia MD   fluticasone propionate (FLONASE) 50 mcg/actuation nasal spray 2 Sprays by Both Nostrils route daily. 4/1/21  Yes Dat Bhatia MD   albuterol (PROVENTIL HFA, VENTOLIN HFA, PROAIR HFA) 90 mcg/actuation inhaler Take 2 Puffs by inhalation every six (6) hours as needed for Wheezing. 3/17/21  Yes Dat Bhatia MD   Nebulizer & Compressor machine Nebulizer machine x 1 Nebulizer Kit 3/17/21  Yes Dat Bhatia MD   albuterol (PROVENTIL VENTOLIN) 2.5 mg /3 mL (0.083 %) nebu 3 mL by Nebulization route every four (4) hours as needed for Wheezing. 3/12/21  Yes Dat Bhatia MD   Nebulizer Accessories kit Use kit with Nebulizer every 4 hours as needed  Wash out kit after each use and air dry  Change kit every month when in use.  3/10/21  Yes Dat Bhatia MD   Ozempic 0.25 mg or 0.5 mg(2 mg/1.5 mL) sub-q pen INJECT 0.25MG UNDERNEATH THE SKIN EVERY 7 DAYS 1/12/21  Yes Garrett Alvarado MD   glucose blood VI test strips (ASCENSIA AUTODISC VI, ONE TOUCH ULTRA TEST VI) strip Check glucose twice daily 4/2/19  Yes Garrett Alvarado MD   amLODIPine (NORVASC) 5 mg tablet Take 1 Tablet by mouth daily. 1/31/22 3/7/22  Garrett Alvarado MD   simvastatin (ZOCOR) 20 mg tablet TAKE 1 TABLET BY MOUTH EVERY NIGHT 9/6/21 3/7/22  Wili Gamboa MD   famotidine (PEPCID) 20 mg tablet TAKE 1 TABLET BY MOUTH EVERY DAY 8/5/21 3/7/22  Wili Gamboa MD     Allergies   Allergen Reactions    Vicodin [Hydrocodone-Acetaminophen] Hives     Past Medical History:   Diagnosis Date    Adverse effect of anesthesia     slow to wake up at dentist office    Anxiety     Arthritis     Chronic pain     3 herniated discs in lower back    Depression     Diabetes ([address]     type 2    GERD (gastroesophageal reflux disease)     High cholesterol     Hypertension     MRSA infection 2008    left leg [facility] at [facility]    Other ill-defined conditions(799.89)     herniated disc to back    PUD (peptic ulcer disease)     Seasonal allergies     Sleep apnea     kerwin     Past Surgical History:   Procedure Laterality Date    COLONOSCOPY N/A 5/31/2017    COLON[facility]COPY performed by Kayla Hampton MD at [facility] ENDOSCOPY    COLONOSCOPY N/A 1/21/2022    COLONOSCOPY   :- performed by Nguyen Coleman MD at [facility] ENDOSCOPY    HX GYN      tubal ligation, hysterectomy    HX HYSTERECTOMY      HX ORTHOPAEDIC      left hand ligament removed    HX OTHER SURGICAL      corticosteroid injections - back    HX OTHER SURGICAL  08/06/2019    Left lower parathyroidectomy with intraoperative PTH monitoring. -[facility]-Dr. Lizabeth Earl    HX TONSILLECTOMY       Family History   Problem Relation Age of Onset   Arcenio Molina Hypertension Mother     Diabetes Father     Hypertension Sister     No Known Problems Brother     Lung Disease Sister         copd    Anesth Problems Neg Hx Social History     Tobacco Use    Smoking status: Former Smoker     Packs/day: 1.00     Years: 30.00     Pack years: 30.00     Types: Cigarettes     Quit date: 10/30/2012     Years since quittin.3    Smokeless tobacco: Never Used   Substance Use Topics    Alcohol use: Yes     Alcohol/week: 16.0 standard drinks     Types: 16 Cans of beer per week     Comment: 4-5 drinks a night x 4 times a week        Review of Systems   Constitutional: Negative for chills and fever. HENT: Negative for hearing loss and tinnitus. Eyes: Negative for blurred vision and double vision. Respiratory: Negative for cough and shortness of breath. Cardiovascular: Negative for chest pain and palpitations. Gastrointestinal: Negative for nausea and vomiting. Genitourinary: Negative for dysuria and frequency. Musculoskeletal: Negative for back pain and falls. Skin: Negative for itching and rash. Neurological: Negative for dizziness, loss of consciousness and headaches. Endo/Heme/Allergies: Negative. Psychiatric/Behavioral: Negative for depression. The patient is not nervous/anxious. PHYSICAL EXAMINATION:  Vital Signs: (As obtained by patient/caregiver at home)  There were no vitals taken for this visit.      Constitutional: [x] Appears well-developed and well-nourished [x] No apparent distress      [] Abnormal -     Mental status: [x] Alert and awake  [x] Oriented to person/place/time [x] Able to follow commands    [] Abnormal -     Eyes:   EOM    [x]  Normal    [] Abnormal -   Sclera  [x]  Normal    [] Abnormal -          Discharge [x]  None visible   [] Abnormal -     HENT: [x] Normocephalic, atraumatic  [] Abnormal -   [x] Mouth/Throat: Mucous membranes are moist    External Ears [x] Normal  [] Abnormal -    Neck: [x] No visualized mass [] Abnormal -     Pulmonary/Chest: [x] Respiratory effort normal   [x] No visualized signs of difficulty breathing or respiratory distress        [] Abnormal - Musculoskeletal:   [x] Normal gait with no signs of ataxia         [x] Normal range of motion of neck        [] Abnormal -     Neurological:        [x] No Facial Asymmetry (Cranial nerve 7 motor function) (limited exam due to video visit)          [x] No gaze palsy        [] Abnormal -          Skin:        [x] No significant exanthematous lesions or discoloration noted on facial skin         [] Abnormal -            Psychiatric:       [x] Normal Affect [] Abnormal -      [x] No Hallucinations    Other pertinent observable physical exam findings:-    Assessment & Plan:   Diagnoses and all orders for this visit:    1. Essential hypertension  -     amLODIPine (NORVASC) 10 mg tablet; Take 1 Tablet by mouth daily. Pt will continue taking amlodipine 10mg daily and losartan 100mg daily. Continue to check BP at home. 2. Mixed hyperlipidemia  -     simvastatin (ZOCOR) 20 mg tablet; Take 1 Tablet by mouth nightly. Continue on current dose of simvastatin. 3. Gastroesophageal reflux disease without esophagitis  -     famotidine (PEPCID) 20 mg tablet; Take 1 Tablet by mouth daily. The current medical regimen is effective;  continue present plan and medications. 4. Smoking  -     CT LOW DOSE LUNG CANCER SCREENING; Future  Pt was advised to get chest CT due to hx of smoking. 5. Nicotine dependence, cigarettes, in remission   -     CT LOW DOSE LUNG CANCER SCREENING; Future      Follow-up and Dispositions    · Return in about 6 months (around 9/7/2022) for follow up. We discussed the expected course, resolution and complications of the diagnosis(es) in detail. Medication risks, benefits, costs, interactions, and alternatives were discussed as indicated. I advised her to contact the office if her condition worsens, changes or fails to improve as anticipated. She expressed understanding with the diagnosis(es) and plan.      Pursuant to the emergency declaration under the [address] and [facility] Emergencies Act, 1135 waiver authority and the Coronavirus Preparedness and Response Supplemental Appropriations Act, this Virtual Visit was conducted, with patient's consent, to reduce the patient's risk of exposure to COVID-19 and provide continuity of care for an established patient. Services were provided through a video synchronous discussion virtually to substitute for in-person clinic visit.      Written by liliam Shetty, as dictated by Maya Leslie M.D.    5:19 PM - 5:34 PM

## 2022-03-18 PROBLEM — Z79.4 CONTROLLED TYPE 2 DIABETES MELLITUS WITHOUT COMPLICATION, WITH LONG-TERM CURRENT USE OF INSULIN (HCC): Status: ACTIVE | Noted: 2018-09-19

## 2022-03-18 PROBLEM — E11.9 CONTROLLED TYPE 2 DIABETES MELLITUS WITHOUT COMPLICATION, WITH LONG-TERM CURRENT USE OF INSULIN (HCC): Status: ACTIVE | Noted: 2018-09-19

## 2022-03-19 PROBLEM — E11.21 TYPE 2 DIABETES WITH NEPHROPATHY (HCC): Status: ACTIVE | Noted: 2020-02-07

## 2022-03-19 PROBLEM — E66.01 SEVERE OBESITY (HCC): Status: ACTIVE | Noted: 2019-08-28

## 2022-04-01 ENCOUNTER — HOSPITAL ENCOUNTER (OUTPATIENT)
Dept: CT IMAGING | Age: 62
Discharge: HOME | End: 2022-04-01
Attending: FAMILY MEDICINE
Payer: MEDICARE

## 2022-04-01 DIAGNOSIS — F17.200 SMOKING: ICD-10-CM

## 2022-04-01 DIAGNOSIS — F17.211 NICOTINE DEPENDENCE, CIGARETTES, IN REMISSION: ICD-10-CM

## 2022-04-01 PROCEDURE — 71271 CT THORAX LUNG CANCER SCR C-: CPT

## 2022-04-03 DIAGNOSIS — E11.9 CONTROLLED TYPE 2 DIABETES MELLITUS WITHOUT COMPLICATION, WITH LONG-TERM CURRENT USE OF INSULIN (HCC): ICD-10-CM

## 2022-04-03 DIAGNOSIS — Z79.4 CONTROLLED TYPE 2 DIABETES MELLITUS WITHOUT COMPLICATION, WITH LONG-TERM CURRENT USE OF INSULIN (HCC): ICD-10-CM

## 2022-04-04 RX ORDER — METFORMIN HYDROCHLORIDE 500 MG/1
TABLET, EXTENDED RELEASE ORAL
Qty: 180 TABLET | Refills: 0 | Status: SHIPPED | OUTPATIENT
Start: 2022-04-04 | End: 2022-07-14 | Stop reason: SDUPTHER

## 2022-04-18 NOTE — PROGRESS NOTES
Ms. Tim Villa,  We need to follow up in 1 year for the CT of the chest , I will place the orders so we dont lose sight of this for the 1 years follow up. If you have any questions let me know.

## 2022-04-25 DIAGNOSIS — E11.9 CONTROLLED TYPE 2 DIABETES MELLITUS WITHOUT COMPLICATION, WITHOUT LONG-TERM CURRENT USE OF INSULIN (HCC): ICD-10-CM

## 2022-04-27 RX ORDER — SEMAGLUTIDE 1.34 MG/ML
INJECTION, SOLUTION SUBCUTANEOUS
Qty: 4.5 ML | Refills: 3 | Status: SHIPPED | OUTPATIENT
Start: 2022-04-27 | End: 2022-08-02 | Stop reason: DRUGHIGH

## 2022-07-14 ENCOUNTER — PATIENT MESSAGE (OUTPATIENT)
Dept: FAMILY MEDICINE CLINIC | Age: 62
End: 2022-07-14

## 2022-07-14 DIAGNOSIS — Z79.4 CONTROLLED TYPE 2 DIABETES MELLITUS WITHOUT COMPLICATION, WITH LONG-TERM CURRENT USE OF INSULIN (HCC): ICD-10-CM

## 2022-07-14 DIAGNOSIS — J30.9 ALLERGIC RHINITIS, UNSPECIFIED SEASONALITY, UNSPECIFIED TRIGGER: ICD-10-CM

## 2022-07-14 DIAGNOSIS — E11.9 CONTROLLED TYPE 2 DIABETES MELLITUS WITHOUT COMPLICATION, WITH LONG-TERM CURRENT USE OF INSULIN (HCC): ICD-10-CM

## 2022-07-14 RX ORDER — LEVOCETIRIZINE DIHYDROCHLORIDE 5 MG/1
TABLET, FILM COATED ORAL
Qty: 90 TABLET | Refills: 0 | Status: SHIPPED | OUTPATIENT
Start: 2022-07-14 | End: 2022-11-03

## 2022-07-14 RX ORDER — METFORMIN HYDROCHLORIDE 500 MG/1
TABLET, EXTENDED RELEASE ORAL
Qty: 180 TABLET | Refills: 0 | Status: SHIPPED | OUTPATIENT
Start: 2022-07-14 | End: 2022-08-02

## 2022-07-14 RX ORDER — LEVOCETIRIZINE DIHYDROCHLORIDE 5 MG/1
TABLET, FILM COATED ORAL
Qty: 30 TABLET | Refills: 0 | Status: SHIPPED | OUTPATIENT
Start: 2022-07-14 | End: 2022-07-14

## 2022-07-14 RX ORDER — METFORMIN HYDROCHLORIDE 500 MG/1
500 TABLET, EXTENDED RELEASE ORAL 2 TIMES DAILY
Qty: 60 TABLET | Refills: 0 | Status: SHIPPED | OUTPATIENT
Start: 2022-07-14 | End: 2022-07-14

## 2022-07-14 NOTE — TELEPHONE ENCOUNTER
From: Bala Serrano  To:  Analisa Green MD  Sent: 7/14/2022 9:38 AM EDT  Subject: Metformin     Im coming info an appointment on August 2 but I need more Metformin fir 15 days which is 30 pills because when I come I want to be put on something else so until my appointment can I have only 30 Metformin pills sent to [place] [facility] along with a refill prescription on my levocetrizune 5 mg

## 2022-08-02 ENCOUNTER — OFFICE VISIT (OUTPATIENT)
Dept: FAMILY MEDICINE CLINIC | Age: 62
End: 2022-08-02
Payer: MEDICARE

## 2022-08-02 VITALS
TEMPERATURE: 97.9 F | HEART RATE: 82 BPM | BODY MASS INDEX: 34.71 KG/M2 | DIASTOLIC BLOOD PRESSURE: 90 MMHG | OXYGEN SATURATION: 99 % | SYSTOLIC BLOOD PRESSURE: 146 MMHG | HEIGHT: 68 IN | WEIGHT: 229 LBS | RESPIRATION RATE: 16 BRPM

## 2022-08-02 DIAGNOSIS — I10 ESSENTIAL HYPERTENSION: ICD-10-CM

## 2022-08-02 DIAGNOSIS — E11.65 UNCONTROLLED TYPE 2 DIABETES MELLITUS WITH HYPERGLYCEMIA (HCC): Primary | ICD-10-CM

## 2022-08-02 DIAGNOSIS — E66.01 SEVERE OBESITY (BMI 35.0-35.9 WITH COMORBIDITY) (HCC): ICD-10-CM

## 2022-08-02 DIAGNOSIS — E78.2 MIXED HYPERLIPIDEMIA: ICD-10-CM

## 2022-08-02 DIAGNOSIS — F41.9 ANXIETY: ICD-10-CM

## 2022-08-02 DIAGNOSIS — F17.211 NICOTINE DEPENDENCE, CIGARETTES, IN REMISSION: ICD-10-CM

## 2022-08-02 DIAGNOSIS — N18.31 STAGE 3A CHRONIC KIDNEY DISEASE (HCC): ICD-10-CM

## 2022-08-02 DIAGNOSIS — N81.6 RECTOCELE: ICD-10-CM

## 2022-08-02 PROBLEM — N18.30 CHRONIC RENAL DISEASE, STAGE III (HCC): Status: ACTIVE | Noted: 2022-08-02

## 2022-08-02 PROBLEM — E11.9 CONTROLLED TYPE 2 DIABETES MELLITUS WITHOUT COMPLICATION, WITH LONG-TERM CURRENT USE OF INSULIN (HCC): Status: RESOLVED | Noted: 2018-09-19 | Resolved: 2022-08-02

## 2022-08-02 PROBLEM — Z79.4 CONTROLLED TYPE 2 DIABETES MELLITUS WITHOUT COMPLICATION, WITH LONG-TERM CURRENT USE OF INSULIN (HCC): Status: RESOLVED | Noted: 2018-09-19 | Resolved: 2022-08-02

## 2022-08-02 PROCEDURE — 2022F DILAT RTA XM EVC RTNOPTHY: CPT | Performed by: FAMILY MEDICINE

## 2022-08-02 PROCEDURE — G8753 SYS BP > OR = 140: HCPCS | Performed by: FAMILY MEDICINE

## 2022-08-02 PROCEDURE — G0463 HOSPITAL OUTPT CLINIC VISIT: HCPCS | Performed by: FAMILY MEDICINE

## 2022-08-02 PROCEDURE — G9717 DOC PT DX DEP/BP F/U NT REQ: HCPCS | Performed by: FAMILY MEDICINE

## 2022-08-02 PROCEDURE — G8427 DOCREV CUR MEDS BY ELIG CLIN: HCPCS | Performed by: FAMILY MEDICINE

## 2022-08-02 PROCEDURE — 99214 OFFICE O/P EST MOD 30 MIN: CPT | Performed by: FAMILY MEDICINE

## 2022-08-02 PROCEDURE — 3017F COLORECTAL CA SCREEN DOC REV: CPT | Performed by: FAMILY MEDICINE

## 2022-08-02 PROCEDURE — 3051F HG A1C>EQUAL 7.0%<8.0%: CPT | Performed by: FAMILY MEDICINE

## 2022-08-02 PROCEDURE — G8417 CALC BMI ABV UP PARAM F/U: HCPCS | Performed by: FAMILY MEDICINE

## 2022-08-02 PROCEDURE — G8754 DIAS BP LESS 90: HCPCS | Performed by: FAMILY MEDICINE

## 2022-08-02 PROCEDURE — G9899 SCRN MAM PERF RSLTS DOC: HCPCS | Performed by: FAMILY MEDICINE

## 2022-08-02 RX ORDER — ALPRAZOLAM 0.25 MG/1
0.25 TABLET ORAL
Qty: 12 TABLET | Refills: 0 | Status: SHIPPED | OUTPATIENT
Start: 2022-08-02

## 2022-08-02 RX ORDER — FLASH GLUCOSE SCANNING READER
EACH MISCELLANEOUS
Qty: 2 EACH | Refills: 5 | Status: SHIPPED | OUTPATIENT
Start: 2022-08-02 | End: 2022-08-08

## 2022-08-02 RX ORDER — SEMAGLUTIDE 1.34 MG/ML
1 INJECTION, SOLUTION SUBCUTANEOUS
Qty: 4 EACH | Refills: 5 | Status: SHIPPED | OUTPATIENT
Start: 2022-08-02

## 2022-08-02 RX ORDER — FLASH GLUCOSE SENSOR
KIT MISCELLANEOUS
Qty: 1 KIT | Refills: 0 | Status: SHIPPED | OUTPATIENT
Start: 2022-08-02 | End: 2022-08-08

## 2022-08-02 RX ORDER — LOSARTAN POTASSIUM 25 MG/1
25 TABLET ORAL DAILY
Qty: 90 TABLET | Refills: 1 | Status: SHIPPED | OUTPATIENT
Start: 2022-08-02

## 2022-08-02 NOTE — PROGRESS NOTES
JAMIL Mcknight 63 y.o. female  presents to the office today to follow up on diabetes. Blood pressure (!) 146/90, pulse 82, temperature 97.9 °F (36.6 °C), temperature source Temporal, resp. rate 16, height 5' 8\" (1.727 m), weight 229 lb (103.9 kg), SpO2 99 %. Body mass index is 34.82 kg/m². Chief Complaint   Patient presents with    Diabetes      DM2: A1c per POC today 9.8, up from A1c of 7.9 on 1/31/22. Pt has been taking Ozempic 0.25mg weekly. Pt reports she stopped taking Metformin 500mg BID because it caused upper GI upset. She does not want to try a lower dose of metformin and would like to try another medication. I recommended increasing dose of Ozempic from 0.25mg weekly to 1mg weekly. I also recommended she start Jardiance 10mg daily. If pt has issues with medication, I advised pt to contact Bernardo Walker PharmD. Pt will monitor glucose from home. I have sent her a prescription for FreeStyle Yogesh monitor. Follow up in 1 month virtually. Hypertension: BP at office today 148/86 and 146/90 on manual recheck. Pt saw cardiologist today and BP was elevated there too. Pt continues with amlodipine 10g daily. I recommended she also start Losartan 25mg daily. Pt will monitor BP at home and follow up in 1 month. Hyperlipidemia: Lipid panel on 1/31/22 notable for total cholesterol 198, HDL 72, , and triglycerides 231. Pt continues with simvastatin 20mg nightly. Stage 3a Chronic Kidney Disease (CKD): Pt has a MHx of Stage 3a CKD. Pt's last lab values on 1/31/22, BUN: 17 (WNL), Creatinine: 1.27 (above NL), BUN/Creatinine Ratio: 13 (WNL), and GFR 52 (below NL). Anxiety: Pt reports a recent increase in anxiety relating to her  and his health. Pt reports she would like to talk to someone. Pt was previously referred to [facility] , [facility]. Pt also requesting prescription for Xanax, because she has taken it in the past and reports it worked well for her. I rx'd Xanax 0.25mg as needed. Rectocele: Pt reports in 2010 she had a prolapsed uterus and had it removed. Now she is experiencing rectocele. I recommended pt follow up with her GYN, Dr. Yo Joseph. Current Outpatient Medications   Medication Sig Dispense Refill    semaglutide (Ozempic) 1 mg/dose (4 mg/3 mL) pnij 1 mg by SubCUTAneous route every seven (7) days. 4 Each 5    empagliflozin (Jardiance) 10 mg tablet Take 1 Tablet by mouth in the morning. 90 Tablet 1    ALPRAZolam (XANAX) 0.25 mg tablet Take 1 Tablet by mouth nightly as needed for Anxiety. Max Daily Amount: 0.25 mg. Indications: anxious 12 Tablet 0    flash glucose scanning reader (FreeStyle Yogesh 14 Day [place]) misc Use as directed 2 Each 5    flash glucose sensor (FreeStyle Yogesh 14 Day Sensor) kit Use as directed 1 Kit 0    losartan (COZAAR) 25 mg tablet Take 1 Tablet by mouth in the morning. 90 Tablet 1    levocetirizine (XYZAL) 5 mg tablet TAKE 1 TABLET BY MOUTH EVERY DAY 90 Tablet 0    amLODIPine (NORVASC) 10 mg tablet Take 1 Tablet by mouth daily. 90 Tablet 1    simvastatin (ZOCOR) 20 mg tablet Take 1 Tablet by mouth nightly. 90 Tablet 1    famotidine (PEPCID) 20 mg tablet Take 1 Tablet by mouth daily. 90 Tablet 1    ibuprofen (MOTRIN) 800 mg tablet TAKE 1 TABLET BY MOUTH EVERY 8 HOURS AS NEEDED FOR PAIN 90 Tablet 1    oxyCODONE-acetaminophen (PERCOCET) 5-325 mg per tablet       nystatin (MYCOSTATIN) topical cream Apply  to affected area two (2) times a day. 60 g 0    fluticasone propionate (FLONASE) 50 mcg/actuation nasal spray 2 Sprays by Both Nostrils route daily. 3 Bottle 1    albuterol (PROVENTIL HFA, VENTOLIN HFA, PROAIR HFA) 90 mcg/actuation inhaler Take 2 Puffs by inhalation every six (6) hours as needed for Wheezing. 1 Inhaler 3    Nebulizer & Compressor machine Nebulizer machine x 1 Nebulizer Kit 1 Each 0    albuterol (PROVENTIL VENTOLIN) 2.5 mg /3 mL (0.083 %) nebu 3 mL by Nebulization route every four (4) hours as needed for Wheezing.  25 Nebule 1    Nebulizer Accessories kit Use kit with Nebulizer every 4 hours as needed  Wash out kit after each use and air dry  Change kit every month when in use. 1 Kit 5    montelukast (SINGULAIR) 10 mg tablet TAKE 1 TABLET BY MOUTH DAILY 90 Tablet 1    glucose blood VI test strips (ASCENSIA AUTODISC VI, ONE TOUCH ULTRA TEST VI) strip Check glucose twice daily (Patient not taking: Reported on 8/2/2022) 100 Strip 11     Allergies   Allergen Reactions    Vicodin [Hydrocodone-Acetaminophen] Hives     Past Medical History:   Diagnosis Date    Adverse effect of anesthesia     slow to wake up at dentist office    Anxiety     Arthritis     Chronic pain     3 herniated discs in lower back    Depression     Diabetes (HCC)     type 2    GERD (gastroesophageal reflux disease)     High cholesterol     Hypertension     MRSA infection 2008    left leg treated with antibiotics at [facility]    Other ill-defined conditions(799.89)     herniated disc to back    PUD (peptic ulcer disease)     Seasonal allergies     Sleep apnea     kerwin     Past Surgical History:   Procedure Laterality Date    COLONOSCOPY N/A 5/31/2017    COLONOSCOPY performed by James Carter MD at [facility] ENDOSCOPY    COLONOSCOPY N/A 1/21/2022    COLONOSCOPY   :- performed by Lennox Belfast, MD at [facility] ENDOSCOPY    HX GYN      tubal ligation, hysterectomy    HX HYSTERECTOMY      HX ORTHOPAEDIC      left hand ligament removed    HX OTHER SURGICAL      corticosteroid injections - back    HX OTHER SURGICAL  08/06/2019    Left lower parathyroidectomy with intraoperative PTH monitoring. -[facility]-Dr. Yady Lincoln    HX TONSILLECTOMY       Family History   Problem Relation Age of Onset    Hypertension Mother     Diabetes Father     Hypertension Sister     No Known Problems Brother     Lung Disease Sister         copd    Anesth Problems Neg Hx      Social History     Tobacco Use    Smoking status: Former     Packs/day: 1.00     Years: 30.00     Pack years: 30.00     Types: Cigarettes Quit date: 10/30/2012     Years since quittin.7    Smokeless tobacco: Never   Substance Use Topics    Alcohol use: Yes     Alcohol/week: 16.0 standard drinks     Types: 16 Cans of beer per week     Comment: 4-5 drinks a night x 4 times a week        Review of Systems   Constitutional:  Negative for chills and fever. HENT:  Negative for hearing loss and tinnitus. Eyes:  Negative for blurred vision and double vision. Respiratory:  Negative for cough and shortness of breath. Cardiovascular:  Negative for chest pain and palpitations. Gastrointestinal:  Negative for nausea and vomiting. Genitourinary:  Negative for dysuria and frequency. Musculoskeletal:  Negative for back pain and falls. Skin:  Negative for itching and rash. Neurological:  Negative for dizziness, loss of consciousness and headaches. Endo/Heme/Allergies: Negative. Psychiatric/Behavioral:  Negative for depression. The patient is nervous/anxious. Physical Exam  Vitals and nursing note reviewed. Constitutional:       General: She is not in acute distress. Appearance: She is well-developed. She is not diaphoretic. HENT:      Head: Normocephalic and atraumatic. Cardiovascular:      Rate and Rhythm: Normal rate and regular rhythm. Heart sounds: Normal heart sounds. No murmur heard. No friction rub. No gallop. Pulmonary:      Effort: Pulmonary effort is normal. No respiratory distress. Breath sounds: Normal breath sounds. No wheezing or rales. Chest:      Chest wall: No tenderness. Abdominal:      General: Bowel sounds are normal. There is no distension. Palpations: Abdomen is soft. Tenderness: There is no abdominal tenderness. Musculoskeletal:         General: Normal range of motion. Cervical back: Normal range of motion and neck supple. Neurological:      Mental Status: She is alert and oriented to person, place, and time.    Psychiatric:         Behavior: Behavior normal. Thought Content: Thought content normal.         Judgment: Judgment normal.       ASSESSMENT and PLAN  Diagnoses and all orders for this visit:    1. Uncontrolled type 2 diabetes mellitus with hyperglycemia (HCC)  I recommended increasing dose of Ozempic from 0.25mg weekly to 1mg weekly. I also recommended she start Jardiance 10mg daily. If pt has issues with medication, I advised pt to contact Avelino Smallwood PharmD. Pt will monitor glucose from home. I have sent her a prescription for FreeStyle Yogesh monitor. Follow up in 1 month virtually. -     semaglutide (Ozempic) 1 mg/dose (4 mg/3 mL) pnij; 1 mg by SubCUTAneous route every seven (7) days. -     empagliflozin (Jardiance) 10 mg tablet; Take 1 Tablet by mouth in the morning.  -     REFERRAL TO PHARMACIST  -     flash glucose scanning reader (FreeStyle Yogesh 14 Day [place]) misc; Use as directed  -     flash glucose sensor (FreeStyle Yogesh 14 Day Sensor) kit; Use as directed    2. Stage 3a chronic kidney disease (HCC)  Pt has a MHx of Stage 3a CKD. Pt's last lab values on 1/31/22, BUN: 17 (WNL), Creatinine: 1.27 (above NL), BUN/Creatinine Ratio: 13 (WNL), and GFR 52 (below NL). 3. Essential hypertension  BP at office today 148/86 and 146/90 on manual recheck. Pt saw cardiologist today and BP was elevated there too. Pt continues with amlodipine 10g daily. I recommended she also start Losartan 25mg daily. Pt will monitor BP at home and follow up in 1 month. -     losartan (COZAAR) 25 mg tablet; Take 1 Tablet by mouth in the morning. 4. Mixed hyperlipidemia  Lipid panel on 1/31/22 notable for total cholesterol 198, HDL 72, , and triglycerides 231. Pt continues with simvastatin 20mg nightly. 5. Severe obesity (BMI 35.0-35.9 with comorbidity) ([address]  I have reviewed/discussed the above normal BMI with the patient.   I have recommended the following interventions: dietary management education, guidance, and counseling, encourage exercise and monitor weight. 6. Nicotine dependence, cigarettes, in remission     7. Anxiety  I rx'd Xanax 0.25mg as needed. -     ALPRAZolam (XANAX) 0.25 mg tablet; Take 1 Tablet by mouth nightly as needed for Anxiety. Max Daily Amount: 0.25 mg. Indications: anxious    8. Rectocele  I recommended pt follow up with her GYN, Dr. Salo Wagner. Follow-up and Dispositions    Return in about 4 weeks (around 8/30/2022) for diabetes follow up. Medication risks/benefits/costs/interactions/alternatives discussed with patient. Advised patient to call back or return to office if symptoms worsen/change/persist.  If patient cannot reach us or should anything more severe/urgent arise he/she should proceed directly to the nearest emergency department. Discussed expected course/resolution/complications of diagnosis in detail with patient. Patient given a written after visit summary which includes diagnoses, current medications and vitals. Patient expressed understanding with the diagnosis and plan. Written by liliam Pickett, as dictated by Elisa Cagle M.D.    5:10 PM- 5:33 PM    Total time spent with the patient 25 minutes, greater than 50% of time spent counseling patient.

## 2022-08-02 NOTE — PROGRESS NOTES
Chief Complaint   Patient presents with    Diabetes     1. \"Have you been to the ER, urgent care clinic since your last visit? Hospitalized since your last visit? \" no    2. \"Have you seen or consulted any other health care providers outside of the [address] [place] [address] since your last visit? \" Yes - obgyn     3. For patients aged 39-70: Has the patient had a colonoscopy / FIT/ Cologuard? No gap       If the patient is female:    4. For patients aged 41-77: Has the patient had a mammogram within the past 2 years? [id number]      5. For patients aged 21-65: Has the patient had a pap smear?  No gap       Eye - yes VEI

## 2022-08-03 ENCOUNTER — TELEPHONE (OUTPATIENT)
Dept: FAMILY MEDICINE CLINIC | Age: 62
End: 2022-08-03

## 2022-08-03 NOTE — TELEPHONE ENCOUNTER
Received message from [facility]) asking if we are accepting New Pt's. Writer called pt and informed him tht the practice is not accepting New Pt's at this time.

## 2022-08-03 NOTE — TELEPHONE ENCOUNTER
Spoke to patient she said this appointment that Dakota Arielle wants her to schedule with our Pharmacist Giselle about her diabetes(something put in her arm) her insurance doesn't cover it. Pt says she doesn't want the appointment if insurance is not going to cover it.  Please advise 08/03/22TM

## 2022-08-04 NOTE — TELEPHONE ENCOUNTER
Patient advised by MY CH[facility] to schedule a appointment with Giselle to receive help with getting Freestyle.

## 2022-08-05 ENCOUNTER — TELEPHONE (OUTPATIENT)
Dept: FAMILY MEDICINE CLINIC | Age: 62
End: 2022-08-05

## 2022-08-05 ENCOUNTER — PATIENT MESSAGE (OUTPATIENT)
Dept: FAMILY MEDICINE CLINIC | Age: 62
End: 2022-08-05

## 2022-08-05 NOTE — TELEPHONE ENCOUNTER
Pharmacy Progress Note - Telephone Call    Ms. Ana Cristina Garcia 63 y.o. was contacted via an outbound telephone call regarding scheduling PharmD DM visit (CGM interest/cost issues) today. A voicemail was left for patient to return my call. This writer's work mobile number was provided as a callback contact - [phone].       Lulu Walden, PharmD, BCGP, BCACP  Clinical Pharmacist Specialist        For Pharmacy Admin Tracking Only    CPA in place: Yes  Recommendation Provided To: Patient/Caregiver: 0 via Telephone  Intervention Accepted By: Patient/Caregiver: 0  Time Spent (min): 5

## 2022-08-05 NOTE — TELEPHONE ENCOUNTER
Pharmacy Progress Note     Sent MyChart message to pt introducing self in order to attempt to schedule PharmD visit.       Maylin Cooper, Elder, BCGP, BCACP  Clinical Pharmacist Specialist      For Pharmacy Admin Tracking Only    CPA in place: Yes  Recommendation Provided To: Patient/Caregiver: 0 via Katelin Davis 135 By: Patient/Caregiver: 0  Time Spent (min): 5

## 2022-08-08 ENCOUNTER — TELEPHONE (OUTPATIENT)
Dept: INTERNAL MEDICINE CLINIC | Age: 62
End: 2022-08-08

## 2022-08-08 ENCOUNTER — DOCUMENTATION ONLY (OUTPATIENT)
Dept: INTERNAL MEDICINE CLINIC | Age: 62
End: 2022-08-08

## 2022-08-08 ENCOUNTER — OFFICE VISIT (OUTPATIENT)
Dept: FAMILY MEDICINE CLINIC | Age: 62
End: 2022-08-08

## 2022-08-08 ENCOUNTER — TELEPHONE (OUTPATIENT)
Dept: FAMILY MEDICINE CLINIC | Age: 62
End: 2022-08-08

## 2022-08-08 DIAGNOSIS — E11.65 UNCONTROLLED TYPE 2 DIABETES MELLITUS WITH HYPERGLYCEMIA (HCC): Primary | ICD-10-CM

## 2022-08-08 RX ORDER — FLASH GLUCOSE SENSOR
KIT MISCELLANEOUS
Qty: 2 KIT | Refills: 11
Start: 2022-08-08

## 2022-08-08 NOTE — PROGRESS NOTES
Pharmacy Progress Note     Pt picked up FSL2 CGM replacement sample.       Dillan Prado, PharmD, BCGP, BCACP  Clinical Pharmacist Specialist      For Pharmacy Admin Tracking Only    CPA in place: Yes  Recommendation Provided To: Patient/Caregiver: 1 via In person  Intervention Detail: Patient Access Assistance/Sample Provided  Intervention Accepted By: Patient/Caregiver: 1  Time Spent (min): 5

## 2022-08-08 NOTE — PROGRESS NOTES
Pharmacy Progress Note - Diabetes Management    S/O: Ms. Adriana Don is a 63 y.o. female, referred by Dr. Chapito Jeter MD, with a PMH of HTN, T2DM, asthma, CKD, anxiety, depression, vit D, obesity, HLD, was seen today for diabetes management. Patient's last A1c was 9.8% (Aug 2022) which is increased from 7.9% (Jan 2022) which is elevated from 7.0% (Aug 2021) which is increased from 6.7% (July 2020). Interim update: Pt was last seen by PCP on 8/2/22 for f/up on chronic conditions. PCP. PCP noted POC A1c was very elevated compared to last check. Ozempic dose was increased and Jardiance was started. PCP referred pt to this writer to discuss potential for CGM and DM management. This writer contacted pt to schedule visit. CGM is not covered. Will provide samples. Pt arrives to clinic accompanied by her  to discuss CGM samples. Discussed BG/A1c goals. Discussed food choices - Healthy Plate Method, carb goals, how to read a nutrition label. Discussed medications - mechanism of action, side effects, efficacy. Discussed that Ozempic will take approx 4 weeks to see full effect. Pt has completed 2 doses. Has also started Jardiance. Denies side effects. Interested in trialing [employer], [address] [place] [address]. Discussed potential for more A1c lowering between Mounjaro and Ozempic. Will investigate coverage with insurance. Pt was provided a sample CGM Freestyle [address] 2 sensor. Sensor was applied. Discussed how device works, how to place, how to scan, reports, settings, and alarms. Yogesh 2 leslie was downloaded on [employer]. Sensor was paired with phone. Pt's leslie was connected to this writer's [employer] account. Medication Adherence/Access:  - Received FSL2 CGM    Diabetes Management:  Diabetes History:  - Endorses   - Family hx:   - Previous anti-hyperglycemic agents includes: Trulicity + Metformin had severe GI side effects.   Switched to Thor Wayne and decreased Metformin and still had severe GI side effects. Switched to Thor Wayne + Jardiance and GI side effects improved. Current anti-hyperglycemic regimen includes:    - Ozempic 1 mg weekly  - Jardiance 10 mg daily    ROS:  Today, Pt endorses:  - Symptoms of Hyperglycemia: none  - Symptoms of Hypoglycemia: none    Self Monitoring Blood Glucose (SMBG) or CGM:  - Brought in home glucometer/blood glucose log/CGM reader today:  yes  BG prior to visit: 015 - after egg, slice toast, 1/2 peach fresh, steak, water  Fasting 233, 249, 266, 287  PPB, 314, 251    Nutrition:  - Has cut out breads  - Has been reading food labels    Physical Activity:   yes  - Consists of walking around [employer]  - Discussed       Vitals: Wt Readings from Last 3 Encounters:   22 229 lb (103.9 kg)   22 241 lb (109.3 kg)   22 236 lb (107 kg)     BP Readings from Last 3 Encounters:   22 (!) 146/90   22 (!) 180/110   22 (!) 147/99     Pulse Readings from Last 3 Encounters:   22 82   22 80   22 71       Past Medical History:   Diagnosis Date    Adverse effect of anesthesia     slow to wake up at dentist office    Anxiety     Arthritis     Chronic pain     3 herniated discs in lower back    Depression     Diabetes ([address]     type 2    GERD (gastroesophageal reflux disease)     High cholesterol     Hypertension     MRSA infection     left leg treated with antibiotics at [facility]    Other ill-defined conditions(799.89)     herniated disc to back    PUD (peptic ulcer disease)     Seasonal allergies     Sleep apnea     kerwin     Allergies   Allergen Reactions    Vicodin [Hydrocodone-Acetaminophen] Hives       Current Outpatient Medications   Medication Sig    semaglutide (Ozempic) 1 mg/dose (4 mg/3 mL) pnij 1 mg by SubCUTAneous route every seven (7) days. empagliflozin (Jardiance) 10 mg tablet Take 1 Tablet by mouth in the morning.     ALPRAZolam (XANAX) 0.25 mg tablet Take 1 Tablet by mouth nightly as needed for Anxiety. Max Daily Amount: 0.25 mg. Indications: anxious    flash glucose scanning reader (FreeStyle Yogesh 14 Day [place]) misc Use as directed    flash glucose sensor (FreeStyle Yogesh 14 Day Sensor) kit Use as directed    losartan (COZAAR) 25 mg tablet Take 1 Tablet by mouth in the morning. levocetirizine (XYZAL) 5 mg tablet TAKE 1 TABLET BY MOUTH EVERY DAY    amLODIPine (NORVASC) 10 mg tablet Take 1 Tablet by mouth daily. simvastatin (ZOCOR) 20 mg tablet Take 1 Tablet by mouth nightly. famotidine (PEPCID) 20 mg tablet Take 1 Tablet by mouth daily. montelukast (SINGULAIR) 10 mg tablet TAKE 1 TABLET BY MOUTH DAILY    ibuprofen (MOTRIN) 800 mg tablet TAKE 1 TABLET BY MOUTH EVERY 8 HOURS AS NEEDED FOR PAIN    oxyCODONE-acetaminophen (PERCOCET) 5-325 mg per tablet     nystatin (MYCOSTATIN) topical cream Apply  to affected area two (2) times a day. fluticasone propionate (FLONASE) 50 mcg/actuation nasal spray 2 Sprays by Both Nostrils route daily. albuterol (PROVENTIL HFA, VENTOLIN HFA, PROAIR HFA) 90 mcg/actuation inhaler Take 2 Puffs by inhalation every six (6) hours as needed for Wheezing. Nebulizer & Compressor machine Nebulizer machine x 1 Nebulizer Kit    albuterol (PROVENTIL VENTOLIN) 2.5 mg /3 mL (0.083 %) nebu 3 mL by Nebulization route every four (4) hours as needed for Wheezing. Nebulizer Accessories kit Use kit with Nebulizer every 4 hours as needed  Wash out kit after each use and air dry  Change kit every month when in use. glucose blood VI test strips (ASCENSIA AUTODISC VI, ONE TOUCH ULTRA TEST VI) strip Check glucose twice daily (Patient not taking: Reported on 8/2/2022)     No current facility-administered medications for this visit.      Lab Results   Component Value Date/Time    Sodium 133 (L) 01/31/2022 12:39 PM    Potassium 4.7 01/31/2022 12:39 PM    Chloride 104 01/31/2022 12:39 PM    CO2 24 01/31/2022 12:39 PM    Anion gap 5 01/31/2022 12:39 PM    Glucose 179 (H) 01/31/2022 12:39 PM    BUN 17 01/31/2022 12:39 PM    Creatinine 1.27 (H) 01/31/2022 12:39 PM    BUN/Creatinine ratio 13 01/31/2022 12:39 PM    GFR est AA 52 (L) 01/31/2022 12:39 PM    GFR est non-AA 43 (L) 01/31/2022 12:39 PM    Calcium 9.4 01/31/2022 12:39 PM    Bilirubin, total 0.4 01/31/2022 12:39 PM    Alk. phosphatase 98 01/31/2022 12:39 PM    Protein, total 7.6 01/31/2022 12:39 PM    Albumin 4.0 01/31/2022 12:39 PM    Globulin 3.6 01/31/2022 12:39 PM    A-G Ratio 1.1 01/31/2022 12:39 PM    ALT (SGPT) 29 01/31/2022 12:39 PM     Lab Results   Component Value Date/Time    Cholesterol, total 198 01/31/2022 12:39 PM    HDL Cholesterol 72 01/31/2022 12:39 PM    LDL,Direct 148 (H) 04/25/2016 12:13 PM    LDL, calculated 79.8 01/31/2022 12:39 PM    VLDL, calculated 46.2 01/31/2022 12:39 PM    Triglyceride 231 (H) 01/31/2022 12:39 PM    CHOL/HDL Ratio 2.8 01/31/2022 12:39 PM     Lab Results   Component Value Date/Time    WBC 7.2 08/06/2021 01:25 PM    WBC 9.4 06/05/2012 12:06 PM    HGB 11.6 08/06/2021 01:25 PM    HCT 36.5 08/06/2021 01:25 PM    PLATELET 420 76/38/0359 01:25 PM    MCV 95.5 08/06/2021 01:25 PM       Lab Results   Component Value Date/Time    Microalbumin/Creat ratio (mg/g creat) 1,424 (H) 01/31/2022 12:39 PM    Microalbumin,urine random 70.50 01/31/2022 12:39 PM    Microalbumin urine (POC) 150 09/29/2020 05:16 PM       Lab Results   Component Value Date/Time    Hemoglobin A1c 7.0 (H) 08/06/2021 01:25 PM    Hemoglobin A1c 6.7 (H) 07/07/2020 10:45 AM    Hemoglobin A1c 6.8 (H) 08/08/2018 12:27 PM     Hemoglobin A1c (POC)   Date Value Ref Range Status   01/31/2022 7.9 % Final        CrCl cannot be calculated (Patient's most recent lab result is older than the maximum 180 days allowed. ). A/P:    1) T2DM: Per ADA guidelines, Pt's A1c is not at goal of < 7%. Current SMBG(s)/CGM trend indicates elevated rdgs; however, they have been declining. She has only been on higher dose of Ozempic x2 doses.   She is not on max dose of Jardiance. Discussed potential for switching GLP-1 agonist to GIP/GLP-1 agonist therapy for further BG lowering. Will need to investigate drug coverage. Dose of Ozempic and Jardiance could be increased as well. Started FSL2 CGM sample today and completed diet education. Will focus on nonpharm interventions at this point.  - Continue Ozempic 1 mg weekly  - Continue Jardiance 25 mg daily  - Sample FSL2 CGM sample provided  - Device training completed for FSL2 CGM      Medication reconciliation was completed during the visit. Medications Discontinued During This Encounter   Medication Reason    flash glucose scanning reader (FreeStyle Yogesh 14 Day [place]) misc Cost of Medication    flash glucose sensor (FreeStyle Yogesh 14 Day Sensor) kit Cost of Medication     Orders Placed This Encounter    flash glucose sensor (FreeStyle Yogesh 2 Sensor) kit     Sig: Scan sensor 4x per day to check blood sugar. Replace sensor every 14 days. Dispense:  2 Kit     Refill:  11         Patient verbalized understanding of the information presented and all of the patients questions were answered. AVS was handed to the patient. Patient advised to call the office with any additional questions or concerns. Notifications of recommendations will be sent to Dr. Zkai Guardado MD for review. Patient will return to clinic in 3 week(s) for follow up.      Avelino Smallwood, PharmD, BCGP, BCACP  Clinical Pharmacist Specialist          For Pharmacy Admin Tracking Only    CPA in place: Yes  Recommendation Provided To: Patient/Caregiver: 6 via In person  Intervention Detail: Device Training, Discontinued Rx: 2, reason: Cost/Formulary Change, New Rx: 1, reason: Cost/Formulary Change, Patient Access Assistance/Sample Provided, and Scheduled Appointment  Intervention Accepted By: Patient/Caregiver: 6  Time Spent (min):  90

## 2022-08-08 NOTE — TELEPHONE ENCOUNTER
Pharmacy Progress Note     Called pt back concerning FSL2 CGM sensor. Pt was unable to get sensor to connect. Provided customer service number to pt in order for her to get a new one. Will leave another sample available for pt to  at the office.       Tristan Dunaway, PharmD, BCGP, BCACP  Clinical Pharmacist Specialist      For Pharmacy Admin Tracking Only    CPA in place: Yes  Recommendation Provided To: Patient/Caregiver: 2 via Telephone  Intervention Detail: Device Training and Patient Access Assistance/Sample Provided  Intervention Accepted By: Patient/Caregiver: 2  Time Spent (min): 15

## 2022-08-08 NOTE — PATIENT INSTRUCTIONS
Your A1c was 9.8% which was uncontrolled. We placed a Freestyle Yogesh 2 sensor. Scan sensor 4x per day. Replace in 14 days.      Blood Sugar Goal  Fastin-130 mg/dL  1-2 after meals: Less than 180 mg/dL    Carbohydrate Goals  Meal: 30-45 grams   Snacks: 15 grams    Physical Activity Goal: 150 minutes moderate activity per week

## 2022-08-08 NOTE — TELEPHONE ENCOUNTER
Pharmacy Progress Note     Contacted pt's insurance to investigate coverage of Mounjaro. Medication is non-formulary. Will stick with Ozempic. Contacted pt to provide update. Pt states that FSL2 CGM sensor did not connect and asked her to rescan. Currently waiting to see if sensor will connect. Will call pt back to check in.         Taylor Rooney, PharmD, BCGP, BCACP  Clinical Pharmacist Specialist      For Pharmacy Admin Tracking Only    CPA in place: Yes  Recommendation Provided To: Patient/Caregiver: 2 via Telephone and Other: 2  Intervention Detail: Device Training and Patient Access Assistance/Sample Provided  Intervention Accepted By: Patient/Caregiver: 2 and Other: 2  Time Spent (min): 10

## 2022-08-13 ENCOUNTER — HOSPITAL ENCOUNTER (EMERGENCY)
Age: 62
Discharge: HOME | End: 2022-08-13
Attending: EMERGENCY MEDICINE | Admitting: STUDENT IN AN ORGANIZED HEALTH CARE EDUCATION/TRAINING PROGRAM
Payer: MEDICARE

## 2022-08-13 ENCOUNTER — APPOINTMENT (OUTPATIENT)
Dept: CT IMAGING | Age: 62
End: 2022-08-13
Attending: EMERGENCY MEDICINE
Payer: MEDICARE

## 2022-08-13 VITALS
RESPIRATION RATE: 18 BRPM | DIASTOLIC BLOOD PRESSURE: 79 MMHG | TEMPERATURE: 98.1 F | WEIGHT: 216.05 LBS | BODY MASS INDEX: 32.74 KG/M2 | HEART RATE: 84 BPM | SYSTOLIC BLOOD PRESSURE: 140 MMHG | OXYGEN SATURATION: 94 % | HEIGHT: 68 IN

## 2022-08-13 DIAGNOSIS — K59.00 CONSTIPATION, UNSPECIFIED CONSTIPATION TYPE: Primary | ICD-10-CM

## 2022-08-13 LAB
ALBUMIN SERPL-MCNC: 3.9 G/DL (ref 3.5–5)
ALBUMIN/GLOB SERPL: 1 {RATIO} (ref 1.1–2.2)
ALP SERPL-CCNC: 96 U/L (ref 45–117)
ALT SERPL-CCNC: 33 U/L (ref 12–78)
ANION GAP SERPL CALC-SCNC: 5 MMOL/L (ref 5–15)
AST SERPL-CCNC: 14 U/L (ref 15–37)
BASOPHILS # BLD: 0.1 K/UL (ref 0–0.1)
BASOPHILS NFR BLD: 1 % (ref 0–1)
BILIRUB SERPL-MCNC: 0.3 MG/DL (ref 0.2–1)
BUN SERPL-MCNC: 28 MG/DL (ref 6–20)
BUN/CREAT SERPL: 16 (ref 12–20)
CALCIUM SERPL-MCNC: 9.7 MG/DL (ref 8.5–10.1)
CHLORIDE SERPL-SCNC: 105 MMOL/L (ref 97–108)
CO2 SERPL-SCNC: 25 MMOL/L (ref 21–32)
COMMENT, HOLDF: NORMAL
CREAT SERPL-MCNC: 1.8 MG/DL (ref 0.55–1.02)
DIFFERENTIAL METHOD BLD: NORMAL
EOSINOPHIL # BLD: 0.2 K/UL (ref 0–0.4)
EOSINOPHIL NFR BLD: 3 % (ref 0–7)
ERYTHROCYTE [DISTWIDTH] IN BLOOD BY AUTOMATED COUNT: 12.2 % (ref 11.5–14.5)
GLOBULIN SER CALC-MCNC: 4 G/DL (ref 2–4)
GLUCOSE SERPL-MCNC: 271 MG/DL (ref 65–100)
HCT VFR BLD AUTO: 35.8 % (ref 35–47)
HGB BLD-MCNC: 11.6 G/DL (ref 11.5–16)
IMM GRANULOCYTES # BLD AUTO: 0 K/UL (ref 0–0.04)
IMM GRANULOCYTES NFR BLD AUTO: 0 % (ref 0–0.5)
LYMPHOCYTES # BLD: 1.5 K/UL (ref 0.8–3.5)
LYMPHOCYTES NFR BLD: 21 % (ref 12–49)
MCH RBC QN AUTO: 29.7 PG (ref 26–34)
MCHC RBC AUTO-ENTMCNC: 32.4 G/DL (ref 30–36.5)
MCV RBC AUTO: 91.8 FL (ref 80–99)
MONOCYTES # BLD: 0.6 K/UL (ref 0–1)
MONOCYTES NFR BLD: 8 % (ref 5–13)
NEUTS SEG # BLD: 4.9 K/UL (ref 1.8–8)
NEUTS SEG NFR BLD: 67 % (ref 32–75)
NRBC # BLD: 0 K/UL (ref 0–0.01)
NRBC BLD-RTO: 0 PER 100 WBC
PLATELET # BLD AUTO: 302 K/UL (ref 150–400)
PMV BLD AUTO: 10.8 FL (ref 8.9–12.9)
POTASSIUM SERPL-SCNC: 4.2 MMOL/L (ref 3.5–5.1)
PROT SERPL-MCNC: 7.9 G/DL (ref 6.4–8.2)
RBC # BLD AUTO: 3.9 M/UL (ref 3.8–5.2)
SAMPLES BEING HELD,HOLD: NORMAL
SODIUM SERPL-SCNC: 135 MMOL/L (ref 136–145)
WBC # BLD AUTO: 7.3 K/UL (ref 3.6–11)

## 2022-08-13 PROCEDURE — 74011250636 HC RX REV CODE- 250/636: Performed by: EMERGENCY MEDICINE

## 2022-08-13 PROCEDURE — 80053 COMPREHEN METABOLIC PANEL: CPT

## 2022-08-13 PROCEDURE — 85025 COMPLETE CBC W/AUTO DIFF WBC: CPT

## 2022-08-13 PROCEDURE — 74176 CT ABD & PELVIS W/O CONTRAST: CPT

## 2022-08-13 PROCEDURE — 36415 COLL VENOUS BLD VENIPUNCTURE: CPT

## 2022-08-13 PROCEDURE — 99284 EMERGENCY DEPT VISIT MOD MDM: CPT

## 2022-08-13 RX ADMIN — SODIUM CHLORIDE 1000 ML: 9 INJECTION, SOLUTION INTRAVENOUS at 14:52

## 2022-08-13 NOTE — ED TRIAGE NOTES
Patient is coming in for constipation for 2 weeks. Patient has tried multiple suppositories without relief. Patient was diagnosed with prolapsed rectum recently. Patient was instructed not to strain.  Patient was told to come in by PCP

## 2022-08-13 NOTE — ED PROVIDER NOTES
Date of Service:  8/13/2022    Patient:  Salima Jack    Chief Complaint:  Constipation       HPI:  Salima Jack is a 63 y.o.  female who presents for evaluation of constipation. 1.5 weeks of constipation. Patient states she is unable to have a bowel movement. History of rectocele that supposed to be operated on at some point in the future. She notes that she has a pressure-like sensation when she feels like she needs to go. She has not been able to extrude any feces. She has not been passing gas in the last several days just lots of burping nausea and vomiting. Generalized abdominal fullness. No other complaints. Past Medical History:   Diagnosis Date    Adverse effect of anesthesia     slow to wake up at dentist office    Anxiety     Arthritis     Chronic pain     3 herniated discs in lower back    Depression     Diabetes ([address]     type 2    GERD (gastroesophageal reflux disease)     High cholesterol     Hypertension     MRSA infection 2008    left leg treated with antibiotics at [facility]    Other ill-defined conditions(799.89)     herniated disc to back    PUD (peptic ulcer disease)     Seasonal allergies     Sleep apnea     kerwin       Past Surgical History:   Procedure Laterality Date    COLONOSCOPY N/A 5/31/2017    COLONOSCOPY performed by Shukri Beth MD at [facility] ENDOSCOPY    COLONOSCOPY N/A 1/21/2022    COLONOSCOPY   :- performed by Mae Macias MD at [facility] ENDOSCOPY    HX GYN      tubal ligation, hysterectomy    HX HYSTERECTOMY      HX ORTHOPAEDIC      left hand ligament removed    HX OTHER SURGICAL      corticosteroid injections - back    HX OTHER SURGICAL  08/06/2019    Left lower parathyroidectomy with intraoperative PTH monitoring. -[facility]-Dr. Kaylyn Jiang    HX TONSILLECTOMY           Family History:   Problem Relation Age of Onset    Hypertension Mother     Diabetes Father     Hypertension Sister     No Known Problems Brother     Lung Disease Sister         copd    Anesth Problems Neg Hx        Social History     Socioeconomic History    Marital status:      Spouse name: Not on file    Number of children: Not on file    Years of education: Not on file    Highest education level: Not on file   Occupational History    Not on file   Tobacco Use    Smoking status: Former     Packs/day: 1.00     Years: 30.00     Pack years: 30.00     Types: Cigarettes     Quit date: 10/30/2012     Years since quittin.7    Smokeless tobacco: Never   Vaping Use    Vaping Use: Never used   Substance and Sexual Activity    Alcohol use: Yes     Alcohol/week: 16.0 standard drinks     Types: 16 Cans of beer per week     Comment: 4-5 drinks a night x 4 times a week    Drug use: Yes     Types: Marijuana    Sexual activity: Yes     Partners: Male   Other Topics Concern     Service Not Asked    Blood Transfusions Not Asked    Caffeine Concern Not Asked    Occupational Exposure Not Asked    Hobby Hazards Not Asked    Sleep Concern Not Asked    Stress Concern Not Asked    Weight Concern Not Asked    Special Diet Not Asked    Back Care Not Asked    Exercise Not Asked    Bike Helmet Not Asked    Seat Belt Not Asked    Self-Exams Not Asked   Social History Narrative    Not on file     Social Determinants of Health     Financial Resource Strain: Not on file   Food Insecurity: Not on file   Transportation Needs: Not on file   Physical Activity: Not on file   Stress: Not on file   Social Connections: Not on file   Intimate Partner Violence: Not on file   Housing Stability: Not on file         ALLERGIES: Vicodin [hydrocodone-acetaminophen]    Review of Systems   Gastrointestinal:  Positive for abdominal pain, constipation, nausea and vomiting. All other systems reviewed and are negative.     Vitals:    22 1323   BP: (!) 152/74   Pulse: 82   Resp: 20   Temp: 97.8 °F (36.6 °C)   SpO2: 98%   Weight: 98 kg (216 lb 0.8 oz)   Height: 5' 8\" (1.727 m)            Physical Exam  Vitals and nursing note reviewed. Exam conducted with a chaperone present. Constitutional:       Appearance: Normal appearance. HENT:      Head: Normocephalic and atraumatic. Mouth/Throat:      Mouth: Mucous membranes are moist.   Eyes:      General: No scleral icterus. Cardiovascular:      Rate and Rhythm: Normal rate. Pulmonary:      Effort: Pulmonary effort is normal.   Abdominal:      Palpations: Abdomen is soft. Tenderness: There is abdominal tenderness (General). There is no guarding. Genitourinary:     Rectum: Normal.      Comments: No rectal prolapse  Musculoskeletal:         General: No deformity. Skin:     General: Skin is warm. Capillary Refill: Capillary refill takes less than 2 seconds. Neurological:      Mental Status: She is alert and oriented to person, place, and time. Psychiatric:         Mood and Affect: Mood normal.        MDM     Amount and/or Complexity of Data Reviewed  Decide to obtain previous medical records or to obtain history from someone other than the patient: yes         12:29 PM  Change of shift. Care of patient signed over to Dr. Mindy Suarez. Bedside handoff complete. Awaiting enema and disposition.        Procedures

## 2022-08-13 NOTE — ED NOTES
3:04 PM  Change of shift. Care of patient taken over from Dr. Letitia Darden; H&P reviewed, bedside handoff complete. Awaiting response to therapy, likely disposition discharge. Will reevaluate. Patient had two large bowel movements. Will dc with plan to follow up with surgery and primary care.      4:32 PM   08/13/22  Dinah Cooks, MD   _____________________________

## 2022-08-23 ENCOUNTER — VIRTUAL VISIT (OUTPATIENT)
Dept: FAMILY MEDICINE CLINIC | Age: 62
End: 2022-08-23

## 2022-08-23 DIAGNOSIS — E11.65 UNCONTROLLED TYPE 2 DIABETES MELLITUS WITH HYPERGLYCEMIA (HCC): Primary | ICD-10-CM

## 2022-08-23 NOTE — PROGRESS NOTES
Pharmacy Progress Note - Diabetes Management    S/O: Ms. Araseli Sexton is a 63 y.o. female, referred by Dr. Jonelle Bryant MD, with a PMH HTN, T2DM, asthma, CKD, anxiety, depression, vit D, obesity, HLD, was seen today for diabetes management. Patient's last A1c was 9.8% (Aug 2022) which is increased from 7.9% (Jan 2022) which is elevated from 7.0% (Aug 2021) which is increased from 6.7% (July 2020). Visit was completed using real time audio visual technology, [email or web address]. Interim update: Pt logs into virtual visit and states she has been focusing on a lot of food changes. Discussed carb goals, BG goals, and that eating 5 small meals a day would help to avoid peaks/valleys with her BG rdg trends. She has been buying sugar free foods. We discussed that does not necessarily mean they are low carb foods and will not affect the BG. Looked at several nutrition labels in her home to evaluate how many carbs they had. Pt has been administering the Ozempic 1 mg dose. She has been experiencing cramps and constipation. She has been diagnosed with a rectocele and didn't want to strain. This is likely compounding the problem at this time. She is trying to eat a lot of vegetables to keep regular and using Senna or Dulcolax. Medication Adherence/Access:  - Received FSL2 CGM     Diabetes Management:  - Previous anti-hyperglycemic agents includes: Trulicity + Metformin had severe GI side effects. Switched to Clydia Lay and decreased Metformin and still had severe GI side effects. Switched to Clydia Lay + Jardiance and GI side effects improved.     Current anti-hyperglycemic regimen includes:    - Ozempic 1 mg weekly  - Jardiance 10 mg daily    ROS:  Today, Pt endorses:  - Symptoms of Hyperglycemia: none  - Symptoms of Hypoglycemia: none    Self Monitoring Blood Glucose (SMBG) or CGM:  - Brought in home glucometer/blood glucose log/CGM reader today:  yes        Nutrition:  Coffee, oatmeal, samuels, bread  Likes bread - discussed low carb bread - 647  Lunch - shrimp, cold cut ham, cheese, 1 slice bread  Dinner - oxtail, fried cabbage (larger portion), rice and beans 1/4 cup  Sweet and salty popcorn - discussed carbs in nutrition label  Chewy carmels - no sugar - carbs 24  14 carbs for 5 pieces sugar free vicenta's      Vitals: Wt Readings from Last 3 Encounters:   08/13/22 216 lb 0.8 oz (98 kg)   08/02/22 229 lb (103.9 kg)   01/31/22 241 lb (109.3 kg)     BP Readings from Last 3 Encounters:   08/13/22 (!) 140/79   08/02/22 (!) 146/90   01/31/22 (!) 180/110     Pulse Readings from Last 3 Encounters:   08/13/22 84   08/02/22 82   01/31/22 80       Past Medical History:   Diagnosis Date    Adverse effect of anesthesia     slow to wake up at dentist office    Anxiety     Arthritis     Chronic pain     3 herniated discs in lower back    Depression     Diabetes ([address]     type 2    GERD (gastroesophageal reflux disease)     High cholesterol     Hypertension     MRSA infection 2008    left leg treated with antibiotics at [facility]    Other ill-defined conditions(799.89)     herniated disc to back    PUD (peptic ulcer disease)     Seasonal allergies     Sleep apnea     kerwin     Allergies   Allergen Reactions    Vicodin [Hydrocodone-Acetaminophen] Hives       Current Outpatient Medications   Medication Sig    flash glucose sensor (FreeStyle Yogesh 2 Sensor) kit Scan sensor 4x per day to check blood sugar. Replace sensor every 14 days. semaglutide (Ozempic) 1 mg/dose (4 mg/3 mL) pnij 1 mg by SubCUTAneous route every seven (7) days. empagliflozin (Jardiance) 10 mg tablet Take 1 Tablet by mouth in the morning. ALPRAZolam (XANAX) 0.25 mg tablet Take 1 Tablet by mouth nightly as needed for Anxiety. Max Daily Amount: 0.25 mg. Indications: anxious    losartan (COZAAR) 25 mg tablet Take 1 Tablet by mouth in the morning.     levocetirizine (XYZAL) 5 mg tablet TAKE 1 TABLET BY MOUTH EVERY DAY    amLODIPine (NORVASC) 10 mg tablet Take 1 Tablet by mouth daily. simvastatin (ZOCOR) 20 mg tablet Take 1 Tablet by mouth nightly. famotidine (PEPCID) 20 mg tablet Take 1 Tablet by mouth daily. montelukast (SINGULAIR) 10 mg tablet TAKE 1 TABLET BY MOUTH DAILY    ibuprofen (MOTRIN) 800 mg tablet TAKE 1 TABLET BY MOUTH EVERY 8 HOURS AS NEEDED FOR PAIN    oxyCODONE-acetaminophen (PERCOCET) 5-325 mg per tablet     nystatin (MYCOSTATIN) topical cream Apply  to affected area two (2) times a day. fluticasone propionate (FLONASE) 50 mcg/actuation nasal spray 2 Sprays by Both Nostrils route daily. albuterol (PROVENTIL HFA, VENTOLIN HFA, PROAIR HFA) 90 mcg/actuation inhaler Take 2 Puffs by inhalation every six (6) hours as needed for Wheezing. Nebulizer & Compressor machine Nebulizer machine x 1 Nebulizer Kit    albuterol (PROVENTIL VENTOLIN) 2.5 mg /3 mL (0.083 %) nebu 3 mL by Nebulization route every four (4) hours as needed for Wheezing. Nebulizer Accessories kit Use kit with Nebulizer every 4 hours as needed  Wash out kit after each use and air dry  Change kit every month when in use. glucose blood VI test strips (ASCENSIA AUTODISC VI, ONE TOUCH ULTRA TEST VI) strip Check glucose twice daily (Patient not taking: Reported on 8/2/2022)     No current facility-administered medications for this visit. Lab Results   Component Value Date/Time    Sodium 135 (L) 08/13/2022 01:39 PM    Potassium 4.2 08/13/2022 01:39 PM    Chloride 105 08/13/2022 01:39 PM    CO2 25 08/13/2022 01:39 PM    Anion gap 5 08/13/2022 01:39 PM    Glucose 271 (H) 08/13/2022 01:39 PM    BUN 28 (H) 08/13/2022 01:39 PM    Creatinine 1.80 (H) 08/13/2022 01:39 PM    BUN/Creatinine ratio 16 08/13/2022 01:39 PM    GFR est AA 35 (L) 08/13/2022 01:39 PM    GFR est non-AA 29 (L) 08/13/2022 01:39 PM    Calcium 9.7 08/13/2022 01:39 PM    Bilirubin, total 0.3 08/13/2022 01:39 PM    Alk.  phosphatase 96 08/13/2022 01:39 PM    Protein, total 7.9 08/13/2022 01:39 PM    Albumin 3.9 08/13/2022 01:39 PM    Globulin 4.0 08/13/2022 01:39 PM    A-G Ratio 1.0 (L) 08/13/2022 01:39 PM    ALT (SGPT) 33 08/13/2022 01:39 PM     Lab Results   Component Value Date/Time    Cholesterol, total 198 01/31/2022 12:39 PM    HDL Cholesterol 72 01/31/2022 12:39 PM    LDL,Direct 148 (H) 04/25/2016 12:13 PM    LDL, calculated 79.8 01/31/2022 12:39 PM    VLDL, calculated 46.2 01/31/2022 12:39 PM    Triglyceride 231 (H) 01/31/2022 12:39 PM    CHOL/HDL Ratio 2.8 01/31/2022 12:39 PM     Lab Results   Component Value Date/Time    WBC 7.3 08/13/2022 01:39 PM    WBC 9.4 06/05/2012 12:06 PM    HGB 11.6 08/13/2022 01:39 PM    HCT 35.8 08/13/2022 01:39 PM    PLATELET 014 05/14/3727 01:39 PM    MCV 91.8 08/13/2022 01:39 PM       Lab Results   Component Value Date/Time    Microalbumin/Creat ratio (mg/g creat) 1,424 (H) 01/31/2022 12:39 PM    Microalbumin,urine random 70.50 01/31/2022 12:39 PM    Microalbumin urine (POC) 150 09/29/2020 05:16 PM       Lab Results   Component Value Date/Time    Hemoglobin A1c 7.0 (H) 08/06/2021 01:25 PM    Hemoglobin A1c 6.7 (H) 07/07/2020 10:45 AM    Hemoglobin A1c 6.8 (H) 08/08/2018 12:27 PM     Hemoglobin A1c (POC)   Date Value Ref Range Status   01/31/2022 7.9 % Final        CrCl cannot be calculated (Unknown ideal weight. ). A/P:    1) T2DM: Per ADA guidelines, Pt's A1c is not at goal of < 7%. Current SMBG(s)/CGM trend indicates majority of rdgs are uncontrolled. Discussed diet in detail and there are several switches pt can make to lower carb options. Pt has been experiencing constiaption. Will not increase Ozempic dose at this time. Dose of Jardiance could also be increased, but will focus on nonpharm interventions at this time. - Continue Ozempic 1 mg weekly  - Continue Jardiance 10 mg daily  - Providing 2 more CGM samples as pt works on food choices      Medication reconciliation was completed during the visit. There are no discontinued medications.   No orders of the defined types were placed in this encounter. Patient verbalized understanding of the information presented and all of the patients questions were answered. AVS was handed to the patient. Patient advised to call the office with any additional questions or concerns. Notifications of recommendations will be sent to Dr. Felicity Barros MD for review. Patient will return to clinic in 4 week(s) for follow up.      Clinton Gaitan, Elder, BCGP, BCACP  Clinical Pharmacist Specialist          For Pharmacy Admin Tracking Only    CPA in place: Yes  Recommendation Provided To: Patient/Caregiver: 1 via Virtual Visit  Intervention Detail: Scheduled Appointment  Intervention Accepted By: Patient/Caregiver: 1  Time Spent (min): 30

## 2022-08-30 ENCOUNTER — TELEPHONE (OUTPATIENT)
Dept: FAMILY MEDICINE CLINIC | Age: 62
End: 2022-08-30

## 2022-08-30 DIAGNOSIS — G89.29 CHRONIC BILATERAL LOW BACK PAIN WITHOUT SCIATICA: ICD-10-CM

## 2022-08-30 DIAGNOSIS — M54.50 CHRONIC BILATERAL LOW BACK PAIN WITHOUT SCIATICA: ICD-10-CM

## 2022-08-30 RX ORDER — MONTELUKAST SODIUM 10 MG/1
10 TABLET ORAL DAILY
Qty: 90 TABLET | Refills: 1 | Status: SHIPPED | OUTPATIENT
Start: 2022-08-30 | End: 2022-09-21

## 2022-08-30 RX ORDER — MONTELUKAST SODIUM 10 MG/1
10 TABLET ORAL DAILY
Qty: 90 TABLET | Refills: 1 | Status: SHIPPED | OUTPATIENT
Start: 2022-08-30

## 2022-08-30 NOTE — TELEPHONE ENCOUNTER
Patient mychart request for refill. Thanks, alberta    Last Visit: 8/2/22 MD Gamboa  Next Appointment: None  Previous Refill Encounter(s): 2/19/22 90 + 1    Requested Prescriptions     Pending Prescriptions Disp Refills    montelukast (SINGULAIR) 10 mg tablet 90 Tablet 1     Sig: Take 1 Tablet by mouth daily. For Blake Olea in place:   Recommendation Provided To:    Intervention Detail: New Rx: 1, reason: Patient Preference  Gap Closed?:   Intervention Accepted By:   Time Spent (min): 5

## 2022-08-30 NOTE — TELEPHONE ENCOUNTER
Pharmacy Progress Note     Pt picked up 2 sample FSL2 CGM sensors today. Confirmed identity with 's license.       Mee Gamez, PharmD, BCGP, BCACP  Clinical Pharmacist Specialist      For Pharmacy Admin Tracking Only    CPA in place: Yes  Recommendation Provided To: Patient/Caregiver: 2 via In person  Intervention Detail: Patient Access Assistance/Sample Provided  Intervention Accepted By: Patient/Caregiver: 2  Time Spent (min): 5

## 2022-08-31 RX ORDER — IBUPROFEN 800 MG/1
TABLET ORAL
Qty: 90 TABLET | Refills: 1 | Status: SHIPPED | OUTPATIENT
Start: 2022-08-31

## 2022-09-04 DIAGNOSIS — K21.9 GASTROESOPHAGEAL REFLUX DISEASE WITHOUT ESOPHAGITIS: ICD-10-CM

## 2022-09-04 DIAGNOSIS — E78.2 MIXED HYPERLIPIDEMIA: ICD-10-CM

## 2022-09-05 RX ORDER — SIMVASTATIN 20 MG/1
TABLET, FILM COATED ORAL
Qty: 90 TABLET | Refills: 1 | Status: SHIPPED | OUTPATIENT
Start: 2022-09-05

## 2022-09-05 RX ORDER — FAMOTIDINE 20 MG/1
20 TABLET, FILM COATED ORAL DAILY
Qty: 90 TABLET | Refills: 1 | Status: SHIPPED | OUTPATIENT
Start: 2022-09-05

## 2022-09-13 ENCOUNTER — VIRTUAL VISIT (OUTPATIENT)
Dept: FAMILY MEDICINE CLINIC | Age: 62
End: 2022-09-13

## 2022-09-13 ENCOUNTER — TELEPHONE (OUTPATIENT)
Dept: FAMILY MEDICINE CLINIC | Age: 62
End: 2022-09-13

## 2022-09-13 DIAGNOSIS — E11.21 TYPE 2 DIABETES WITH NEPHROPATHY (HCC): Primary | ICD-10-CM

## 2022-09-13 NOTE — PROGRESS NOTES
Pharmacy Progress Note - Diabetes Management    S/O: Ms. Jesse Mensah is a 63 y.o. female, referred by Dr. Colonel Manpreet MD, with a PMH of HTN, T2DM, asthma, CKD, anxiety, depression, vit D, obesity, HLD, and rectocele was seen today for diabetes management. Patient's last A1c was 9.8% (Aug 2022) which is increased from 7.9% (Jan 2022). Visit was completed using real time audio visual technology, [email or web address]. Interim update: Pt was last seen by pharmacy on 8/23/22 at which time, diet was discussed in detail. Held off on any medication changes due to the patient experiencing GI cramping and constipation. The pt reports being adherent to the ozempic 1 mg and the jardiance 10mg, but they are still experiencing GI issues, which started before starting the DM medication. They are having a lot of gas, cramping, and constipation. The pt has an upcoming appointment with a gastroenterologist to help address theses issues. The pt has been decreasing carb intake. Switched to low carb bread, low carb sweetener, and eating smaller snacks with < 15 g of carbs. They are also waiting to eat when their BG is high and then having larger meals when they do eat. Current anti-hyperglycemic regimen includes:    - Ozempic 1 mg weekly  - Jardiance 10 mg daily     ROS:  Today, Pt endorses:  - Symptoms of Hyperglycemia: none  - Symptoms of Hypoglycemia: none    Self Monitoring Blood Glucose (SMBG) or CGM:  - Brought in home glucometer/blood glucose log/CGM reader today:  yes        Nutrition:  - Eats 2-3 meals/day   - Loves bread  - Breakfast: 2 slices low carb bread ,eggs, samuels, coffe with low carb sweetener  - Lunch: the largest meal of the day with more bread  - Dinner: a sandwich  - Snack(s): kettle corn, nuts, sweet potato chips  - Beverage(s): water, un-sweet tea, and coffee with low carb sweetener    Vitals:   Wt Readings from Last 3 Encounters:   08/13/22 216 lb 0.8 oz (98 kg)   08/02/22 229 lb (103.9 kg)   01/31/22 241 lb (109.3 kg)     BP Readings from Last 3 Encounters:   08/13/22 (!) 140/79   08/02/22 (!) 146/90   01/31/22 (!) 180/110     Pulse Readings from Last 3 Encounters:   08/13/22 84   08/02/22 82   01/31/22 80       Past Medical History:   Diagnosis Date    Adverse effect of anesthesia     slow to wake up at dentist office    Anxiety     Arthritis     Chronic pain     3 herniated discs in lower back    Depression     Diabetes ([address]     type 2    GERD (gastroesophageal reflux disease)     High cholesterol     Hypertension     MRSA infection 2008    left leg treated with antibiotics at [facility]    Other ill-defined conditions(799.89)     herniated disc to back    PUD (peptic ulcer disease)     Seasonal allergies     Sleep apnea     kerwin     Allergies   Allergen Reactions    Vicodin [Hydrocodone-Acetaminophen] Hives       Current Outpatient Medications   Medication Sig    famotidine (PEPCID) 20 mg tablet TAKE 1 TABLET BY MOUTH DAILY    simvastatin (ZOCOR) 20 mg tablet TAKE 1 TABLET BY MOUTH EVERY NIGHT    ibuprofen (MOTRIN) 800 mg tablet TAKE 1 TABLET BY MOUTH EVERY 8 HOURS AS NEEDED FOR PAIN    montelukast (SINGULAIR) 10 mg tablet TAKE 1 TABLET BY MOUTH DAILY    montelukast (SINGULAIR) 10 mg tablet Take 1 Tablet by mouth daily. flash glucose sensor (General AssemblyStyle Yogesh 2 Sensor) kit Scan sensor 4x per day to check blood sugar. Replace sensor every 14 days. semaglutide (Ozempic) 1 mg/dose (4 mg/3 mL) pnij 1 mg by SubCUTAneous route every seven (7) days. empagliflozin (Jardiance) 10 mg tablet Take 1 Tablet by mouth in the morning. ALPRAZolam (XANAX) 0.25 mg tablet Take 1 Tablet by mouth nightly as needed for Anxiety. Max Daily Amount: 0.25 mg. Indications: anxious    losartan (COZAAR) 25 mg tablet Take 1 Tablet by mouth in the morning. levocetirizine (XYZAL) 5 mg tablet TAKE 1 TABLET BY MOUTH EVERY DAY    amLODIPine (NORVASC) 10 mg tablet Take 1 Tablet by mouth daily.     oxyCODONE-acetaminophen (PERCOCET) 5-325 mg per tablet     nystatin (MYCOSTATIN) topical cream Apply  to affected area two (2) times a day. fluticasone propionate (FLONASE) 50 mcg/actuation nasal spray 2 Sprays by Both Nostrils route daily. albuterol (PROVENTIL HFA, VENTOLIN HFA, PROAIR HFA) 90 mcg/actuation inhaler Take 2 Puffs by inhalation every six (6) hours as needed for Wheezing. Nebulizer & Compressor machine Nebulizer machine x 1 Nebulizer Kit    albuterol (PROVENTIL VENTOLIN) 2.5 mg /3 mL (0.083 %) nebu 3 mL by Nebulization route every four (4) hours as needed for Wheezing. Nebulizer Accessories kit Use kit with Nebulizer every 4 hours as needed  Wash out kit after each use and air dry  Change kit every month when in use. glucose blood VI test strips (ASCENSIA AUTODISC VI, ONE TOUCH ULTRA TEST VI) strip Check glucose twice daily (Patient not taking: Reported on 8/2/2022)     No current facility-administered medications for this visit. Lab Results   Component Value Date/Time    Sodium 135 (L) 08/13/2022 01:39 PM    Potassium 4.2 08/13/2022 01:39 PM    Chloride 105 08/13/2022 01:39 PM    CO2 25 08/13/2022 01:39 PM    Anion gap 5 08/13/2022 01:39 PM    Glucose 271 (H) 08/13/2022 01:39 PM    BUN 28 (H) 08/13/2022 01:39 PM    Creatinine 1.80 (H) 08/13/2022 01:39 PM    BUN/Creatinine ratio 16 08/13/2022 01:39 PM    GFR est AA 35 (L) 08/13/2022 01:39 PM    GFR est non-AA 29 (L) 08/13/2022 01:39 PM    Calcium 9.7 08/13/2022 01:39 PM    Bilirubin, total 0.3 08/13/2022 01:39 PM    Alk.  phosphatase 96 08/13/2022 01:39 PM    Protein, total 7.9 08/13/2022 01:39 PM    Albumin 3.9 08/13/2022 01:39 PM    Globulin 4.0 08/13/2022 01:39 PM    A-G Ratio 1.0 (L) 08/13/2022 01:39 PM    ALT (SGPT) 33 08/13/2022 01:39 PM     Lab Results   Component Value Date/Time    Cholesterol, total 198 01/31/2022 12:39 PM    HDL Cholesterol 72 01/31/2022 12:39 PM    LDL,Direct 148 (H) 04/25/2016 12:13 PM    LDL, calculated 79.8 01/31/2022 12:39 PM VLDL, calculated 46.2 01/31/2022 12:39 PM    Triglyceride 231 (H) 01/31/2022 12:39 PM    CHOL/HDL Ratio 2.8 01/31/2022 12:39 PM     Lab Results   Component Value Date/Time    WBC 7.3 08/13/2022 01:39 PM    WBC 9.4 06/05/2012 12:06 PM    HGB 11.6 08/13/2022 01:39 PM    HCT 35.8 08/13/2022 01:39 PM    PLATELET 813 04/19/5044 01:39 PM    MCV 91.8 08/13/2022 01:39 PM       Lab Results   Component Value Date/Time    Microalbumin/Creat ratio (mg/g creat) 1,424 (H) 01/31/2022 12:39 PM    Microalbumin,urine random 70.50 01/31/2022 12:39 PM    Microalbumin urine (POC) 150 09/29/2020 05:16 PM       Lab Results   Component Value Date/Time    Hemoglobin A1c 7.0 (H) 08/06/2021 01:25 PM    Hemoglobin A1c 6.7 (H) 07/07/2020 10:45 AM    Hemoglobin A1c 6.8 (H) 08/08/2018 12:27 PM     Hemoglobin A1c (POC)   Date Value Ref Range Status   01/31/2022 7.9 % Final        CrCl cannot be calculated (Unknown ideal weight. ). A/P:    1) T2DM: Per ADA guidelines, Pt's A1c is not at goal of < 7%. Current SMBG(s)/CGM trend indicates that the pt is uncontrolled most of the time. Reiterated the necessary dietary changes and that if the pt eats smaller more frequent meals, this could provide more consistent low BG rdgs as well as possibly mitigate the GI symptoms that have been troubling the pt. Do not recommend increasing any medications at this time, until GI issues resolve. Discussed the possibility of having to decrease or stop the ozempic in the future, at which time we may need to consider insulin for DM control.  - Continue Ozempic 1 mg weekly  - Continue Jardiance 10 mg daily  - Recommend moderating and diversifying carbohydrate intake to max of~45-60 grams/meal and 15 grams/snack ; at least 1 serving of protein/meal.  Reviewed low-carb alternatives to existing food choices. - Recommend eating smaller, more frequent meals    Medication reconciliation was completed during the visit. There are no discontinued medications.   No orders of the defined types were placed in this encounter. Patient verbalized understanding of the information presented and all of the patients questions were answered. AVS was handed to the patient. Patient advised to call the office with any additional questions or concerns. Notifications of recommendations will be sent to Dr. Jonelle Bryant MD for review. Patient will return to clinic in 7 week(s) for follow up.      Drake Champagne, PharmD  [address] [facility] [facility] PGY1 Resident

## 2022-09-13 NOTE — PROGRESS NOTES
Patient was seen with Capo Sun PharmD, PGY1 resident. I was present for the visit and agree with the assessment and plan. Please see their note for more details of the visit.     Mayur Brock PharmD, BCGP, BCACP  Clinical Pharmacist Specialist      For Pharmacy Admin Tracking Only    CPA in place: Yes  Recommendation Provided To: Patient/Caregiver: 1 via Virtual Visit  Intervention Detail: Scheduled Appointment  Intervention Accepted By: Patient/Caregiver: 1  Time Spent (min): 60

## 2022-09-21 ENCOUNTER — HOSPITAL ENCOUNTER (OUTPATIENT)
Dept: PREADMISSION TESTING | Age: 62
Discharge: HOME | End: 2022-09-21
Payer: MEDICARE

## 2022-09-21 VITALS
RESPIRATION RATE: 18 BRPM | TEMPERATURE: 98.2 F | SYSTOLIC BLOOD PRESSURE: 125 MMHG | HEIGHT: 68 IN | HEART RATE: 75 BPM | WEIGHT: 221.34 LBS | BODY MASS INDEX: 33.55 KG/M2 | DIASTOLIC BLOOD PRESSURE: 78 MMHG

## 2022-09-21 LAB
ANION GAP SERPL CALC-SCNC: 7 MMOL/L (ref 5–15)
BUN SERPL-MCNC: 21 MG/DL (ref 6–20)
BUN/CREAT SERPL: 14 (ref 12–20)
CALCIUM SERPL-MCNC: 9.6 MG/DL (ref 8.5–10.1)
CHLORIDE SERPL-SCNC: 104 MMOL/L (ref 97–108)
CO2 SERPL-SCNC: 25 MMOL/L (ref 21–32)
CREAT SERPL-MCNC: 1.47 MG/DL (ref 0.55–1.02)
EST. AVERAGE GLUCOSE BLD GHB EST-MCNC: 163 MG/DL
GLUCOSE SERPL-MCNC: 239 MG/DL (ref 65–100)
HBA1C MFR BLD: 7.3 % (ref 4–5.6)
POTASSIUM SERPL-SCNC: 4 MMOL/L (ref 3.5–5.1)
SODIUM SERPL-SCNC: 136 MMOL/L (ref 136–145)

## 2022-09-21 PROCEDURE — 83036 HEMOGLOBIN GLYCOSYLATED A1C: CPT

## 2022-09-21 PROCEDURE — 93005 ELECTROCARDIOGRAM TRACING: CPT

## 2022-09-21 PROCEDURE — 36415 COLL VENOUS BLD VENIPUNCTURE: CPT

## 2022-09-21 PROCEDURE — 80048 BASIC METABOLIC PNL TOTAL CA: CPT

## 2022-09-21 RX ORDER — TRIAMCINOLONE ACETONIDE 0.25 MG/G
CREAM TOPICAL AS NEEDED
COMMUNITY

## 2022-09-21 RX ORDER — ESTRADIOL 0.1 MG/G
2 CREAM VAGINAL DAILY
COMMUNITY

## 2022-09-21 NOTE — PERIOP NOTES
[address] INSTRUCTIONS  [facility]    Surgery Date:   09/28/2022    Your surgeon's office or [facility] staff will call you between 4 PM- 8 PM the day before surgery with your arrival time. If your surgery is on a Monday, you will receive a call the preceding Friday. Please report to [facility] Patient Access/Admitting on the 1st floor. Bring your insurance card, photo identification, and any copayment ( if applicable). If you are going home the same day of your surgery, you must have a responsible adult to drive you home. You need to have a responsible adult to stay with you the first 24 hours after surgery and you should not drive a car for 24 hours following your surgery. If you are being admitted to the hospital, please leave personal belongings/luggage in your car until you have an assigned hospital room number. Please refer to [facility] book for Pre-operative Nutrition Plan. Do NOT drink alcohol or smoke 24 hours before surgery. STOP smoking for 14 days prior as it helps with breathing and healing after surgery. Please wear comfortable clothes. Wear your glasses instead of contacts. We ask that all money, jewelry and valuables be left at home. Wear no make up, particularly mascara, the day of surgery. All body piercings, rings, and jewelry need to be removed and left at home. Please remove any nail polish or artificial nails from your fingernails. Please wear your hair loose or down. Please no pony-tails, buns, or any metal hair accessories. If you shower the morning of surgery, please do not apply any lotions or powders afterwards. You may wear deodorant. Do not shave any body area within 24 hours of your surgery. Please follow all instructions to avoid any potential surgical cancellation. Should your physical condition change, (i.e. fever, cold, flu, etc.) please notify your surgeon as soon as possible. It is important to be on time.  If a situation occurs where you may be delayed, please call:  [phone] / [phone] on the day of surgery. The Preadmission Testing staff can be reached at [phone]. Special instructions: [address]  [facility], BRING CPAP     Current Outpatient Medications   Medication Sig    triamcinolone acetonide (KENALOG) 0.025 % topical cream Apply  to affected area as needed for Skin Irritation. use thin layer    estradioL (Estrace) 0.01 % (0.1 mg/gram) vaginal cream Insert 2 g into vagina daily. famotidine (PEPCID) 20 mg tablet TAKE 1 TABLET BY MOUTH DAILY    simvastatin (ZOCOR) 20 mg tablet TAKE 1 TABLET BY MOUTH EVERY NIGHT (Patient taking differently: Take 20 mg by mouth daily.)    ibuprofen (MOTRIN) 800 mg tablet TAKE 1 TABLET BY MOUTH EVERY 8 HOURS AS NEEDED FOR PAIN    montelukast (SINGULAIR) 10 mg tablet TAKE 1 TABLET BY MOUTH DAILY    flash glucose sensor ([employer]Style Yogesh 2 Sensor) kit Scan sensor 4x per day to check blood sugar. Replace sensor every 14 days. semaglutide (Ozempic) 1 mg/dose (4 mg/3 mL) pnij 1 mg by SubCUTAneous route every seven (7) days. empagliflozin (Jardiance) 10 mg tablet Take 1 Tablet by mouth in the morning. ALPRAZolam (XANAX) 0.25 mg tablet Take 1 Tablet by mouth nightly as needed for Anxiety. Max Daily Amount: 0.25 mg. Indications: anxious    losartan (COZAAR) 25 mg tablet Take 1 Tablet by mouth in the morning. levocetirizine (XYZAL) 5 mg tablet TAKE 1 TABLET BY MOUTH EVERY DAY (Patient taking differently: Take 5 mg by mouth nightly. TAKE 1 TABLET BY MOUTH EVERY DAY)    amLODIPine (NORVASC) 10 mg tablet Take 1 Tablet by mouth daily. oxyCODONE-acetaminophen (PERCOCET) 5-325 mg per tablet Take 1 Tablet by mouth every six (6) hours as needed. nystatin (MYCOSTATIN) topical cream Apply  to affected area two (2) times a day. fluticasone propionate (FLONASE) 50 mcg/actuation nasal spray 2 Sprays by Both Nostrils route daily.  (Patient taking differently: 2 Sprays by Both Nostrils route as needed.)    albuterol (PROVENTIL HFA, VENTOLIN HFA, PROAIR HFA) 90 mcg/actuation inhaler Take 2 Puffs by inhalation every six (6) hours as needed for Wheezing. Nebulizer & Compressor machine Nebulizer machine x 1 Nebulizer Kit    albuterol (PROVENTIL VENTOLIN) 2.5 mg /3 mL (0.083 %) nebu 3 mL by Nebulization route every four (4) hours as needed for Wheezing. Nebulizer Accessories kit Use kit with Nebulizer every 4 hours as needed  Wash out kit after each use and air dry  Change kit every month when in use. glucose blood VI test strips (ASCENSIA AUTODISC VI, ONE TOUCH ULTRA TEST VI) strip Check glucose twice daily     No current facility-administered medications for this encounter. YOU MUST ONLY TAKE THESE MEDICATIONS THE MORNING OF SURGERY WITH A SIP OF WATER: SIMVASTATIN, AMLODIPINE, PEPCID, SINGULAIR,   MEDICATIONS TO TAKE THE MORNING OF SURGERY ONLY IF NEEDED: TYLENOL, XANAX, ALBUTEROL INHALER OR NEBULIZER, FLONASE, PERCOCET LAST IS TO BE 4 HOURS PRIOR TO ARRIVAL,   HOLD these prescription medications BEFORE surgery: DO NOT TAKE MORNING OF SURGERY: JARDIANCE, LOSARTAN, TRIAMCINOLONE, NYSTATIN, STOP AS OF 09/24/2022: IBUPROFEN,   Ask your surgeon/prescribing physician about when/if to STOP taking these medications: ESTRADIOL  Stop all vitamins, herbal medicines and Aspirin containing products 7 days prior to surgery. Stop any non-steroidal anti-inflammatory drugs (i.e. Ibuprofen, Naproxen, Advil, Aleve) 3 days before surgery. You may take Tylenol. If you are currently taking Aspirin, Plavix, Coumadin or any other blood-thinning/anticoagulant medication contact your prescribing physician for instructions. Eating and Drinking Before Surgery    You may eat a regular dinner at the usual time on the day before your surgery. If your surgery requires a bowel prep, follow prep instructions given to you by your surgeon.   Do NOT eat any solid foods after midnight unless your arrival time at the hospital is 3pm or later. You may drink clear liquids only from 12 midnight until 1 hour prior to your arrival time at the hospital on the day of your surgery. Do NOT drink alcohol. Clear liquids include:  Water  Fruit juices without pulp( i.e. apple juice)  Carbonated beverages  Black coffee (no cream/milk)  Tea (no cream/milk)  Gatorade  You may drink up to 12-16 ounces at one time every 4 hours between the hours of midnight and 1 hour before your arrival time at the hospital. Example- if your arrival time at the hospital is 6am, you may drink 12-16 ounces of clear liquids no later than 5am.  If your arrival time at the hospital is 3pm or later, you may eat a light breakfast before 8am.  A light breakfast includes: Toast or bagel (no butter)  Black coffee (no cream/milk)  Tea (no cream/milk)  Fruit juices without pulp ( i.e. apple juice)  Do NOT eat meat, eggs, vegetables or fruit  If you have any questions, please contact your surgeon's office. Incentive Spirometer        Using the incentive spirometer helps expand the small air sacs of your lungs, helps you breathe deeply, and helps improve your lung function. Use your incentive spirometer twice a day (10 breaths each time) prior to surgery. How to Use Your Incentive Spirometer:  Hold the incentive spirometer in an upright position. Breathe out as usual.   Place the mouthpiece in your mouth and seal your lips tightly around it. Take a deep breath. Breathe in slowly and as deeply as possible. Keep the blue flow rate guide between the arrows. Hold your breath as long as possible. Then exhale slowly and allow the piston to fall to the bottom of the column. Rest for a few seconds and repeat steps one through five at least 10 times. Opportunity given to ask and answer questions. Preventing Infections Before and After - Your Surgery    IMPORTANT INSTRUCTIONS    You play an important role in your health and preparation for surgery.  To reduce the germs on your skin you will need to shower with CHG soap (Chorhexidine gluconate 4%) two times before surgery. CHG soap (Hibiclens, Hex-A-Clens or store brand)  CHG soap will be provided at your Preadmission Testing (PAT) appointment. If you do not have a PAT appointment before surgery, you may arrange to  CHG soap from our office or purchase CHG soap at a pharmacy, grocery or department store. You need to purchase TWO 4 ounce bottles to use for your 2 showers. Steps to follow:  Acquanetta Horta your hair with your normal shampoo and your body with regular soap and rinse well to remove shampoo and soap from your skin. Wet a clean washcloth and turn off the shower. Put CHG soap on washcloth and apply to your entire body from the neck down. Do not use on your head, face or private parts(genitals). Do not use CHG soap on open sores, wounds or areas of skin irritation. Wash you body gently for 5 minutes. Do not wash your skin too hard. This soap does not create lather. Pay special attention to your underarms and from your belly button to your feet. Turn the shower back on and rinse well to get CHG soap off your body. Pat your skin dry with a clean, dry towel. Do not apply lotions or moisturizer. Put on clean clothes and sleep on fresh bed sheets and do not allow pets to sleep with you. Shower with CHG soap 2 times before your surgery  The evening before your surgery  The morning of your surgery      Tips to help prevent infections after your surgery:  Protect your surgical wound from germs:  Hand washing is the most important thing you and your caregivers can do to prevent infections. Keep your bandage clean and dry! Do not touch your surgical wound. Use clean, freshly washed towels and washcloths every time you shower; do not share bath linens with others. Until your surgical wound is healed, wear clothing and sleep on bed linens each day that are clean and freshly washed.   Do not allow pets to sleep in your bed with you or touch your surgical wound. Do not smoke - smoking delays wound healing. This may be a good time to stop smoking. If you have diabetes, it is important for you to manage your blood sugar levels properly before your surgery as well as after your surgery. Poorly managed blood sugar levels slow down wound healing and prevent you from healing completely. Patient Information Regarding COVID Restrictions    Day of Procedure    Please park in the parking deck or any designated visitor parking lot. Enter the facility through the Main Entrance of the hospital.  On the day of surgery, please provide the cell phone number of the person who will be waiting for you to the Patient Access representative at the time of registration. Please wear a mask on the day of your procedure. We are now allowing two designated visitors per stay. Pediatric patients may have 2 designated visitors. These two people may come in with you on the day of your procedure. The designated visitor must also wear a mask. Once your procedure and the immediate recovery period is completed, a nurse in the recovery area will contact your designated visitor to inform them of your room number or to otherwise review other pertinent information regarding your care. Social distancing practices are to be adhered to in waiting areas and the cafeteria. The patient was contacted in person. She verbalized understanding of all instructions does not  need reinforcement.

## 2022-09-21 NOTE — PERIOP NOTES
INSTRUCTIONS GIVEN AND REVIEWED, 2 BOTTLES OF SOAP, INCENTIVE SPIROMETER GIVEN, PT GIVEN TIME TO ASK QUESTIONS,    COPY OF COVID CARD PLACED ON CHART     PT HAD QUESTIONS ABOUT PROCEDURE DR. SHI WILL NEED TO REVIEW PRIOR TO PATIENT  SIGNING CONSENT     EKG DONE     COPIED AND REVIEWED WITH PATIENT ABOUT DRINKS FOR ERAS PROCEDURE STATES UNDER STANDS

## 2022-09-23 LAB
ATRIAL RATE: 65 BPM
CALCULATED P AXIS, ECG09: 111 DEGREES
CALCULATED R AXIS, ECG10: -27 DEGREES
CALCULATED T AXIS, ECG11: -25 DEGREES
DIAGNOSIS, 93000: NORMAL
P-R INTERVAL, ECG05: 154 MS
Q-T INTERVAL, ECG07: 396 MS
QRS DURATION, ECG06: 94 MS
QTC CALCULATION (BEZET), ECG08: 411 MS
VENTRICULAR RATE, ECG03: 65 BPM

## 2022-09-27 ENCOUNTER — ANESTHESIA EVENT (OUTPATIENT)
Dept: SURGERY | Age: 62
End: 2022-09-27
Payer: MEDICARE

## 2022-09-28 ENCOUNTER — HOSPITAL ENCOUNTER (OUTPATIENT)
Age: 62
Setting detail: OBSERVATION
Discharge: HOME | End: 2022-09-29
Attending: OBSTETRICS & GYNECOLOGY | Admitting: OBSTETRICS & GYNECOLOGY
Payer: MEDICARE

## 2022-09-28 ENCOUNTER — ANESTHESIA (OUTPATIENT)
Dept: SURGERY | Age: 62
End: 2022-09-28
Payer: MEDICARE

## 2022-09-28 DIAGNOSIS — Z98.890 STATUS POST SURGERY: Primary | ICD-10-CM

## 2022-09-28 LAB
GLUCOSE BLD STRIP.AUTO-MCNC: 139 MG/DL (ref 65–117)
GLUCOSE BLD STRIP.AUTO-MCNC: 149 MG/DL (ref 65–117)
GLUCOSE BLD STRIP.AUTO-MCNC: 185 MG/DL (ref 65–117)
SERVICE CMNT-IMP: ABNORMAL

## 2022-09-28 PROCEDURE — G0378 HOSPITAL OBSERVATION PER HR: HCPCS

## 2022-09-28 PROCEDURE — 77030040830 HC CATH URETH FOL MDII -A: Performed by: OBSTETRICS & GYNECOLOGY

## 2022-09-28 PROCEDURE — 74011000250 HC RX REV CODE- 250: Performed by: OBSTETRICS & GYNECOLOGY

## 2022-09-28 PROCEDURE — 2709999900 HC NON-CHARGEABLE SUPPLY: Performed by: OBSTETRICS & GYNECOLOGY

## 2022-09-28 PROCEDURE — 74011250636 HC RX REV CODE- 250/636: Performed by: OBSTETRICS & GYNECOLOGY

## 2022-09-28 PROCEDURE — 74011250636 HC RX REV CODE- 250/636: Performed by: ANESTHESIOLOGY

## 2022-09-28 PROCEDURE — 74011000250 HC RX REV CODE- 250: Performed by: ANESTHESIOLOGY

## 2022-09-28 PROCEDURE — 77030031139 HC SUT VCRL2 J&J -A: Performed by: OBSTETRICS & GYNECOLOGY

## 2022-09-28 PROCEDURE — C1771 REP DEV, URINARY, W/SLING: HCPCS | Performed by: OBSTETRICS & GYNECOLOGY

## 2022-09-28 PROCEDURE — 76210000006 HC OR PH I REC 0.5 TO 1 HR: Performed by: OBSTETRICS & GYNECOLOGY

## 2022-09-28 PROCEDURE — 77030040361 HC SLV COMPR DVT MDII -B: Performed by: OBSTETRICS & GYNECOLOGY

## 2022-09-28 PROCEDURE — 74011250637 HC RX REV CODE- 250/637: Performed by: ANESTHESIOLOGY

## 2022-09-28 PROCEDURE — 76060000033 HC ANESTHESIA 1 TO 1.5 HR: Performed by: OBSTETRICS & GYNECOLOGY

## 2022-09-28 PROCEDURE — 77030002966 HC SUT PDS J&J -A: Performed by: OBSTETRICS & GYNECOLOGY

## 2022-09-28 PROCEDURE — 82962 GLUCOSE BLOOD TEST: CPT

## 2022-09-28 PROCEDURE — 76010000149 HC OR TIME 1 TO 1.5 HR: Performed by: OBSTETRICS & GYNECOLOGY

## 2022-09-28 PROCEDURE — 77030037079 HC DEV CAPIO SLIM SUT CAP BCS -F: Performed by: OBSTETRICS & GYNECOLOGY

## 2022-09-28 PROCEDURE — 74011250637 HC RX REV CODE- 250/637: Performed by: OBSTETRICS & GYNECOLOGY

## 2022-09-28 DEVICE — SLING INCONT RETROPUBIC CONTINENCE SYS GYNECARE TVT EXACT: Type: IMPLANTABLE DEVICE | Status: FUNCTIONAL

## 2022-09-28 RX ORDER — SODIUM CHLORIDE 0.9 % (FLUSH) 0.9 %
5-40 SYRINGE (ML) INJECTION AS NEEDED
Status: DISCONTINUED | OUTPATIENT
Start: 2022-09-28 | End: 2022-09-28 | Stop reason: HOSPADM

## 2022-09-28 RX ORDER — ONDANSETRON 2 MG/ML
INJECTION INTRAMUSCULAR; INTRAVENOUS AS NEEDED
Status: DISCONTINUED | OUTPATIENT
Start: 2022-09-28 | End: 2022-09-28 | Stop reason: HOSPADM

## 2022-09-28 RX ORDER — HYDROMORPHONE HYDROCHLORIDE 1 MG/ML
0.2 INJECTION, SOLUTION INTRAMUSCULAR; INTRAVENOUS; SUBCUTANEOUS
Status: DISCONTINUED | OUTPATIENT
Start: 2022-09-28 | End: 2022-09-28 | Stop reason: HOSPADM

## 2022-09-28 RX ORDER — ACETAMINOPHEN 325 MG/1
650 TABLET ORAL ONCE
Status: COMPLETED | OUTPATIENT
Start: 2022-09-28 | End: 2022-09-28

## 2022-09-28 RX ORDER — ACETAMINOPHEN 325 MG/1
650 TABLET ORAL
Status: DISCONTINUED | OUTPATIENT
Start: 2022-09-28 | End: 2022-09-29 | Stop reason: HOSPADM

## 2022-09-28 RX ORDER — SODIUM CHLORIDE, SODIUM LACTATE, POTASSIUM CHLORIDE, CALCIUM CHLORIDE 600; 310; 30; 20 MG/100ML; MG/100ML; MG/100ML; MG/100ML
50 INJECTION, SOLUTION INTRAVENOUS CONTINUOUS
Status: DISCONTINUED | OUTPATIENT
Start: 2022-09-28 | End: 2022-09-28 | Stop reason: HOSPADM

## 2022-09-28 RX ORDER — MIDAZOLAM HYDROCHLORIDE 1 MG/ML
1 INJECTION, SOLUTION INTRAMUSCULAR; INTRAVENOUS AS NEEDED
Status: DISCONTINUED | OUTPATIENT
Start: 2022-09-28 | End: 2022-09-28 | Stop reason: HOSPADM

## 2022-09-28 RX ORDER — DOCUSATE SODIUM 100 MG/1
100 CAPSULE, LIQUID FILLED ORAL 2 TIMES DAILY
Status: DISCONTINUED | OUTPATIENT
Start: 2022-09-28 | End: 2022-09-29 | Stop reason: HOSPADM

## 2022-09-28 RX ORDER — IBUPROFEN 600 MG/1
600 TABLET ORAL
Status: DISCONTINUED | OUTPATIENT
Start: 2022-09-28 | End: 2022-09-29 | Stop reason: HOSPADM

## 2022-09-28 RX ORDER — ATROPA BELLADONNA AND OPIUM 16.2; 6 MG/1; MG/1
1 SUPPOSITORY RECTAL ONCE
Status: COMPLETED | OUTPATIENT
Start: 2022-09-28 | End: 2022-09-28

## 2022-09-28 RX ORDER — ONDANSETRON 2 MG/ML
4 INJECTION INTRAMUSCULAR; INTRAVENOUS AS NEEDED
Status: DISCONTINUED | OUTPATIENT
Start: 2022-09-28 | End: 2022-09-28 | Stop reason: HOSPADM

## 2022-09-28 RX ORDER — FENTANYL CITRATE 50 UG/ML
25 INJECTION, SOLUTION INTRAMUSCULAR; INTRAVENOUS
Status: DISCONTINUED | OUTPATIENT
Start: 2022-09-28 | End: 2022-09-28 | Stop reason: HOSPADM

## 2022-09-28 RX ORDER — FENTANYL CITRATE 50 UG/ML
INJECTION, SOLUTION INTRAMUSCULAR; INTRAVENOUS AS NEEDED
Status: DISCONTINUED | OUTPATIENT
Start: 2022-09-28 | End: 2022-09-28 | Stop reason: HOSPADM

## 2022-09-28 RX ORDER — NALOXONE HYDROCHLORIDE 0.4 MG/ML
0.4 INJECTION, SOLUTION INTRAMUSCULAR; INTRAVENOUS; SUBCUTANEOUS AS NEEDED
Status: DISCONTINUED | OUTPATIENT
Start: 2022-09-28 | End: 2022-09-29 | Stop reason: HOSPADM

## 2022-09-28 RX ORDER — METRONIDAZOLE 500 MG/100ML
500 INJECTION, SOLUTION INTRAVENOUS ONCE
Status: DISCONTINUED | OUTPATIENT
Start: 2022-09-28 | End: 2022-09-28 | Stop reason: HOSPADM

## 2022-09-28 RX ORDER — HYDROMORPHONE HYDROCHLORIDE 2 MG/1
2 TABLET ORAL
Status: DISCONTINUED | OUTPATIENT
Start: 2022-09-28 | End: 2022-09-29 | Stop reason: HOSPADM

## 2022-09-28 RX ORDER — FENTANYL CITRATE 50 UG/ML
50 INJECTION, SOLUTION INTRAMUSCULAR; INTRAVENOUS AS NEEDED
Status: DISCONTINUED | OUTPATIENT
Start: 2022-09-28 | End: 2022-09-28 | Stop reason: HOSPADM

## 2022-09-28 RX ORDER — ROCURONIUM BROMIDE 10 MG/ML
INJECTION, SOLUTION INTRAVENOUS AS NEEDED
Status: DISCONTINUED | OUTPATIENT
Start: 2022-09-28 | End: 2022-09-28 | Stop reason: HOSPADM

## 2022-09-28 RX ORDER — MIDAZOLAM HYDROCHLORIDE 1 MG/ML
INJECTION, SOLUTION INTRAMUSCULAR; INTRAVENOUS AS NEEDED
Status: DISCONTINUED | OUTPATIENT
Start: 2022-09-28 | End: 2022-09-28 | Stop reason: HOSPADM

## 2022-09-28 RX ORDER — DIPHENHYDRAMINE HCL 25 MG
25 CAPSULE ORAL
Status: DISCONTINUED | OUTPATIENT
Start: 2022-09-28 | End: 2022-09-29 | Stop reason: HOSPADM

## 2022-09-28 RX ORDER — LIDOCAINE HYDROCHLORIDE 10 MG/ML
0.1 INJECTION, SOLUTION EPIDURAL; INFILTRATION; INTRACAUDAL; PERINEURAL AS NEEDED
Status: DISCONTINUED | OUTPATIENT
Start: 2022-09-28 | End: 2022-09-28 | Stop reason: HOSPADM

## 2022-09-28 RX ORDER — LIDOCAINE HYDROCHLORIDE 20 MG/ML
INJECTION, SOLUTION EPIDURAL; INFILTRATION; INTRACAUDAL; PERINEURAL AS NEEDED
Status: DISCONTINUED | OUTPATIENT
Start: 2022-09-28 | End: 2022-09-28 | Stop reason: HOSPADM

## 2022-09-28 RX ORDER — PROPOFOL 10 MG/ML
INJECTION, EMULSION INTRAVENOUS AS NEEDED
Status: DISCONTINUED | OUTPATIENT
Start: 2022-09-28 | End: 2022-09-28 | Stop reason: HOSPADM

## 2022-09-28 RX ORDER — SODIUM CHLORIDE 0.9 % (FLUSH) 0.9 %
5-40 SYRINGE (ML) INJECTION EVERY 8 HOURS
Status: DISCONTINUED | OUTPATIENT
Start: 2022-09-28 | End: 2022-09-28 | Stop reason: HOSPADM

## 2022-09-28 RX ORDER — SODIUM CHLORIDE 0.9 % (FLUSH) 0.9 %
5-40 SYRINGE (ML) INJECTION EVERY 8 HOURS
Status: DISCONTINUED | OUTPATIENT
Start: 2022-09-28 | End: 2022-09-29 | Stop reason: HOSPADM

## 2022-09-28 RX ORDER — KETOROLAC TROMETHAMINE 30 MG/ML
INJECTION, SOLUTION INTRAMUSCULAR; INTRAVENOUS AS NEEDED
Status: DISCONTINUED | OUTPATIENT
Start: 2022-09-28 | End: 2022-09-28 | Stop reason: HOSPADM

## 2022-09-28 RX ORDER — SODIUM CHLORIDE 0.9 % (FLUSH) 0.9 %
5-40 SYRINGE (ML) INJECTION AS NEEDED
Status: DISCONTINUED | OUTPATIENT
Start: 2022-09-28 | End: 2022-09-29 | Stop reason: HOSPADM

## 2022-09-28 RX ORDER — ONDANSETRON 4 MG/1
4 TABLET, ORALLY DISINTEGRATING ORAL
Status: DISCONTINUED | OUTPATIENT
Start: 2022-09-28 | End: 2022-09-29 | Stop reason: HOSPADM

## 2022-09-28 RX ORDER — SODIUM CHLORIDE, SODIUM LACTATE, POTASSIUM CHLORIDE, CALCIUM CHLORIDE 600; 310; 30; 20 MG/100ML; MG/100ML; MG/100ML; MG/100ML
INJECTION, SOLUTION INTRAVENOUS
Status: DISCONTINUED | OUTPATIENT
Start: 2022-09-28 | End: 2022-09-28 | Stop reason: HOSPADM

## 2022-09-28 RX ORDER — HYDROMORPHONE HYDROCHLORIDE 2 MG/ML
INJECTION, SOLUTION INTRAMUSCULAR; INTRAVENOUS; SUBCUTANEOUS AS NEEDED
Status: DISCONTINUED | OUTPATIENT
Start: 2022-09-28 | End: 2022-09-28 | Stop reason: HOSPADM

## 2022-09-28 RX ORDER — DEXAMETHASONE SODIUM PHOSPHATE 4 MG/ML
INJECTION, SOLUTION INTRA-ARTICULAR; INTRALESIONAL; INTRAMUSCULAR; INTRAVENOUS; SOFT TISSUE AS NEEDED
Status: DISCONTINUED | OUTPATIENT
Start: 2022-09-28 | End: 2022-09-28 | Stop reason: HOSPADM

## 2022-09-28 RX ADMIN — WATER 2 G: 1 INJECTION INTRAMUSCULAR; INTRAVENOUS; SUBCUTANEOUS at 12:32

## 2022-09-28 RX ADMIN — HYDROMORPHONE HYDROCHLORIDE 2 MG: 2 TABLET ORAL at 17:57

## 2022-09-28 RX ADMIN — DOCUSATE SODIUM 100 MG: 100 CAPSULE, LIQUID FILLED ORAL at 17:03

## 2022-09-28 RX ADMIN — ATROPA BELLADONNA AND OPIUM 1 SUPPOSITORY: 16.2; 6 SUPPOSITORY RECTAL at 13:00

## 2022-09-28 RX ADMIN — PROPOFOL 200 MG: 10 INJECTION, EMULSION INTRAVENOUS at 12:27

## 2022-09-28 RX ADMIN — HYDROMORPHONE HYDROCHLORIDE 1 MG: 2 INJECTION, SOLUTION INTRAMUSCULAR; INTRAVENOUS; SUBCUTANEOUS at 13:22

## 2022-09-28 RX ADMIN — SUGAMMADEX 200 MG: 100 INJECTION, SOLUTION INTRAVENOUS at 13:06

## 2022-09-28 RX ADMIN — MIDAZOLAM 2 MG: 1 INJECTION INTRAMUSCULAR; INTRAVENOUS at 12:18

## 2022-09-28 RX ADMIN — ONDANSETRON HYDROCHLORIDE 4 MG: 2 INJECTION, SOLUTION INTRAMUSCULAR; INTRAVENOUS at 13:06

## 2022-09-28 RX ADMIN — SODIUM CHLORIDE, POTASSIUM CHLORIDE, SODIUM LACTATE AND CALCIUM CHLORIDE: 600; 310; 30; 20 INJECTION, SOLUTION INTRAVENOUS at 12:18

## 2022-09-28 RX ADMIN — HYDROMORPHONE HYDROCHLORIDE 0.5 MG: 2 INJECTION, SOLUTION INTRAMUSCULAR; INTRAVENOUS; SUBCUTANEOUS at 13:29

## 2022-09-28 RX ADMIN — DEXAMETHASONE SODIUM PHOSPHATE 4 MG: 4 INJECTION, SOLUTION INTRAMUSCULAR; INTRAVENOUS at 12:27

## 2022-09-28 RX ADMIN — HYDROMORPHONE HYDROCHLORIDE 0.5 MG: 2 INJECTION, SOLUTION INTRAMUSCULAR; INTRAVENOUS; SUBCUTANEOUS at 13:26

## 2022-09-28 RX ADMIN — ROCURONIUM BROMIDE 50 MG: 10 SOLUTION INTRAVENOUS at 12:27

## 2022-09-28 RX ADMIN — ACETAMINOPHEN 650 MG: 325 TABLET, FILM COATED ORAL at 17:03

## 2022-09-28 RX ADMIN — LIDOCAINE HYDROCHLORIDE 100 MG: 20 INJECTION, SOLUTION EPIDURAL; INFILTRATION; INTRACAUDAL; PERINEURAL at 12:27

## 2022-09-28 RX ADMIN — FENTANYL CITRATE 100 MCG: 50 INJECTION, SOLUTION INTRAMUSCULAR; INTRAVENOUS at 12:27

## 2022-09-28 RX ADMIN — IBUPROFEN 600 MG: 600 TABLET ORAL at 20:28

## 2022-09-28 RX ADMIN — SODIUM CHLORIDE, PRESERVATIVE FREE 10 ML: 5 INJECTION INTRAVENOUS at 21:24

## 2022-09-28 RX ADMIN — ACETAMINOPHEN 650 MG: 325 TABLET, FILM COATED ORAL at 11:00

## 2022-09-28 RX ADMIN — MEPERIDINE HYDROCHLORIDE 12.5 MG: 25 INJECTION, SOLUTION INTRAMUSCULAR; INTRAVENOUS; SUBCUTANEOUS at 13:37

## 2022-09-28 RX ADMIN — SODIUM CHLORIDE, PRESERVATIVE FREE 10 ML: 5 INJECTION INTRAVENOUS at 17:03

## 2022-09-28 RX ADMIN — KETOROLAC TROMETHAMINE 30 MG: 30 INJECTION, SOLUTION INTRAMUSCULAR; INTRAVENOUS at 13:06

## 2022-09-28 RX ADMIN — HYDROMORPHONE HYDROCHLORIDE 2 MG: 2 TABLET ORAL at 23:26

## 2022-09-28 NOTE — PROGRESS NOTES
Bedside and Verbal shift change report given to [employer] (oncoming nurse) by Rubia Rodriguez (offgoing nurse). Report included the following information SBAR, MAR, Accordion, and Recent Results.

## 2022-09-28 NOTE — H&P
Gynecology History and Physical    Name: Chadwick Mace MRN: [id number] SSN: [id number]    YOB: 1960  Age: 63 y.o. Sex: female       Subjective:      Chief complaint:  Sherman Gottron is a 63 y.o.  female with a history of pelvic pain, pelvic relaxation, and rectocele. Previous workup included urodynamics. Previous treatment measures included physical therapy. She is admitted for Procedure(s) (LRB):  POSTERIOR REPAIR WITH POSSIBLE SACROSPINOUS LIGAMENT SUSPENSION (E R A S) (N/A). The current method of family planning is none. OB History    No obstetric history on file. Past Medical History:   Diagnosis Date    Adverse effect of anesthesia     slow to wake up at dentist office    Anxiety     Arthritis     Asthma     Chronic kidney disease     STAGE 3A    Chronic pain     3 herniated discs in lower back    Depression     Diabetes ([facility] [address]     type 2    GERD (gastroesophageal reflux disease)     High cholesterol     Hypertension     MRSA infection 2008    left leg treated with antibiotics at [facility], NEGATIVE RESULT IN 2019 IN LAB REPORT    Other ill-defined conditions(799.89)     herniated disc to back    PUD (peptic ulcer disease)     Seasonal allergies     Sleep apnea     kerwin USES CPAP    Thyroid disease     PARTIAL REMOVAL     Past Surgical History:   Procedure Laterality Date    COLONOSCOPY N/A 05/31/2017    COLONOSCOPY performed by Loc Olivares MD at [facility] ENDOSCOPY    COLONOSCOPY N/A 01/21/2022    COLONOSCOPY   :- performed by Guy Joseph MD at [facility] ENDOSCOPY    HX CATARACT REMOVAL Right     STARTING    HX GYN      tubal ligation, hysterectomy    HX HYSTERECTOMY      HX ORTHOPAEDIC      left hand ligament removed    HX OTHER SURGICAL      corticosteroid injections - back    HX OTHER SURGICAL  08/06/2019    Left lower parathyroidectomy with intraoperative PTH monitoring. -[facility]-Dr. Howard Alfaro    HX TONSILLECTOMY      HX TONSILLECTOMY      AGE 15\    HX WISDOM TEETH EXTRACTION       Social History     Occupational History    Not on file   Tobacco Use    Smoking status: Former     Packs/day: 1.00     Years: 30.00     Pack years: 30.00     Types: Cigarettes     Quit date: 10/30/2012     Years since quittin.9     Passive exposure: Current (Sundar Chris)    Smokeless tobacco: Never   Vaping Use    Vaping Use: Never used   Substance and Sexual Activity    Alcohol use: Yes     Alcohol/week: 14.0 standard drinks     Types: 14 Cans of beer per week    Drug use: Yes     Types: Marijuana    Sexual activity: Yes     Partners: Male     Family History   Problem Relation Age of Onset    Hypertension Mother     Other Mother         SJORAN'S DISEASE    Diabetes Father     Hypertension Sister     Lung Disease Sister         copd    No Known Problems Brother     Anesth Problems Neg Hx         Allergies   Allergen Reactions    Vicodin [Hydrocodone-Acetaminophen] Hives     Prior to Admission medications    Medication Sig Start Date End Date Taking? Authorizing Provider   triamcinolone acetonide (KENALOG) 0.025 % topical cream Apply  to affected area as needed for Skin Irritation. use thin layer    Provider, Historical   estradioL (Estrace) 0.01 % (0.1 mg/gram) vaginal cream Insert 2 g into vagina daily. Provider, Historical   famotidine (PEPCID) 20 mg tablet TAKE 1 TABLET BY MOUTH DAILY 22   Malini Gamboa MD   simvastatin (ZOCOR) 20 mg tablet TAKE 1 TABLET BY MOUTH EVERY NIGHT  Patient taking differently: Take 20 mg by mouth daily. 22   Malini Gamboa MD   ibuprofen (MOTRIN) 800 mg tablet TAKE 1 TABLET BY MOUTH EVERY 8 HOURS AS NEEDED FOR PAIN 22   Malini Gamboa MD   montelukast (SINGULAIR) 10 mg tablet TAKE 1 TABLET BY MOUTH DAILY 22   Malini Gamboa MD   flash glucose sensor ([employer] Yogesh 2 Sensor) kit Scan sensor 4x per day to check blood sugar. Replace sensor every 14 days.  22   Malini Gamboa MD   semaglutide (Ozempic) 1 mg/dose (4 mg/3 mL) pnij 1 mg by SubCUTAneous route every seven (7) days. 8/2/22   Queen Mike Gamboa MD   empagliflozin (Jardiance) 10 mg tablet Take 1 Tablet by mouth in the morning. 8/2/22   Queen Mike Gamboa MD   ALPRAZolam Darrol Fruita) 0.25 mg tablet Take 1 Tablet by mouth nightly as needed for Anxiety. Max Daily Amount: 0.25 mg. Indications: anxious 8/2/22   Radha Ledesma MD   losartan (COZAAR) 25 mg tablet Take 1 Tablet by mouth in the morning. 8/2/22   Queen Mike Gamboa MD   levocetirizine (XYZAL) 5 mg tablet TAKE 1 TABLET BY MOUTH EVERY DAY  Patient taking differently: Take 5 mg by mouth nightly. TAKE 1 TABLET BY MOUTH EVERY DAY 7/14/22   Gloria Mortensen MD   amLODIPine (NORVASC) 10 mg tablet Take 1 Tablet by mouth daily. 3/7/22   Queen Mike Gamboa MD   oxyCODONE-acetaminophen (PERCOCET) 5-325 mg per tablet Take 1 Tablet by mouth every six (6) hours as needed. 6/18/21   Elena Palomino   nystatin (MYCOSTATIN) topical cream Apply  to affected area two (2) times a day. 5/26/21   Queen Mike Gamboa MD   fluticasone propionate (FLONASE) 50 mcg/actuation nasal spray 2 Sprays by Both Nostrils route daily. Patient taking differently: 2 Sprays by Both Nostrils route as needed. 4/1/21   Queen Mike Gamboa MD   albuterol (PROVENTIL HFA, VENTOLIN HFA, PROAIR HFA) 90 mcg/actuation inhaler Take 2 Puffs by inhalation every six (6) hours as needed for Wheezing. 3/17/21   Queen Mike Gamboa MD   Nebulizer & Compressor machine Nebulizer machine x 1 Nebulizer Kit 3/17/21   Queen Mike Gamboa MD   albuterol (PROVENTIL VENTOLIN) 2.5 mg /3 mL (0.083 %) nebu 3 mL by Nebulization route every four (4) hours as needed for Wheezing. 3/12/21   Radha Ledesma MD   Nebulizer Accessories kit Use kit with Nebulizer every 4 hours as needed  Wash out kit after each use and air dry  Change kit every month when in use.  3/10/21   Chance Gamboa MD   glucose blood VI test strips (ASCENSIA AUTODISC VI, ONE TOUCH ULTRA TEST VI) strip Check glucose twice daily 4/2/19   Ian Lora MD        Review of Systems:  A comprehensive review of systems was negative except for that written in the History of Present Illness. Objective: There were no vitals filed for this visit. Physical Exam:  Patient without distress. Heart: Regular rate and rhythm  Lung: clear to auscultation throughout lung fields, no wheezes, no rales, no rhonchi, and normal respiratory effort  Abdomen: soft, nontender  External Genitalia: normal general appearance  Vagina: normal mucosa without prolapse or lesions, normal without tenderness, induration or masses, and rectocele present, grade iii, with associated vaginal vault prolapse    Assessment:     Active Problems:    * No active hospital problems. *     Rectocele with pelvic relaxation and rectocele    Plan:     Procedure(s) (LRB):  POSTERIOR REPAIR WITH POSSIBLE SACROSPINOUS LIGAMENT SUSPENSION (E R A S) (N/A)  Discussed the risks of surgery including the risks of bleeding, infection, deep vein thrombosis, and surgical injuries to internal organs including but not limited to the bowels, bladder, rectum, and female reproductive organs. The patient understands the risks; any and all questions were answered to the patient's satisfaction.

## 2022-09-28 NOTE — ANESTHESIA POSTPROCEDURE EVALUATION
Post-Anesthesia Evaluation and Assessment    Patient: Melba Cash MRN: [id number]  SSN: [id number]    YOB: 1960  Age: 63 y.o. Sex: female      I have evaluated the patient and they are stable and ready for discharge from the PACU. Cardiovascular Function/Vital Signs  Visit Vitals  BP (!) 155/86   Pulse 73   Temp 36.8 °C (98.3 °F)   Resp 14   Ht 5' 8\" (1.727 m)   Wt 98.7 kg (217 lb 9.5 oz)   SpO2 97%   BMI 33.09 kg/m²       Patient is status post General anesthesia for Procedure(s):  POSTERIOR REPAIR WITH POSSIBLE SACROSPINOUS LIGAMENT SUSPENSION (E R A S). Nausea/Vomiting: None    Postoperative hydration reviewed and adequate. Pain:  Pain Scale 1: Numeric (0 - 10) (09/28/22 1355)  Pain Intensity 1: 3 (09/28/22 1355)   Managed    Neurological Status:   Neuro (WDL): Within Defined Limits (09/28/22 1355)  Neuro  Neurologic State: Alert (09/28/22 1355)  Orientation Level: Oriented X4 (09/28/22 1355)  LUE Motor Response: Purposeful (09/28/22 1355)  LLE Motor Response: Purposeful (09/28/22 1355)  RUE Motor Response: Purposeful (09/28/22 1355)  RLE Motor Response: Purposeful (09/28/22 1355)   At baseline    Mental Status, Level of Consciousness: Alert and  oriented to person, place, and time    Pulmonary Status:   O2 Device: CO2 nasal cannula (09/28/22 1329)   Adequate oxygenation and airway patent    Complications related to anesthesia: None    Post-anesthesia assessment completed.  No concerns    Signed By: Priya Bautista MD     September 28, 2022

## 2022-09-28 NOTE — PERIOP NOTES
TRANSFER - OUT REPORT:    Verbal report given to [facility] RN on David Acharya  being transferred to [place] [address] (unit) for routine post - op       Report consisted of patients Situation, Background, Assessment and   Recommendations(SBAR). Time Pre op antibiotic given:1232  Anesthesia Stop time: 3900    Information from the following report(s) SBAR, Procedure Summary, Intake/Output, MAR, and Recent Results was reviewed with the receiving nurse. Opportunity for questions and clarification was provided. Is the patient on 02? YES       L/Min 2      Is the patient on a monitor? NO    Is the nurse transporting with the patient? NO    Surgical Waiting Area notified of patient's transfer from PACU? YES      The following personal items collected during your admission accompanied patient upon transfer:   Dental Appliance: Dental Appliances: None  Vision: Visual Aid: Glasses, At home  Hearing Aid:    Jewelry: Jewelry: None  Clothing: Clothing: At bedside (all belongings returned to pt in pacu)  Other Valuables:  Other Valuables: None  Valuables sent to safe:

## 2022-09-28 NOTE — BRIEF OP NOTE
Brief Postoperative Note    Patient: Joaquin Piña  YOB: 1960  MRN: [id number]    Date of Procedure: 9/28/2022     Pre-Op Diagnosis: RECTOCELE    Post-Op Diagnosis: Same as preoperative diagnosis. Procedure(s):  POSTERIOR REPAIR WITH POSSIBLE SACROSPINOUS LIGAMENT SUSPENSION (E R A S)    Surgeon(s):  Lila Kraft MD    Surgical Assistant: Surg Asst-1: Arcelia LOWE    Anesthesia: General     Estimated Blood Loss (mL): less than 50     Complications: None    Specimens: * No specimens in log *     Implants: * No implants in log *    Drains: * No LDAs found *    Findings: grade iii rectocele w posterior enterocele. Normal bladder and apex.  Some perineal atrophy    Electronically Signed by Diann Isabel MD on 9/28/2022 at 1:06 PM

## 2022-09-28 NOTE — ANESTHESIA PREPROCEDURE EVALUATION
Anesthetic History   No history of anesthetic complications            Review of Systems / Medical History  Patient summary reviewed, nursing notes reviewed and pertinent labs reviewed    Pulmonary        Sleep apnea    Asthma        Neuro/Psych         Psychiatric history     Cardiovascular    Hypertension          Hyperlipidemia    Exercise tolerance: >4 METS     GI/Hepatic/Renal     GERD    Renal disease: CRI  PUD    Comments: Blood in stool Endo/Other    Diabetes: type 2  Hypothyroidism  Obesity and arthritis     Other Findings              Physical Exam    Airway  Mallampati: II  TM Distance: 4 - 6 cm  Neck ROM: normal range of motion   Mouth opening: Normal     Cardiovascular    Rhythm: regular  Rate: normal         Dental    Dentition: Upper partial plate     Pulmonary  Breath sounds clear to auscultation               Abdominal  Abdominal exam normal       Other Findings            Anesthetic Plan    ASA: 3  Anesthesia type: general          Induction: Intravenous  Anesthetic plan and risks discussed with: Patient

## 2022-09-29 VITALS
TEMPERATURE: 98.5 F | OXYGEN SATURATION: 99 % | HEART RATE: 69 BPM | HEIGHT: 68 IN | WEIGHT: 217.59 LBS | RESPIRATION RATE: 17 BRPM | DIASTOLIC BLOOD PRESSURE: 77 MMHG | BODY MASS INDEX: 32.98 KG/M2 | SYSTOLIC BLOOD PRESSURE: 152 MMHG

## 2022-09-29 LAB
HCT VFR BLD AUTO: 35.6 % (ref 35–47)
HGB BLD-MCNC: 11.7 G/DL (ref 11.5–16)

## 2022-09-29 PROCEDURE — 85018 HEMOGLOBIN: CPT

## 2022-09-29 PROCEDURE — G0378 HOSPITAL OBSERVATION PER HR: HCPCS

## 2022-09-29 PROCEDURE — 36415 COLL VENOUS BLD VENIPUNCTURE: CPT

## 2022-09-29 PROCEDURE — 74011250637 HC RX REV CODE- 250/637: Performed by: OBSTETRICS & GYNECOLOGY

## 2022-09-29 RX ORDER — DOCUSATE SODIUM 100 MG/1
100 CAPSULE, LIQUID FILLED ORAL 2 TIMES DAILY
Qty: 60 CAPSULE | Refills: 2 | Status: SHIPPED | OUTPATIENT
Start: 2022-09-29 | End: 2022-12-28

## 2022-09-29 RX ORDER — HYDROMORPHONE HYDROCHLORIDE 2 MG/1
2 TABLET ORAL
Qty: 20 TABLET | Refills: 0 | Status: SHIPPED | OUTPATIENT
Start: 2022-09-29 | End: 2022-10-06

## 2022-09-29 RX ADMIN — DOCUSATE SODIUM 100 MG: 100 CAPSULE, LIQUID FILLED ORAL at 08:28

## 2022-09-29 RX ADMIN — ACETAMINOPHEN 650 MG: 325 TABLET, FILM COATED ORAL at 06:58

## 2022-09-29 RX ADMIN — IBUPROFEN 600 MG: 600 TABLET ORAL at 04:44

## 2022-09-29 RX ADMIN — HYDROMORPHONE HYDROCHLORIDE 2 MG: 2 TABLET ORAL at 12:43

## 2022-09-29 NOTE — PROGRESS NOTES
Gynecology Progress Note    Patient doing well post-op day 1 from Procedure(s):  POSTERIOR REPAIR WITH POSSIBLE SACROSPINOUS LIGAMENT SUSPENSION (E R A S) without significant complaints. Pain controlled on current medication. Voiding without difficulty. Patient is passing flatus. Vitals:  Blood pressure (!) 152/77, pulse 69, temperature 98.5 °F (36.9 °C), resp. rate 17, height 5' 8\" (1.727 m), weight 98.7 kg (217 lb 9.5 oz), SpO2 99 %. Temp (24hrs), Av.3 °F (36.8 °C), Min:97.6 °F (36.4 °C), Max:98.9 °F (37.2 °C)        Exam:  Patient without distress. Abdomen soft,  nontender. Incision dry and clean without erythema. Lower extremities are negative for swelling, cords, or tenderness. Lab/Data Review: All lab results for the last 24 hours reviewed. Assessment and Plan:  Patient appears to be having uncomplicated post Procedure(s):  POSTERIOR REPAIR WITH POSSIBLE SACROSPINOUS LIGAMENT SUSPENSION (E R A S) course. Continue routine post-op care. Reviewed instructions for discharge. Pt figuring out pain medication with her Pain mgmt doctor. Will possibly just use percocet which she already has just more frequently. Plan for stool softener.

## 2022-09-29 NOTE — PROGRESS NOTES
0800: Bedside and Verbal shift change report given to HERBERT (oncoming nurse) by Evin Buck (offgoing nurse). Report included the following information SBAR, Kardex, Intake/Output, MAR, Accordion, Recent Results, and Med Rec Status. 1252: I have reviewed discharge instructions with the patient. The patient verbalized understanding. Discharge medications reviewed with patient and appropriate educational materials and side effects teaching were provided.

## 2022-09-29 NOTE — PROGRESS NOTES
Medicare Outpatient Observation Notice (MOON) provided to patient/representative with verbal explanation of the notice. Time allotted for questions regarding the notice. Patient /representative provided a completed copy of the MOON notice. Copy placed on bedside chart.   Thomas Hernandez, Care Management Assistant

## 2022-09-29 NOTE — DISCHARGE SUMMARY
Discharge Summary     Name: Jareth Stover MRN: [id number]  SSN: [id number]    YOB: 1960  Age: 63 y.o. Sex: female      Allergies: Vicodin [hydrocodone-acetaminophen]    Admit Date: 9/28/2022    Discharge Date: 9/29/2022      Admitting Physician: Austyn Coles MD     * Admission Diagnoses: Rectocele    * Discharge Diagnoses:   Hospital Problems as of 9/29/2022 Date Reviewed: 8/2/2022            Codes Class Noted - Resolved POA    Status post surgery ICD-10-CM: Z98.890  ICD-9-CM: V45.89  9/28/2022 - Present Unknown            * Procedures: Posterior Colporrhaphy    * Discharge Condition: [facility] Course: Normal hospital course for this procedure. Significant Diagnostic Studies:   Recent Results (from the past 24 hour(s))   GLUCOSE, POC    Collection Time: 09/28/22 11:16 AM   Result Value Ref Range    Glucose (POC) 185 (H) 65 - 117 mg/dL    Performed by Jennifer Walton    GLUCOSE, POC    Collection Time: 09/28/22  1:50 PM   Result Value Ref Range    Glucose (POC) 139 (H) 65 - 117 mg/dL    Performed by Ripu Derald Shone    GLUCOSE, POC    Collection Time: 09/28/22  2:08 PM   Result Value Ref Range    Glucose (POC) 149 (H) 65 - 117 mg/dL    Performed by Janes Ann    HGB & HCT    Collection Time: 09/29/22  5:08 AM   Result Value Ref Range    HGB 11.7 11.5 - 16.0 g/dL    HCT 35.6 35.0 - 47.0 %       * Disposition: Home    Discharge Medications:   Current Discharge Medication List        START taking these medications    Details   docusate sodium (COLACE) 100 mg capsule Take 1 Capsule by mouth two (2) times a day for 90 days. Qty: 60 Capsule, Refills: 2  Start date: 9/29/2022, End date: 12/28/2022      HYDROmorphone (DILAUDID) 2 mg tablet Take 1 Tablet by mouth every four (4) hours as needed for Pain (breakthrough) for up to 7 days.  Max Daily Amount: 12 mg.  Qty: 20 Tablet, Refills: 0  Start date: 9/29/2022, End date: 10/6/2022    Associated Diagnoses: Status post surgery           CONTINUE these medications which have NOT CHANGED    Details   famotidine (PEPCID) 20 mg tablet TAKE 1 TABLET BY MOUTH DAILY  Qty: 90 Tablet, Refills: 1    Associated Diagnoses: Gastroesophageal reflux disease without esophagitis      simvastatin (ZOCOR) 20 mg tablet TAKE 1 TABLET BY MOUTH EVERY NIGHT  Qty: 90 Tablet, Refills: 1    Associated Diagnoses: Mixed hyperlipidemia      ibuprofen (MOTRIN) 800 mg tablet TAKE 1 TABLET BY MOUTH EVERY 8 HOURS AS NEEDED FOR PAIN  Qty: 90 Tablet, Refills: 1    Associated Diagnoses: Chronic bilateral low back pain without sciatica      montelukast (SINGULAIR) 10 mg tablet TAKE 1 TABLET BY MOUTH DAILY  Qty: 90 Tablet, Refills: 1      semaglutide (Ozempic) 1 mg/dose (4 mg/3 mL) pnij 1 mg by SubCUTAneous route every seven (7) days. Qty: 4 Each, Refills: 5    Associated Diagnoses: Uncontrolled type 2 diabetes mellitus with hyperglycemia (HCC)      empagliflozin (Jardiance) 10 mg tablet Take 1 Tablet by mouth in the morning. Qty: 90 Tablet, Refills: 1    Associated Diagnoses: Uncontrolled type 2 diabetes mellitus with hyperglycemia (HCC)      losartan (COZAAR) 25 mg tablet Take 1 Tablet by mouth in the morning. Qty: 90 Tablet, Refills: 1    Associated Diagnoses: Essential hypertension      levocetirizine (XYZAL) 5 mg tablet TAKE 1 TABLET BY MOUTH EVERY DAY  Qty: 90 Tablet, Refills: 0    Comments: **Patient requests 90 days supply**  Associated Diagnoses: Allergic rhinitis, unspecified seasonality, unspecified trigger      amLODIPine (NORVASC) 10 mg tablet Take 1 Tablet by mouth daily. Qty: 90 Tablet, Refills: 1    Associated Diagnoses: Essential hypertension      triamcinolone acetonide (KENALOG) 0.025 % topical cream Apply  to affected area as needed for Skin Irritation. use thin layer      estradioL (ESTRACE) 0.01 % (0.1 mg/gram) vaginal cream Insert 2 g into vagina daily. flash glucose sensor (FreeStyle Yogesh 2 Sensor) kit Scan sensor 4x per day to check blood sugar.   Replace sensor every 14 days. Qty: 2 Kit, Refills: 11    Associated Diagnoses: Uncontrolled type 2 diabetes mellitus with hyperglycemia (HCC)      ALPRAZolam (XANAX) 0.25 mg tablet Take 1 Tablet by mouth nightly as needed for Anxiety. Max Daily Amount: 0.25 mg. Indications: anxious  Qty: 12 Tablet, Refills: 0    Associated Diagnoses: Anxiety      oxyCODONE-acetaminophen (PERCOCET) 5-325 mg per tablet Take 1 Tablet by mouth every six (6) hours as needed. nystatin (MYCOSTATIN) topical cream Apply  to affected area two (2) times a day. Qty: 60 g, Refills: 0      fluticasone propionate (FLONASE) 50 mcg/actuation nasal spray 2 Sprays by Both Nostrils route daily. Qty: 3 Bottle, Refills: 1    Associated Diagnoses: Allergic rhinitis due to pollen, unspecified seasonality      albuterol (PROVENTIL HFA, VENTOLIN HFA, PROAIR HFA) 90 mcg/actuation inhaler Take 2 Puffs by inhalation every six (6) hours as needed for Wheezing. Qty: 1 Inhaler, Refills: 3    Associated Diagnoses: Mild intermittent asthma without complication      Nebulizer & Compressor machine Nebulizer machine x 1 Nebulizer Kit  Qty: 1 Each, Refills: 0    Associated Diagnoses: Asthma      albuterol (PROVENTIL VENTOLIN) 2.5 mg /3 mL (0.083 %) nebu 3 mL by Nebulization route every four (4) hours as needed for Wheezing. Qty: 25 Nebule, Refills: 1    Associated Diagnoses: Mild intermittent asthma without complication      Nebulizer Accessories kit Use kit with Nebulizer every 4 hours as needed  Wash out kit after each use and air dry  Change kit every month when in use. Qty: 1 Kit, Refills: 5      glucose blood VI test strips (ASCENSIA AUTODISC VI, ONE TOUCH ULTRA TEST VI) strip Check glucose twice daily  Qty: 100 Strip, Refills: 11    Associated Diagnoses: Controlled type 2 diabetes mellitus without complication, with long-term current use of insulin (HCC)              * Follow-up Care/Patient Instructions:   Activity: No sex, douching, or tampons for 6 weeks or as directed by your physician. No heavy lifting for 6 weeks. No driving while taking pain medication. Diet: Resume pre-hospital diet  Wound Care: Keep wound clean and dry    Follow-up Information       Follow up With Specialties Details Why Contact Info    Lily Ferrara MD Family Medicine   [address] [facility]  [facility] [phone]                    .

## 2022-09-29 NOTE — DISCHARGE INSTRUCTIONS
Pelvic Prolapse: What to Expect at [address] had pelvic prolapse surgery. This surgery put your organ back in place. It also adds support to your muscles. You can expect to feel better and stronger each day. But you may get tired quickly and need pain medicine for a week or two. You may need about 4 to 6 weeks to fully recover from open surgery and 1 to 2 weeks to recover from laparoscopic surgery or vaginal surgery. It is important to avoid heavy lifting while you are recovering, so that your incision can heal.  This care sheet gives you a general idea about how long it will take for you to recover. But each person recovers at a different pace. Follow the steps below to get better as quickly as possible. How can you care for yourself at home? Activity    Rest when you feel tired. Getting enough sleep will help you recover. Try to walk each day. Start out by walking a little more than you did the day before. Bit by bit, increase the amount you walk. Walking boosts blood flow and helps prevent pneumonia and constipation. Avoid lifting anything that will make you strain--which includes lifting a child, a vacuum, or grocery bags--for about 1 week after laparoscopic surgery and 4 to 6 weeks after open surgery. Avoid strenuous activities, such as biking, jogging, weightlifting, and aerobic exercise, for 4 to 6 weeks after open surgery and 1 week after laparoscopic surgery. You may shower. Pat the incision dry when you are done. Do not take a bath for the first week after surgery or until your doctor tells you it is okay. You may have some light vaginal bleeding. Wear sanitary pads if needed. Do not douche or use tampons. Ask your doctor when you can drive again. You will probably need to take 2 to 4 weeks off work for open surgery and 1 week off for laparoscopic surgery or vaginal surgery. It depends on the type of work you do and how you feel.      Your doctor will tell you when you can have sex again. Diet    You can eat your normal diet. If your stomach is upset, try bland, low-fat foods like plain rice, broiled chicken, toast, and yogurt. Drink plenty of fluids (unless your doctor tells you not to). You may notice that your bowel movements are not regular right after your surgery. This is common. Try to avoid constipation and straining with bowel movements. You may want to take a fiber supplement every day. If you have not had a bowel movement after a couple of days, ask your doctor about taking a mild laxative. Medicines    Your doctor will tell you if and when you can restart your medicines. He or she will also give you instructions about taking any new medicines. If you take aspirin or some other blood thinner, ask your doctor if and when to start taking it again. Make sure that you understand exactly what your doctor wants you to do. Be safe with medicines. Take pain medicines exactly as directed. If the doctor gave you a prescription medicine for pain, take it as prescribed. If you are not taking a prescription pain medicine, ask your doctor if you can take an over-the-counter medicine. If you think your pain medicine is making you sick to your stomach: Take your medicine after meals (unless your doctor tells you not to). Ask your doctor for a different pain medicine. If your doctor prescribed antibiotics, take them as directed. Do not stop taking them just because you feel better. You need to take the full course of antibiotics. Incision care    If you have strips of tape on the cut (incision) the doctor made, leave the tape on for a week or until it falls off. Wash the area daily with warm, soapy water and pat it dry. Keep the area clean and dry. You may cover it with a gauze bandage if it weeps or rubs against clothing. Change the bandage every day.    Other instructions    If you go home with a urinary catheter, follow your doctor's instructions on catheter care. Wear loose, comfortable clothing and avoid anything that puts pressure on your belly, such as a girdle, for a few weeks. Your doctor may recommend pelvic floor (Kegel) exercises, which tighten and strengthen pelvic muscles, once you have completely healed. To do Kegel exercises:  Squeeze the same muscles you would use to stop your urine. Your belly and thighs should not move. Hold the squeeze for 3 seconds, and then relax for 3 seconds. Start with 3 seconds. Then add 1 second each week until you are able to squeeze for 10 seconds. Repeat the exercise 10 to 15 times for each session. Do three or more sessions each day. Follow-up care is a key part of your treatment and safety. Be sure to make and go to all appointments, and call your doctor if you are having problems. It's also a good idea to know your test results and keep a list of the medicines you take. When should you call for help? Call 911 anytime you think you may need emergency care. For example, call if:    You passed out (lost consciousness). You have chest pain, are short of breath, or cough up blood. Call your doctor now or seek immediate medical care if:    You have bright red vaginal bleeding that soaks one or more pads in an hour, or you have large clots. You are sick to your stomach or cannot drink fluids. You have pain that does not get better after you take pain medicine. You have loose stitches, or your incision comes open. Bright red blood has soaked through the bandage over your incision. You have vaginal discharge that has increased in amount or smells bad. You have signs of infection, such as: Increased pain, swelling, warmth, or redness. Red streaks leading from the incision. Pus draining from the incision. A fever. You cannot pass stools or gas.      You have signs of a blood clot in your leg (called deep vein thrombosis), such as:  Pain in your calf, back of knee, thigh, or groin. Redness and swelling in your leg. Watch closely for any changes in your health, and be sure to contact your doctor if you have any problems. Where can you learn more? Go to http://www.gray.com/  Enter Q395 in the search box to learn more about \"Pelvic Prolapse: What to Expect at Home. \"  Current as of: November 22, 2021               Content Version: 13.2  © 2006-2022 [employer]wise, [employer]. Care instructions adapted under license by [employer] (which disclaims liability or warranty for this information). If you have questions about a medical condition or this instruction, always ask your healthcare professional. [address] any warranty or liability for your use of this information.

## 2022-09-29 NOTE — PROGRESS NOTES
Bedside and Verbal shift change report given to Gina Lyle RN (oncoming nurse) by Katalina Guadarrama (offgoing nurse). Report included the following information SBAR, Kardex, Intake/Output, MAR, and Recent Results. 66[facility] with MD to clarify carrillo removal and vaginal packing orders. MD stated voiding trial did not need to be performed and both carrillo and vaginal packing can be removed at this time. 4574: Vaginal packing and carrillo removed.

## 2022-09-30 ENCOUNTER — PATIENT OUTREACH (OUTPATIENT)
Dept: CASE MANAGEMENT | Age: 62
End: 2022-09-30

## 2022-09-30 NOTE — PROGRESS NOTES
Care Transitions Initial Call    Call within 2 business days of discharge: Yes     Patient: Ana Cristina Garcia Patient : 1960 MRN: [id number]    Last Discharge [address]       Date Complaint Diagnosis Description Type Department Provider    22  Status post surgery Admission (Discharged) Lilly Pino MD            Was this an external facility discharge? No     Challenges to be reviewed by the provider   Additional needs identified to be addressed with provider: yes    Patient declined PCP follow up at this time, she states she will follow up with Dr Meliza Zimmer for this admission --her post op follow up is 10/19/22    Declined [facility] visit    No ACP on file         Method of communication with provider : chart routing    Discussed COVID-19 related testing which was not done at this time. Advance Care Planning:   Does patient have an Advance Directive: decision makers updated, not on file. Inpatient Readmission Risk score: No data recorded  Was this a readmission? no     Patients top risk factors for readmission:  none identified at time of call    Interventions to address risk factors: Scheduled appointment with PCP-see goals, Scheduled appointment with Specialist-see goals, Education of patient/family/caregiver/guardian to support self-management-see gaols, and Assistance in accessing community resources-[facility]    Care Transition Nurse (CTN) contacted the patient by telephone to perform post hospital discharge assessment. Verified name and  with patient as identifiers. Provided introduction to self, and explanation of the CTN role. CTN reviewed discharge instructions, medical action plan and red flags with patient who verbalized understanding. Were discharge instructions available to patient? yes.  Reviewed appropriate site of care based on symptoms and resources available to patient including: PCP and Specialist. Patient given an opportunity to ask questions and does not have any further questions or concerns at this time. The patient agrees to contact the PCP office for questions related to their healthcare. Medication reconciliation was performed with patient, who verbalizes understanding of administration of home medications. Advised obtaining a 90-day supply of all daily and as-needed medications. Referral to Pharm D needed: no     Home Health/Outpatient orders at discharge: none    Durable Medical Equipment ordered at discharge: None    Discussed follow-up appointments. If no appointment was previously scheduled, appointment scheduling offered: yes. Is follow up appointment scheduled within 7 days of discharge? no.   [facility] follow up appointment(s):   Future Appointments   Date Time Provider [address]   11/7/2022  9:00 AM Giselle Meng PHARMD PAFP BS AMB   4/11/2023  9:00 AM [facility]     [facility] follow up appointment(s): Dr Jose Jordan 10/19/22 per patient    Plan for follow-up call in 5-7 days based on severity of symptoms and risk factors. Plan for next call: symptom management-post op complcation, ACP  CTN provided contact information for future needs. Goals Addressed                   This Visit's Progress     COMPLETED: Establish PCP relationships and regularly scheduled appointments. Prevent complications post hospitalization. 09/30/22  Patient declined follow up with PCP at this time, she states she will call Dr Emily Jeff office for concerns for this admission  Patient has follow up with Dr Piero Jordan on 10/19/22. Patient has MD contact number and was instructed to call any time if she has any complications  CTN reviewed written d/c instructions with patient, she verbalizes understanding, questions answered.   Patient reports no bleeding since packing removed while IP, no drainage reported  Patient denies fever, reports pain well controlled on percocet-will call MD with symptoms  Patient reports she is taking stool softeners BID, she has not had a BM except for small runny stool prior to d/c. Patient is able to eat and drink normally, is passing gas. Pt instructed to eat non greasy, non spicy foods for a few days to avoid NV, increase fiber, drink 6-8 glasses water a day. Call MD with any problems with constipation or diarrhea  Patient instructed to monitor for fever or signs of infection, call MD with concerns  Patient instructed to ambulate and perform kegels 3 times a day as instructed on d/c paper  [facility] information provided, alberto ghosh    CTN will follow up next week, patient will call if any needs. ---nick       COMPLETED: Reduce ED Utilization

## 2022-10-06 NOTE — OP NOTES
[address]   OPERATIVE REPORT    Name:  Bradford Cyr  MR#:  [id number]  :  1960  ACCOUNT #:  [de-identified]  DATE OF SERVICE:    PREOPERATIVE DIAGNOSES:  Rectocele, vaginal vault prolapse. POSTOPERATIVE DIAGNOSES:  Rectocele, vaginal vault prolapse. PROCEDURE PERFORMED:  Posterior repair with sacrospinous ligament suspension and posterior enterocele repair. SURGEON:  Morgan Arango MD    ASSISTANT:  None. ANESTHESIA:  General.    COMPLICATIONS:  none. SPECIMENS REMOVED:  None. IMPLANTS:  none. ESTIMATED BLOOD LOSS:  Less than 300, approximately 100. FLUIDS:  In 1 liter, out 200. DRAINS:  Glez to gravity. FINDINGS:  Grade III rectocele, grade II to grade III vaginal vault prolapse. Normal-appearing vaginal mucosa otherwise noted. PROCEDURE:  The patient had the informed consent performed. The patient was prepped and draped in the usual fashion. After performing adequate anesthesia, time-out, and appropriate use of SCIP protocol antibiotics, the decision was made to begin. Our attention was first turned to the vaginal mucosa. A triangular incision was made in the perineal body. The skin was removed. The vaginal mucosa was opened up in the midline using a combination of blunt and sharp dissection to reach the apex of the vagina. We then dissected laterally until the donvilliers fascia could be seen. The ischial spine could be palpated. The Denonvilliers fascia could be seen. The sacrospinous ligament could be identified. Using a Capio needle , a suture of 2-0 PDS was passed once we were medial cephalad and posterior to the ischial spine through the sacrospinous ligament and secured to the posterior edge of the vaginal cuff. A second suture with 2-0 Prolene was passed in the same place. On the contralateral side, a single suture of 2-0 PDS was passed. Posterior enterocele was identified, reduced, and closed with a modified pursestring. The Denonvilliers fascia was then plicated in the midline beneath the apex of the vagina working our way down the introitus using horizontal plicating sutures of 0 Vicryl. A second imbricating layer using 2-0 PDS was used to bolster the support in a running fashion. At that point, the vaginal mucosa was trimmed. The vaginal mucosa was closed with 2-0 Vicryl in a running locked stitch. The three sacrospinal sutures were tightened using modified pulling technique. The pursestring around the enterocele was once again used to reduce the enterocele and suture it down. The remainder of the vaginal mucosa was closed with 2-0 Vicryl running-locking stitch. The perineal body was rebuilt using modified crown stitch x4 of 0 Vicryl. The superficial perineum was rebuilt using a modified baseball stitch of 2-0 Vicryl and a subcuticular stitch of 2-0 Vicryl was used to close. A vaginal pack was inserted and Glez catheter remained in place. The patient was awakened and taken to the recovery room in stable condition. All counts were correct.       Amy Gaspar MD      [id number]  D:  10/05/2022 13:23  T:  10/06/2022 3:54  JOB #:  [id number]  CC:  Regina Alvarez MD       Primary Care Physician

## 2022-10-13 ENCOUNTER — TRANSCRIBE ORDER (OUTPATIENT)
Dept: SCHEDULING | Age: 62
End: 2022-10-13

## 2022-10-13 DIAGNOSIS — Z12.31 SCREENING MAMMOGRAM FOR HIGH-RISK PATIENT: Primary | ICD-10-CM

## 2022-10-31 DIAGNOSIS — I10 ESSENTIAL HYPERTENSION: ICD-10-CM

## 2022-10-31 DIAGNOSIS — J30.9 ALLERGIC RHINITIS, UNSPECIFIED SEASONALITY, UNSPECIFIED TRIGGER: ICD-10-CM

## 2022-11-03 RX ORDER — AMLODIPINE BESYLATE 10 MG/1
10 TABLET ORAL DAILY
Qty: 90 TABLET | Refills: 1 | Status: SHIPPED | OUTPATIENT
Start: 2022-11-03 | End: 2022-11-28

## 2022-11-03 RX ORDER — LEVOCETIRIZINE DIHYDROCHLORIDE 5 MG/1
TABLET, FILM COATED ORAL
Qty: 90 TABLET | Refills: 0 | Status: SHIPPED | OUTPATIENT
Start: 2022-11-03

## 2022-11-03 NOTE — PROGRESS NOTES
Follow up as needed    Low fiber diet VIRTUAL VISIT    Patient seen via virtual visit today for the following:  Chief Complaint   Patient presents with    Cough     x 2 weeks    Chest Congestion    Nasal Congestion     HISTORY OF PRESENT ILLNESS   HPI  2 week h/o nasal congestion, frontal/ethmoid/maxillary sinus pressure, heavy PND, thick mucous, chest congestion, hacking wet cough productive of thick pale yellow sputum. She has had some mild intermittent wheezing and tightness. No chest pain, SOB, fevers, chills, sweats. She reports h/o seasonal allergies and  asthma and TRAVIS. She is complying w/ her regimen of Xyzal and Flonase and has been using her CPAP routinely. The past week she has been needing to use her Albuterol HFA 2-3 x a day. She has used her Alb Neb machine about 4 times over the course of the past 2 weeks. She is taking Mucinex and OTC generic sinus/allergy tabs w/o relief. No travel or known Covid exposures. She had the first vaccine about a week ago. Former smoker, quit ~ 5 yrs ago. REVIEW OF SYMPTOMS   Review of Systems   Constitutional: Negative for fever. HENT: Positive for congestion and sinus pain. Negative for ear pain and sore throat. Respiratory: Positive for cough and sputum production. Negative for hemoptysis and shortness of breath. Cardiovascular: Negative for chest pain. Gastrointestinal: Negative.             PROBLEM LIST/MEDICAL HISTORY     Problem List  Date Reviewed: 3/26/2021          Codes Class Noted    Type 2 diabetes with nephropathy ([facility] [address] ICD-10-CM: E11.21  ICD-9-CM: 250.40, 583.81  2/7/2020        Severe obesity ([facility] [address] ICD-10-CM: E66.01  ICD-9-CM: 278.01  8/28/2019        Controlled type 2 diabetes mellitus without complication, with long-term current use of insulin ([facility] [address] ICD-10-CM: E11.9, Z79.4  ICD-9-CM: 250.00, V58.67  9/19/2018        Hyperlipidemia with target LDL less than 100 ICD-10-CM: E78.5  ICD-9-CM: 272.4  10/7/2015        Obesity, Class I, BMI 30-34.9 ICD-10-CM: E66.9  ICD-9-CM: 278.00 5/14/2015        Asthma ICD-10-CM: J45.909  ICD-9-CM: 493.90  3/18/2013        Vitamin D deficiency ICD-10-CM: E55.9  ICD-9-CM: 268.9  9/21/2012        Lumbar herniated disc ICD-10-CM: M51.26  ICD-9-CM: 722.10  6/5/2012        Back pain ICD-10-CM: M54.9  ICD-9-CM: 724.5  6/5/2012        Anxiety ICD-10-CM: F41.9  ICD-9-CM: 300.00  6/5/2012        HTN (hypertension) ICD-10-CM: I10  ICD-9-CM: 401.9  6/5/2012        Depression ICD-10-CM: F32.9  ICD-9-CM: 171  6/5/2012                  PAST SURGICAL HISTORY     Past Surgical History:   Procedure Laterality Date    COLONOSCOPY N/A 5/31/2017    COLONOSCOPY performed by Jeyson East MD at [facility] ENDOSCOPY    HX GYN      tubal ligation, hysterectomy    HX HYSTERECTOMY      HX ORTHOPAEDIC      left hand ligament removed    HX OTHER SURGICAL      corticosteroid injections - back    HX OTHER SURGICAL  08/06/2019    Left lower parathyroidectomy with intraoperative PTH monitoring. -[facility]-Dr. Red Jackson    HX TONSILLECTOMY           MEDICATIONS     Current Outpatient Medications   Medication Sig    albuterol (PROVENTIL HFA, VENTOLIN HFA, PROAIR HFA) 90 mcg/actuation inhaler INHALE TWO PUFFS BY MOUTH EVERY 6 HOURS AS NEEDED FOR WHEEZING     albuterol (PROVENTIL HFA, VENTOLIN HFA, PROAIR HFA) 90 mcg/actuation inhaler Take 2 Puffs by inhalation every six (6) hours as needed for Wheezing.  albuterol (PROVENTIL VENTOLIN) 2.5 mg /3 mL (0.083 %) nebu 3 mL by Nebulization route every four (4) hours as needed for Wheezing.  albuterol (PROVENTIL VENTOLIN) 2.5 mg /3 mL (0.083 %) nebu 3 mL by Nebulization route every four (4) hours as needed for Wheezing.  albuterol (PROVENTIL VENTOLIN) 2.5 mg /3 mL (0.083 %) nebu 3 mL by Nebulization route every four (4) hours as needed for Wheezing.     simvastatin (ZOCOR) 20 mg tablet TAKE 1 TABLET BY MOUTH EVERY NIGHT    losartan (COZAAR) 100 mg tablet TAKE 1 TABLET BY MOUTH DAILY    Ozempic 0.25 mg or 0.5 mg(2 mg/1.5 mL) sub-q pen INJECT 0.25MG UNDERNEATH THE SKIN EVERY 7 DAYS    metFORMIN ER (GLUCOPHAGE XR) 500 mg tablet TAKE 1 TABLET BY MOUTH TWICE DAILY    levocetirizine (XYZAL) 5 mg tablet TAKE 1 TABLET BY MOUTH ONCE DAILY    fluticasone propionate (FLONASE) 50 mcg/actuation nasal spray 2 Sprays by Both Nostrils route daily.  Nebulizer & Compressor machine Nebulizer machine x 1 Nebulizer Kit    Nebulizer Accessories kit Use kit with Nebulizer every 4 hours as needed  Wash out kit after each use and air dry  Change kit every month when in use.  ibuprofen (MOTRIN) 800 mg tablet Take 1 Tab by mouth every eight (8) hours as needed for Pain.  famotidine (PEPCID) 20 mg tablet Take 1 Tab by mouth daily. Pain Management is prescribing the medication    ALPRAZolam (XANAX) 0.25 mg tablet Take 1 Tab by mouth nightly as needed for Anxiety. Max Daily Amount: 0.25 mg.    glucose blood VI test strips (ASCENSIA AUTODISC VI, ONE TOUCH ULTRA TEST VI) strip Check glucose twice daily     No current facility-administered medications for this visit. ALLERGIES     Allergies   Allergen Reactions    Vicodin [Hydrocodone-Acetaminophen] Hives          SOCIAL HISTORY     Social History     Tobacco Use    Smoking status: Former Smoker     Packs/day: 1.00     Years: 30.00     Pack years: 30.00     Types: Cigarettes     Quit date: 10/30/2012     Years since quittin.4    Smokeless tobacco: Never Used   Substance Use Topics    Alcohol use:  Yes     Alcohol/week: 10.0 standard drinks     Types: 10 Glasses of wine per week     Comment: 1-2 drinks/night     Social History     Social History Narrative    Not on file     Social History     Substance and Sexual Activity   Sexual Activity Yes    Partners: Male       IMMUNIZATIONS     Immunization History   Administered Date(s) Administered    COVID-19, PFIZER, MRNA, LNP-S, PF, 30MCG/0.3ML DOSE 2021    Tdap 2015         FAMILY HISTORY     Family History   Problem Relation Age of Onset    Hypertension Mother     Diabetes Father     Hypertension Sister     No Known Problems Brother     Lung Disease Sister         copd    Anesth Problems Neg Hx          VITALS   There were no vitals taken for this virtual visit. PHYSICAL EXAMINATION   Physical Exam  Constitutional:       General: She is not in acute distress. Appearance: She is not ill-appearing or toxic-appearing. Pulmonary:      Effort: Pulmonary effort is normal. No respiratory distress. Neurological:      Mental Status: She is alert. LABORATORY DATA/ANCILLARY/IMAGING     Results for orders placed or performed in visit on 03/08/21   AMB POC HEMOGLOBIN A1C   Result Value Ref Range    Hemoglobin A1c (POC) 7.8 %            ASSESSMENT & PLAN       ICD-10-CM ICD-9-CM    1. Sinobronchitis  J32.9 473.9 azithromycin (ZITHROMAX) 250 mg tablet    J40 490 predniSONE (DELTASONE) 20 mg tablet      benzonatate (TESSALON) 200 mg capsule   2. Acute bronchitis with asthma with acute exacerbation  J20.9 466.0 predniSONE (DELTASONE) 20 mg tablet    J45.901 493.92      Zpack as directed  Prednisone 40 mg daily x 5 days  Tessalon Perles 200 mg tid prn  Albuterol Neb treatments q4-6 hr prn  Reviewed medications and possible side effects  Patient counseled about diagnosis, assessment, management options, current recommended treatment plan, expectant course, worsening signs & symptoms w/ instructions for appropriate follow up. Follow up if not improving. Call back sooner for worsening symptoms or failure to improve w/in expectant course. ER evaluation for any severe/new/concerning symptoms as discussed in detail in office today. Patient expresses understanding and agreement with plan of care.    ---------------------------------------------------------------------------------------------------------------  Patient is being evaluated by a virtual/ video visit encounter to address concerns as noted above.    Patient identification was verified at the start of the visit: YES  A caregiver was present when appropriate. Due to this being a TeleHealth encounter (During AMQZA-19 public health emergency), evaluation of the following organ systems was limited: Vitals/Constitutional/EENT/Resp/CV/GI//MS/Neuro/Skin/Heme-Lymph-Imm. Pursuant to the emergency declaration under the [address] [place] [address] authority and the [employer] Act, this Virtual  Visit was conducted, with patient's (and/or legal guardian's) consent, to reduce the patient's risk of exposure to COVID-19 and provide necessary medical care. Services were provided through a video synchronous discussion virtually to substitute for in-person clinic visit. The patient (and/or legal guardian) has also been advised to contact this office for worsening conditions or problems, and seek emergency medical treatment and/or call 911 if deemed necessary. Patient was located at their own home. I was at home while conducting this encounter. Consent:  She and/or her healthcare decision maker is aware that this patient-initiated Telehealth encounter is a billable service, with coverage as determined by her insurance carrier. She is aware that she may receive a bill and has provided verbal consent to proceed: Yes  This virtual visit was conducted via [email or web address] me. Pursuant to the emergency declaration under the [address] [place] [address], [address] [place] [address] authority and the [employer] Act, this Virtual  Visit was conducted to reduce the patient's risk of exposure to COVID-19 and provide continuity of care for an established patient. Services were provided through a video synchronous discussion virtually to substitute for in-person clinic visit.   Due to this being a TeleHealth evaluation, many elements of the physical examination are unable to be assessed. Total Time: minutes: 20 minutes. An electronic signature was used to authenticate this note.   ~Lily Armenta MD~

## 2022-11-03 NOTE — TELEPHONE ENCOUNTER
Spoke with pt on 11/3/22 informed the pt that her medication was approved and sent to her pharmacy.   -FLYNN 11/3/22

## 2022-11-07 ENCOUNTER — VIRTUAL VISIT (OUTPATIENT)
Dept: FAMILY MEDICINE CLINIC | Age: 62
End: 2022-11-07

## 2022-11-07 DIAGNOSIS — E11.21 TYPE 2 DIABETES WITH NEPHROPATHY (HCC): Primary | ICD-10-CM

## 2022-11-07 NOTE — PROGRESS NOTES
Pharmacy Progress Note - Diabetes Management    S/O: Ms. Antoine Kimbrough is a 63 y.o. female, referred by Dr. Dario Olivares MD, with a PMH of HTN, T2DM, asthma, CKD, anxiety, depression, vit D, obesity, HLD, and rectocele was seen today for diabetes management. Patient's last A1c was 7.3% (Sept 2022) which is improved from 9.8% (Aug 2022) which is increased from 7.9% (Jan 2022). Visit was completed using real time audio visual technology, [email or web address]. Interim update: Pt was last seen by this writer on 9/13/22 for f/up on DM management. Have been focusing on nonpharm interventions. Med changes were not completed as pt had been experiencing severe gas and some nausea. On 9/28/22, pt had surgery for rectocele. Surgery was uncomplicated. Pt was discharged the next day with Hydrocodone PRN for pain management. Pt logs into virtual visit and states states that since her surgery, her gas is improving in smell and frequency. She cancelled her GI consult. She also states that the smell of her burps still smells terrible. She was encouraged to reschedule the GI appt. Pt reports working very hard on her nonpharm interventions - food choices. Her current weight is 214 lbs (down from 236 lbs). She knows that she likes starchy foods so she has found some lower carb options that she can have. CGM has significantly helped her food choices.        Current anti-hyperglycemic regimen includes:    - Ozempic 1 mg weekly  - Jardiance 10 mg daily    ROS:  Today, Pt endorses:  - Symptoms of Hyperglycemia: none  - Symptoms of Hypoglycemia: none    Self Monitoring Blood Glucose (SMBG) or CGM:  - Brought in home glucometer/blood glucose log/CGM reader today:  yes        Nutrition:  Eating less rice  Eating low carb bread  Reading labels  Has sweet tooth at night - gets sugar free candy  Will have tea instead of coffee and creamer    Physical Activity:   no  - Recently raked the leaves in her yard, but she does not routinely adhere to additional physical activity. She understands that her BG rdgs could improve with increased physical activity. Vitals: Wt Readings from Last 3 Encounters:   09/28/22 217 lb 9.5 oz (98.7 kg)   09/21/22 221 lb 5.5 oz (100.4 kg)   08/13/22 216 lb 0.8 oz (98 kg)     BP Readings from Last 3 Encounters:   09/29/22 (!) 152/77   09/21/22 125/78   08/13/22 (!) 140/79     Pulse Readings from Last 3 Encounters:   09/29/22 69   09/21/22 75   08/13/22 84       Past Medical History:   Diagnosis Date    Adverse effect of anesthesia     slow to wake up at dentist office    Anxiety     Arthritis     Asthma     Chronic kidney disease     STAGE 3A    Chronic pain     3 herniated discs in lower back    Depression     Diabetes ([address]     type 2    GERD (gastroesophageal reflux disease)     High cholesterol     Hypertension     MRSA infection 2008    left leg treated with antibiotics at [facility], NEGATIVE RESULT IN 2019 IN LAB REPORT    Other ill-defined conditions(799.89)     herniated disc to back    PUD (peptic ulcer disease)     Seasonal allergies     Sleep apnea     kerwin USES CPAP    Thyroid disease     PARTIAL REMOVAL     Allergies   Allergen Reactions    Vicodin [Hydrocodone-Acetaminophen] Hives       Current Outpatient Medications   Medication Sig    levocetirizine (XYZAL) 5 mg tablet TAKE 1 TABLET BY MOUTH EVERY DAY    amLODIPine (NORVASC) 10 mg tablet TAKE 1 TABLET BY MOUTH DAILY    docusate sodium (COLACE) 100 mg capsule Take 1 Capsule by mouth two (2) times a day for 90 days. triamcinolone acetonide (KENALOG) 0.025 % topical cream Apply  to affected area as needed for Skin Irritation. use thin layer    estradioL (ESTRACE) 0.01 % (0.1 mg/gram) vaginal cream Insert 2 g into vagina daily.     famotidine (PEPCID) 20 mg tablet TAKE 1 TABLET BY MOUTH DAILY    simvastatin (ZOCOR) 20 mg tablet TAKE 1 TABLET BY MOUTH EVERY NIGHT (Patient taking differently: Take 20 mg by mouth daily.)    ibuprofen (MOTRIN) 800 mg tablet TAKE 1 TABLET BY MOUTH EVERY 8 HOURS AS NEEDED FOR PAIN    montelukast (SINGULAIR) 10 mg tablet TAKE 1 TABLET BY MOUTH DAILY    flash glucose sensor (FreeStyle Yogesh 2 Sensor) kit Scan sensor 4x per day to check blood sugar. Replace sensor every 14 days. semaglutide (Ozempic) 1 mg/dose (4 mg/3 mL) pnij 1 mg by SubCUTAneous route every seven (7) days. empagliflozin (Jardiance) 10 mg tablet Take 1 Tablet by mouth in the morning. ALPRAZolam (XANAX) 0.25 mg tablet Take 1 Tablet by mouth nightly as needed for Anxiety. Max Daily Amount: 0.25 mg. Indications: anxious    losartan (COZAAR) 25 mg tablet Take 1 Tablet by mouth in the morning. oxyCODONE-acetaminophen (PERCOCET) 5-325 mg per tablet Take 1 Tablet by mouth every six (6) hours as needed. nystatin (MYCOSTATIN) topical cream Apply  to affected area two (2) times a day. fluticasone propionate (FLONASE) 50 mcg/actuation nasal spray 2 Sprays by Both Nostrils route daily. albuterol (PROVENTIL HFA, VENTOLIN HFA, PROAIR HFA) 90 mcg/actuation inhaler Take 2 Puffs by inhalation every six (6) hours as needed for Wheezing. Nebulizer & Compressor machine Nebulizer machine x 1 Nebulizer Kit    albuterol (PROVENTIL VENTOLIN) 2.5 mg /3 mL (0.083 %) nebu 3 mL by Nebulization route every four (4) hours as needed for Wheezing. Nebulizer Accessories kit Use kit with Nebulizer every 4 hours as needed  Wash out kit after each use and air dry  Change kit every month when in use. glucose blood VI test strips (ASCENSIA AUTODISC VI, ONE TOUCH ULTRA TEST VI) strip Check glucose twice daily     No current facility-administered medications for this visit.      Lab Results   Component Value Date/Time    Sodium 136 09/21/2022 01:56 PM    Potassium 4.0 09/21/2022 01:56 PM    Chloride 104 09/21/2022 01:56 PM    CO2 25 09/21/2022 01:56 PM    Anion gap 7 09/21/2022 01:56 PM    Glucose 239 (H) 09/21/2022 01:56 PM    BUN 21 (H) 09/21/2022 01:56 PM Creatinine 1.47 (H) 09/21/2022 01:56 PM    BUN/Creatinine ratio 14 09/21/2022 01:56 PM    GFR est AA 44 (L) 09/21/2022 01:56 PM    GFR est non-AA 36 (L) 09/21/2022 01:56 PM    Calcium 9.6 09/21/2022 01:56 PM    Bilirubin, total 0.3 08/13/2022 01:39 PM    Alk. phosphatase 96 08/13/2022 01:39 PM    Protein, total 7.9 08/13/2022 01:39 PM    Albumin 3.9 08/13/2022 01:39 PM    Globulin 4.0 08/13/2022 01:39 PM    A-G Ratio 1.0 (L) 08/13/2022 01:39 PM    ALT (SGPT) 33 08/13/2022 01:39 PM     Lab Results   Component Value Date/Time    Cholesterol, total 198 01/31/2022 12:39 PM    HDL Cholesterol 72 01/31/2022 12:39 PM    LDL,Direct 148 (H) 04/25/2016 12:13 PM    LDL, calculated 79.8 01/31/2022 12:39 PM    VLDL, calculated 46.2 01/31/2022 12:39 PM    Triglyceride 231 (H) 01/31/2022 12:39 PM    CHOL/HDL Ratio 2.8 01/31/2022 12:39 PM     Lab Results   Component Value Date/Time    WBC 7.3 08/13/2022 01:39 PM    WBC 9.4 06/05/2012 12:06 PM    HGB 11.7 09/29/2022 05:08 AM    HCT 35.6 09/29/2022 05:08 AM    PLATELET 449 80/47/6538 01:39 PM    MCV 91.8 08/13/2022 01:39 PM       Lab Results   Component Value Date/Time    Microalbumin/Creat ratio (mg/g creat) 1,424 (H) 01/31/2022 12:39 PM    Microalbumin,urine random 70.50 01/31/2022 12:39 PM    Microalbumin urine (POC) 150 09/29/2020 05:16 PM       Lab Results   Component Value Date/Time    Hemoglobin A1c 7.3 (H) 09/21/2022 01:56 PM    Hemoglobin A1c 7.0 (H) 08/06/2021 01:25 PM    Hemoglobin A1c 6.7 (H) 07/07/2020 10:45 AM     Hemoglobin A1c (POC)   Date Value Ref Range Status   01/31/2022 7.9 % Final        CrCl cannot be calculated (Unknown ideal weight. ). A/P:    1) T2DM: Per ADA guidelines, Pt's A1c is not at goal of < 7%. Current SMBG(s)/CGM trend has significantly improved since last visit with nonpharm interventions and tracking with CGM. Time in target improved from 43% to 73%. Average CGM rdg improved from 189 mg/dL to 164 mg/dL.   Pt has worked really hard on food choices. She continues to have gas but this has improved. Encouraged pt to reschedule GI consult. - Continue Ozempic 1 mg weekly  - Continue Jardiance 10 mg daily  - Pt set goal to walk 10 min 2-3x per week      Medication reconciliation was completed during the visit. There are no discontinued medications. No orders of the defined types were placed in this encounter. Patient verbalized understanding of the information presented and all of the patients questions were answered. AVS was handed to the patient. Patient advised to call the office with any additional questions or concerns. Notifications of recommendations will be sent to Dr. Ellen Rossi MD for review. Patient will return to clinic in 4 week(s) for follow up.      Deandra Bell, PharmD, BCGP, BCACP  Clinical Pharmacist Specialist          For Pharmacy Admin Tracking Only    CPA in place: Yes  Recommendation Provided To: Patient/Caregiver: 1 via Virtual Visit  Intervention Detail: Scheduled Appointment  Gap Closed?: Yes  Intervention Accepted By: Patient/Caregiver: 1  Time Spent (min): 60

## 2022-11-08 ENCOUNTER — TELEPHONE (OUTPATIENT)
Dept: FAMILY MEDICINE CLINIC | Age: 62
End: 2022-11-08

## 2022-11-08 DIAGNOSIS — E11.65 UNCONTROLLED TYPE 2 DIABETES MELLITUS WITH HYPERGLYCEMIA (HCC): ICD-10-CM

## 2022-11-08 RX ORDER — FLASH GLUCOSE SENSOR
KIT MISCELLANEOUS
Qty: 2 KIT | Refills: 11 | Status: SHIPPED | OUTPATIENT
Start: 2022-11-08

## 2022-11-08 NOTE — TELEPHONE ENCOUNTER
Pharmacy Progress Note     Pt LVM for this writer asking for CGM samples to be left at the  for her to . This writer called pt back in order to make sure she was aware that CGM sensors could not be provided long term. This writer also does not have any samples in stock. Discussed coverage of CGM sensors thru Medicare. Will provide order for FSL2 CGM sensor to her pharmacy. Medications Discontinued During This Encounter   Medication Reason    flash glucose sensor (FreeStyle Ygoesh 2 Sensor) kit REORDER     Orders Placed This Encounter    flash glucose sensor (FreeStyle Yogesh 2 Sensor) kit     Sig: Scan sensor 4x per day to check blood sugar. Replace sensor every 14 days.      Dispense:  2 Kit     Refill:  11         Giselle Zentgraf, PharmD, BCGP, BCACP  Clinical Pharmacist Specialist      For Pharmacy Admin Tracking Only    CPA in place: Yes  Recommendation Provided To: Patient/Caregiver: 1 via Telephone  Intervention Detail: Refill(s) Provided  Intervention Accepted By: Patient/Caregiver: 1  Time Spent (min): 10

## 2022-11-15 ENCOUNTER — TRANSCRIBE ORDER (OUTPATIENT)
Dept: SCHEDULING | Age: 62
End: 2022-11-15

## 2022-11-15 DIAGNOSIS — Z12.31 VISIT FOR SCREENING MAMMOGRAM: Primary | ICD-10-CM

## 2022-11-27 DIAGNOSIS — I10 ESSENTIAL HYPERTENSION: ICD-10-CM

## 2022-11-28 RX ORDER — AMLODIPINE BESYLATE 10 MG/1
10 TABLET ORAL DAILY
Qty: 90 TABLET | Refills: 1 | Status: SHIPPED | OUTPATIENT
Start: 2022-11-28

## 2022-12-05 ENCOUNTER — VIRTUAL VISIT (OUTPATIENT)
Dept: FAMILY MEDICINE CLINIC | Age: 62
End: 2022-12-05

## 2022-12-05 DIAGNOSIS — E11.65 UNCONTROLLED TYPE 2 DIABETES MELLITUS WITH HYPERGLYCEMIA (HCC): Primary | ICD-10-CM

## 2022-12-05 NOTE — PROGRESS NOTES
Pharmacy Progress Note - Diabetes Management    S/O: Ms. Maxwell Vera is a 63 y.o. female, referred by Dr. Lily Ferrara MD, with a PMH of HTN, T2DM, asthma, CKD, anxiety, depression, vit D, obesity, HLD, and rectocele was seen today for diabetes management. Patient's last A1c was 7.3% (Sept 2022) which is improved from 9.8% (Aug 2022) which is increased from 7.9% (Jan 2022). Visit was completed using real time audio visual technology, [email or web address]. Interim update: Pt was last seen by this writer on 11/7/22 for f/up on DM management. No med changes were made. Pt had been focusing on nonpharm interventions. She had lost wt with changes in diet. She was encouraged to keep appts with GI. Pt logs into virtual visit today and states that she rescheduled her GI appt and it is in January. She still has gas and acid like burps, but she states she continues to tolerate Ozempic overall. Still adherent to lower carb diet and casually increasing physical activity. Reports current weight is 215 lbs. Similar to previous visit's reported wt which was 214 lbs. Current anti-hyperglycemic regimen includes:    - Ozempic 1 mg weekly  - Jardiance 10 mg daily    ROS:  Today, Pt endorses:  - Symptoms of Hyperglycemia: none  - Symptoms of Hypoglycemia: none    Self Monitoring Blood Glucose (SMBG) or CGM:  - Brought in home glucometer/blood glucose log/CGM reader today:  yes  This is the data from the last few weeks of CGM. Pt no longer has CGM. Was using samples and cannot afford to pay for them out of pocket - not covered by insurance. Has not completed any fingersticks since stopping CGM.         Nutrition:  Bfast today - 1/2 mission wheat wrap, sausage  Lunch - mini bagel and chicken salad  Snack - another mini bagel and chicken salad or fruit or cheese  Dinner - lamb chops, salad, applesauce    Physical Activity:   yes  - Consists of parking further away, walking more in general day to day activities  - Does not do any additional physical activity    Vitals: Wt Readings from Last 3 Encounters:   09/28/22 217 lb 9.5 oz (98.7 kg)   09/21/22 221 lb 5.5 oz (100.4 kg)   08/13/22 216 lb 0.8 oz (98 kg)     BP Readings from Last 3 Encounters:   09/29/22 (!) 152/77   09/21/22 125/78   08/13/22 (!) 140/79     Pulse Readings from Last 3 Encounters:   09/29/22 69   09/21/22 75   08/13/22 84       Past Medical History:   Diagnosis Date    Adverse effect of anesthesia     slow to wake up at dentist office    Anxiety     Arthritis     Asthma     Chronic kidney disease     STAGE 3A    Chronic pain     3 herniated discs in lower back    Depression     Diabetes ([address]     type 2    GERD (gastroesophageal reflux disease)     High cholesterol     Hypertension     MRSA infection 2008    left leg treated with antibiotics at [facility], NEGATIVE RESULT IN 2019 IN LAB REPORT    Other ill-defined conditions(799.89)     herniated disc to back    PUD (peptic ulcer disease)     Seasonal allergies     Sleep apnea     kerwin USES CPAP    Thyroid disease     PARTIAL REMOVAL     Allergies   Allergen Reactions    Vicodin [Hydrocodone-Acetaminophen] Hives       Current Outpatient Medications   Medication Sig    amLODIPine (NORVASC) 10 mg tablet TAKE 1 TABLET BY MOUTH DAILY    flash glucose sensor (FreeStyle Yogesh 2 Sensor) kit Scan sensor 4x per day to check blood sugar. Replace sensor every 14 days. levocetirizine (XYZAL) 5 mg tablet TAKE 1 TABLET BY MOUTH EVERY DAY    docusate sodium (COLACE) 100 mg capsule Take 1 Capsule by mouth two (2) times a day for 90 days. triamcinolone acetonide (KENALOG) 0.025 % topical cream Apply  to affected area as needed for Skin Irritation. use thin layer    estradioL (ESTRACE) 0.01 % (0.1 mg/gram) vaginal cream Insert 2 g into vagina daily.     famotidine (PEPCID) 20 mg tablet TAKE 1 TABLET BY MOUTH DAILY    simvastatin (ZOCOR) 20 mg tablet TAKE 1 TABLET BY MOUTH EVERY NIGHT (Patient taking differently: Take 20 mg by mouth daily.)    ibuprofen (MOTRIN) 800 mg tablet TAKE 1 TABLET BY MOUTH EVERY 8 HOURS AS NEEDED FOR PAIN    montelukast (SINGULAIR) 10 mg tablet TAKE 1 TABLET BY MOUTH DAILY    semaglutide (Ozempic) 1 mg/dose (4 mg/3 mL) pnij 1 mg by SubCUTAneous route every seven (7) days. empagliflozin (Jardiance) 10 mg tablet Take 1 Tablet by mouth in the morning. ALPRAZolam (XANAX) 0.25 mg tablet Take 1 Tablet by mouth nightly as needed for Anxiety. Max Daily Amount: 0.25 mg. Indications: anxious    losartan (COZAAR) 25 mg tablet Take 1 Tablet by mouth in the morning. oxyCODONE-acetaminophen (PERCOCET) 5-325 mg per tablet Take 1 Tablet by mouth every six (6) hours as needed. nystatin (MYCOSTATIN) topical cream Apply  to affected area two (2) times a day. fluticasone propionate (FLONASE) 50 mcg/actuation nasal spray 2 Sprays by Both Nostrils route daily. albuterol (PROVENTIL HFA, VENTOLIN HFA, PROAIR HFA) 90 mcg/actuation inhaler Take 2 Puffs by inhalation every six (6) hours as needed for Wheezing. Nebulizer & Compressor machine Nebulizer machine x 1 Nebulizer Kit    albuterol (PROVENTIL VENTOLIN) 2.5 mg /3 mL (0.083 %) nebu 3 mL by Nebulization route every four (4) hours as needed for Wheezing. Nebulizer Accessories kit Use kit with Nebulizer every 4 hours as needed  Wash out kit after each use and air dry  Change kit every month when in use. glucose blood VI test strips (ASCENSIA AUTODISC VI, ONE TOUCH ULTRA TEST VI) strip Check glucose twice daily     No current facility-administered medications for this visit.      Lab Results   Component Value Date/Time    Sodium 136 09/21/2022 01:56 PM    Potassium 4.0 09/21/2022 01:56 PM    Chloride 104 09/21/2022 01:56 PM    CO2 25 09/21/2022 01:56 PM    Anion gap 7 09/21/2022 01:56 PM    Glucose 239 (H) 09/21/2022 01:56 PM    BUN 21 (H) 09/21/2022 01:56 PM    Creatinine 1.47 (H) 09/21/2022 01:56 PM    BUN/Creatinine ratio 14 09/21/2022 01:56 PM GFR est AA 44 (L) 09/21/2022 01:56 PM    GFR est non-AA 36 (L) 09/21/2022 01:56 PM    Calcium 9.6 09/21/2022 01:56 PM    Bilirubin, total 0.3 08/13/2022 01:39 PM    Alk. phosphatase 96 08/13/2022 01:39 PM    Protein, total 7.9 08/13/2022 01:39 PM    Albumin 3.9 08/13/2022 01:39 PM    Globulin 4.0 08/13/2022 01:39 PM    A-G Ratio 1.0 (L) 08/13/2022 01:39 PM    ALT (SGPT) 33 08/13/2022 01:39 PM     Lab Results   Component Value Date/Time    Cholesterol, total 198 01/31/2022 12:39 PM    HDL Cholesterol 72 01/31/2022 12:39 PM    LDL,Direct 148 (H) 04/25/2016 12:13 PM    LDL, calculated 79.8 01/31/2022 12:39 PM    VLDL, calculated 46.2 01/31/2022 12:39 PM    Triglyceride 231 (H) 01/31/2022 12:39 PM    CHOL/HDL Ratio 2.8 01/31/2022 12:39 PM     Lab Results   Component Value Date/Time    WBC 7.3 08/13/2022 01:39 PM    WBC 9.4 06/05/2012 12:06 PM    HGB 11.7 09/29/2022 05:08 AM    HCT 35.6 09/29/2022 05:08 AM    PLATELET 355 35/91/8091 01:39 PM    MCV 91.8 08/13/2022 01:39 PM       Lab Results   Component Value Date/Time    Microalbumin/Creat ratio (mg/g creat) 1,424 (H) 01/31/2022 12:39 PM    Microalbumin,urine random 70.50 01/31/2022 12:39 PM    Microalbumin urine (POC) 150 09/29/2020 05:16 PM       Lab Results   Component Value Date/Time    Hemoglobin A1c 7.3 (H) 09/21/2022 01:56 PM    Hemoglobin A1c 7.0 (H) 08/06/2021 01:25 PM    Hemoglobin A1c 6.7 (H) 07/07/2020 10:45 AM     Hemoglobin A1c (POC)   Date Value Ref Range Status   01/31/2022 7.9 % Final        CrCl cannot be calculated (Unknown ideal weight. ). A/P:    1) T2DM: Per ADA guidelines, Pt's A1c is not at goal of < 7%. Current SMBG(s)/CGM trend indicates nearly controlled BG rdgs; however, pt has not checked BG rdgs since CGM ended. She has maintained what she previously has done but does not have away to evaluate her actions. Pt is due for f/up with PCP and A1c. If her A1c is better controlled, will continue current regimen.   If it continues to be elevated, will consider increasing SGLT2 inhibitor dose. - Continue Ozempic 1 mg weekly  - Continue Jardiance 10 mg daily  - Scheduled PCP f/up      Medication reconciliation was completed during the visit. There are no discontinued medications. No orders of the defined types were placed in this encounter. Patient verbalized understanding of the information presented and all of the patients questions were answered. AVS was handed to the patient. Patient advised to call the office with any additional questions or concerns. Notifications of recommendations will be sent to Dr. Oumar Lawrence MD for review. Patient will return to clinic in 12 week(s) for follow up.      Xander Funes, PharmD, BCGP, BCACP  Clinical Pharmacist Specialist          For Pharmacy Admin Tracking Only    CPA in place: Yes  Recommendation Provided To: Patient/Caregiver: 2 via Virtual Visit  Intervention Detail: Scheduled Appointment  Intervention Accepted By: Patient/Caregiver: 2  Time Spent (min): 30

## 2022-12-06 DIAGNOSIS — J30.1 ALLERGIC RHINITIS DUE TO POLLEN, UNSPECIFIED SEASONALITY: ICD-10-CM

## 2022-12-06 NOTE — TELEPHONE ENCOUNTER
Patient mychart request for refill flonase. Thanks, Norma Garcia    Last Visit: 22 MD Gamboa  Next Appointment: 23 MD Gamboa  Previous Refill Encounter(s): 21 3 + 1    Requested Prescriptions     Pending Prescriptions Disp Refills    fluticasone propionate (FLONASE) 50 mcg/actuation nasal spray 3 Each 1     Si Sprays by Both Nostrils route daily. For [facility] in place:   Recommendation Provided To:    Intervention Detail: New Rx: 1, reason: Patient Preference  Gap Closed?:   Intervention Accepted By:   Time Spent (min): 5

## 2022-12-07 RX ORDER — FLUTICASONE PROPIONATE 50 MCG
2 SPRAY, SUSPENSION (ML) NASAL DAILY
Qty: 3 EACH | Refills: 1 | Status: SHIPPED | OUTPATIENT
Start: 2022-12-07

## 2023-01-13 ENCOUNTER — TRANSCRIBE ORDER (OUTPATIENT)
Dept: SCHEDULING | Age: 63
End: 2023-01-13

## 2023-01-13 DIAGNOSIS — K59.00 CONSTIPATION, UNSPECIFIED: ICD-10-CM

## 2023-01-13 DIAGNOSIS — D36.9 TUBULOVILLOUS ADENOMA: ICD-10-CM

## 2023-01-13 DIAGNOSIS — Z87.11 HISTORY OF GASTRIC ULCER: ICD-10-CM

## 2023-01-13 DIAGNOSIS — K21.9 GERD (GASTROESOPHAGEAL REFLUX DISEASE): ICD-10-CM

## 2023-01-13 DIAGNOSIS — R14.0 ABDOMINAL BLOATING: Primary | ICD-10-CM

## 2023-01-28 DIAGNOSIS — I10 ESSENTIAL HYPERTENSION: ICD-10-CM

## 2023-01-31 RX ORDER — LOSARTAN POTASSIUM 25 MG/1
25 TABLET ORAL DAILY
Qty: 90 TABLET | Refills: 1 | Status: SHIPPED | OUTPATIENT
Start: 2023-01-31

## 2023-02-02 DIAGNOSIS — E11.65 UNCONTROLLED TYPE 2 DIABETES MELLITUS WITH HYPERGLYCEMIA (HCC): ICD-10-CM

## 2023-02-04 RX ORDER — SEMAGLUTIDE 1.34 MG/ML
INJECTION, SOLUTION SUBCUTANEOUS
Qty: 4 EACH | Refills: 2 | Status: SHIPPED | OUTPATIENT
Start: 2023-02-04

## 2023-02-05 DIAGNOSIS — E11.65 UNCONTROLLED TYPE 2 DIABETES MELLITUS WITH HYPERGLYCEMIA (HCC): ICD-10-CM

## 2023-02-06 ENCOUNTER — HOSPITAL ENCOUNTER (OUTPATIENT)
Dept: NUCLEAR MEDICINE | Age: 63
End: 2023-02-06
Attending: HOSPITALIST

## 2023-02-06 ENCOUNTER — HOSPITAL ENCOUNTER (OUTPATIENT)
Dept: NUCLEAR MEDICINE | Age: 63
Discharge: HOME | End: 2023-02-06
Attending: HOSPITALIST

## 2023-02-06 DIAGNOSIS — D36.9 TUBULOVILLOUS ADENOMA: ICD-10-CM

## 2023-02-06 DIAGNOSIS — K59.00 CONSTIPATION, UNSPECIFIED: ICD-10-CM

## 2023-02-06 DIAGNOSIS — Z87.11 HISTORY OF GASTRIC ULCER: ICD-10-CM

## 2023-02-06 DIAGNOSIS — K21.9 GERD (GASTROESOPHAGEAL REFLUX DISEASE): ICD-10-CM

## 2023-02-06 DIAGNOSIS — R14.0 ABDOMINAL BLOATING: ICD-10-CM

## 2023-02-06 NOTE — TELEPHONE ENCOUNTER
Patient mychart request for refill. Jazzy Elana    Last Visit: 8/2/22 MD Gamboa  Next Appointment: 3/14/23 MD Gamboa  Previous Refill Encounter(s): 8/2/22 90 + 1    Requested Prescriptions     Pending Prescriptions Disp Refills    empagliflozin (Jardiance) 10 mg tablet 90 Tablet 1     Sig: Take 1 Tablet by mouth daily. For Pharmacy Admin Tracking Only    Program: Medication Refill  CPA in place:   Recommendation Provided To:    Intervention Detail: New Rx: 1, reason: Patient Preference  Intervention Accepted By:   Johny Griffin Closed?:   Time Spent (min): 5

## 2023-02-15 ENCOUNTER — TELEPHONE (OUTPATIENT)
Dept: FAMILY MEDICINE CLINIC | Age: 63
End: 2023-02-15

## 2023-02-15 NOTE — TELEPHONE ENCOUNTER
Spoke to pt on 2/15/23 she gave her 3/14/23 appt up to her mom. While speaking with Nicholas Marte) she wanted  to know that her number has gone down to 6. 0. Pt does have a VV with Giselle on 3/14/23 @ 8:00 AM pt can be called back if  feel's that he need's to have a VV or an In Office Appt.

## 2023-03-10 NOTE — PROGRESS NOTES
Pharmacy Progress Note - Diabetes Management    Assessment / Plan:   Diabetes Management:  - Per ADA guidelines, pt's A1c is at goal of <7%. - Pt reports not checking BG at home although she has supplies. Recommended checking 2-3x per week. - Ozempic dose increased 2/4 and pt would be eligible for dose increase, but reports gas and if she takes medication in the morning, vomiting  - Jardiance dose appropriate    - Continue Jardiance 10 mg daily  - Continue Ozempic 1 mg injected under skin once weekly    Cardiovascular Management  - Per ADA guidelines, pt's LDL is not at goal <70 mg/dL. - LDL checked >1 year ago    - Recommend checking LDL   - If LDL remains>70 mg/dL, change to rosuvastatin 20 mg daily  - Continue simvastatin 20 mg daily    Nutrition/Lifestyle Modifications:  - Recommend continuing low carb diet options  - Recommend ~30 minutes consistent, moderately intensive, exercise/day or ~150 minutes/week. Start small, stay consistent, and increase length and types of exercise, as tolerated. Pt says she is unlikely to exercise. S/O: Ms. Sachin Leong is a 63 y.o. female, referred by Dr. Ozzy Padilla MD, with a PMH of T2DM with nephropathy, HTN, HLD, GERD, asthma, arthritis, lumbar herniated disc, obesity, depression, was seen today for 3-month diabetes management follow up. Patient's last A1c was 6% (external 2/9/23), decreased from 7.3% (9/21/22) and 7% (8/2021). Patient's last LDL was 80 mg/dL (01/2022). Patient's last eGFR was 44 mL/min/1.73 m2 (9/21/22) which was increased from 35 mL/min/1.73 m2 (8/13/22) but decreased from 52 mL/min/1.73 m2 (01/31/22). Visit was completed using real time audio visual technology, [email or web address]. Pharmacy student, Sunny Stewart, participated in the visit.     Interim update:   - checked A1c at [facility]  - changed diet to keto (647) bread, changed to tea with one equal in it  - passing gas for night after taking & up all night   - GI changed famotidine to pantoprazole   - 4/10 colonoscopy & endoscopy   - burping and throws up if she takes it in the morning    Current anti-hyperglycemic regimen include(s):    - Jardiance 10 mg daily  - Ozempic 1 mg injected under skin every 7 days (Monday night)    Last microalbumin/creatinine ratio: 1424 mg/g (1/2022)    Cardiovascular regimen:  ACEi/ARB: losartan 25 mg daily  Other HTN medications: amlodipine 10 mg daily  ASA: none  Statin: simvastatin 20 mg daily    ROS:  Today, Pt endorses:  - Symptoms of Hyperglycemia: none  - Symptoms of Hypoglycemia: none    Blood Glucose Monitoring (BGM) or CGM:  - Brought in home glucometer/blood glucose log today:  no  - Conducts: does not check     Nutrition/Lifestyle Modifications:  - Alcohol consumption? Yes - drinks beer at night    - Physical Activity:   - walking from parking lot to stores or takes stairs at doctor's office    The 10-year ASCVD risk score (Georges ALVA, et al., 2019) is: 21.1%    Values used to calculate the score:      Age: 63 years      Sex: Female      Is Non- : Yes      Diabetic: Yes      Tobacco smoker: No      Systolic Blood Pressure: 935 mmHg      Is BP treated: Yes      HDL Cholesterol: 72 MG/DL      Total Cholesterol: 198 MG/DL     Vitals:   Wt Readings from Last 3 Encounters:   09/28/22 217 lb 9.5 oz (98.7 kg)   09/21/22 221 lb 5.5 oz (100.4 kg)   08/13/22 216 lb 0.8 oz (98 kg)     BP Readings from Last 3 Encounters:   09/29/22 (!) 152/77   09/21/22 125/78   08/13/22 (!) 140/79     Pulse Readings from Last 3 Encounters:   09/29/22 69   09/21/22 75   08/13/22 84       Past Medical History:   Diagnosis Date    Adverse effect of anesthesia     slow to wake up at dentist office    Anxiety     Arthritis     Asthma     Chronic kidney disease     STAGE 3A    Chronic pain     3 herniated discs in lower back    Depression     Diabetes ([facility] [address]     type 2    GERD (gastroesophageal reflux disease)     High cholesterol     Hypertension     MRSA infection 2008    left leg treated with antibiotics at [facility], NEGATIVE RESULT IN 2019 IN LAB REPORT    Other ill-defined conditions(009.47)     herniated disc to back    PUD (peptic ulcer disease)     Seasonal allergies     Sleep apnea     keriwn USES CPAP    Thyroid disease     PARTIAL REMOVAL     Allergies   Allergen Reactions    Vicodin [Hydrocodone-Acetaminophen] Hives       Current Outpatient Medications   Medication Sig    empagliflozin (Jardiance) 10 mg tablet Take 1 Tablet by mouth daily. Ozempic 1 mg/dose (4 mg/3 mL) pnij INJECT 1 MG UNDER THE SKIN EVERY 7 DAYS    losartan (COZAAR) 25 mg tablet TAKE 1 TABLET BY MOUTH IN THE MORNING    losartan (COZAAR) 25 mg tablet Take 1 Tablet by mouth daily. triamcinolone acetonide (KENALOG) 0.025 % ointment Apply  to affected area two (2) times a day. use thin layer    fluticasone propionate (FLONASE) 50 mcg/actuation nasal spray 2 Sprays by Both Nostrils route daily. amLODIPine (NORVASC) 10 mg tablet TAKE 1 TABLET BY MOUTH DAILY    flash glucose sensor (FreeStyle Yogesh 2 Sensor) kit Scan sensor 4x per day to check blood sugar. Replace sensor every 14 days. levocetirizine (XYZAL) 5 mg tablet TAKE 1 TABLET BY MOUTH EVERY DAY    estradioL (ESTRACE) 0.01 % (0.1 mg/gram) vaginal cream Insert 2 g into vagina daily. famotidine (PEPCID) 20 mg tablet TAKE 1 TABLET BY MOUTH DAILY    simvastatin (ZOCOR) 20 mg tablet TAKE 1 TABLET BY MOUTH EVERY NIGHT (Patient taking differently: Take 20 mg by mouth daily.)    ibuprofen (MOTRIN) 800 mg tablet TAKE 1 TABLET BY MOUTH EVERY 8 HOURS AS NEEDED FOR PAIN    montelukast (SINGULAIR) 10 mg tablet TAKE 1 TABLET BY MOUTH DAILY    ALPRAZolam (XANAX) 0.25 mg tablet Take 1 Tablet by mouth nightly as needed for Anxiety. Max Daily Amount: 0.25 mg. Indications: anxious    oxyCODONE-acetaminophen (PERCOCET) 5-325 mg per tablet Take 1 Tablet by mouth every six (6) hours as needed.     nystatin (MYCOSTATIN) topical cream Apply  to affected area two (2) times a day. albuterol (PROVENTIL HFA, VENTOLIN HFA, PROAIR HFA) 90 mcg/actuation inhaler Take 2 Puffs by inhalation every six (6) hours as needed for Wheezing. Nebulizer & Compressor machine Nebulizer machine x 1 Nebulizer Kit    albuterol (PROVENTIL VENTOLIN) 2.5 mg /3 mL (0.083 %) nebu 3 mL by Nebulization route every four (4) hours as needed for Wheezing. Nebulizer Accessories kit Use kit with Nebulizer every 4 hours as needed  Wash out kit after each use and air dry  Change kit every month when in use. glucose blood VI test strips (ASCENSIA AUTODISC VI, ONE TOUCH ULTRA TEST VI) strip Check glucose twice daily     No current facility-administered medications for this visit. Lab Results   Component Value Date/Time    Sodium 136 09/21/2022 01:56 PM    Potassium 4.0 09/21/2022 01:56 PM    Chloride 104 09/21/2022 01:56 PM    CO2 25 09/21/2022 01:56 PM    Anion gap 7 09/21/2022 01:56 PM    Glucose 239 (H) 09/21/2022 01:56 PM    BUN 21 (H) 09/21/2022 01:56 PM    Creatinine 1.47 (H) 09/21/2022 01:56 PM    BUN/Creatinine ratio 14 09/21/2022 01:56 PM    GFR est AA 44 (L) 09/21/2022 01:56 PM    GFR est non-AA 36 (L) 09/21/2022 01:56 PM    Calcium 9.6 09/21/2022 01:56 PM    Bilirubin, total 0.3 08/13/2022 01:39 PM    Alk.  phosphatase 96 08/13/2022 01:39 PM    Protein, total 7.9 08/13/2022 01:39 PM    Albumin 3.9 08/13/2022 01:39 PM    Globulin 4.0 08/13/2022 01:39 PM    A-G Ratio 1.0 (L) 08/13/2022 01:39 PM    ALT (SGPT) 33 08/13/2022 01:39 PM       Lab Results   Component Value Date/Time    Cholesterol, total 198 01/31/2022 12:39 PM    HDL Cholesterol 72 01/31/2022 12:39 PM    LDL,Direct 148 (H) 04/25/2016 12:13 PM    LDL, calculated 79.8 01/31/2022 12:39 PM    VLDL, calculated 46.2 01/31/2022 12:39 PM    Triglyceride 231 (H) 01/31/2022 12:39 PM    CHOL/HDL Ratio 2.8 01/31/2022 12:39 PM       Lab Results   Component Value Date/Time    WBC 7.3 08/13/2022 01:39 PM    WBC 9.4 06/05/2012 12:06 PM    HGB 11.7 09/29/2022 05:08 AM    HCT 35.6 09/29/2022 05:08 AM    PLATELET 895 25/60/2554 01:39 PM    MCV 91.8 08/13/2022 01:39 PM       Lab Results   Component Value Date/Time    Microalbumin/Creat ratio (mg/g creat) 1,424 (H) 01/31/2022 12:39 PM    Microalbumin,urine random 70.50 01/31/2022 12:39 PM    Microalbumin urine (POC) 150 09/29/2020 05:16 PM       HbA1c:  Lab Results   Component Value Date/Time    Hemoglobin A1c 7.3 (H) 09/21/2022 01:56 PM    Hemoglobin A1c (POC) 7.9 01/31/2022 12:07 PM     No components found for: 2     Last Point of Care HGB A1C  Hemoglobin A1c (POC)   Date Value Ref Range Status   01/31/2022 7.9 % Final        CrCl cannot be calculated (Unknown ideal weight. ). Medication reconciliation was completed during the visit. There are no discontinued medications. Patient verbalized understanding of the information presented and all of the patients questions were answered. AVS was handed to the patient. Patient advised to call the office with any additional questions or concerns. Notifications of recommendations will be sent to Dr. Duncan Contreras MD for review. Patient will return to clinic in 3 months (s) for follow up.      Thank you for the consult,  Eugenio Leger, TenzinD

## 2023-03-14 ENCOUNTER — VIRTUAL VISIT (OUTPATIENT)
Dept: FAMILY MEDICINE CLINIC | Age: 63
End: 2023-03-14

## 2023-03-14 DIAGNOSIS — E11.65 UNCONTROLLED TYPE 2 DIABETES MELLITUS WITH HYPERGLYCEMIA (HCC): Primary | ICD-10-CM

## 2023-03-14 LAB
ANION GAP SERPL CALC-SCNC: 7 MMOL/L (ref 5–15)
BUN SERPL-MCNC: 25 MG/DL (ref 6–20)
BUN/CREAT SERPL: 16 (ref 12–20)
CALCIUM SERPL-MCNC: 9.8 MG/DL (ref 8.5–10.1)
CHLORIDE SERPL-SCNC: 109 MMOL/L (ref 97–108)
CHOLEST SERPL-MCNC: 165 MG/DL
CO2 SERPL-SCNC: 22 MMOL/L (ref 21–32)
CREAT SERPL-MCNC: 1.52 MG/DL (ref 0.55–1.02)
CREAT UR-MCNC: 134 MG/DL
GLUCOSE SERPL-MCNC: 135 MG/DL (ref 65–100)
HDLC SERPL-MCNC: 78 MG/DL
HDLC SERPL: 2.1 (ref 0–5)
LDLC SERPL CALC-MCNC: 63.8 MG/DL (ref 0–100)
MICROALBUMIN UR-MCNC: 51.6 MG/DL
MICROALBUMIN/CREAT UR-RTO: 385 MG/G (ref 0–30)
POTASSIUM SERPL-SCNC: 4.5 MMOL/L (ref 3.5–5.1)
SODIUM SERPL-SCNC: 138 MMOL/L (ref 136–145)
TRIGL SERPL-MCNC: 116 MG/DL (ref ?–150)
VLDLC SERPL CALC-MCNC: 23.2 MG/DL

## 2023-03-14 NOTE — PROGRESS NOTES
Patient was seen with Alyssa Patel PharmD, PGY1 resident. I was present for the visit and agree with the assessment and plan. Please see their note for more details of the visit.     Ana Herrera PharmD, [facility], BCACP  Clinical Pharmacist Specialist      For Pharmacy Admin Tracking Only    Program: Medical Group  CPA in place: Yes  Recommendation Provided To: Patient/Caregiver: 1 via Virtual Visit  Intervention Detail: Lab(s) Ordered  Intervention Accepted By: Patient/Caregiver: 1  Time Spent (min): 30

## 2023-04-10 PROBLEM — E11.22 TYPE 2 DIABETES MELLITUS WITH CHRONIC KIDNEY DISEASE (HCC): Status: ACTIVE | Noted: 2023-04-10

## 2023-04-10 PROBLEM — E11.21 TYPE 2 DIABETES WITH NEPHROPATHY (HCC): Status: RESOLVED | Noted: 2020-02-07 | Resolved: 2023-04-10

## 2023-04-12 ENCOUNTER — APPOINTMENT (OUTPATIENT)
Dept: FAMILY MEDICINE CLINIC | Age: 63
End: 2023-04-12

## 2023-04-16 DIAGNOSIS — M54.50 CHRONIC BILATERAL LOW BACK PAIN WITHOUT SCIATICA: ICD-10-CM

## 2023-04-16 DIAGNOSIS — G89.29 CHRONIC BILATERAL LOW BACK PAIN WITHOUT SCIATICA: ICD-10-CM

## 2023-04-17 ENCOUNTER — TRANSCRIBE ORDER (OUTPATIENT)
Dept: SCHEDULING | Age: 63
End: 2023-04-17

## 2023-04-17 DIAGNOSIS — S32.512A: Primary | ICD-10-CM

## 2023-04-17 RX ORDER — IBUPROFEN 800 MG/1
800 TABLET ORAL
Qty: 90 TABLET | Refills: 1 | Status: SHIPPED | OUTPATIENT
Start: 2023-04-17

## 2023-04-17 RX ORDER — IBUPROFEN 800 MG/1
TABLET ORAL
Qty: 90 TABLET | Refills: 1 | Status: SHIPPED | OUTPATIENT
Start: 2023-04-17

## 2023-04-18 ENCOUNTER — VIRTUAL VISIT (OUTPATIENT)
Dept: FAMILY MEDICINE CLINIC | Age: 63
End: 2023-04-18
Payer: MEDICARE

## 2023-04-18 DIAGNOSIS — N18.32 STAGE 3B CHRONIC KIDNEY DISEASE (HCC): ICD-10-CM

## 2023-04-18 DIAGNOSIS — S72.002A CLOSED FRACTURE DISLOCATION OF LEFT HIP JOINT, INITIAL ENCOUNTER (HCC): Primary | ICD-10-CM
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
## 2023-04-18 PROCEDURE — G0463 HOSPITAL OUTPT CLINIC VISIT: HCPCS | Performed by: FAMILY MEDICINE

## 2023-04-18 PROCEDURE — 99214 OFFICE O/P EST MOD 30 MIN: CPT | Performed by: FAMILY MEDICINE

## 2023-04-18 PROCEDURE — 3017F COLORECTAL CA SCREEN DOC REV: CPT | Performed by: FAMILY MEDICINE

## 2023-04-18 PROCEDURE — G9899 SCRN MAM PERF RSLTS DOC: HCPCS | Performed by: FAMILY MEDICINE

## 2023-04-18 PROCEDURE — G8427 DOCREV CUR MEDS BY ELIG CLIN: HCPCS | Performed by: FAMILY MEDICINE

## 2023-04-18 PROCEDURE — G9717 DOC PT DX DEP/BP F/U NT REQ: HCPCS | Performed by: FAMILY MEDICINE
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
## 2023-04-19 ENCOUNTER — HOSPITAL ENCOUNTER (OUTPATIENT)
Dept: CT IMAGING | Age: 63
Discharge: HOME | End: 2023-04-19
Attending: FAMILY MEDICINE
Payer: MEDICARE

## 2023-04-19 DIAGNOSIS — R91.1 LUNG NODULE: ICD-10-CM

## 2023-04-19 DIAGNOSIS — S32.512A: ICD-10-CM

## 2023-04-19 DIAGNOSIS — Z87.891 FORMER SMOKER: ICD-10-CM

## 2023-04-19 PROCEDURE — 72192 CT PELVIS W/O DYE: CPT

## 2023-04-19 PROCEDURE — 71250 CT THORAX DX C-: CPT

## 2023-04-21 DIAGNOSIS — Z12.31 SCREENING MAMMOGRAM FOR HIGH-RISK PATIENT: Primary | ICD-10-CM

## 2023-04-22 DIAGNOSIS — Z12.31 VISIT FOR SCREENING MAMMOGRAM: Primary | ICD-10-CM

## 2023-04-23 ENCOUNTER — TRANSCRIBE ORDERS (OUTPATIENT)
Facility: HOSPITAL | Age: 63
End: 2023-04-23

## 2023-04-23 DIAGNOSIS — Z12.31 VISIT FOR SCREENING MAMMOGRAM: Primary | ICD-10-CM

## 2023-04-26 DIAGNOSIS — E11.65 UNCONTROLLED TYPE 2 DIABETES MELLITUS WITH HYPERGLYCEMIA (HCC): ICD-10-CM

## 2023-04-26 RX ORDER — SEMAGLUTIDE 1.34 MG/ML
INJECTION, SOLUTION SUBCUTANEOUS
Qty: 4 EACH | Refills: 5 | Status: SHIPPED | OUTPATIENT
Start: 2023-04-26

## 2023-05-06 RX ORDER — LOSARTAN POTASSIUM 25 MG/1
25 TABLET ORAL DAILY
COMMUNITY
Start: 2023-01-31 | End: 2023-06-19 | Stop reason: ALTCHOICE

## 2023-06-19 ENCOUNTER — OFFICE VISIT (OUTPATIENT)
Age: 63
End: 2023-06-19
Payer: MEDICAID

## 2023-06-19 VITALS
RESPIRATION RATE: 16 BRPM | HEART RATE: 95 BPM | OXYGEN SATURATION: 99 % | BODY MASS INDEX: 31.22 KG/M2 | TEMPERATURE: 97.7 F | SYSTOLIC BLOOD PRESSURE: 170 MMHG | DIASTOLIC BLOOD PRESSURE: 100 MMHG | WEIGHT: 206 LBS | HEIGHT: 68 IN

## 2023-06-19 DIAGNOSIS — Z23 IMMUNIZATION DUE: ICD-10-CM

## 2023-06-19 DIAGNOSIS — Z87.891 PERSONAL HISTORY OF TOBACCO USE: ICD-10-CM

## 2023-06-19 DIAGNOSIS — I10 PRIMARY HYPERTENSION: ICD-10-CM

## 2023-06-19 DIAGNOSIS — E11.9 TYPE 2 DIABETES MELLITUS WITHOUT COMPLICATION, WITHOUT LONG-TERM CURRENT USE OF INSULIN (HCC): Primary | ICD-10-CM

## 2023-06-19 DIAGNOSIS — Z11.4 SCREENING FOR HIV WITHOUT PRESENCE OF RISK FACTORS: ICD-10-CM

## 2023-06-19 DIAGNOSIS — Z11.59 NEED FOR HEPATITIS C SCREENING TEST: ICD-10-CM

## 2023-06-19 LAB — HBA1C MFR BLD: 6.3 %

## 2023-06-19 PROCEDURE — PBSHW AMB POC HEMOGLOBIN A1C: Performed by: FAMILY MEDICINE

## 2023-06-19 PROCEDURE — 3078F DIAST BP <80 MM HG: CPT | Performed by: FAMILY MEDICINE

## 2023-06-19 PROCEDURE — 83036 HEMOGLOBIN GLYCOSYLATED A1C: CPT | Performed by: FAMILY MEDICINE

## 2023-06-19 PROCEDURE — 99214 OFFICE O/P EST MOD 30 MIN: CPT | Performed by: FAMILY MEDICINE

## 2023-06-19 PROCEDURE — 3074F SYST BP LT 130 MM HG: CPT | Performed by: FAMILY MEDICINE

## 2023-06-19 RX ORDER — LOSARTAN POTASSIUM 50 MG/1
50 TABLET ORAL DAILY
Qty: 30 TABLET | Refills: 5 | Status: SHIPPED | OUTPATIENT
Start: 2023-06-19

## 2023-06-19 RX ORDER — ZOSTER VACCINE RECOMBINANT, ADJUVANTED 50 MCG/0.5
0.5 KIT INTRAMUSCULAR SEE ADMIN INSTRUCTIONS
Qty: 0.5 ML | Refills: 0 | Status: SHIPPED | OUTPATIENT
Start: 2023-06-19 | End: 2023-12-16

## 2023-06-19 SDOH — ECONOMIC STABILITY: FOOD INSECURITY: WITHIN THE PAST 12 MONTHS, YOU WORRIED THAT YOUR FOOD WOULD RUN OUT BEFORE YOU GOT MONEY TO BUY MORE.: NEVER TRUE

## 2023-06-19 SDOH — ECONOMIC STABILITY: INCOME INSECURITY: HOW HARD IS IT FOR YOU TO PAY FOR THE VERY BASICS LIKE FOOD, HOUSING, MEDICAL CARE, AND HEATING?: NOT HARD AT ALL

## 2023-06-19 SDOH — ECONOMIC STABILITY: HOUSING INSECURITY
IN THE LAST 12 MONTHS, WAS THERE A TIME WHEN YOU DID NOT HAVE A STEADY PLACE TO SLEEP OR SLEPT IN A SHELTER (INCLUDING NOW)?: NO

## 2023-06-19 SDOH — ECONOMIC STABILITY: FOOD INSECURITY: WITHIN THE PAST 12 MONTHS, THE FOOD YOU BOUGHT JUST DIDN'T LAST AND YOU DIDN'T HAVE MONEY TO GET MORE.: NEVER TRUE

## 2023-06-19 ASSESSMENT — PATIENT HEALTH QUESTIONNAIRE - PHQ9
1. LITTLE INTEREST OR PLEASURE IN DOING THINGS: 0
SUM OF ALL RESPONSES TO PHQ QUESTIONS 1-9: 0
2. FEELING DOWN, DEPRESSED OR HOPELESS: 0
SUM OF ALL RESPONSES TO PHQ QUESTIONS 1-9: 0
SUM OF ALL RESPONSES TO PHQ QUESTIONS 1-9: 0
SUM OF ALL RESPONSES TO PHQ9 QUESTIONS 1 & 2: 0
SUM OF ALL RESPONSES TO PHQ QUESTIONS 1-9: 0

## 2023-06-19 ASSESSMENT — ENCOUNTER SYMPTOMS
BACK PAIN: 0
SHORTNESS OF BREATH: 0
SORE THROAT: 0
CONSTIPATION: 0
WHEEZING: 0
EYES NEGATIVE: 1
DIARRHEA: 0
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
## 2023-06-19 NOTE — PATIENT INSTRUCTIONS
[facility]  [facility]  Post-op Visit    Patient: Gemma Fuentes Date: 2019   : 1956 Attending: Black Venegas MD   63year old female      SUBJECTIVE:   SURGERY:   Date: 3/21/19  Procedure: Laparoscopic supracervical hysterectomy, bilateral salpingectomy, sacrocolpopexy, cystoscopy  Diagnosis: Stage 2 pelvic organ prolapse     Jim Santoyo is a 63year old female here for 2 week post-op visit. Since her surgery, she has been feeling well. Feels UTI symptoms. Reports bothersome cyst on labia, patient would like this removed. Pain: No  Bleeding: No  Voiding well: Yes   Incomplete emptying: No  Issues with BMs: No  Prolapse symptoms: No  Incontinence symptoms:  No    OBJECTIVE:  Visit Vitals  /86 (BP Location: [facility], Patient Position: Sitting)   Pulse 66   Temp 97.2 Â°F (36.2 Â°C) (Temporal)   LMP 2009     Urethra cleansed with betadine, straight catheter placed to obtain urine for testing. General: Alert, cooperative, conversive. No acute distress. HEENT: NC/AT, EOM intact, no LAD, neck supple  Abdomen: Non-distended. Non-tender. No masses. Laparoscopic incisions intact and healing well. Pelvis: Normal appearing external genitalia. Normal appearing vaginal mucosa. No evidence of mesh exposure or erosion. 7mm nontender, mobile, fluid-filled cyst at posterior right introitus    Cyst removal:   After obtaining informed consent, the cystic area cleansed with betadine, and the mucosa was injected with 1% lidocaine. 15-blade used to excise cyst at base. Mucosa reapproximated with 3-0 Vicryl in figure of eight fashion. ASSESSMENT:    Ms. Gemma Fuentes is a 63year old female s/p Laparoscopic supracervical hysterectomy, bilateral salpingectomy, sacrocolpopexy, cystoscopy, doing well post-operatively. PLAN:  1. Small vulvar cyst removed today. 2. May increase lifting to 30lbs. 3. Return in 1 month for post-op visit.      Black Venegas MD Learning About Lung Cancer Screening  What is screening for lung cancer? Lung cancer screening is a way to find some lung cancers early, before a person has any symptoms of the cancer. Lung cancer screening may help those who have the highest risk for lung cancer--people age 48 and older who are or were heavy smokers. For most people, who aren't at increased risk, screening for lung cancer probably isn't helpful. Screening won't prevent cancer. And it may not find all lung cancers. Lung cancer screening may lower the risk of dying from lung cancer in a small number of people. How is it done? Lung cancer screening is done with a low-dose CT (computed tomography) scan. A CT scan uses X-rays, or radiation, to make detailed pictures of your body. Experts recommend that screening be done in medical centers that focus on finding and treating lung cancer. Who is screening recommended for? Lung cancer screening is recommended for people age 48 and older who are or were heavy smokers. That means people with a smoking history of at least 20 pack years. A pack year is a way to measure how heavy a smoker you are or were. To figure out your pack years, multiply how many packs a day on average (assuming 20 cigarettes per pack) you have smoked by how many years you have smoked. For example: If you smoked 1 pack a day for 20 years, that's 1 times 20. So you have a smoking history of 20 pack years. If you smoked 2 packs a day for 10 years, that's 2 times 10. So you have a smoking history of 20 pack years. Experts agree that screening is for people who have a high risk of lung cancer. But experts don't agree on what high risk means. Some say people age 48 or older with at least a 20-pack-year smoking history are high risk. Others say it's people age 54 or older with a 30-pack-year history. To see if you could benefit from screening, first find out if you are at high risk for lung cancer.  Your doctor can help you

## 2023-06-19 NOTE — PROGRESS NOTES
Chief Complaint   Patient presents with    Diabetes     1. \"Have you been to the ER, urgent care clinic since your last visit? Hospitalized since your last visit? \" no    2. \"Have you seen or consulted any other health care providers outside of the [address] [place] [address] since your last visit? \"  Ortho  DX with pelvic fractures x 3     Foot exam ? no    A1C    Vaginal dryness continues estradiol cream not working

## 2023-06-20 LAB
HCV AB SERPL QL IA: NONREACTIVE
HIV 1+2 AB+HIV1 P24 AG SERPL QL IA: NONREACTIVE
HIV 1/2 RESULT COMMENT: NORMAL

## 2023-06-25 ENCOUNTER — PATIENT MESSAGE (OUTPATIENT)
Age: 63
End: 2023-06-25

## 2023-06-25 DIAGNOSIS — I10 PRIMARY HYPERTENSION: ICD-10-CM

## 2023-06-27 RX ORDER — NYSTATIN 100000 U/G
CREAM TOPICAL
Qty: 60 G | Refills: 1 | Status: SHIPPED | OUTPATIENT
Start: 2023-06-27

## 2023-06-29 RX ORDER — LOSARTAN POTASSIUM 50 MG/1
50 TABLET ORAL DAILY
Qty: 90 TABLET | Refills: 1 | Status: SHIPPED | OUTPATIENT
Start: 2023-06-29

## 2023-07-05 ENCOUNTER — ANESTHESIA (OUTPATIENT)
Facility: HOSPITAL | Age: 63
End: 2023-07-05
Payer: MEDICARE

## 2023-07-05 ENCOUNTER — ANESTHESIA EVENT (OUTPATIENT)
Facility: HOSPITAL | Age: 63
End: 2023-07-05
Payer: MEDICARE

## 2023-07-05 ENCOUNTER — HOSPITAL ENCOUNTER (OUTPATIENT)
Facility: HOSPITAL | Age: 63
Setting detail: OUTPATIENT SURGERY
Discharge: HOME | End: 2023-07-05
Attending: SPECIALIST | Admitting: SPECIALIST
Payer: MEDICARE

## 2023-07-05 VITALS
HEIGHT: 68 IN | HEART RATE: 90 BPM | SYSTOLIC BLOOD PRESSURE: 148 MMHG | WEIGHT: 200.4 LBS | RESPIRATION RATE: 17 BRPM | TEMPERATURE: 97.5 F | BODY MASS INDEX: 30.37 KG/M2 | DIASTOLIC BLOOD PRESSURE: 99 MMHG | OXYGEN SATURATION: 100 %

## 2023-07-05 LAB
GLUCOSE BLD STRIP.AUTO-MCNC: 102 MG/DL (ref 65–117)
SERVICE CMNT-IMP: NORMAL

## 2023-07-05 PROCEDURE — 3700000001 HC ADD 15 MINUTES (ANESTHESIA): Performed by: SPECIALIST

## 2023-07-05 PROCEDURE — 3600007512: Performed by: SPECIALIST

## 2023-07-05 PROCEDURE — 7100000010 HC PHASE II RECOVERY - FIRST 15 MIN: Performed by: SPECIALIST

## 2023-07-05 PROCEDURE — 2709999900 HC NON-CHARGEABLE SUPPLY: Performed by: SPECIALIST

## 2023-07-05 PROCEDURE — 88305 TISSUE EXAM BY PATHOLOGIST: CPT

## 2023-07-05 PROCEDURE — 6360000002 HC RX W HCPCS: Performed by: NURSE ANESTHETIST, CERTIFIED REGISTERED

## 2023-07-05 PROCEDURE — 3600007502: Performed by: SPECIALIST

## 2023-07-05 PROCEDURE — 3700000000 HC ANESTHESIA ATTENDED CARE: Performed by: SPECIALIST

## 2023-07-05 PROCEDURE — 82962 GLUCOSE BLOOD TEST: CPT

## 2023-07-05 PROCEDURE — 6370000000 HC RX 637 (ALT 250 FOR IP): Performed by: SPECIALIST

## 2023-07-05 PROCEDURE — 2500000003 HC RX 250 WO HCPCS: Performed by: NURSE ANESTHETIST, CERTIFIED REGISTERED

## 2023-07-05 RX ORDER — SODIUM CHLORIDE 9 MG/ML
INJECTION, SOLUTION INTRAVENOUS CONTINUOUS
Status: DISCONTINUED | OUTPATIENT
Start: 2023-07-05 | End: 2023-07-05 | Stop reason: HOSPADM

## 2023-07-05 RX ORDER — PROPOFOL 10 MG/ML
INJECTION, EMULSION INTRAVENOUS PRN
Status: DISCONTINUED | OUTPATIENT
Start: 2023-07-05 | End: 2023-07-05 | Stop reason: SDUPTHER

## 2023-07-05 RX ORDER — SIMETHICONE 20 MG/.3ML
40 EMULSION ORAL EVERY 6 HOURS PRN
Status: DISCONTINUED | OUTPATIENT
Start: 2023-07-05 | End: 2023-07-05 | Stop reason: HOSPADM

## 2023-07-05 RX ORDER — LIDOCAINE HYDROCHLORIDE 20 MG/ML
INJECTION, SOLUTION EPIDURAL; INFILTRATION; INTRACAUDAL; PERINEURAL PRN
Status: DISCONTINUED | OUTPATIENT
Start: 2023-07-05 | End: 2023-07-05 | Stop reason: SDUPTHER

## 2023-07-05 RX ORDER — SODIUM CHLORIDE 0.9 % (FLUSH) 0.9 %
5-40 SYRINGE (ML) INJECTION PRN
Status: DISCONTINUED | OUTPATIENT
Start: 2023-07-05 | End: 2023-07-05 | Stop reason: HOSPADM

## 2023-07-05 RX ORDER — SIMETHICONE 20 MG/.3ML
EMULSION ORAL PRN
Status: DISCONTINUED | OUTPATIENT
Start: 2023-07-05 | End: 2023-07-05 | Stop reason: ALTCHOICE

## 2023-07-05 RX ORDER — GLYCOPYRROLATE 0.2 MG/ML
INJECTION INTRAMUSCULAR; INTRAVENOUS PRN
Status: DISCONTINUED | OUTPATIENT
Start: 2023-07-05 | End: 2023-07-05 | Stop reason: SDUPTHER

## 2023-07-05 RX ORDER — PROPOFOL 10 MG/ML
INJECTION, EMULSION INTRAVENOUS CONTINUOUS PRN
Status: DISCONTINUED | OUTPATIENT
Start: 2023-07-05 | End: 2023-07-05 | Stop reason: SDUPTHER

## 2023-07-05 RX ORDER — 0.9 % SODIUM CHLORIDE 0.9 %
INTRAVENOUS SOLUTION INTRAVENOUS PRN
Status: DISCONTINUED | OUTPATIENT
Start: 2023-07-05 | End: 2023-07-05

## 2023-07-05 RX ADMIN — PROPOFOL 120 MCG/KG/MIN: 10 INJECTION, EMULSION INTRAVENOUS at 09:42

## 2023-07-05 RX ADMIN — GLYCOPYRROLATE 0.2 MG: 0.2 INJECTION INTRAMUSCULAR; INTRAVENOUS at 09:42

## 2023-07-05 RX ADMIN — PROPOFOL 30 MG: 10 INJECTION, EMULSION INTRAVENOUS at 09:49

## 2023-07-05 RX ADMIN — PROPOFOL 100 MG: 10 INJECTION, EMULSION INTRAVENOUS at 09:42

## 2023-07-05 RX ADMIN — PROPOFOL 50 MG: 10 INJECTION, EMULSION INTRAVENOUS at 09:46

## 2023-07-05 RX ADMIN — LIDOCAINE HYDROCHLORIDE 40 MG: 20 INJECTION, SOLUTION EPIDURAL; INFILTRATION; INTRACAUDAL; PERINEURAL at 09:42

## 2023-07-05 ASSESSMENT — PAIN - FUNCTIONAL ASSESSMENT: PAIN_FUNCTIONAL_ASSESSMENT: NONE - DENIES PAIN

## 2023-07-05 NOTE — OP NOTE
[address] [place] [address] IFRAH Pandya MD  [phone]      2023    Esophagogastroduodenoscopy & Colonoscopy Procedure Note  Naresh Farmer  : 1960  [facility] Medical Record Number: [id number]      Indications:   Bloating Personal history of colon polyps, family history of colon cancer. Referring Physician:  Naga Mccracken MD  Anesthesia/Sedation: Conscious Sedation/Moderate Sedation/MAC  Endoscopist:  Dr. Rafa Pimentel  Complications:  None  Estimated Blood Loss:  None    Permit:  The indications, risks, benefits and alternatives were reviewed with the patient or their decision maker who was provided an opportunity to ask questions and all questions were answered. The specific risks of esophagogastroduodenoscopy with conscious sedation were reviewed, including but not limited to anesthetic complication, bleeding, adverse drug reaction, missed lesion, infection, IV site reactions, and intestinal perforation which would lead to the need for surgical repair. Alternatives to EGD and colonoscopy including radiographic imaging, observation without testing, or laboratory testing were reviewed as well as the limitations of those alternatives discussed. After considering the options and having all their questions answered, the patient or their decision maker provided both verbal and written consent to proceed. -----------EGD------------   Procedure in Detail:  After obtaining informed consent, positioning of the patient in the left lateral decubitus position, and conduction of a pre-procedure pause or \"time out\" the endoscope was introduced into the mouth and advanced to the duodenum. A careful inspection was made, and findings or interventions are described below.     Findings:   Esophagus:normal  Stomach: normal   Duodenum/jejunum: normal    Cold forceps biopsies of the gastric antrum and duodenum taken for

## 2023-07-05 NOTE — ANESTHESIA POSTPROCEDURE EVALUATION
Department of Anesthesiology  Postprocedure Note    Patient: Omero Livingston  MRN: [id number]  YOB: 1960  Date of evaluation: 7/5/2023      Procedure Summary     Date: 07/05/23 Room / Location: [place] [address] / [facility] ENDOSCOPY    Anesthesia Start: 0940 Anesthesia Stop: 1004    Procedures:       COLONOSCOPY DIAGNOSTIC (Lower GI Region)      EGD ESOPHAGOGASTRODUODENOSCOPY (Upper GI Region)      EGD BIOPSY (Upper GI Region)      COLONOSCOPY POLYPECTOMY SNARE/COLD BIOPSY (Lower GI Region) Diagnosis:       Personal history of colonic polyps      Occult blood positive stool      (Personal history of colonic polyps [Z86.010])      (Occult blood positive stool [R19.5])    Surgeons: Imani Molina MD Responsible Provider: Elana Jeffery MD    Anesthesia Type: MAC ASA Status: 3          Anesthesia Type: No value filed.     Taya Phase I: Taya Score: 10    Taya Phase II: Taya Score: 10      Anesthesia Post Evaluation    Patient location during evaluation: PACU  Patient participation: complete - patient participated  Level of consciousness: awake  Airway patency: patent  Nausea & Vomiting: no vomiting and no nausea  Complications: no  Cardiovascular status: hemodynamically stable  Respiratory status: acceptable  Hydration status: stable

## 2023-07-05 NOTE — H&P
Never   Substance Use Topics    Alcohol use: Yes     Alcohol/week: 14.0 standard drinks     Types: 14 Cans of beer per week      Family History   Problem Relation Age of Onset    No Known Problems Brother     Anesth Problems Neg Hx     Lung Disease Sister         copd    Hypertension Sister     Diabetes Father     Other Mother         SJORAN'S DISEASE    Hypertension Mother       Allergies   Allergen Reactions    Hydrocodone-Acetaminophen Hives      Prior to Admission Medications   Prescriptions Last Dose Informant Patient Reported? Taking?    Semaglutide, 1 MG/DOSE, (OZEMPIC, 1 MG/DOSE,) 4 MG/3ML SOPN 7/3/2023  Yes No   Sig: INJECT 1 MG UNDER THE SKIN EVERY 7 DAYS   acetaminophen (TYLENOL) 650 MG extended release tablet   Yes No   Sig: Take 1 tablet by mouth every 8 hours as needed for Pain   albuterol (PROVENTIL) (2.5 MG/3ML) 0.083% nebulizer solution Unknown  Yes No   Sig: Inhale 3 mLs into the lungs every 4 hours as needed   albuterol sulfate HFA (PROVENTIL;VENTOLIN;PROAIR) 108 (90 Base) MCG/ACT inhaler Unknown  Yes No   Sig: Inhale 2 puffs into the lungs every 6 hours as needed   amLODIPine (NORVASC) 10 MG tablet 2023  Yes No   Sig: Take 1 tablet by mouth daily   aspirin 81 MG EC tablet 7/3/2023  Yes No   Sig: Take 1 tablet by mouth daily   empagliflozin (JARDIANCE) 25 MG tablet 7/3/2023  No No   Sig: Take 1 tablet by mouth daily   estradiol (ESTRACE) 0.1 MG/GM vaginal cream   Yes No   Sig: Place 2 g vaginally daily   fluticasone (FLONASE) 50 MCG/ACT nasal spray   Yes No   Si sprays by Nasal route daily   levocetirizine (XYZAL) 5 MG tablet 2023  Yes No   Sig: Take 1 tablet by mouth daily   losartan (COZAAR) 50 MG tablet 2023  No No   Sig: Take 1 tablet by mouth daily   montelukast (SINGULAIR) 10 MG tablet 2023  Yes No   Sig: Take 1 tablet by mouth daily   nystatin (MYCOSTATIN) 223534 UNIT/GM cream   No No   Sig: APPLY TOPICALLY TO THE AFFECTED AREA TWICE DAILY   oxyCODONE-acetaminophen
hematemesis. Genitourinary: Denies dark urine, dysuria, frequent urination, hematuria, incontinence. Hematologic/Lymphatic: Denies easy bruising, prolonged bleeding. Integumentary: Denies itching, rashes, sun sensitivity. Musculoskeletal: Denies arthritis, back pain, gout, joint pain, muscle weakness, stiffness. Neurological: Denies dizziness, fainting, frequent headaches, memory loss. Psychiatric: Denies anxiety, depression, difficulty sleeping, hallucinations, nervousness, panic attacks, paranoia. Respiratory: Denies cough, dyspnea, wheezing. Vital Signs:  BP  (mmHg)  Pulse  (bpm) Weight (lbs/oz) Height (ft/in) BMI Temp  150/101 81 216 / 5 / 8 32.84 97.1 (F)  Physical Exam:  Constitutional:  Appearance: No distress, appears comfortable. Communication: Understands/receives spoken information. Head/face: Inspection: Normacephalic, atraumatic. Facial strength: appears normal.  Eyes:  Conjunctivae/lids: Normal.  Pupils/irises: Pupils equal, round and normal.  Respiratory:  Effort: Normal respiratory effort, comfortable, speaks in complete sentences. Auscultation: normal breath sounds, no rubs, wheezes or rhonchi. Cardiovascular: Auscultation: normal, S1 and S2,no gallops,no rubs or murmurs. Gastrointestinal/Abdomen:  Liver/Spleen: normal,normal size,Liver size and consistency normal, spleen is non-palpable. Abdomen Exam: normal bowel sounds,non distended, non tender. Psychiatric:  Judgment/insight: Normal,normal judgement, normal insight. Orientation: oriented to time, space and person. Lab Results:   No Electronic Results  Impressions:   Abdominal bloating  Gastroesophageal reflux disease (GERD)  Tubulovillous adenoma  Constipation, unspecified  History of gastric ulcer  Assessment:   GERD, abdominal bloating - Given the prior h/o gastric ulcer will proceed with EGD to evaluate for PUD, gastritis, esophagitis. Recommend PPI qday. Educated to avoid NSAIDs.  Abdominal bloating other differentials

## 2023-07-05 NOTE — PROGRESS NOTES
[facility]  1960  [id number]    Situation:  Verbal report received from: Dina CRENSHAW   Procedure: Procedure(s):  COLONOSCOPY DIAGNOSTIC  EGD ESOPHAGOGASTRODUODENOSCOPY  EGD BIOPSY  COLONOSCOPY POLYPECTOMY SNARE/COLD BIOPSY    Background:    Preoperative diagnosis: Personal history of colonic polyps [Z86.010]  Occult blood positive stool [R19.5]  Postoperative diagnosis: * No post-op diagnosis entered *    :  Dr. Marcelo Citizen  Assistant(s): Circulator: Humera Contreras RN  Endoscopy Technician: Rosmery Colon    Specimens:   ID Type Source Tests Collected by Time Destination   A : duodenum biopsy Tissue Duodenum SURGICAL PATHOLOGY Janice Kendall MD 7/5/2023 8532    B : gastric antrum biopsy Tissue Gastric SURGICAL PATHOLOGY Janice Kendall MD 7/5/2023 2253    C : ascending colon polyp Tissue Colon-Ascending SURGICAL PATHOLOGY Janice Kendall MD 7/5/2023 1000    D : sigmoid colon polyps Tissue Sigmoid Colon SURGICAL PATHOLOGY Janice Kendall MD 7/5/2023 1001      H. Pylori  No    Assessment:    Anesthesia gave intra-procedure sedation and medications, see anesthesia flow sheet No    Intravenous fluids: NS@ KVO     Vital signs stable     Abdominal assessment: round and soft     Recommendation:  Discharge patient per MD order.   Return to floor  Family or Friend   Permission to share finding with family or friend Yes

## 2023-07-05 NOTE — ANESTHESIA PRE PROCEDURE
(>4 METS),   (+) hypertension:,         Rhythm: regular  Rate: normal                    Neuro/Psych:   (+) psychiatric history:            GI/Hepatic/Renal:   (+) GERD:, PUD, renal disease: CRI,           Endo/Other:    (+) Diabetes, . Abdominal:             Vascular: negative vascular ROS. Other Findings:           Anesthesia Plan      MAC     ASA 3       Induction: intravenous. Anesthetic plan and risks discussed with patient. Plan discussed with CRNA.     Attending anesthesiologist reviewed and agrees with Preprocedure content                Quita Arguello MD   7/5/2023

## 2023-07-05 NOTE — PROGRESS NOTES
3846  Timeout performed. Anesthesia staff at patient's bedside administering anesthesia and monitoring patients vital signs throughout procedure. See anesthesia note. Post procedure, report received from CRNA,    [id number] [facility]      1003  Endoscope was pre-cleaned at bedside immediately following procedure by endo John de la fuente    1006  Patient tolerated procedure. Abdomen soft and patient arousable and voices no complaints. Patient transported to endoscopy recovery area.    Report given to post procedure RNRobbie

## 2023-07-05 NOTE — PROGRESS NOTES
Endoscopy discharge instructions have been reviewed and given to patient. The patient verbalized understanding and acceptance of instructions. Dr. Anita Canela discussed with patient procedure findings and next steps.

## 2023-07-05 NOTE — DISCHARGE INSTRUCTIONS
[address] [facility] IFRAH Childs MD  [phone]      July 5, 2023    [address] [place] [address] Avenue: 1960    COLONOSCOPY DISCHARGE INSTRUCTIONS    If there is redness at IV site you should apply warm compress to area. If redness or soreness persist contact Dr. Airam Childs' or your primary care doctor. There may be a slight amount of blood passed from the rectum. Gaseous discomfort may develop, but walking, belching will help relieve this. You may not operate a vehicle for 12 hours  You may not operate machinery or dangerous appliances for rest of today  You may not drink alcoholic beverages for 12 hours  Avoid making any critical decisions for 24 hours    DIET:  You may resume your normal diet, but some patients find that heavy or large meals may lead to indigestion or vomiting. I suggest a light meal as first food intake. MEDICATIONS:  The use of some over-the-counter pain medication may lead to bleeding after colon biopsies or polyp removal.  Tylenol (also called acetaminophen) is safe to take even if you have had colonoscopy with polyp removal.  Based on the procedure you had today you may not safely take aspirin or aspirin-like products for the next ten (10) days. Remember that Tylenol (also called acetaminophen) is safe to take after colonoscopy even if you have had biopsies or polyps removed. ACTIVITY:  You may resume your normal household activities, but it is recommended that you spend the remainder of the day resting -  avoid any strenuous activity.     CALL DR. Airam Childs' OFFICE IF:  Increasing pain, nausea, vomiting  Abdominal distension (swelling)  Significant new or increased bleeding (oral or rectal)  Fever/Chills  Chest pain/shortness of breath                       Additional instructions:   Great news, no serious findings today but we did remove three small polyps and I'll contact you with those results in

## 2023-07-06 RX ORDER — SIMVASTATIN 20 MG
TABLET ORAL
Qty: 90 TABLET | Refills: 1 | Status: SHIPPED | OUTPATIENT
Start: 2023-07-06

## 2023-07-10 ENCOUNTER — PHARMACY VISIT (OUTPATIENT)
Age: 63
End: 2023-07-10

## 2023-07-10 DIAGNOSIS — E11.9 TYPE 2 DIABETES MELLITUS WITHOUT COMPLICATION, WITHOUT LONG-TERM CURRENT USE OF INSULIN (HCC): Primary | ICD-10-CM

## 2023-07-10 NOTE — PROGRESS NOTES
Pharmacy Progress Note - Diabetes Management    S/O: Ms. Broderick Calix is a 63 y.o. female, referred by Dr. Matias Raza MD, with a PMH of T2DM with nephropathy, HTN, HLD, GERD, asthma, arthritis, lumbar herniated disc, obesity, depression, was seen today for diabetes management follow up. Patient's last A1c was 6.3% (June 2023). Visit was completed using real time audio visual technology, [employer] video. Interim update: Pt logs into virtual visit and states she forgot to get new battery for glucometer. She knows that her fasting BG prior to endoscopy was 103 mg/dL. Reports taking Jardiance 20 mg (two 10 mg tabs). Will be starting 25 mg tab today. She is excited about her most recent A1c. She is eating more leafy veg instead of potatoes and focusing on chicken/fish instead of red meat. PCP increased dose of Losartan. She has not checked her BP at home. She has a wrist BP cuff. Discussed that this skews rdgs higher than upper arm cuff. She will investigate getting an arm cuff. Current anti-hyperglycemic regimen includes:    - Jardiance 25 mg daily  - Ozempic 1 mg weekly    ROS:  Today, Pt endorses:  - Symptoms of Hyperglycemia: none  - Symptoms of Hypoglycemia: none    Self Monitoring Blood Glucose (SMBG) or CGM:  - Brought in home glucometer/blood glucose log/CGM reader today:  no  - Checks BG: none      Vitals:   Wt Readings from Last 3 Encounters:   07/05/23 200 lb 6.4 oz (90.9 kg)   06/19/23 206 lb (93.4 kg)   08/02/22 229 lb (103.9 kg)     BP Readings from Last 3 Encounters:   07/05/23 (!) 148/99   06/19/23 (!) 170/100   08/02/22 (!) 146/90     Pulse Readings from Last 3 Encounters:   07/05/23 90   06/19/23 95   08/02/22 82       Past Medical History:   Diagnosis Date    Adverse effect of anesthesia     slow to wake up at dentist office    Anxiety and depression     Arthritis     Asthma     Chronic pain     3 herniated discs in lower back    Diabetes mellitus, type II ([address] )

## 2023-07-24 ENCOUNTER — PATIENT MESSAGE (OUTPATIENT)
Age: 63
End: 2023-07-24

## 2023-07-24 DIAGNOSIS — E11.9 TYPE 2 DIABETES MELLITUS WITHOUT COMPLICATION, WITHOUT LONG-TERM CURRENT USE OF INSULIN (HCC): Primary | ICD-10-CM

## 2023-07-25 RX ORDER — SEMAGLUTIDE 1.34 MG/ML
INJECTION, SOLUTION SUBCUTANEOUS
Qty: 9 ML | Refills: 2 | Status: SHIPPED | OUTPATIENT
Start: 2023-07-25

## 2023-07-25 NOTE — TELEPHONE ENCOUNTER
Pharmacy Progress Note     Pt requested refill via [employer] message. Refill provided.     Medications Discontinued During This Encounter   Medication Reason    Semaglutide, 1 MG/DOSE, (OZEMPIC, 1 MG/DOSE,) 4 MG/3ML SOPN REORDER     Orders Placed This Encounter    Semaglutide, 1 MG/DOSE, (OZEMPIC, 1 MG/DOSE,) 4 MG/3ML SOPN     Sig: INJECT 1 MG UNDER THE SKIN EVERY 7 DAYS     Dispense:  9 mL     Refill:  2         Tere Christy, PharmD, BCGP  Clinical Pharmacist Specialist      For Pharmacy Admin Tracking Only    Program: Medical Group  CPA in place:  Yes  Recommendation Provided To: Patient/Caregiver: 1 via [address] Detail: Refill(s) Provided  Intervention Accepted By: Patient/Caregiver: 1  Time Spent (min): 5

## 2023-08-18 RX ORDER — MONTELUKAST SODIUM 10 MG/1
10 TABLET ORAL DAILY
Qty: 90 TABLET | Refills: 1 | Status: SHIPPED | OUTPATIENT
Start: 2023-08-18

## 2023-08-23 ENCOUNTER — HOSPITAL ENCOUNTER (OUTPATIENT)
Facility: HOSPITAL | Age: 63
Discharge: HOME | End: 2023-08-26
Payer: MEDICARE

## 2023-08-23 DIAGNOSIS — N18.32 STAGE 3B CHRONIC KIDNEY DISEASE (HCC): ICD-10-CM

## 2023-08-23 PROCEDURE — 76770 US EXAM ABDO BACK WALL COMP: CPT

## 2023-08-28 ENCOUNTER — OFFICE VISIT (OUTPATIENT)
Age: 63
End: 2023-08-28
Payer: MEDICARE

## 2023-08-28 VITALS — WEIGHT: 205 LBS | BODY MASS INDEX: 31.17 KG/M2 | SYSTOLIC BLOOD PRESSURE: 110 MMHG | DIASTOLIC BLOOD PRESSURE: 62 MMHG

## 2023-08-28 DIAGNOSIS — N89.8 VAGINAL DRYNESS: ICD-10-CM

## 2023-08-28 DIAGNOSIS — R68.82 LOW LIBIDO: ICD-10-CM

## 2023-08-28 DIAGNOSIS — N95.1 VASOMOTOR SYMPTOMS DUE TO MENOPAUSE: ICD-10-CM

## 2023-08-28 DIAGNOSIS — Z01.419 WELL WOMAN EXAM: Primary | ICD-10-CM

## 2023-08-28 DIAGNOSIS — N94.10 DYSPAREUNIA, FEMALE: ICD-10-CM

## 2023-08-28 PROCEDURE — G8417 CALC BMI ABV UP PARAM F/U: HCPCS | Performed by: OBSTETRICS & GYNECOLOGY

## 2023-08-28 PROCEDURE — 99203 OFFICE O/P NEW LOW 30 MIN: CPT | Performed by: OBSTETRICS & GYNECOLOGY

## 2023-08-28 PROCEDURE — 1036F TOBACCO NON-USER: CPT | Performed by: OBSTETRICS & GYNECOLOGY

## 2023-08-28 PROCEDURE — 3017F COLORECTAL CA SCREEN DOC REV: CPT | Performed by: OBSTETRICS & GYNECOLOGY

## 2023-08-28 PROCEDURE — 3078F DIAST BP <80 MM HG: CPT | Performed by: OBSTETRICS & GYNECOLOGY

## 2023-08-28 PROCEDURE — G8428 CUR MEDS NOT DOCUMENT: HCPCS | Performed by: OBSTETRICS & GYNECOLOGY

## 2023-08-28 PROCEDURE — 3074F SYST BP LT 130 MM HG: CPT | Performed by: OBSTETRICS & GYNECOLOGY

## 2023-08-28 PROCEDURE — G0101 CA SCREEN;PELVIC/BREAST EXAM: HCPCS | Performed by: OBSTETRICS & GYNECOLOGY

## 2023-08-28 RX ORDER — ESTRADIOL 2 MG/1
1 RING VAGINAL ONCE
Qty: 1 EACH | Refills: 3 | Status: SHIPPED | OUTPATIENT
Start: 2023-08-28 | End: 2023-08-29

## 2023-08-28 RX ORDER — MONTELUKAST SODIUM 10 MG/1
10 TABLET ORAL DAILY
Qty: 90 TABLET | Refills: 3 | Status: SHIPPED | OUTPATIENT
Start: 2023-08-28

## 2023-08-28 NOTE — PROGRESS NOTES
without murmur  Extremities: no peripheral edema    Breasts  Inspection of Breasts: breasts symmetrical, no skin changes, no discharge present, nipple appearance normal, no skin retraction present  Palpation of Breasts and Axillae: no masses present on palpation, no breast tenderness  Axillary Lymph Nodes: no lymphadenopathy present    Gastrointestinal  Abdominal Examination: abdomen non-tender to palpation, normal bowel sounds, no masses present  Liver and spleen: no hepatomegaly present, spleen not palpable  Hernias: no hernias identified    Genitourinary  External Genitalia: normal appearance for age, no discharge present, no tenderness present, no inflammatory lesions present, no masses present, atrophy of UG mucosa  Vagina: normal vaginal vault without central or paravaginal defects, no discharge present, no inflammatory lesions present, no masses present  Bladder: non-tender to palpation  Urethra: appears normal  Cervix: Absent  Uterus: Absent  Adnexa: no adnexal tenderness present, no adnexal masses present  Perineum: perineum within normal limits, no evidence of trauma, no rashes or skin lesions present    Skin  General Inspection: no rash, no lesions identified    Neurologic/Psychiatric  Mental Status:  Orientation: grossly oriented to person, place and time  Mood and Affect: mood normal, affect appropriate      Assessment/Plan:  Kai Hyman is a 63 y.o. postmenopausal female presenting for annual exam. Overall doing well. 1. Well woman exam    2. Vaginal dryness  -We discussed the differential diagnosis to her vaginal dryness in detail. Likely from urogenital syndrome of menopause. She has previously tried Premarin cream which she reports did not aid with her symptoms. We discussed multiple treatment options on the market today. We also discussed lubrication with intercourse. Patient would like to try Estring.   We discussed the risk, benefits, indications, alternatives, side effects and possible

## 2023-08-29 ENCOUNTER — TELEPHONE (OUTPATIENT)
Age: 63
End: 2023-08-29

## 2023-08-29 RX ORDER — ESTRADIOL 10 UG/1
INSERT VAGINAL
Qty: 18 TABLET | Refills: 0 | Status: SHIPPED | OUTPATIENT
Start: 2023-08-29 | End: 2023-08-31

## 2023-08-29 NOTE — TELEPHONE ENCOUNTER
Patient calling in asking for an alternative to Estring rx yesterday at her appointment with Dr. Rebecca Beck. Patient states her insurance, Medicare, does not cover the medication.

## 2023-08-29 NOTE — TELEPHONE ENCOUNTER
Patient sent Mychart message with alternative recommendation suggested by Dr. Mirta Luu. Rx sent to pharmacy on file.

## 2023-08-31 LAB — TESTOST SERPL-MCNC: 15 NG/DL (ref 4–50)

## 2023-08-31 RX ORDER — ESTRADIOL 10 UG/1
INSERT VAGINAL
Qty: 57 TABLET | Refills: 1 | Status: SHIPPED | OUTPATIENT
Start: 2023-08-31

## 2023-08-31 RX ORDER — ESTRADIOL 10 UG/1
INSERT VAGINAL
Qty: 57 TABLET | Refills: 1 | OUTPATIENT
Start: 2023-08-31

## 2023-09-06 LAB
TESTOST FREE SERPL-MCNC: 1 PG/ML (ref 0–4.2)
TESTOST SERPL-MCNC: 15 NG/DL (ref 4–50)

## 2023-09-11 ENCOUNTER — PHARMACY VISIT (OUTPATIENT)
Age: 63
End: 2023-09-11

## 2023-09-11 DIAGNOSIS — E11.9 TYPE 2 DIABETES MELLITUS WITHOUT COMPLICATION, WITHOUT LONG-TERM CURRENT USE OF INSULIN (HCC): Primary | ICD-10-CM

## 2023-09-11 NOTE — PROGRESS NOTES
Pharmacy Progress Note - Diabetes Management    S/O: Ms. Sinan Mata is a 63 y.o. female, referred by Dr. Trevon Davis MD, with a PMH of T2DM with nephropathy, HTN, HLD, GERD, asthma, arthritis, lumbar herniated disc, obesity, depression, was seen today for diabetes management follow up. Patient's last A1c was 6.3% (June 2023). Visit was completed using real time audio visual technology, [employer] video. Interim update: Pt logs into virtual visit and states that she feels she has been doing well. She no longer is experiencing gas from the GLP-1 agonist therapy. Denies side effects. She is not checking BG at home - only when at doc offices. Current anti-hyperglycemic regimen includes:    - Ozempic 1 mg weekly  - Jardiance 25 mg daily    ROS:  Today, Pt endorses:  - Symptoms of Hyperglycemia: none  - Symptoms of Hypoglycemia: none    Self Monitoring Blood Glucose (SMBG) or CGM:  - Brought in home glucometer/blood glucose log/CGM reader today:  no  - Checks BG: never    Nutrition:  Salads  Less starches - hardly cook and will have small portions  Meat in salads  Sugar free candies occassionally      Vitals:   Wt Readings from Last 3 Encounters:   08/28/23 205 lb (93 kg)   07/05/23 200 lb 6.4 oz (90.9 kg)   06/19/23 206 lb (93.4 kg)     BP Readings from Last 3 Encounters:   08/28/23 110/62   07/05/23 (!) 148/99   06/19/23 (!) 170/100     Pulse Readings from Last 3 Encounters:   07/05/23 90   06/19/23 95   08/02/22 82       Past Medical History:   Diagnosis Date    Adverse effect of anesthesia     slow to wake up at dentist office    Anxiety and depression     Arthritis     Asthma     Chronic pain     3 herniated discs in lower back    Diabetes mellitus, type II ([address])     GERD (gastroesophageal reflux disease)     High cholesterol     History of colon polyps     Hypertension     MRSA infection 2008    left leg treated with antibiotics at [facility], NEGATIVE RESULT IN 2019 IN LAB REPORT    PUD

## 2023-09-19 DIAGNOSIS — N95.1 MENOPAUSAL AND FEMALE CLIMACTERIC STATES: Primary | ICD-10-CM

## 2023-09-19 RX ORDER — ESTRADIOL 2 MG/1
1 RING VAGINAL ONCE
Qty: 1 EACH | Refills: 3 | Status: SHIPPED | OUTPATIENT
Start: 2023-09-19 | End: 2023-09-19

## 2023-10-02 DIAGNOSIS — N95.1 MENOPAUSAL AND FEMALE CLIMACTERIC STATES: ICD-10-CM

## 2023-10-02 RX ORDER — ESTRADIOL 2 MG/1
1 RING VAGINAL ONCE
Qty: 1 EACH | Refills: 3 | Status: SHIPPED | OUTPATIENT
Start: 2023-10-02 | End: 2023-10-02

## 2023-10-05 RX ORDER — ESTRADIOL 10 UG/1
INSERT VAGINAL
Qty: 18 TABLET | OUTPATIENT
Start: 2023-10-05

## 2023-10-08 DIAGNOSIS — E11.9 TYPE 2 DIABETES MELLITUS WITHOUT COMPLICATION, WITHOUT LONG-TERM CURRENT USE OF INSULIN (HCC): ICD-10-CM

## 2023-10-09 RX ORDER — EMPAGLIFLOZIN 25 MG/1
25 TABLET, FILM COATED ORAL DAILY
Qty: 90 TABLET | Refills: 1 | Status: SHIPPED | OUTPATIENT
Start: 2023-10-09

## 2023-10-09 RX ORDER — SIMVASTATIN 20 MG
TABLET ORAL
Qty: 90 TABLET | Refills: 1 | Status: SHIPPED | OUTPATIENT
Start: 2023-10-09

## 2023-10-18 ENCOUNTER — HOSPITAL ENCOUNTER (OUTPATIENT)
Facility: HOSPITAL | Age: 63
Discharge: HOME | End: 2023-10-21
Payer: MEDICARE

## 2023-10-18 VITALS — HEIGHT: 68 IN | BODY MASS INDEX: 31.07 KG/M2 | WEIGHT: 205 LBS

## 2023-10-18 DIAGNOSIS — Z12.31 VISIT FOR SCREENING MAMMOGRAM: ICD-10-CM

## 2023-10-18 PROCEDURE — 77063 BREAST TOMOSYNTHESIS BI: CPT

## 2023-10-21 NOTE — ED NOTES
Assumed care of pt. Pt resting in bed A&Ox4, respirations unlabored, not vomiting, skin warm and dry. IVF infusing. Md states pt to be discharged soon. Pt denies pain. Has call bell. Voices no complaints. DISPLAY PLAN FREE TEXT

## 2023-10-30 ENCOUNTER — OFFICE VISIT (OUTPATIENT)
Age: 63
End: 2023-10-30
Payer: MEDICARE

## 2023-10-30 VITALS
OXYGEN SATURATION: 98 % | TEMPERATURE: 97.8 F | SYSTOLIC BLOOD PRESSURE: 136 MMHG | WEIGHT: 204 LBS | DIASTOLIC BLOOD PRESSURE: 82 MMHG | HEART RATE: 78 BPM | RESPIRATION RATE: 16 BRPM | BODY MASS INDEX: 30.92 KG/M2 | HEIGHT: 68 IN

## 2023-10-30 DIAGNOSIS — N18.32 CHRONIC KIDNEY DISEASE, STAGE 3B (HCC): ICD-10-CM

## 2023-10-30 DIAGNOSIS — R89.9 ABNORMAL LABORATORY TEST RESULT: ICD-10-CM

## 2023-10-30 DIAGNOSIS — B35.1 ONYCHOMYCOSIS: ICD-10-CM

## 2023-10-30 DIAGNOSIS — Z23 IMMUNIZATION DUE: ICD-10-CM

## 2023-10-30 DIAGNOSIS — E11.9 TYPE 2 DIABETES MELLITUS WITHOUT COMPLICATION, WITHOUT LONG-TERM CURRENT USE OF INSULIN (HCC): Primary | ICD-10-CM

## 2023-10-30 DIAGNOSIS — I10 PRIMARY HYPERTENSION: ICD-10-CM

## 2023-10-30 LAB — HBA1C MFR BLD: 5.7 %

## 2023-10-30 PROCEDURE — 3017F COLORECTAL CA SCREEN DOC REV: CPT | Performed by: FAMILY MEDICINE

## 2023-10-30 PROCEDURE — PBSHW AMB POC HEMOGLOBIN A1C: Performed by: FAMILY MEDICINE

## 2023-10-30 PROCEDURE — G8484 FLU IMMUNIZE NO ADMIN: HCPCS | Performed by: FAMILY MEDICINE

## 2023-10-30 PROCEDURE — G8417 CALC BMI ABV UP PARAM F/U: HCPCS | Performed by: FAMILY MEDICINE

## 2023-10-30 PROCEDURE — 2022F DILAT RTA XM EVC RTNOPTHY: CPT | Performed by: FAMILY MEDICINE

## 2023-10-30 PROCEDURE — 1036F TOBACCO NON-USER: CPT | Performed by: FAMILY MEDICINE

## 2023-10-30 PROCEDURE — 3046F HEMOGLOBIN A1C LEVEL >9.0%: CPT | Performed by: FAMILY MEDICINE

## 2023-10-30 PROCEDURE — 83036 HEMOGLOBIN GLYCOSYLATED A1C: CPT | Performed by: FAMILY MEDICINE

## 2023-10-30 PROCEDURE — 99214 OFFICE O/P EST MOD 30 MIN: CPT | Performed by: FAMILY MEDICINE

## 2023-10-30 PROCEDURE — 3074F SYST BP LT 130 MM HG: CPT | Performed by: FAMILY MEDICINE

## 2023-10-30 PROCEDURE — 3078F DIAST BP <80 MM HG: CPT | Performed by: FAMILY MEDICINE

## 2023-10-30 PROCEDURE — G8427 DOCREV CUR MEDS BY ELIG CLIN: HCPCS | Performed by: FAMILY MEDICINE

## 2023-10-30 RX ORDER — IBUPROFEN 800 MG/1
800 TABLET ORAL EVERY 6 HOURS PRN
COMMUNITY

## 2023-10-30 RX ORDER — ZOSTER VACCINE RECOMBINANT, ADJUVANTED 50 MCG/0.5
0.5 KIT INTRAMUSCULAR SEE ADMIN INSTRUCTIONS
Qty: 0.5 ML | Refills: 1 | Status: SHIPPED | OUTPATIENT
Start: 2023-10-30 | End: 2024-04-27

## 2023-10-30 SDOH — ECONOMIC STABILITY: FOOD INSECURITY: WITHIN THE PAST 12 MONTHS, THE FOOD YOU BOUGHT JUST DIDN'T LAST AND YOU DIDN'T HAVE MONEY TO GET MORE.: NEVER TRUE

## 2023-10-30 SDOH — ECONOMIC STABILITY: FOOD INSECURITY: WITHIN THE PAST 12 MONTHS, YOU WORRIED THAT YOUR FOOD WOULD RUN OUT BEFORE YOU GOT MONEY TO BUY MORE.: NEVER TRUE

## 2023-10-30 SDOH — ECONOMIC STABILITY: INCOME INSECURITY: HOW HARD IS IT FOR YOU TO PAY FOR THE VERY BASICS LIKE FOOD, HOUSING, MEDICAL CARE, AND HEATING?: NOT HARD AT ALL

## 2023-10-30 ASSESSMENT — ENCOUNTER SYMPTOMS
NAUSEA: 0
COUGH: 0
BACK PAIN: 0
SHORTNESS OF BREATH: 0
VOMITING: 0

## 2023-10-30 ASSESSMENT — PATIENT HEALTH QUESTIONNAIRE - PHQ9
SUM OF ALL RESPONSES TO PHQ QUESTIONS 1-9: 2
SUM OF ALL RESPONSES TO PHQ9 QUESTIONS 1 & 2: 2
SUM OF ALL RESPONSES TO PHQ QUESTIONS 1-9: 2
1. LITTLE INTEREST OR PLEASURE IN DOING THINGS: 1
SUM OF ALL RESPONSES TO PHQ QUESTIONS 1-9: 2
2. FEELING DOWN, DEPRESSED OR HOPELESS: 1
SUM OF ALL RESPONSES TO PHQ QUESTIONS 1-9: 2

## 2023-10-30 NOTE — PROGRESS NOTES
Chief Complaint   Patient presents with    Diabetes    Follow-up Chronic Condition     1. \"Have you been to the ER, urgent care clinic since your last visit? Hospitalized since your last visit? \" no    2. \"Have you seen or consulted any other health care providers outside of the [address] [place] [address] since your last visit? \"  Nephrology       A1c    Eye - yes  VEI

## 2023-10-30 NOTE — PROGRESS NOTES
EZEQUIEL Forbes 66 y.o. female  presents to the office today for follow up on chronic conditions. There were no vitals taken for this visit. There is no height or weight on file to calculate BMI. Chief Complaint   Patient presents with    Diabetes    Follow-up Chronic Condition      Hypertension: BP at office today 160/70 and 136/82 on manual recheck. Pt continues with Losartan 50mg and amlodipine 10mg daily. Hyperlipidemia: Lipid panel on 3/14/23 was WNL. Pt continues with simvastatin 20mg nightly. DM2: A1c per POC today 5.7, down from A1c of 6.3 on 6/19/23. Pt continues with Jardiance 25mg daily and Semaglutide 1mg weekly. She has been feeling bumps on right lower leg. The area is also numb after her accident. She denies pain or swelling. The leg feels better after her  massages it. Pt also reports the toenail on her left big toe is thick and white. Health Maintenance:   Shingrix: due, prescription given   [id number]: due, prescription given  COVID Booster: planning to get   Flu Shot: planning to get     Current Outpatient Medications   Medication Sig Dispense Refill    simvastatin (ZOCOR) 20 MG tablet TAKE 1 TABLET BY MOUTH EVERY NIGHT 90 tablet 1    JARDIANCE 25 MG tablet TAKE 1 TABLET BY MOUTH DAILY 90 tablet 1    Estradiol (ESTRING) 7.5 MCG/24HR RING Place 1 each vaginally once for 1 dose Place 1 each vaginally once for 1 dose Place one ring vaginally for three months.  Remove at the end of three months and replace with a new one. 1 each 3    Estradiol (VAGIFEM) 10 MCG TABS vaginal tablet PLACE 1 TABLET VAGINALLY DAILY FOR 2 WEEKS THEN TWICE PER WEEK 57 tablet 1    montelukast (SINGULAIR) 10 MG tablet TAKE 1 TABLET BY MOUTH DAILY 90 tablet 3    Semaglutide, 1 MG/DOSE, (OZEMPIC, 1 MG/DOSE,) 4 MG/3ML SOPN INJECT 1 MG UNDER THE SKIN EVERY 7 DAYS 9 mL 2    losartan (COZAAR) 50 MG tablet Take 1 tablet by mouth daily 90 tablet 1    nystatin (MYCOSTATIN) 955840 UNIT/GM cream APPLY

## 2023-10-31 LAB
ALBUMIN SERPL-MCNC: 4.1 G/DL (ref 3.5–5)
ALBUMIN/GLOB SERPL: 1 (ref 1.1–2.2)
ALP SERPL-CCNC: 124 U/L (ref 45–117)
ALT SERPL-CCNC: 32 U/L (ref 12–78)
ANION GAP SERPL CALC-SCNC: 5 MMOL/L (ref 5–15)
AST SERPL-CCNC: 14 U/L (ref 15–37)
BILIRUB SERPL-MCNC: 0.5 MG/DL (ref 0.2–1)
BUN SERPL-MCNC: 23 MG/DL (ref 6–20)
BUN/CREAT SERPL: 16 (ref 12–20)
CALCIUM SERPL-MCNC: 9.6 MG/DL (ref 8.5–10.1)
CHLORIDE SERPL-SCNC: 107 MMOL/L (ref 97–108)
CHOLEST SERPL-MCNC: 188 MG/DL
CO2 SERPL-SCNC: 24 MMOL/L (ref 21–32)
CREAT SERPL-MCNC: 1.48 MG/DL (ref 0.55–1.02)
CREAT UR-MCNC: 121 MG/DL
GLOBULIN SER CALC-MCNC: 4.1 G/DL (ref 2–4)
GLUCOSE SERPL-MCNC: 134 MG/DL (ref 65–100)
HDLC SERPL-MCNC: 101 MG/DL
HDLC SERPL: 1.9 (ref 0–5)
LDLC SERPL CALC-MCNC: 72.2 MG/DL (ref 0–100)
MICROALBUMIN UR-MCNC: 26.5 MG/DL
MICROALBUMIN/CREAT UR-RTO: 219 MG/G (ref 0–30)
POTASSIUM SERPL-SCNC: 4.2 MMOL/L (ref 3.5–5.1)
PROT SERPL-MCNC: 8.2 G/DL (ref 6.4–8.2)
SODIUM SERPL-SCNC: 136 MMOL/L (ref 136–145)
TRIGL SERPL-MCNC: 74 MG/DL
VLDLC SERPL CALC-MCNC: 14.8 MG/DL

## 2023-11-02 DIAGNOSIS — E11.9 TYPE 2 DIABETES MELLITUS WITHOUT COMPLICATION, WITHOUT LONG-TERM CURRENT USE OF INSULIN (HCC): ICD-10-CM

## 2023-11-02 DIAGNOSIS — R89.9 ABNORMAL LABORATORY TEST RESULT: Primary | ICD-10-CM

## 2023-11-28 RX ORDER — AMLODIPINE BESYLATE 10 MG/1
10 TABLET ORAL DAILY
Qty: 90 TABLET | Refills: 1 | Status: SHIPPED | OUTPATIENT
Start: 2023-11-28

## 2023-11-30 RX ORDER — IBUPROFEN 800 MG/1
800 TABLET ORAL EVERY 6 HOURS PRN
Qty: 30 TABLET | Refills: 1 | Status: SHIPPED | OUTPATIENT
Start: 2023-11-30

## 2023-12-01 RX ORDER — IBUPROFEN 800 MG/1
800 TABLET ORAL EVERY 6 HOURS PRN
Qty: 90 TABLET | Refills: 0 | OUTPATIENT
Start: 2023-12-01

## 2023-12-13 ENCOUNTER — TELEMEDICINE (OUTPATIENT)
Age: 63
End: 2023-12-13
Payer: MEDICARE

## 2023-12-13 ENCOUNTER — TELEPHONE (OUTPATIENT)
Age: 63
End: 2023-12-13

## 2023-12-13 DIAGNOSIS — J45.21 MILD INTERMITTENT ASTHMA WITH ACUTE EXACERBATION: Primary | ICD-10-CM

## 2023-12-13 DIAGNOSIS — R51.9 ACUTE NONINTRACTABLE HEADACHE, UNSPECIFIED HEADACHE TYPE: ICD-10-CM

## 2023-12-13 PROCEDURE — 3017F COLORECTAL CA SCREEN DOC REV: CPT | Performed by: STUDENT IN AN ORGANIZED HEALTH CARE EDUCATION/TRAINING PROGRAM

## 2023-12-13 PROCEDURE — 99214 OFFICE O/P EST MOD 30 MIN: CPT | Performed by: STUDENT IN AN ORGANIZED HEALTH CARE EDUCATION/TRAINING PROGRAM

## 2023-12-13 PROCEDURE — G8428 CUR MEDS NOT DOCUMENT: HCPCS | Performed by: STUDENT IN AN ORGANIZED HEALTH CARE EDUCATION/TRAINING PROGRAM

## 2023-12-13 RX ORDER — ALBUTEROL SULFATE 90 UG/1
2 AEROSOL, METERED RESPIRATORY (INHALATION) EVERY 6 HOURS PRN
Qty: 18 G | Refills: 3 | Status: SHIPPED | OUTPATIENT
Start: 2023-12-13

## 2023-12-13 RX ORDER — BENZONATATE 200 MG/1
200 CAPSULE ORAL 3 TIMES DAILY PRN
Qty: 21 CAPSULE | Refills: 0 | Status: SHIPPED | OUTPATIENT
Start: 2023-12-13 | End: 2023-12-20

## 2023-12-13 RX ORDER — IBUPROFEN 800 MG/1
800 TABLET ORAL EVERY 8 HOURS PRN
Qty: 90 TABLET | Refills: 1 | Status: SHIPPED | OUTPATIENT
Start: 2023-12-13

## 2023-12-13 NOTE — PROGRESS NOTES
Highest education level: Not on file   Occupational History    Not on file   Tobacco Use    Smoking status: Former     Packs/day: 1     Types: Cigarettes     Quit date: 10/30/2012     Years since quittin.1    Smokeless tobacco: Never   Vaping Use    Vaping Use: Every day    Substances: Nicotine   Substance and Sexual Activity    Alcohol use: Yes     Alcohol/week: 14.0 standard drinks of alcohol     Types: 14 Cans of beer per week    Drug use: Yes     Frequency: 7.0 times per week     Types: Marijuana Lewis Gallardo)    Sexual activity: Not on file   Other Topics Concern    Not on file   Social History Narrative    Not on file     Social Determinants of Health     Financial Resource Strain: Low Risk  (10/30/2023)    Overall Financial Resource Strain (CARDIA)     Difficulty of Paying Living Expenses: Not hard at all   Food Insecurity: Not on file (10/30/2023)   Transportation Needs: Unknown (10/30/2023)    PRAPARE - Transportation     Lack of Transportation (Medical): Not on file     Lack of Transportation (Non-Medical):  No   Physical Activity: Not on file   Stress: Not on file   Social Connections: Not on file   Intimate Partner Violence: Not on file   Housing Stability: Unknown (10/30/2023)    Housing Stability Vital Sign     Unable to Pay for Housing in the Last Year: Not on file     Number of [address] in the Last Year: Not on file     Unstable Housing in the Last Year: No     Patient Active Problem List   Diagnosis    Vitamin D deficiency    Obesity, Class I, BMI 30-34.9    Back pain    Anxiety    Depression    Severe obesity (HCC)    HTN (hypertension)    Asthma    Hyperlipidemia with target LDL less than 100    Lumbar herniated disc    Status post surgery    Type 2 diabetes mellitus with chronic kidney disease ([facility])    Stage 3b chronic kidney disease ([address] )          Current Medications/Allergies   Medications and Allergies reviewed:    Current Outpatient Medications   Medication Sig Dispense Refill

## 2023-12-13 NOTE — TELEPHONE ENCOUNTER
Pt called said she had a 10:45 VV and sent an email  with the link instead of a text. Pt said she was asleep and missed the appt. Her phone is going to cut off at 12 pm and was wondering if she can be seen today before 12 pm.    [facility]# [phone]

## 2023-12-14 ENCOUNTER — NURSE ONLY (OUTPATIENT)
Age: 63
End: 2023-12-14
Payer: MEDICARE

## 2023-12-14 DIAGNOSIS — J06.9 UPPER RESPIRATORY TRACT INFECTION, UNSPECIFIED TYPE: Primary | ICD-10-CM

## 2023-12-14 DIAGNOSIS — R68.89 FLU-LIKE SYMPTOMS: ICD-10-CM

## 2023-12-14 DIAGNOSIS — R05.1 ACUTE COUGH: ICD-10-CM

## 2023-12-14 LAB
LOT EXPIRE DATE: NORMAL
LOT KIT NUMBER: NORMAL
QUICKVUE INFLUENZA TEST: NEGATIVE
SARS-COV-2, POC: NORMAL
VALID INTERNAL CONTROL, POC: YES
VALID INTERNAL CONTROL: YES
VENDOR AND KIT NAME POC: NORMAL

## 2023-12-14 PROCEDURE — 87804 INFLUENZA ASSAY W/OPTIC: CPT | Performed by: STUDENT IN AN ORGANIZED HEALTH CARE EDUCATION/TRAINING PROGRAM

## 2023-12-14 PROCEDURE — 87426 SARSCOV CORONAVIRUS AG IA: CPT | Performed by: STUDENT IN AN ORGANIZED HEALTH CARE EDUCATION/TRAINING PROGRAM

## 2023-12-14 PROCEDURE — PBSHW AMB POC SARS-COV-2: Performed by: STUDENT IN AN ORGANIZED HEALTH CARE EDUCATION/TRAINING PROGRAM

## 2023-12-14 PROCEDURE — PBSHW AMB POC RAPID INFLUENZA TEST: Performed by: STUDENT IN AN ORGANIZED HEALTH CARE EDUCATION/TRAINING PROGRAM

## 2023-12-14 RX ORDER — AZITHROMYCIN 250 MG/1
250 TABLET, FILM COATED ORAL SEE ADMIN INSTRUCTIONS
Qty: 6 TABLET | Refills: 0 | Status: SHIPPED | OUTPATIENT
Start: 2023-12-14 | End: 2023-12-19

## 2023-12-29 RX ORDER — TRIAMCINOLONE ACETONIDE 0.25 MG/G
OINTMENT TOPICAL 2 TIMES DAILY
Qty: 15 G | Refills: 0 | Status: SHIPPED | OUTPATIENT
Start: 2023-12-29

## 2024-01-03 DIAGNOSIS — I10 PRIMARY HYPERTENSION: ICD-10-CM

## 2024-01-03 RX ORDER — LOSARTAN POTASSIUM 50 MG/1
50 TABLET ORAL DAILY
Qty: 90 TABLET | Refills: 1 | Status: SHIPPED | OUTPATIENT
Start: 2024-01-03

## 2024-01-09 ENCOUNTER — OFFICE VISIT (OUTPATIENT)
Age: 64
End: 2024-01-09
Payer: MEDICARE

## 2024-01-09 VITALS — BODY MASS INDEX: 31.47 KG/M2 | SYSTOLIC BLOOD PRESSURE: 134 MMHG | DIASTOLIC BLOOD PRESSURE: 62 MMHG | WEIGHT: 207 LBS

## 2024-01-09 DIAGNOSIS — B37.31 VULVOVAGINAL CANDIDIASIS: ICD-10-CM

## 2024-01-09 DIAGNOSIS — N90.89 VULVAR IRRITATION: Primary | ICD-10-CM

## 2024-01-09 PROCEDURE — G8484 FLU IMMUNIZE NO ADMIN: HCPCS | Performed by: OBSTETRICS & GYNECOLOGY

## 2024-01-09 PROCEDURE — G8427 DOCREV CUR MEDS BY ELIG CLIN: HCPCS | Performed by: OBSTETRICS & GYNECOLOGY

## 2024-01-09 PROCEDURE — 3075F SYST BP GE 130 - 139MM HG: CPT | Performed by: OBSTETRICS & GYNECOLOGY

## 2024-01-09 PROCEDURE — 3017F COLORECTAL CA SCREEN DOC REV: CPT | Performed by: OBSTETRICS & GYNECOLOGY

## 2024-01-09 PROCEDURE — 99213 OFFICE O/P EST LOW 20 MIN: CPT | Performed by: OBSTETRICS & GYNECOLOGY

## 2024-01-09 PROCEDURE — 3078F DIAST BP <80 MM HG: CPT | Performed by: OBSTETRICS & GYNECOLOGY

## 2024-01-09 PROCEDURE — 1036F TOBACCO NON-USER: CPT | Performed by: OBSTETRICS & GYNECOLOGY

## 2024-01-09 PROCEDURE — G8417 CALC BMI ABV UP PARAM F/U: HCPCS | Performed by: OBSTETRICS & GYNECOLOGY

## 2024-01-09 RX ORDER — TERBINAFINE HYDROCHLORIDE 250 MG/1
250 TABLET ORAL DAILY
COMMUNITY
Start: 2024-01-06

## 2024-01-09 RX ORDER — FLUCONAZOLE 150 MG/1
150 TABLET ORAL
Qty: 2 TABLET | Refills: 0 | Status: SHIPPED | OUTPATIENT
Start: 2024-01-09 | End: 2024-01-15

## 2024-01-09 RX ORDER — GABAPENTIN 100 MG/1
1 CAPSULE ORAL NIGHTLY
COMMUNITY

## 2024-01-09 NOTE — PROGRESS NOTES
GYN Problem Visit    Bonnie Rodarte is a 63 y.o.  presenting for problem visit. Her main concern today is vaginal irritation. She attributes this to her Estring potentially being in the wrong position. She has been using OTC vulva care cream with some relief.       Ob/Gyn Hx:   - Largest baby 9lb 16oz.  Menopause age - Unsure, h/o Totally Hysterectomy  PMB - None  Menopausal symptoms - Hot flashes, night sweats, mood swings, vaginal dryness.  HRT - None  Sexually active - yes, male partner      Past Medical History:   Diagnosis Date    Adverse effect of anesthesia     slow to wake up at dentist office    Anxiety and depression     Arthritis     Asthma     Chronic pain     3 herniated discs in lower back    Diabetes mellitus, type II (HCC)     GERD (gastroesophageal reflux disease)     High cholesterol     History of colon polyps     Hypertension     MRSA infection     left leg treated with antibiotics at [facility], NEGATIVE RESULT IN 2019 IN LAB REPORT    PUD (peptic ulcer disease)     S/P partial thyroidectomy     Seasonal allergies     Sleep apnea     Sleep apnea treated with continuous positive airway pressure (CPAP)     Stage 3a chronic kidney disease (CKD) (HCC)     Thyroid disease        Past Surgical History:   Procedure Laterality Date    COLONOSCOPY N/A 2022    COLONOSCOPY   :- performed by Dorene Knowles MD at [facility] ENDOSCOPY    COLONOSCOPY N/A 2017    COLONOSCOPY performed by Dorene Knowles MD at [facility] ENDOSCOPY    COLONOSCOPY N/A 2023    COLONOSCOPY DIAGNOSTIC performed by Amrin Funk MD at [facility] ENDOSCOPY    COLONOSCOPY N/A 2023    COLONOSCOPY POLYPECTOMY SNARE/COLD BIOPSY performed by Armin Funk MD at [facility] ENDOSCOPY    HYSTERECTOMY (CERVIX STATUS UNKNOWN)      ORTHOPEDIC SURGERY      left hand ligament removed    OTHER SURGICAL HISTORY      corticosteroid injections - back    PARATHYROIDECTOMY  2019    Left lower parathyroidectomy with

## 2024-01-12 LAB
A VAGINAE DNA VAG QL NAA+PROBE: ABNORMAL SCORE
BVAB2 DNA VAG QL NAA+PROBE: ABNORMAL SCORE
C ALBICANS DNA VAG QL NAA+PROBE: POSITIVE
C GLABRATA DNA VAG QL NAA+PROBE: NEGATIVE
C TRACH DNA VAG QL NAA+PROBE: NEGATIVE
MEGA1 DNA VAG QL NAA+PROBE: ABNORMAL SCORE
N GONORRHOEA DNA VAG QL NAA+PROBE: NEGATIVE
SPECIMEN STATUS REPORT: NORMAL
T VAGINALIS DNA VAG QL NAA+PROBE: NEGATIVE

## 2024-03-04 ENCOUNTER — PATIENT MESSAGE (OUTPATIENT)
Age: 64
End: 2024-03-04

## 2024-03-04 DIAGNOSIS — E11.9 TYPE 2 DIABETES MELLITUS WITHOUT COMPLICATION, WITHOUT LONG-TERM CURRENT USE OF INSULIN (HCC): ICD-10-CM

## 2024-03-05 RX ORDER — SEMAGLUTIDE 1.34 MG/ML
INJECTION, SOLUTION SUBCUTANEOUS
Qty: 3 ML | Refills: 0 | Status: SHIPPED | OUTPATIENT
Start: 2024-03-05

## 2024-03-05 RX ORDER — AMLODIPINE BESYLATE 10 MG/1
10 TABLET ORAL DAILY
Qty: 90 TABLET | Refills: 0 | Status: SHIPPED | OUTPATIENT
Start: 2024-03-05

## 2024-03-05 NOTE — TELEPHONE ENCOUNTER
Pharmacy Progress Note     Pt contacted this writer via [employer] message requesting refill of Ozempic.  She is due for f/up with PCP.  Last A1c was well controlled.    Sent in 1 mo supply refill and encouraged pt to schedule f/up with PCP.    Medications Discontinued During This Encounter   Medication Reason    Semaglutide, 1 MG/DOSE, (OZEMPIC, 1 MG/DOSE,) 4 MG/3ML SOPN REORDER     Orders Placed This Encounter    Semaglutide, 1 MG/DOSE, (OZEMPIC, 1 MG/DOSE,) 4 MG/3ML SOPN     Sig: INJECT 1 MG UNDER THE SKIN EVERY 7 DAYS     Dispense:  3 mL     Refill:  Taylor RosarioD, BCGP  Clinical Pharmacist Specialist      For Pharmacy Admin Tracking Only    Program: Medical Group  CPA in place:  Yes  Recommendation Provided To: Patient/Caregiver: 2 via [employer] Message  Intervention Detail: Refill(s) Provided and Scheduled Appointment  Intervention Accepted By: Patient/Caregiver: 2  Time Spent (min): 5

## 2024-04-04 RX ORDER — PANTOPRAZOLE SODIUM 40 MG/1
40 TABLET, DELAYED RELEASE ORAL DAILY
Qty: 90 TABLET | Refills: 0 | Status: SHIPPED | OUTPATIENT
Start: 2024-04-04

## 2024-04-04 RX ORDER — PANTOPRAZOLE SODIUM 40 MG/1
40 TABLET, DELAYED RELEASE ORAL DAILY
Qty: 30 TABLET | Refills: 0 | Status: SHIPPED | OUTPATIENT
Start: 2024-04-04 | End: 2024-04-04 | Stop reason: SDUPTHER

## 2024-04-26 DIAGNOSIS — E11.9 TYPE 2 DIABETES MELLITUS WITHOUT COMPLICATION, WITHOUT LONG-TERM CURRENT USE OF INSULIN (HCC): ICD-10-CM

## 2024-04-26 RX ORDER — SEMAGLUTIDE 1.34 MG/ML
INJECTION, SOLUTION SUBCUTANEOUS
Qty: 3 ML | Refills: 0 | Status: SHIPPED | OUTPATIENT
Start: 2024-04-26

## 2024-04-26 NOTE — TELEPHONE ENCOUNTER
Last appointment: VV 12/13/23 [place], 10/30/23 Chirstopher  Next appointment: 4/30/24 Christopher  Previous refill encounter(s): 3/5/24 3ml    Requested Prescriptions     Pending Prescriptions Disp Refills    OZEMPIC, 1 MG/DOSE, 4 MG/3ML SOPN sc injection [Pharmacy Med Name: OZEMPIC 1MG PER DOSE (4MG/3ML) PFP] 3 mL 0     Sig: INJECT 1 MG UNDER THE SKIN EVERY 7 DAYS     For Pharmacy Admin Tracking Only    Program: Medication Refill  CPA in place:    Recommendation Provided To:   Intervention Detail: New Rx: 1, reason: Patient Preference  Intervention Accepted By:   Gap Closed?:    Time Spent (min): 5

## 2024-04-30 ENCOUNTER — OFFICE VISIT (OUTPATIENT)
Age: 64
End: 2024-04-30
Payer: MEDICARE

## 2024-04-30 VITALS
HEART RATE: 88 BPM | WEIGHT: 208.4 LBS | SYSTOLIC BLOOD PRESSURE: 118 MMHG | TEMPERATURE: 98.6 F | DIASTOLIC BLOOD PRESSURE: 73 MMHG | RESPIRATION RATE: 16 BRPM | OXYGEN SATURATION: 98 % | HEIGHT: 68 IN | BODY MASS INDEX: 31.58 KG/M2

## 2024-04-30 DIAGNOSIS — J30.2 SEASONAL ALLERGIC RHINITIS, UNSPECIFIED TRIGGER: ICD-10-CM

## 2024-04-30 DIAGNOSIS — E11.22 TYPE 2 DIABETES MELLITUS WITH STAGE 3B CHRONIC KIDNEY DISEASE, WITHOUT LONG-TERM CURRENT USE OF INSULIN (HCC): Primary | ICD-10-CM

## 2024-04-30 DIAGNOSIS — N18.32 TYPE 2 DIABETES MELLITUS WITH STAGE 3B CHRONIC KIDNEY DISEASE, WITHOUT LONG-TERM CURRENT USE OF INSULIN (HCC): Primary | ICD-10-CM

## 2024-04-30 DIAGNOSIS — F17.290 VAPING NICOTINE DEPENDENCE, TOBACCO PRODUCT: ICD-10-CM

## 2024-04-30 DIAGNOSIS — N18.32 CHRONIC KIDNEY DISEASE, STAGE 3B (HCC): ICD-10-CM

## 2024-04-30 DIAGNOSIS — Z23 IMMUNIZATION DUE: ICD-10-CM

## 2024-04-30 DIAGNOSIS — Z00.00 ENCOUNTER FOR SUBSEQUENT ANNUAL WELLNESS VISIT (AWV) IN MEDICARE PATIENT: ICD-10-CM

## 2024-04-30 LAB — HBA1C MFR BLD: 5.6 %

## 2024-04-30 PROCEDURE — 99214 OFFICE O/P EST MOD 30 MIN: CPT | Performed by: FAMILY MEDICINE

## 2024-04-30 PROCEDURE — 3017F COLORECTAL CA SCREEN DOC REV: CPT | Performed by: FAMILY MEDICINE

## 2024-04-30 PROCEDURE — 1036F TOBACCO NON-USER: CPT | Performed by: FAMILY MEDICINE

## 2024-04-30 PROCEDURE — G8417 CALC BMI ABV UP PARAM F/U: HCPCS | Performed by: FAMILY MEDICINE

## 2024-04-30 PROCEDURE — 3074F SYST BP LT 130 MM HG: CPT | Performed by: FAMILY MEDICINE

## 2024-04-30 PROCEDURE — 2022F DILAT RTA XM EVC RTNOPTHY: CPT | Performed by: FAMILY MEDICINE

## 2024-04-30 PROCEDURE — G0439 PPPS, SUBSEQ VISIT: HCPCS | Performed by: FAMILY MEDICINE

## 2024-04-30 PROCEDURE — PBSHW AMB POC HEMOGLOBIN A1C: Performed by: FAMILY MEDICINE

## 2024-04-30 PROCEDURE — 3046F HEMOGLOBIN A1C LEVEL >9.0%: CPT | Performed by: FAMILY MEDICINE

## 2024-04-30 PROCEDURE — 3078F DIAST BP <80 MM HG: CPT | Performed by: FAMILY MEDICINE

## 2024-04-30 PROCEDURE — 83036 HEMOGLOBIN GLYCOSYLATED A1C: CPT | Performed by: FAMILY MEDICINE

## 2024-04-30 PROCEDURE — G8427 DOCREV CUR MEDS BY ELIG CLIN: HCPCS | Performed by: FAMILY MEDICINE

## 2024-04-30 RX ORDER — VARENICLINE TARTRATE 0.5 MG/1
.5-1 TABLET, FILM COATED ORAL SEE ADMIN INSTRUCTIONS
Qty: 57 TABLET | Refills: 0 | Status: SHIPPED | OUTPATIENT
Start: 2024-04-30 | End: 2024-05-01

## 2024-04-30 RX ORDER — SEMAGLUTIDE 2.68 MG/ML
2 INJECTION, SOLUTION SUBCUTANEOUS
Qty: 9 ML | Refills: 5 | Status: SHIPPED | OUTPATIENT
Start: 2024-04-30

## 2024-04-30 RX ORDER — VARENICLINE TARTRATE 1 MG/1
1 TABLET, FILM COATED ORAL 2 TIMES DAILY
Qty: 60 TABLET | Refills: 3 | Status: SHIPPED | OUTPATIENT
Start: 2024-04-30

## 2024-04-30 RX ORDER — LEVOCETIRIZINE DIHYDROCHLORIDE 5 MG/1
5 TABLET, FILM COATED ORAL DAILY
Qty: 90 TABLET | Refills: 1 | Status: SHIPPED | OUTPATIENT
Start: 2024-04-30

## 2024-04-30 RX ORDER — ZOSTER VACCINE RECOMBINANT, ADJUVANTED 50 MCG/0.5
0.5 KIT INTRAMUSCULAR SEE ADMIN INSTRUCTIONS
Qty: 0.5 ML | Refills: 1 | Status: SHIPPED | OUTPATIENT
Start: 2024-04-30 | End: 2024-10-27

## 2024-04-30 ASSESSMENT — PATIENT HEALTH QUESTIONNAIRE - PHQ9
10. IF YOU CHECKED OFF ANY PROBLEMS, HOW DIFFICULT HAVE THESE PROBLEMS MADE IT FOR YOU TO DO YOUR WORK, TAKE CARE OF THINGS AT HOME, OR GET ALONG WITH OTHER PEOPLE: NOT DIFFICULT AT ALL
6. FEELING BAD ABOUT YOURSELF - OR THAT YOU ARE A FAILURE OR HAVE LET YOURSELF OR YOUR FAMILY DOWN: NOT AT ALL
SUM OF ALL RESPONSES TO PHQ QUESTIONS 1-9: 0
SUM OF ALL RESPONSES TO PHQ QUESTIONS 1-9: 0
2. FEELING DOWN, DEPRESSED OR HOPELESS: NOT AT ALL
9. THOUGHTS THAT YOU WOULD BE BETTER OFF DEAD, OR OF HURTING YOURSELF: NOT AT ALL
1. LITTLE INTEREST OR PLEASURE IN DOING THINGS: NOT AT ALL
SUM OF ALL RESPONSES TO PHQ9 QUESTIONS 1 & 2: 0
4. FEELING TIRED OR HAVING LITTLE ENERGY: NOT AT ALL
5. POOR APPETITE OR OVEREATING: NOT AT ALL
SUM OF ALL RESPONSES TO PHQ QUESTIONS 1-9: 0
3. TROUBLE FALLING OR STAYING ASLEEP: NOT AT ALL
8. MOVING OR SPEAKING SO SLOWLY THAT OTHER PEOPLE COULD HAVE NOTICED. OR THE OPPOSITE, BEING SO FIGETY OR RESTLESS THAT YOU HAVE BEEN MOVING AROUND A LOT MORE THAN USUAL: NOT AT ALL
SUM OF ALL RESPONSES TO PHQ QUESTIONS 1-9: 0
7. TROUBLE CONCENTRATING ON THINGS, SUCH AS READING THE NEWSPAPER OR WATCHING TELEVISION: NOT AT ALL

## 2024-04-30 ASSESSMENT — LIFESTYLE VARIABLES
HOW MANY STANDARD DRINKS CONTAINING ALCOHOL DO YOU HAVE ON A TYPICAL DAY: 1 OR 2
HOW OFTEN DO YOU HAVE A DRINK CONTAINING ALCOHOL: MONTHLY OR LESS

## 2024-04-30 NOTE — PROGRESS NOTES
Chief Complaint   Patient presents with    Diabetes     Follow up    Cholesterol Problem     Follow up     \"Have you been to the ER, urgent care clinic since your last visit?  Hospitalized since your last visit?\"    NO    “Have you seen or consulted any other health care providers outside of [facility] since your last visit?”    NO            Click Here for Release of Records Request             4/30/2024    11:36 AM   PHQ-9    Little interest or pleasure in doing things 0   Feeling down, depressed, or hopeless 0   Trouble falling or staying asleep, or sleeping too much 0   Feeling tired or having little energy 0   Poor appetite or overeating 0   Feeling bad about yourself - or that you are a failure or have let yourself or your family down 0   Trouble concentrating on things, such as reading the newspaper or watching television 0   Moving or speaking so slowly that other people could have noticed. Or the opposite - being so fidgety or restless that you have been moving around a lot more than usual 0   Thoughts that you would be better off dead, or of hurting yourself in some way 0   PHQ-2 Score 0   PHQ-9 Total Score 0   If you checked off any problems, how difficult have these problems made it for you to do your work, take care of things at home, or get along with other people? 0           Financial Resource Strain: Low Risk  (10/30/2023)    Overall Financial Resource Strain (CARDIA)     Difficulty of Paying Living Expenses: Not hard at all      Food Insecurity: Not on file (10/30/2023)          Health Maintenance Due   Topic Date Due    Pneumococcal 0-64 years Vaccine (1 of 2 - PCV) Never done    Shingles vaccine (1 of 2) Never done    Respiratory Syncytial Virus (RSV) Pregnant or age 60 yrs+ (1 - 1-dose 60+ series) Never done    Diabetic retinal exam  07/14/2023    COVID-19 Vaccine (5 - 2023-24 season) 09/01/2023    Annual Wellness Visit (Medicare)  04/10/2024       
Medicare Annual Wellness Visit    Bonnie Rodarte is here for Diabetes (Follow up) and Cholesterol Problem (Follow up)    Assessment & Plan   Type 2 diabetes mellitus with stage 3b chronic kidney disease, without long-term current use of insulin (HCC)  Assessment & Plan:   Well-controlled, continue current medications and continue current treatment plan  Orders:  -     AMB POC HEMOGLOBIN A1C  -     semaglutide, 2 MG/DOSE, (OZEMPIC, 2 MG/DOSE,) 8 MG/3ML SOPN sc injection; Inject 2 mg into the skin every 7 days, Disp-9 mL, R-5Normal  -     empagliflozin (JARDIANCE) 25 MG tablet; Take 1 tablet by mouth daily, Disp-90 tablet, R-1Normal  -     Lipid Panel; Future  -     Comprehensive Metabolic Panel; Future  Chronic kidney disease, stage 3b (HCC)  Assessment & Plan:   Asymptomatic, continue current treatment plan  Seasonal allergic rhinitis, unspecified trigger  -     levocetirizine (XYZAL) 5 MG tablet; Take 1 tablet by mouth daily, Disp-90 tablet, R-1Normal  Immunization due  -     zoster recombinant adjuvanted vaccine (SHINGRIX) 50 MCG/0.5ML SUSR injection; Inject 0.5 mLs into the muscle See Admin Instructions 1 dose now and repeat in 2-6 months, Disp-0.5 mL, R-1Print  Encounter for subsequent annual wellness visit (AWV) in Medicare patient  Vaping nicotine dependence, tobacco product  -     varenicline (CHANTIX CONTINUING MONTH MACK) 1 MG tablet; Take 1 tablet by mouth 2 times daily, Disp-60 tablet, R-3Normal    Recommendations for Preventive Services Due: see orders and patient instructions/AVS.  Recommended screening schedule for the next 5-10 years is provided to the patient in written form: see Patient Instructions/AVS.     Return in about 6 months (around 10/30/2024) for Diabetes.     Subjective       Patient's complete Health Risk Assessment and screening values have been reviewed and are found in Flowsheets. The following problems were reviewed today and where indicated follow up appointments were made and/or 
neck supple.   Skin:     General: Skin is warm and dry.   Neurological:      General: No focal deficit present.      Mental Status: She is alert and oriented to person, place, and time.   Psychiatric:         Mood and Affect: Mood normal.         Behavior: Behavior normal.         Judgment: Judgment normal.           ASSESSMENT and PLAN  Bonnie was seen today for diabetes and cholesterol problem.    Diagnoses and all orders for this visit:    Type 2 diabetes mellitus with stage 3b chronic kidney disease, without long-term current use of insulin ([facility])  A1c per POC today 5.6, down from A1c of 5.7 on 10/30/23. She is doing well with Ozempic. I increased her dose to 2mg weekly. She continues with Jardiance 25mg daily. We discussed her side effects with Ozempic are normal. Pt will have updated lab work performed during today's OV.   -     AMB POC HEMOGLOBIN A1C  -     semaglutide, 2 MG/DOSE, (OZEMPIC, 2 MG/DOSE,) 8 MG/3ML SOPN sc injection; Inject 2 mg into the skin every 7 days  -     empagliflozin (JARDIANCE) 25 MG tablet; Take 1 tablet by mouth daily  -     Lipid Panel; Future  -     Comprehensive Metabolic Panel; Future    Chronic kidney disease, stage 3b ([facility])  Followed by nephrologist, Dr. Vera. Pt's last lab values on 10/30/23, BUN: 23, Creatinine: 1.48, BUN/Creatinine Ratio: 16, and GFR 40.    Seasonal allergic rhinitis, unspecified trigger  Stable. Pt continues with Xyzal 5mg daily. Pt requests a refill of their medication, which I have granted.  -     levocetirizine (XYZAL) 5 MG tablet; Take 1 tablet by mouth daily    Immunization due  Pt is due for Shingrix. They were given a prescription to get the vaccine at their pharmacy.   -     zoster recombinant adjuvanted vaccine (SHINGRIX) 50 MCG/0.5ML SUSR injection; Inject 0.5 mLs into the muscle See Admin Instructions 1 dose now and repeat in 2-6 months    Encounter for subsequent annual wellness visit (AWV) in Medicare patient  Medicare questionnaire discussed

## 2024-05-01 LAB
ALBUMIN SERPL-MCNC: 4.3 G/DL (ref 3.5–5)
ALBUMIN/GLOB SERPL: 1.2 (ref 1.1–2.2)
ALP SERPL-CCNC: 110 U/L (ref 45–117)
ALT SERPL-CCNC: 33 U/L (ref 12–78)
ANION GAP SERPL CALC-SCNC: 8 MMOL/L (ref 5–15)
AST SERPL-CCNC: 16 U/L (ref 15–37)
BILIRUB SERPL-MCNC: 0.5 MG/DL (ref 0.2–1)
BUN SERPL-MCNC: 27 MG/DL (ref 6–20)
BUN/CREAT SERPL: 17 (ref 12–20)
CALCIUM SERPL-MCNC: 10.4 MG/DL (ref 8.5–10.1)
CHLORIDE SERPL-SCNC: 106 MMOL/L (ref 97–108)
CHOLEST SERPL-MCNC: 176 MG/DL
CO2 SERPL-SCNC: 21 MMOL/L (ref 21–32)
CREAT SERPL-MCNC: 1.6 MG/DL (ref 0.55–1.02)
GLOBULIN SER CALC-MCNC: 3.7 G/DL (ref 2–4)
GLUCOSE SERPL-MCNC: 110 MG/DL (ref 65–100)
HDLC SERPL-MCNC: 92 MG/DL
HDLC SERPL: 1.9 (ref 0–5)
LDLC SERPL CALC-MCNC: 69.2 MG/DL (ref 0–100)
POTASSIUM SERPL-SCNC: 4.6 MMOL/L (ref 3.5–5.1)
PROT SERPL-MCNC: 8 G/DL (ref 6.4–8.2)
SODIUM SERPL-SCNC: 135 MMOL/L (ref 136–145)
TRIGL SERPL-MCNC: 74 MG/DL
VLDLC SERPL CALC-MCNC: 14.8 MG/DL

## 2024-05-01 RX ORDER — VARENICLINE TARTRATE 0.5 MG/1
TABLET, FILM COATED ORAL
Qty: 168 TABLET | Refills: 1 | Status: SHIPPED | OUTPATIENT
Start: 2024-05-01

## 2024-05-30 ENCOUNTER — TRANSCRIBE ORDERS (OUTPATIENT)
Facility: HOSPITAL | Age: 64
End: 2024-05-30

## 2024-05-30 DIAGNOSIS — Z12.31 VISIT FOR SCREENING MAMMOGRAM: Primary | ICD-10-CM

## 2024-05-30 NOTE — RESULT ENCOUNTER NOTE
Patient needs to see Dr. Barreto nephrology her renal function has progressively gotten worse serum creatinine is 1.6 please let patient know that she has to follow-up with her nephrologist Dr.Bhavesh Estevan MD.      Patient's lipid panel stable keep up the good work.

## 2024-06-04 ENCOUNTER — HOSPITAL ENCOUNTER (EMERGENCY)
Facility: HOSPITAL | Age: 64
Discharge: HOME | End: 2024-06-04
Payer: MEDICARE

## 2024-06-04 ENCOUNTER — APPOINTMENT (OUTPATIENT)
Facility: HOSPITAL | Age: 64
End: 2024-06-04
Payer: MEDICARE

## 2024-06-04 VITALS
BODY MASS INDEX: 32.13 KG/M2 | DIASTOLIC BLOOD PRESSURE: 70 MMHG | HEIGHT: 68 IN | TEMPERATURE: 97.8 F | WEIGHT: 212 LBS | HEART RATE: 94 BPM | RESPIRATION RATE: 20 BRPM | OXYGEN SATURATION: 100 % | SYSTOLIC BLOOD PRESSURE: 126 MMHG

## 2024-06-04 DIAGNOSIS — M17.11 OSTEOARTHRITIS OF RIGHT KNEE, UNSPECIFIED OSTEOARTHRITIS TYPE: Primary | ICD-10-CM

## 2024-06-04 DIAGNOSIS — S83.91XA SPRAIN OF RIGHT KNEE, UNSPECIFIED LIGAMENT, INITIAL ENCOUNTER: ICD-10-CM

## 2024-06-04 PROCEDURE — 73562 X-RAY EXAM OF KNEE 3: CPT

## 2024-06-04 PROCEDURE — 6370000000 HC RX 637 (ALT 250 FOR IP): Performed by: NURSE PRACTITIONER

## 2024-06-04 PROCEDURE — 99283 EMERGENCY DEPT VISIT LOW MDM: CPT

## 2024-06-04 RX ORDER — SENNOSIDES 8.6 MG
650 CAPSULE ORAL EVERY 8 HOURS PRN
Qty: 60 TABLET | Refills: 0 | Status: SHIPPED | OUTPATIENT
Start: 2024-06-04

## 2024-06-04 RX ORDER — OXYCODONE HYDROCHLORIDE 5 MG/1
5 TABLET ORAL
Status: COMPLETED | OUTPATIENT
Start: 2024-06-04 | End: 2024-06-04

## 2024-06-04 RX ADMIN — OXYCODONE 5 MG: 5 TABLET ORAL at 14:37

## 2024-06-04 ASSESSMENT — PAIN - FUNCTIONAL ASSESSMENT: PAIN_FUNCTIONAL_ASSESSMENT: 0-10

## 2024-06-04 ASSESSMENT — PAIN DESCRIPTION - DESCRIPTORS: DESCRIPTORS: THROBBING;ACHING

## 2024-06-04 ASSESSMENT — PAIN DESCRIPTION - LOCATION
LOCATION: KNEE
LOCATION: KNEE

## 2024-06-04 ASSESSMENT — PAIN SCALES - GENERAL
PAINLEVEL_OUTOF10: 10
PAINLEVEL_OUTOF10: 9

## 2024-06-04 ASSESSMENT — PAIN DESCRIPTION - ORIENTATION
ORIENTATION: RIGHT
ORIENTATION: RIGHT

## 2024-06-04 NOTE — DISCHARGE INSTRUCTIONS
It was a pleasure taking care of you at [facility] Emergency Department today.  We know that when you come to [facility], you are entrusting us with your health, comfort, and safety.  Our physicians and nurses honor that trust, and we truly appreciate the opportunity to care for you and your loved ones.      We also value our feedback.  If you receive a survey about your Emergency Department experience today, please fill it out.  We care about our patients' feedback, and we listen to what you have to say.  Thank you!

## 2024-06-04 NOTE — ED PROVIDER NOTES
Jose  Take 1 tablet by mouth 2 times daily     * varenicline 0.5 MG tablet  Commonly known as: CHANTIX  TAKE 1 TABLET BY MOUTH DAILY FOR 3 DAYS FOLLOWED BY 1 TABLET TWICE DAILY FOR 4 DAYS FOLLOWED BY 1 MG TWICE DAILY           * This list has 4 medication(s) that are the same as other medications prescribed for you. Read the directions carefully, and ask your doctor or other care provider to review them with you.                   Where to Get Your Medications        These medications were sent to [id number] - [place], [place] - [address] - P [phone] - F [phone] [address] [address]      Phone: [phone]   acetaminophen 650 MG extended release tablet  diclofenac sodium 1 % Gel           DISCONTINUED MEDICATIONS:  Discharge Medication List as of 6/4/2024  3:01 PM        I have seen and evaluated the patient autonomously. My supervision physician was on site and available for consultation if needed.     I am the Primary Clinician of Record.   SONDRA Gandara NP (electronically signed)    (Please note that parts of this dictation were completed with voice recognition software. Quite often unanticipated grammatical, syntax, homophones, and other interpretive errors are inadvertently transcribed by the computer software. Please disregards these errors. Please excuse any errors that have escaped final proofreading.)       Pablo Arevalo APRN - NP  06/04/24 1384

## 2024-06-04 NOTE — ED NOTES
Pt presents to ED complaining of right knee pain. Pt states that when she was getting in the car her knee pops and the started hurt. Pt took 3 Tylenol and Percocet at around 12:30 pm.  Pt states that she cannot put weigh on her right leg due too pain , with pain score of 9/10  Pt is alert and oriented x 4, RR even and unlabored, skin is warm and dry. Assesment completed and pt updated on plan of care.       Emergency Department Nursing Plan of Care       The Nursing Plan of Care is developed from the Nursing assessment and Emergency Department Attending provider initial evaluation.  The plan of care may be reviewed in the “ED Provider note”.    The Plan of Care was developed with the following considerations:   Presenting ambulatory assessment: Ambulating at baseline  Patient / Family readiness to learn indicated by: verbalized understanding  Persons(s) to be included in education: patient   Barriers to Learning/Limitations: None    Signed     SAY LENNON RN    6/4/2024   1:59 PM

## 2024-06-04 NOTE — ED NOTES
Discharge instructions were given to the patient by SAY LENNON RN.     The patient left the Emergency Department alert and oriented and in no acute distress with 2 prescriptions. The patient was encouraged to call or return to the ED for worsening issues or problems and was encouraged to schedule a follow up appointment for continuing care.     Ambulation assessment completed before discharge.  Pt left Emergency Department ambulating at baseline with crutches knee immobilizer  Ortho device education: proper application and proper use    The patient verbalized understanding of discharge instructions and prescriptions, all questions were answered. The patient has no further concerns at this time.

## 2024-06-04 NOTE — ED TRIAGE NOTES
Pt states she took extra strength tylenol and half of percocet   Pt in wheelchair and uses cane to walk sometimes

## 2024-06-28 NOTE — TELEPHONE ENCOUNTER
Last appointment: 4/30/24 Christopher, lipid 4/2024  Next appointment: 10/29/24 Christopher  Previous refill encounter(s): 10/9/23 90+ 1    Requested Prescriptions     Pending Prescriptions Disp Refills    simvastatin (ZOCOR) 20 MG tablet [Pharmacy Med Name: SIMVASTATIN 20MG TABLETS] 90 tablet 1     Sig: Take 1 tablet by mouth nightly     For Pharmacy Admin Tracking Only    Program: Medication Refill  CPA in place:    Recommendation Provided To:   Intervention Detail: New Rx: 1, reason: Patient Preference  Intervention Accepted By:   Gap Closed?:    Time Spent (min): 5

## 2024-06-29 DIAGNOSIS — I10 PRIMARY HYPERTENSION: ICD-10-CM

## 2024-06-29 RX ORDER — SIMVASTATIN 20 MG
20 TABLET ORAL NIGHTLY
Qty: 90 TABLET | Refills: 1 | Status: SHIPPED | OUTPATIENT
Start: 2024-06-29

## 2024-07-01 DIAGNOSIS — N18.32 TYPE 2 DIABETES MELLITUS WITH STAGE 3B CHRONIC KIDNEY DISEASE, WITHOUT LONG-TERM CURRENT USE OF INSULIN (HCC): ICD-10-CM

## 2024-07-01 DIAGNOSIS — E11.22 TYPE 2 DIABETES MELLITUS WITH STAGE 3B CHRONIC KIDNEY DISEASE, WITHOUT LONG-TERM CURRENT USE OF INSULIN (HCC): ICD-10-CM

## 2024-07-01 RX ORDER — LOSARTAN POTASSIUM 50 MG/1
50 TABLET ORAL DAILY
Qty: 90 TABLET | Refills: 1 | Status: SHIPPED | OUTPATIENT
Start: 2024-07-01

## 2024-07-01 RX ORDER — EMPAGLIFLOZIN 25 MG/1
25 TABLET, FILM COATED ORAL DAILY
Qty: 90 TABLET | Refills: 1 | OUTPATIENT
Start: 2024-07-01

## 2024-07-01 NOTE — TELEPHONE ENCOUNTER
PCP: Jc White MD    Last appt: 4/30/2024   Future Appointments   Date Time Provider Department Center   10/21/2024  9:30 AM [facility] 1 [facility]   10/29/2024 10:30 AM Jc White MD [facility]       Requested Prescriptions     Pending Prescriptions Disp Refills    losartan (COZAAR) 50 MG tablet [Pharmacy Med Name: LOSARTAN 50MG TABLETS] 90 tablet 1     Sig: TAKE 1 TABLET BY MOUTH DAILY         Prior labs and Blood pressures:  BP Readings from Last 3 Encounters:   06/04/24 126/70   04/30/24 118/73   01/09/24 134/62     Lab Results   Component Value Date/Time     04/30/2024 12:49 PM    K 4.6 04/30/2024 12:49 PM     04/30/2024 12:49 PM    CO2 21 04/30/2024 12:49 PM    BUN 27 04/30/2024 12:49 PM    GFRAA 44 09/21/2022 01:56 PM     Lab Results   Component Value Date/Time    [id number] 5.6 04/30/2024 12:13 PM     Lab Results   Component Value Date/Time    CHOL 176 04/30/2024 12:49 PM    HDL 92 04/30/2024 12:49 PM    LDL 69.2 04/30/2024 12:49 PM    LDL 72.2 10/30/2023 12:18 PM    VLDL 14.8 04/30/2024 12:49 PM     No results found for: \"VITD3\"    No results found for: \"TSH\", \"TSH2\", \"TSH3\"

## 2024-07-04 DIAGNOSIS — N95.1 MENOPAUSAL AND FEMALE CLIMACTERIC STATES: ICD-10-CM

## 2024-07-05 RX ORDER — ESTRADIOL 2 MG/1
SYSTEM VAGINAL
Qty: 1 EACH | Refills: 4 | Status: SHIPPED | OUTPATIENT
Start: 2024-07-05

## 2024-08-01 ENCOUNTER — HOSPITAL ENCOUNTER (OUTPATIENT)
Facility: HOSPITAL | Age: 64
Discharge: HOME | End: 2024-08-04
Attending: ORTHOPAEDIC SURGERY

## 2024-08-01 DIAGNOSIS — M17.11 LOCALIZED OSTEOARTHRITIS OF RIGHT KNEE: ICD-10-CM

## 2024-08-24 ENCOUNTER — HOSPITAL ENCOUNTER (OUTPATIENT)
Facility: HOSPITAL | Age: 64
End: 2024-08-24
Attending: ORTHOPAEDIC SURGERY
Payer: MEDICARE

## 2024-08-24 PROCEDURE — 73721 MRI JNT OF LWR EXTRE W/O DYE: CPT

## 2024-08-26 RX ORDER — AMLODIPINE BESYLATE 10 MG/1
10 TABLET ORAL DAILY
Qty: 90 TABLET | Refills: 0 | Status: CANCELLED | OUTPATIENT
Start: 2024-08-26

## 2024-08-27 RX ORDER — AMLODIPINE BESYLATE 10 MG/1
10 TABLET ORAL DAILY
Qty: 90 TABLET | Refills: 1 | Status: SHIPPED | OUTPATIENT
Start: 2024-08-27

## 2024-08-27 NOTE — TELEPHONE ENCOUNTER
Duplicate request from patient for amlodipine.  Advised via [employer] message request has been sent to MD White for review.  Thanks, Migdalia    For Pharmacy Admin Tracking Only    Program: Medication Refill  CPA in place:    Recommendation Provided To:   Intervention Detail: Discontinued Rx: 1, reason: Duplicate Therapy  Intervention Accepted By:   Gap Closed?:    Time Spent (min): 5

## 2024-08-27 NOTE — TELEPHONE ENCOUNTER
Last appointment: 4/30/24 Christopher  Next appointment: 10/29/24 Christopher  Previous refill encounter(s): 3/5/24 90    Requested Prescriptions     Pending Prescriptions Disp Refills    amLODIPine (NORVASC) 10 MG tablet [Pharmacy Med Name: AMLODIPINE BESYLATE 10MG TABLETS] 90 tablet 1     Sig: Take 1 tablet by mouth daily     For Pharmacy Admin Tracking Only    Program: Medication Refill  CPA in place:    Recommendation Provided To:   Intervention Detail: New Rx: 1, reason: Patient Preference  Intervention Accepted By:   Gap Closed?:    Time Spent (min): 5

## 2024-10-21 ENCOUNTER — HOSPITAL ENCOUNTER (OUTPATIENT)
Facility: HOSPITAL | Age: 64
Discharge: HOME | End: 2024-10-24
Attending: FAMILY MEDICINE
Payer: MEDICARE

## 2024-10-21 VITALS — WEIGHT: 212 LBS | BODY MASS INDEX: 32.13 KG/M2 | HEIGHT: 68 IN

## 2024-10-21 DIAGNOSIS — Z12.31 VISIT FOR SCREENING MAMMOGRAM: ICD-10-CM

## 2024-10-21 PROCEDURE — 77063 BREAST TOMOSYNTHESIS BI: CPT

## 2024-10-28 DIAGNOSIS — J30.2 SEASONAL ALLERGIC RHINITIS, UNSPECIFIED TRIGGER: ICD-10-CM

## 2024-10-28 RX ORDER — LEVOCETIRIZINE DIHYDROCHLORIDE 5 MG/1
5 TABLET, FILM COATED ORAL DAILY
Qty: 90 TABLET | Refills: 1 | Status: SHIPPED | OUTPATIENT
Start: 2024-10-28

## 2024-10-28 NOTE — TELEPHONE ENCOUNTER
PCP: Jc White MD    Last appt: 4/30/2024     Future Appointments   Date Time Provider Department Center   10/29/2024 10:30 AM Jc White MD [facility] DEP       Requested Prescriptions     Pending Prescriptions Disp Refills    levocetirizine (XYZAL) 5 MG tablet [Pharmacy Med Name: LEVOCETIRIZINE 5MG TABLETS] 90 tablet 1     Sig: TAKE 1 TABLET BY MOUTH DAILY       Prior labs and Blood pressures:  BP Readings from Last 3 Encounters:   06/04/24 126/70   04/30/24 118/73   01/09/24 134/62     Lab Results   Component Value Date/Time     04/30/2024 12:49 PM    K 4.6 04/30/2024 12:49 PM     04/30/2024 12:49 PM    CO2 21 04/30/2024 12:49 PM    BUN 27 04/30/2024 12:49 PM    GFRAA 44 09/21/2022 01:56 PM     Lab Results   Component Value Date/Time    [id number] 5.6 04/30/2024 12:13 PM     Lab Results   Component Value Date/Time    CHOL 176 04/30/2024 12:49 PM    HDL 92 04/30/2024 12:49 PM    LDL 69.2 04/30/2024 12:49 PM    LDL 72.2 10/30/2023 12:18 PM    VLDL 14.8 04/30/2024 12:49 PM     No results found for: \"VITD3\"    No results found for: \"TSH\", \"TSH2\", \"TSH3\"

## 2024-12-02 RX ORDER — MONTELUKAST SODIUM 10 MG/1
10 TABLET ORAL DAILY
Qty: 90 TABLET | Refills: 1 | Status: SHIPPED | OUTPATIENT
Start: 2024-12-02

## 2024-12-02 RX ORDER — AMLODIPINE BESYLATE 10 MG/1
10 TABLET ORAL DAILY
Qty: 30 TABLET | Refills: 0 | OUTPATIENT
Start: 2024-12-02

## 2024-12-02 RX ORDER — MONTELUKAST SODIUM 10 MG/1
10 TABLET ORAL DAILY
Qty: 30 TABLET | Refills: 5 | Status: SHIPPED | OUTPATIENT
Start: 2024-12-02

## 2024-12-02 NOTE — TELEPHONE ENCOUNTER
Patient called stating that she is needing a refill on her Singulair medication. Patient has now gone 4 days without this medication, and is hoping to get a call back as soon as possible.    Best call back number  [phone]

## 2024-12-27 ENCOUNTER — TELEPHONE (OUTPATIENT)
Age: 64
End: 2024-12-27

## 2024-12-27 DIAGNOSIS — N18.32 TYPE 2 DIABETES MELLITUS WITH STAGE 3B CHRONIC KIDNEY DISEASE, WITHOUT LONG-TERM CURRENT USE OF INSULIN (HCC): ICD-10-CM

## 2024-12-27 DIAGNOSIS — I10 PRIMARY HYPERTENSION: ICD-10-CM

## 2024-12-27 DIAGNOSIS — E11.22 TYPE 2 DIABETES MELLITUS WITH STAGE 3B CHRONIC KIDNEY DISEASE, WITHOUT LONG-TERM CURRENT USE OF INSULIN (HCC): ICD-10-CM

## 2024-12-27 NOTE — TELEPHONE ENCOUNTER
PCP: Jc White MD    Last appt: 4/30/2024     No future appointments.    Requested Prescriptions     Pending Prescriptions Disp Refills    pantoprazole (PROTONIX) 40 MG tablet 90 tablet 0     Sig: Take 1 tablet by mouth daily         Prior labs and Blood pressures:  BP Readings from Last 3 Encounters:   06/04/24 126/70   04/30/24 118/73   01/09/24 134/62     Lab Results   Component Value Date/Time     04/30/2024 12:49 PM    K 4.6 04/30/2024 12:49 PM     04/30/2024 12:49 PM    CO2 21 04/30/2024 12:49 PM    BUN 27 04/30/2024 12:49 PM    GFRAA 44 09/21/2022 01:56 PM     Lab Results   Component Value Date/Time    [id number] 5.6 04/30/2024 12:13 PM     Lab Results   Component Value Date/Time    CHOL 176 04/30/2024 12:49 PM    HDL 92 04/30/2024 12:49 PM    LDL 69.2 04/30/2024 12:49 PM    LDL 72.2 10/30/2023 12:18 PM    VLDL 14.8 04/30/2024 12:49 PM

## 2024-12-27 NOTE — TELEPHONE ENCOUNTER
Patient called office to request a new PAP supply prescription informed patient she would need an up to date office visit to have her provider create a new prescription. Patient last seen in 2019. Patient stated she believes she has been to another sleep providers office since then and will contact them to request a prescription.

## 2024-12-28 RX ORDER — SIMVASTATIN 20 MG
20 TABLET ORAL NIGHTLY
Qty: 30 TABLET | Refills: 0 | Status: SHIPPED | OUTPATIENT
Start: 2024-12-28 | End: 2024-12-28

## 2024-12-28 RX ORDER — SIMVASTATIN 20 MG
20 TABLET ORAL NIGHTLY
Qty: 30 TABLET | Refills: 0 | Status: SHIPPED | OUTPATIENT
Start: 2024-12-28

## 2024-12-28 RX ORDER — LOSARTAN POTASSIUM 50 MG/1
50 TABLET ORAL DAILY
Qty: 30 TABLET | Refills: 0 | Status: SHIPPED | OUTPATIENT
Start: 2024-12-28

## 2024-12-28 RX ORDER — EMPAGLIFLOZIN 25 MG/1
25 TABLET, FILM COATED ORAL DAILY
Qty: 30 TABLET | Refills: 0 | Status: SHIPPED | OUTPATIENT
Start: 2024-12-28

## 2024-12-30 ENCOUNTER — TELEPHONE (OUTPATIENT)
Age: 64
End: 2024-12-30

## 2024-12-30 DIAGNOSIS — I10 PRIMARY HYPERTENSION: ICD-10-CM

## 2024-12-30 NOTE — TELEPHONE ENCOUNTER
PCP: Jc White MD    Last appt: 4/30/2024     Future Appointments   Date Time Provider Department Center   1/21/2025  9:15 AM Chelly Rider MD [facility]   3/31/2025 11:15 AM Jc White MD [facility] DEP       Requested Prescriptions     Pending Prescriptions Disp Refills    losartan (COZAAR) 50 MG tablet [Pharmacy Med Name: LOSARTAN 50MG TABLETS] 90 tablet      Sig: TAKE 1 TABLET BY MOUTH DAILY         Prior labs and Blood pressures:  BP Readings from Last 3 Encounters:   06/04/24 126/70   04/30/24 118/73   01/09/24 134/62     Lab Results   Component Value Date/Time     04/30/2024 12:49 PM    K 4.6 04/30/2024 12:49 PM     04/30/2024 12:49 PM    CO2 21 04/30/2024 12:49 PM    BUN 27 04/30/2024 12:49 PM    GFRAA 44 09/21/2022 01:56 PM     Lab Results   Component Value Date/Time    PAI0WAPG 5.6 04/30/2024 12:13 PM     Lab Results   Component Value Date/Time    CHOL 176 04/30/2024 12:49 PM    HDL 92 04/30/2024 12:49 PM    LDL 69.2 04/30/2024 12:49 PM    LDL 72.2 10/30/2023 12:18 PM    VLDL 14.8 04/30/2024 12:49 PM     No results found for: \"VITD3\"    No results found for: \"TSH\", \"TSH2\", \"TSH3\"     no

## 2024-12-30 NOTE — TELEPHONE ENCOUNTER
Patient called in, name and  verified. Patient is calling as she believes she has a yeast infection. She has not yet tried anything OTC. It has been recommended that she try OTC monistat 7 day and if her sx persist, then to call our office. Pt has expressed understanding. She has been placed on the upcoming schedule just incase.

## 2025-01-02 RX ORDER — LOSARTAN POTASSIUM 50 MG/1
50 TABLET ORAL DAILY
Qty: 90 TABLET | OUTPATIENT
Start: 2025-01-02

## 2025-01-07 ENCOUNTER — OFFICE VISIT (OUTPATIENT)
Age: 65
End: 2025-01-07
Payer: MEDICARE

## 2025-01-07 VITALS
RESPIRATION RATE: 16 BRPM | SYSTOLIC BLOOD PRESSURE: 112 MMHG | HEART RATE: 99 BPM | OXYGEN SATURATION: 98 % | BODY MASS INDEX: 33.19 KG/M2 | HEIGHT: 68 IN | WEIGHT: 219 LBS | DIASTOLIC BLOOD PRESSURE: 76 MMHG | TEMPERATURE: 97.8 F

## 2025-01-07 DIAGNOSIS — N89.8 VAGINAL IRRITATION: ICD-10-CM

## 2025-01-07 DIAGNOSIS — B37.31 VAGINAL CANDIDIASIS: Primary | ICD-10-CM

## 2025-01-07 PROCEDURE — G8417 CALC BMI ABV UP PARAM F/U: HCPCS | Performed by: OBSTETRICS & GYNECOLOGY

## 2025-01-07 PROCEDURE — G8427 DOCREV CUR MEDS BY ELIG CLIN: HCPCS | Performed by: OBSTETRICS & GYNECOLOGY

## 2025-01-07 PROCEDURE — 3074F SYST BP LT 130 MM HG: CPT | Performed by: OBSTETRICS & GYNECOLOGY

## 2025-01-07 PROCEDURE — 3017F COLORECTAL CA SCREEN DOC REV: CPT | Performed by: OBSTETRICS & GYNECOLOGY

## 2025-01-07 PROCEDURE — M1308 PR FLU IMMUNIZE NO ADMIN: HCPCS | Performed by: OBSTETRICS & GYNECOLOGY

## 2025-01-07 PROCEDURE — 99213 OFFICE O/P EST LOW 20 MIN: CPT | Performed by: OBSTETRICS & GYNECOLOGY

## 2025-01-07 PROCEDURE — 3078F DIAST BP <80 MM HG: CPT | Performed by: OBSTETRICS & GYNECOLOGY

## 2025-01-07 PROCEDURE — 1036F TOBACCO NON-USER: CPT | Performed by: OBSTETRICS & GYNECOLOGY

## 2025-01-07 RX ORDER — FLUCONAZOLE 150 MG/1
150 TABLET ORAL SEE ADMIN INSTRUCTIONS
Qty: 2 TABLET | Refills: 0 | Status: SHIPPED | OUTPATIENT
Start: 2025-01-07

## 2025-01-07 SDOH — ECONOMIC STABILITY: FOOD INSECURITY: WITHIN THE PAST 12 MONTHS, YOU WORRIED THAT YOUR FOOD WOULD RUN OUT BEFORE YOU GOT MONEY TO BUY MORE.: NEVER TRUE

## 2025-01-07 SDOH — ECONOMIC STABILITY: FOOD INSECURITY: WITHIN THE PAST 12 MONTHS, THE FOOD YOU BOUGHT JUST DIDN'T LAST AND YOU DIDN'T HAVE MONEY TO GET MORE.: NEVER TRUE

## 2025-01-07 SDOH — ECONOMIC STABILITY: INCOME INSECURITY: HOW HARD IS IT FOR YOU TO PAY FOR THE VERY BASICS LIKE FOOD, HOUSING, MEDICAL CARE, AND HEATING?: NOT HARD AT ALL

## 2025-01-07 ASSESSMENT — PATIENT HEALTH QUESTIONNAIRE - PHQ9
6. FEELING BAD ABOUT YOURSELF - OR THAT YOU ARE A FAILURE OR HAVE LET YOURSELF OR YOUR FAMILY DOWN: NOT AT ALL
SUM OF ALL RESPONSES TO PHQ QUESTIONS 1-9: 1
1. LITTLE INTEREST OR PLEASURE IN DOING THINGS: SEVERAL DAYS
8. MOVING OR SPEAKING SO SLOWLY THAT OTHER PEOPLE COULD HAVE NOTICED. OR THE OPPOSITE, BEING SO FIGETY OR RESTLESS THAT YOU HAVE BEEN MOVING AROUND A LOT MORE THAN USUAL: NOT AT ALL
SUM OF ALL RESPONSES TO PHQ9 QUESTIONS 1 & 2: 1
3. TROUBLE FALLING OR STAYING ASLEEP: NOT AT ALL
SUM OF ALL RESPONSES TO PHQ QUESTIONS 1-9: 1
9. THOUGHTS THAT YOU WOULD BE BETTER OFF DEAD, OR OF HURTING YOURSELF: NOT AT ALL
10. IF YOU CHECKED OFF ANY PROBLEMS, HOW DIFFICULT HAVE THESE PROBLEMS MADE IT FOR YOU TO DO YOUR WORK, TAKE CARE OF THINGS AT HOME, OR GET ALONG WITH OTHER PEOPLE: NOT DIFFICULT AT ALL
4. FEELING TIRED OR HAVING LITTLE ENERGY: NOT AT ALL
7. TROUBLE CONCENTRATING ON THINGS, SUCH AS READING THE NEWSPAPER OR WATCHING TELEVISION: NOT AT ALL
5. POOR APPETITE OR OVEREATING: NOT AT ALL
2. FEELING DOWN, DEPRESSED OR HOPELESS: NOT AT ALL
SUM OF ALL RESPONSES TO PHQ QUESTIONS 1-9: 1
SUM OF ALL RESPONSES TO PHQ QUESTIONS 1-9: 1

## 2025-01-07 NOTE — PROGRESS NOTES
Problem Visit    Bonnie Rodarte is a 64 y.o. presenting for problem visit. Her main concern today is vaginal irritation and white discharge for one week.      Ob/Gyn Hx:  A1  LMP - No LMP recorded. S/p hysterectomy.  Hx of STI - denies  SA - yes     Health maintenance:  Last Pap: 2017 NILM    Past Medical History:   Diagnosis Date    Adverse effect of anesthesia     slow to wake up at dentist office    Anxiety and depression     Arthritis     Asthma     Chronic pain     3 herniated discs in lower back    Diabetes mellitus, type II (HCC)     GERD (gastroesophageal reflux disease)     High cholesterol     History of colon polyps     Hypertension     MRSA infection     left leg treated with antibiotics at [facility], NEGATIVE RESULT IN 2019 IN LAB REPORT    PUD (peptic ulcer disease)     S/P partial thyroidectomy     Seasonal allergies     Sleep apnea     Sleep apnea treated with continuous positive airway pressure (CPAP)     Stage 3a chronic kidney disease (CKD) (HCC)     Thyroid disease        Past Surgical History:   Procedure Laterality Date    COLONOSCOPY N/A 2022    COLONOSCOPY   :- performed by Dorene Knowles MD at [facility] ENDOSCOPY    COLONOSCOPY N/A 2017    COLONOSCOPY performed by Dorene Knowles MD at [facility] ENDOSCOPY    COLONOSCOPY N/A 2023    COLONOSCOPY DIAGNOSTIC performed by Armin Funk MD at [facility] ENDOSCOPY    COLONOSCOPY N/A 2023    COLONOSCOPY POLYPECTOMY SNARE/COLD BIOPSY performed by Armin Funk MD at [facility] ENDOSCOPY    HYSTERECTOMY (CERVIX STATUS UNKNOWN)      ORTHOPEDIC SURGERY      left hand ligament removed    OTHER SURGICAL HISTORY      corticosteroid injections - back    PARATHYROIDECTOMY  2019    Left lower parathyroidectomy with intraoperative PTH monitoring.-[facility]-Dr. MARCO ANTONIO Spencer    RECTOCELE REPAIR      TONSILLECTOMY      AGE 15    TUBAL LIGATION      UPPER GASTROINTESTINAL ENDOSCOPY N/A 2023    EGD ESOPHAGOGASTRODUODENOSCOPY

## 2025-01-07 NOTE — PROGRESS NOTES
Bonnie Rodarte is a 64 y.o. female presents for a problem visit.    Chief Complaint   Patient presents with    Vaginal Itching   Vaginal irritation and white discharge for 1 week.     Ob/Gyn Hx:  A1  LMP - No LMP recorded (lmp unknown). Patient has had a hysterectomy.  Menses  - hysterectomy  Contraception - hysterectomy  Hx of STI - denies  SA - yes    Health maintenance:  Last Pap: 2017 NILM    1. Have you been to the ER, urgent care clinic, or hospitalized since your last visit? No    2. Have you seen or consulted any other health care providers outside of the [facility] since your last visit? Yes - PCP     She declines  a chaperone during the gynecologic exam today.

## 2025-01-08 RX ORDER — PANTOPRAZOLE SODIUM 40 MG/1
40 TABLET, DELAYED RELEASE ORAL DAILY
Qty: 90 TABLET | Refills: 0 | Status: SHIPPED | OUTPATIENT
Start: 2025-01-08

## 2025-01-09 LAB
CYTOLOGIST CVX/VAG CYTO: NORMAL
CYTOLOGY CVX/VAG DOC CYTO: NORMAL
CYTOLOGY CVX/VAG DOC THIN PREP: NORMAL
DX ICD CODE: NORMAL
HPV GENOTYPE REFLEX: NORMAL
HPV I/H RISK 4 DNA CVX QL PROBE+SIG AMP: NEGATIVE
Lab: NORMAL
OTHER STN SPEC: NORMAL
STAT OF ADQ CVX/VAG CYTO-IMP: NORMAL

## 2025-01-30 DIAGNOSIS — E11.22 TYPE 2 DIABETES MELLITUS WITH STAGE 3B CHRONIC KIDNEY DISEASE, WITHOUT LONG-TERM CURRENT USE OF INSULIN (HCC): ICD-10-CM

## 2025-01-30 DIAGNOSIS — N18.32 TYPE 2 DIABETES MELLITUS WITH STAGE 3B CHRONIC KIDNEY DISEASE, WITHOUT LONG-TERM CURRENT USE OF INSULIN (HCC): ICD-10-CM

## 2025-01-30 DIAGNOSIS — I10 PRIMARY HYPERTENSION: ICD-10-CM

## 2025-01-30 RX ORDER — SIMVASTATIN 20 MG
20 TABLET ORAL NIGHTLY
Qty: 90 TABLET | Refills: 0 | Status: SHIPPED | OUTPATIENT
Start: 2025-01-30

## 2025-01-30 RX ORDER — LOSARTAN POTASSIUM 50 MG/1
50 TABLET ORAL DAILY
Qty: 90 TABLET | Refills: 0 | Status: SHIPPED | OUTPATIENT
Start: 2025-01-30

## 2025-01-30 NOTE — TELEPHONE ENCOUNTER
Last appointment: 4/30/24 Christopher, labs 4/2024  Next appointment: 3/31/25 Christopher  Previous refill encounter(s): 12/28/24 90 (all 3) by covering provider    Requested Prescriptions     Pending Prescriptions Disp Refills    empagliflozin (JARDIANCE) 25 MG tablet 90 tablet 0     Sig: Take 1 tablet by mouth daily    losartan (COZAAR) 50 MG tablet 90 tablet 0     Sig: Take 1 tablet by mouth daily    simvastatin (ZOCOR) 20 MG tablet 90 tablet 0     Sig: Take 1 tablet by mouth nightly     For Pharmacy Admin Tracking Only    Program: Medication Refill  CPA in place:    Recommendation Provided To:   Intervention Detail: New Rx: 3, reason: Patient Preference  Intervention Accepted By:   Gap Closed?:    Time Spent (min): 5

## 2025-03-03 RX ORDER — MONTELUKAST SODIUM 10 MG/1
10 TABLET ORAL DAILY
Qty: 90 TABLET | Refills: 1 | Status: SHIPPED | OUTPATIENT
Start: 2025-03-03

## 2025-03-03 RX ORDER — AMLODIPINE BESYLATE 10 MG/1
10 TABLET ORAL DAILY
Qty: 90 TABLET | Refills: 1 | Status: SHIPPED | OUTPATIENT
Start: 2025-03-03

## 2025-03-03 NOTE — TELEPHONE ENCOUNTER
PCP: Jc White MD    Last appt: 4/30/2024   Future Appointments   Date Time Provider Department Center   3/31/2025 11:15 AM Jc White MD [facility]       Requested Prescriptions     Pending Prescriptions Disp Refills    amLODIPine (NORVASC) 10 MG tablet 90 tablet 1     Sig: Take 1 tablet by mouth daily    montelukast (SINGULAIR) 10 MG tablet 30 tablet 5     Sig: Take 1 tablet by mouth daily         Prior labs and Blood pressures:  BP Readings from Last 3 Encounters:   01/07/25 112/76   06/04/24 126/70   04/30/24 118/73     Lab Results   Component Value Date/Time     04/30/2024 12:49 PM    K 4.6 04/30/2024 12:49 PM     04/30/2024 12:49 PM    CO2 21 04/30/2024 12:49 PM    BUN 27 04/30/2024 12:49 PM    GFRAA 44 09/21/2022 01:56 PM     Lab Results   Component Value Date/Time    [id number] 5.6 04/30/2024 12:13 PM     Lab Results   Component Value Date/Time    CHOL 176 04/30/2024 12:49 PM    HDL 92 04/30/2024 12:49 PM    LDL 69.2 04/30/2024 12:49 PM    LDL 72.2 10/30/2023 12:18 PM    VLDL 14.8 04/30/2024 12:49 PM     No results found for: \"VITD3\"    No results found for: \"TSH\", \"TSH2\", \"TSH3\"

## 2025-03-28 ENCOUNTER — PATIENT MESSAGE (OUTPATIENT)
Age: 65
End: 2025-03-28

## 2025-03-28 NOTE — TELEPHONE ENCOUNTER
For Pharmacy Admin Tracking Only    Program: Medical Group  CPA in place:  Yes  Recommendation Provided To: Patient/Caregiver: 0 via [employer] Message  Intervention Accepted By: Patient/Caregiver: 0  Time Spent (min): 5

## 2025-03-31 ENCOUNTER — TELEPHONE (OUTPATIENT)
Age: 65
End: 2025-03-31

## 2025-03-31 ENCOUNTER — OFFICE VISIT (OUTPATIENT)
Age: 65
End: 2025-03-31
Payer: MEDICARE

## 2025-03-31 VITALS
SYSTOLIC BLOOD PRESSURE: 134 MMHG | HEART RATE: 91 BPM | RESPIRATION RATE: 16 BRPM | WEIGHT: 221 LBS | HEIGHT: 68 IN | TEMPERATURE: 97.1 F | BODY MASS INDEX: 33.49 KG/M2 | DIASTOLIC BLOOD PRESSURE: 82 MMHG | OXYGEN SATURATION: 98 %

## 2025-03-31 DIAGNOSIS — B35.1 ONYCHOMYCOSIS OF LEFT GREAT TOE: ICD-10-CM

## 2025-03-31 DIAGNOSIS — E78.5 HYPERLIPIDEMIA WITH TARGET LDL LESS THAN 100: ICD-10-CM

## 2025-03-31 DIAGNOSIS — I10 PRIMARY HYPERTENSION: ICD-10-CM

## 2025-03-31 DIAGNOSIS — L72.3 SEBACEOUS CYST OF LEFT AXILLA: ICD-10-CM

## 2025-03-31 DIAGNOSIS — E11.22 TYPE 2 DIABETES MELLITUS WITH STAGE 3B CHRONIC KIDNEY DISEASE, WITHOUT LONG-TERM CURRENT USE OF INSULIN (HCC): Primary | ICD-10-CM

## 2025-03-31 DIAGNOSIS — N18.32 TYPE 2 DIABETES MELLITUS WITH STAGE 3B CHRONIC KIDNEY DISEASE, WITHOUT LONG-TERM CURRENT USE OF INSULIN (HCC): Primary | ICD-10-CM

## 2025-03-31 DIAGNOSIS — E66.811 OBESITY, CLASS I, BMI 30-34.9: ICD-10-CM

## 2025-03-31 LAB — HBA1C MFR BLD: 6.6 %

## 2025-03-31 PROCEDURE — 3079F DIAST BP 80-89 MM HG: CPT | Performed by: FAMILY MEDICINE

## 2025-03-31 PROCEDURE — G8417 CALC BMI ABV UP PARAM F/U: HCPCS | Performed by: FAMILY MEDICINE

## 2025-03-31 PROCEDURE — 2022F DILAT RTA XM EVC RTNOPTHY: CPT | Performed by: FAMILY MEDICINE

## 2025-03-31 PROCEDURE — 3046F HEMOGLOBIN A1C LEVEL >9.0%: CPT | Performed by: FAMILY MEDICINE

## 2025-03-31 PROCEDURE — 99214 OFFICE O/P EST MOD 30 MIN: CPT | Performed by: FAMILY MEDICINE

## 2025-03-31 PROCEDURE — 1036F TOBACCO NON-USER: CPT | Performed by: FAMILY MEDICINE

## 2025-03-31 PROCEDURE — 3044F HG A1C LEVEL LT 7.0%: CPT | Performed by: FAMILY MEDICINE

## 2025-03-31 PROCEDURE — G8427 DOCREV CUR MEDS BY ELIG CLIN: HCPCS | Performed by: FAMILY MEDICINE

## 2025-03-31 PROCEDURE — 3075F SYST BP GE 130 - 139MM HG: CPT | Performed by: FAMILY MEDICINE

## 2025-03-31 PROCEDURE — PBSHW AMB POC HEMOGLOBIN A1C: Performed by: FAMILY MEDICINE

## 2025-03-31 PROCEDURE — 3017F COLORECTAL CA SCREEN DOC REV: CPT | Performed by: FAMILY MEDICINE

## 2025-03-31 PROCEDURE — 83036 HEMOGLOBIN GLYCOSYLATED A1C: CPT | Performed by: FAMILY MEDICINE

## 2025-03-31 RX ORDER — METFORMIN HYDROCHLORIDE 500 MG/1
1 TABLET, EXTENDED RELEASE ORAL 2 TIMES DAILY
COMMUNITY

## 2025-03-31 RX ORDER — ERGOCALCIFEROL 1.25 MG/1
CAPSULE ORAL
COMMUNITY

## 2025-03-31 RX ORDER — CALCIUM CARBONATE 500(1250)
TABLET,CHEWABLE ORAL
COMMUNITY

## 2025-03-31 RX ORDER — FINERENONE 20 MG/1
1 TABLET, FILM COATED ORAL DAILY
COMMUNITY

## 2025-03-31 RX ORDER — PSEUDOEPHEDRINE HCL 30 MG
1 TABLET ORAL 2 TIMES DAILY
COMMUNITY

## 2025-03-31 RX ORDER — ALPRAZOLAM 0.25 MG
1 TABLET ORAL DAILY PRN
COMMUNITY

## 2025-03-31 RX ORDER — TERBINAFINE HYDROCHLORIDE 250 MG/1
1 TABLET ORAL DAILY
COMMUNITY

## 2025-03-31 RX ORDER — SODIUM BICARBONATE 650 MG/1
TABLET ORAL
COMMUNITY
Start: 2024-10-22

## 2025-03-31 RX ORDER — VARENICLINE TARTRATE 1 MG/1
1 TABLET, FILM COATED ORAL 2 TIMES DAILY
COMMUNITY

## 2025-03-31 RX ORDER — EFINACONAZOLE 100 MG/ML
SOLUTION TOPICAL
Qty: 8 ML | Refills: 10 | Status: SHIPPED | OUTPATIENT
Start: 2025-03-31

## 2025-03-31 SDOH — ECONOMIC STABILITY: TRANSPORTATION INSECURITY
IN THE PAST 12 MONTHS, HAS LACK OF TRANSPORTATION KEPT YOU FROM MEETINGS, WORK, OR FROM GETTING THINGS NEEDED FOR DAILY LIVING?: NO

## 2025-03-31 SDOH — ECONOMIC STABILITY: FOOD INSECURITY: WITHIN THE PAST 12 MONTHS, THE FOOD YOU BOUGHT JUST DIDN'T LAST AND YOU DIDN'T HAVE MONEY TO GET MORE.: NEVER TRUE

## 2025-03-31 SDOH — ECONOMIC STABILITY: INCOME INSECURITY: IN THE LAST 12 MONTHS, WAS THERE A TIME WHEN YOU WERE NOT ABLE TO PAY THE MORTGAGE OR RENT ON TIME?: NO

## 2025-03-31 SDOH — ECONOMIC STABILITY: FOOD INSECURITY: WITHIN THE PAST 12 MONTHS, YOU WORRIED THAT YOUR FOOD WOULD RUN OUT BEFORE YOU GOT MONEY TO BUY MORE.: NEVER TRUE

## 2025-03-31 SDOH — ECONOMIC STABILITY: TRANSPORTATION INSECURITY
IN THE PAST 12 MONTHS, HAS THE LACK OF TRANSPORTATION KEPT YOU FROM MEDICAL APPOINTMENTS OR FROM GETTING MEDICATIONS?: NO

## 2025-03-31 NOTE — TELEPHONE ENCOUNTER
Provider notes for next appointment Return in about 6 months (around 9/30/2025) for Diabetes, hyperlipidemia. Pt has been scheduled for Oct 6th. Due to wanting a earlier appointment. Pt stated that provider wanted her to return for a physical. If physical is required instead, pt will need to be notified, and schedule adjusted.       Pt can be reached at     [phone]

## 2025-03-31 NOTE — PROGRESS NOTES
Bonnie Rodarte is a 64 y.o. female    Chief Complaint   Patient presents with    Annual Exam       /82   Pulse 91   Temp 97.1 °F (36.2 °C)   Resp 16   Ht 1.727 m (5' 8\")   Wt 100.2 kg (221 lb)   LMP  (LMP Unknown)   SpO2 98%   BMI 33.60 kg/m²         1. Have you been to the ER, urgent care clinic since your last visit?  Hospitalized since your last visit? No    2. Have you seen or consulted any other health care providers outside of the [facility] since your last visit?  Include any pap smears or colon screening. No    Learning Assessment:       No data to display                Fall Risk Assessment:      4/30/2024    12:23 PM 3/7/2024     4:18 PM 10/30/2023     7:10 AM 9/29/2022     8:13 AM 9/28/2022     8:52 PM 9/28/2022     3:54 PM 9/28/2022    10:48 AM   Amb Fall Risk Assessment and TUG Test   Do you feel unsteady or are you worried about falling?  no no no       2 or more falls in past year? no no no       Fall with injury in past year? no no no       Total Score    1 3 3 1       Abuse Screening:       No data to display                ADL Screening:       No data to display                
Medicare Annual Wellness Visit    Bonnie Rodarte is here for Annual Exam    Assessment & Plan   Type 2 diabetes mellitus with stage 3b chronic kidney disease, without long-term current use of insulin ([facility])  -     AMB POC HEMOGLOBIN A1C       No follow-ups on file.     Subjective   {OPTIONAL - WILL AUTO-DELETE IF NOT USED:0766192587}    Patient's complete Health Risk Assessment and screening values have been reviewed and are found in Flowsheets. The following problems were reviewed today and where indicated follow up appointments were made and/or referrals ordered.    Positive Risk Factor Screenings with Interventions:        Drug Use:   Substance and Sexual Activity   Drug Use Yes    Frequency: 7.0 times per week    Types: Marijuana (Weed)       Interventions:  {Drug Use Interventions:983983798}     Controlled Medication Review:  {IMPORTANT! Click here to document Controlled Substance Monitoring and then refresh note (Optional):95039}  Today's Pain Level: No data recorded   Opioid Risk: (Low risk score <55) Opioid risk score: 23    Patient is low risk for opioid use disorder or overdose.    Last PDMP Eliezer as Reviewed:  Review User Review Instant Review Result                   Inactivity:    (!) Abnormal       Interventions:  {Inactivity Interventions:188864072}     Abnormal BMI (obese):  Body mass index is 33.6 kg/m². (!) Abnormal    Interventions:  {Obesity Interventions:143524638}    {Optional - Use if Billing for Obesity Counseling (8-15 minutes):4707934532}           {OPTIONAL- LDCT, CVD, STI Counseling Statements:0742258021}            Objective   Vitals:    03/31/25 1121   BP: 134/82   Pulse: 91   Resp: 16   Temp: 97.1 °F (36.2 °C)   SpO2: 98%   Weight: 100.2 kg (221 lb)   Height: 1.727 m (5' 8\")      Body mass index is 33.6 kg/m².        {OPTIONAL - GENERAL PHYSICAL EXAM (WILL AUTO-DELETE IF NOT USED):307107665}            Allergies   Allergen Reactions    Other Other (See Comments)     Vicodin    Hydrocodone 
pneumonia vaccine administered.    2. Diabetes Mellitus.  A1c level is currently at 6.6. Weight gain noted despite increased Ozempic dosage to 2 mg. Mounjaro considered for better weight loss and diabetes management; patient to verify formulary coverage. Laboratory tests ordered today, including lipid panel and urine microalbumin.    3. Onychomycosis.  Prescription for Jublia issued, to be applied daily for 48 weeks. Effectiveness of treatment to be monitored, with adjustments as necessary. Patient has been seeing a foot doctor for ongoing management.    4. Sebaceous cyst.  Cyst measures approximately 4 x 4 mm, no tenderness upon palpation, mobile upon touch. Warm compresses recommended, and avoidance of deodorant on the affected area advised. If no improvement, minor incision may be necessary for drainage.           Return in about 6 months (around 9/30/2025) for Diabetes, hyperlipidemia.          Medication risks/benefits/costs/interactions/alternatives discussed with patient.  Advised patient to call back or return to office if symptoms worsen/change/persist.  If patient cannot reach us or should anything more severe/urgent arise he/she should proceed directly to the nearest emergency department.  Discussed expected course/resolution/complications of diagnosis in detail with patient.    Patient given a written after visit summary which includes diagnoses, current medications and vitals.  Patient expressed understanding with the diagnosis and plan.    IJc MD, am scribing for and in the presence of Jc White MD. 3/31/25/12:18 PM EDT

## 2025-04-01 LAB
ALBUMIN SERPL-MCNC: 4.3 G/DL (ref 3.5–5)
ALBUMIN/GLOB SERPL: 1.1 (ref 1.1–2.2)
ALP SERPL-CCNC: 105 U/L (ref 45–117)
ALT SERPL-CCNC: 29 U/L (ref 12–78)
ANION GAP SERPL CALC-SCNC: 8 MMOL/L (ref 2–12)
AST SERPL-CCNC: 15 U/L (ref 15–37)
BILIRUB SERPL-MCNC: 0.5 MG/DL (ref 0.2–1)
BUN SERPL-MCNC: 23 MG/DL (ref 6–20)
BUN/CREAT SERPL: 14 (ref 12–20)
CALCIUM SERPL-MCNC: 10.4 MG/DL (ref 8.5–10.1)
CHLORIDE SERPL-SCNC: 101 MMOL/L (ref 97–108)
CHOLEST SERPL-MCNC: 167 MG/DL
CO2 SERPL-SCNC: 23 MMOL/L (ref 21–32)
CREAT SERPL-MCNC: 1.66 MG/DL (ref 0.55–1.02)
CREAT UR-MCNC: 119 MG/DL
GLOBULIN SER CALC-MCNC: 4 G/DL (ref 2–4)
GLUCOSE SERPL-MCNC: 150 MG/DL (ref 65–100)
HDLC SERPL-MCNC: 72 MG/DL
HDLC SERPL: 2.3 (ref 0–5)
LDLC SERPL CALC-MCNC: 64.2 MG/DL (ref 0–100)
MICROALBUMIN UR-MCNC: 4.74 MG/DL
MICROALBUMIN/CREAT UR-RTO: 40 MG/G (ref 0–30)
POTASSIUM SERPL-SCNC: 4.4 MMOL/L (ref 3.5–5.1)
PROT SERPL-MCNC: 8.3 G/DL (ref 6.4–8.2)
SODIUM SERPL-SCNC: 132 MMOL/L (ref 136–145)
TRIGL SERPL-MCNC: 154 MG/DL
VLDLC SERPL CALC-MCNC: 30.8 MG/DL

## 2025-04-02 NOTE — TELEPHONE ENCOUNTER
Spoke pt to and offer a CPE for 5/7 at 1 pm or 2 pm pt declined and stated has to get kids off bus. Pt has appt for routine follow up 10/6 with Dr. White and wanted CPE done during that visit. Inform pt she can ask Dr. White during that visit if he can do CPE or [facility].-TM 4/2/25.

## 2025-04-03 ENCOUNTER — RESULTS FOLLOW-UP (OUTPATIENT)
Age: 65
End: 2025-04-03

## 2025-04-04 ENCOUNTER — TELEPHONE (OUTPATIENT)
Age: 65
End: 2025-04-04

## 2025-04-28 ENCOUNTER — OFFICE VISIT (OUTPATIENT)
Age: 65
End: 2025-04-28
Payer: MEDICARE

## 2025-04-28 VITALS
BODY MASS INDEX: 33.1 KG/M2 | TEMPERATURE: 97.7 F | HEIGHT: 68 IN | SYSTOLIC BLOOD PRESSURE: 108 MMHG | DIASTOLIC BLOOD PRESSURE: 74 MMHG | HEART RATE: 88 BPM | WEIGHT: 218.4 LBS | RESPIRATION RATE: 18 BRPM | OXYGEN SATURATION: 98 %

## 2025-04-28 DIAGNOSIS — Z00.00 ENCOUNTER FOR SUBSEQUENT ANNUAL WELLNESS VISIT (AWV) IN MEDICARE PATIENT: Primary | ICD-10-CM

## 2025-04-28 DIAGNOSIS — R91.1 PULMONARY NODULE, RIGHT: ICD-10-CM

## 2025-04-28 PROCEDURE — 3017F COLORECTAL CA SCREEN DOC REV: CPT | Performed by: FAMILY MEDICINE

## 2025-04-28 PROCEDURE — G0439 PPPS, SUBSEQ VISIT: HCPCS | Performed by: FAMILY MEDICINE

## 2025-04-28 PROCEDURE — 3078F DIAST BP <80 MM HG: CPT | Performed by: FAMILY MEDICINE

## 2025-04-28 PROCEDURE — 3074F SYST BP LT 130 MM HG: CPT | Performed by: FAMILY MEDICINE

## 2025-04-28 ASSESSMENT — PATIENT HEALTH QUESTIONNAIRE - PHQ9
6. FEELING BAD ABOUT YOURSELF - OR THAT YOU ARE A FAILURE OR HAVE LET YOURSELF OR YOUR FAMILY DOWN: NOT AT ALL
SUM OF ALL RESPONSES TO PHQ QUESTIONS 1-9: 1
SUM OF ALL RESPONSES TO PHQ QUESTIONS 1-9: 1
2. FEELING DOWN, DEPRESSED OR HOPELESS: NOT AT ALL
SUM OF ALL RESPONSES TO PHQ QUESTIONS 1-9: 1
4. FEELING TIRED OR HAVING LITTLE ENERGY: NOT AT ALL
1. LITTLE INTEREST OR PLEASURE IN DOING THINGS: SEVERAL DAYS
SUM OF ALL RESPONSES TO PHQ QUESTIONS 1-9: 1
10. IF YOU CHECKED OFF ANY PROBLEMS, HOW DIFFICULT HAVE THESE PROBLEMS MADE IT FOR YOU TO DO YOUR WORK, TAKE CARE OF THINGS AT HOME, OR GET ALONG WITH OTHER PEOPLE: NOT DIFFICULT AT ALL
9. THOUGHTS THAT YOU WOULD BE BETTER OFF DEAD, OR OF HURTING YOURSELF: NOT AT ALL
5. POOR APPETITE OR OVEREATING: NOT AT ALL
8. MOVING OR SPEAKING SO SLOWLY THAT OTHER PEOPLE COULD HAVE NOTICED. OR THE OPPOSITE, BEING SO FIGETY OR RESTLESS THAT YOU HAVE BEEN MOVING AROUND A LOT MORE THAN USUAL: NOT AT ALL
3. TROUBLE FALLING OR STAYING ASLEEP: NOT AT ALL
7. TROUBLE CONCENTRATING ON THINGS, SUCH AS READING THE NEWSPAPER OR WATCHING TELEVISION: NOT AT ALL

## 2025-04-28 ASSESSMENT — ENCOUNTER SYMPTOMS
SHORTNESS OF BREATH: 0
ABDOMINAL PAIN: 0

## 2025-04-28 NOTE — PROGRESS NOTES
Bonnie Rodarte 64 y.o. female  presents to the office today AWV    Blood pressure 108/74, pulse 88, temperature 97.7 °F (36.5 °C), temperature source Temporal, resp. rate 18, height 1.727 m (5' 8\"), weight 99.1 kg (218 lb 6.4 oz), SpO2 98%. Body mass index is 33.21 kg/m².   Chief Complaint   Patient presents with    Annual Exam        History of Present Illness  The patient presents for an annual wellness visit.    A finger stick test was performed on 03/31/2025. No memory-related issues are reported, and no falls have occurred within the past year. She acknowledges experiencing depressive symptoms. Physical activity is minimal, primarily involving walking within her home. A healthy diet is maintained, and a recent 40-day fast was completed, during which only fish and vegetables were consumed. Cognitive exercises are performed through mobile games. Sleep duration is monitored using a CPAP machine. An eye examination was conducted by Dr. Cristian Tolentino within the past year. A lung scan was last performed in 2022, revealing a 2 mm pulmonary nodule in the right upper lobe and nonspecific thickening of the adrenal glands.     Vaping has been discontinued but resumed twice; cigarette smoking ceased on 12/20/2012 after a 40-year history. An upcoming appointment with a nephrologist is scheduled for chronic kidney disease (CKD) management. Despite dietary modifications, including the elimination of bread and consumption of carb-balanced bread, desired weight loss has not been achieved, and Wegovy has been suggested. Diabetic foot care is managed by a podiatrist, who also treats a fungal infection on the big toe. Medication was taken for 3 months, and the podiatrist trims her nails.    SOCIAL HISTORY  The patient drinks beer just about every day. The patient stopped smoking cigarettes since 12/20/2012 but has been vaping for about a year.      Current Outpatient Medications   Medication Sig Dispense Refill    docusate 
Chief Complaint   Patient presents with    Annual Exam         \"Have you been to the ER, urgent care clinic since your last visit?  Hospitalized since your last visit?\"    NO    “Have you seen or consulted any other health care providers [facility] since your last visit?”    NO            Click Here for Release of Records Request           1/7/2025     3:49 PM   PHQ-9    Little interest or pleasure in doing things 1   Feeling down, depressed, or hopeless 0   Trouble falling or staying asleep, or sleeping too much 0   Feeling tired or having little energy 0   Poor appetite or overeating 0   Feeling bad about yourself - or that you are a failure or have let yourself or your family down 0   Trouble concentrating on things, such as reading the newspaper or watching television 0   Moving or speaking so slowly that other people could have noticed. Or the opposite - being so fidgety or restless that you have been moving around a lot more than usual 0   Thoughts that you would be better off dead, or of hurting yourself in some way 0   PHQ-2 Score 1   PHQ-9 Total Score 1   If you checked off any problems, how difficult have these problems made it for you to do your work, take care of things at home, or get along with other people? 0           Financial Resource Strain: Low Risk  (1/7/2025)    Overall Financial Resource Strain (CARDIA)     Difficulty of Paying Living Expenses: Not hard at all      Food Insecurity: Not on file (3/31/2025)          Health Maintenance Due   Topic Date Due    Lung Cancer Screening &/or Counseling  04/19/2024       
AFFECTED AREA TWICE DAILY  Patient not taking: Reported on 4/28/2025  Jc White MD       CareTeam (Including outside providers/suppliers regularly involved in providing care):   Patient Care Team:  Jc White MD as PCP - General  Jc White MD as PCP - Empaneled Provider     Recommendations for Preventive Services Due: see orders and patient instructions/AVS.  Recommended screening schedule for the next 5-10 years is provided to the patient in written form: see Patient Instructions/AVS.     Reviewed and updated this visit:  Tobacco  Allergies  Meds  Problems  Med Hx  Surg Hx  Fam Hx  Sexual   Hx

## 2025-05-01 DIAGNOSIS — E11.22 TYPE 2 DIABETES MELLITUS WITH STAGE 3B CHRONIC KIDNEY DISEASE, WITHOUT LONG-TERM CURRENT USE OF INSULIN (HCC): ICD-10-CM

## 2025-05-01 DIAGNOSIS — N18.32 TYPE 2 DIABETES MELLITUS WITH STAGE 3B CHRONIC KIDNEY DISEASE, WITHOUT LONG-TERM CURRENT USE OF INSULIN (HCC): ICD-10-CM

## 2025-05-01 DIAGNOSIS — I10 PRIMARY HYPERTENSION: ICD-10-CM

## 2025-05-01 RX ORDER — SIMVASTATIN 20 MG
20 TABLET ORAL NIGHTLY
Qty: 90 TABLET | Refills: 0 | Status: CANCELLED | OUTPATIENT
Start: 2025-05-01

## 2025-05-01 RX ORDER — LOSARTAN POTASSIUM 50 MG/1
50 TABLET ORAL DAILY
Qty: 90 TABLET | Refills: 0 | Status: SHIPPED | OUTPATIENT
Start: 2025-05-01

## 2025-05-01 RX ORDER — EMPAGLIFLOZIN 25 MG/1
25 TABLET, FILM COATED ORAL DAILY
Qty: 90 TABLET | Refills: 0 | Status: SHIPPED | OUTPATIENT
Start: 2025-05-01

## 2025-05-01 RX ORDER — SEMAGLUTIDE 2.68 MG/ML
2 INJECTION, SOLUTION SUBCUTANEOUS
Qty: 9 ML | Refills: 5 | Status: CANCELLED | OUTPATIENT
Start: 2025-05-01

## 2025-05-01 RX ORDER — SIMVASTATIN 20 MG
20 TABLET ORAL NIGHTLY
Qty: 90 TABLET | Refills: 0 | Status: SHIPPED | OUTPATIENT
Start: 2025-05-01

## 2025-05-01 RX ORDER — SEMAGLUTIDE 2.68 MG/ML
INJECTION, SOLUTION SUBCUTANEOUS
Qty: 9 ML | Refills: 0 | Status: SHIPPED | OUTPATIENT
Start: 2025-05-01

## 2025-05-01 RX ORDER — PANTOPRAZOLE SODIUM 40 MG/1
40 TABLET, DELAYED RELEASE ORAL DAILY
Qty: 90 TABLET | Refills: 0 | Status: SHIPPED | OUTPATIENT
Start: 2025-05-01

## 2025-05-01 NOTE — TELEPHONE ENCOUNTER
PCP: Jc White MD    Last appt: 4/28/2025     Future Appointments   Date Time Provider Department Center   5/6/2025  9:45 AM [facility] CT ER 2 [facility]   10/6/2025 10:00 AM Jc White MD [facility] DEP       Requested Prescriptions     Pending Prescriptions Disp Refills    semaglutide, 2 MG/DOSE, (OZEMPIC, 2 MG/DOSE,) 8 MG/3ML SOPN sc injection 9 mL 5     Sig: Inject 2 mg into the skin every 7 days    pantoprazole (PROTONIX) 40 MG tablet 90 tablet 0     Sig: Take 1 tablet by mouth daily    losartan (COZAAR) 50 MG tablet 90 tablet 0     Sig: Take 1 tablet by mouth daily    simvastatin (ZOCOR) 20 MG tablet 90 tablet 0     Sig: Take 1 tablet by mouth nightly       Prior labs and Blood pressures:  BP Readings from Last 3 Encounters:   04/28/25 108/74   03/31/25 134/82   01/07/25 112/76     Lab Results   Component Value Date/Time     03/31/2025 01:07 PM    K 4.4 03/31/2025 01:07 PM     03/31/2025 01:07 PM    CO2 23 03/31/2025 01:07 PM    BUN 23 03/31/2025 01:07 PM    GFRAA 44 09/21/2022 01:56 PM     Lab Results   Component Value Date/Time    FPX2EHWD 6.6 03/31/2025 11:15 AM     Lab Results   Component Value Date/Time    CHOL 167 03/31/2025 01:07 PM    HDL 72 03/31/2025 01:07 PM    LDL 64.2 03/31/2025 01:07 PM    LDL 72.2 10/30/2023 12:18 PM    VLDL 30.8 03/31/2025 01:07 PM     No results found for: \"VITD3\"    No results found for: \"TSH\", \"TSH2\", \"TSH3\"

## 2025-05-01 NOTE — TELEPHONE ENCOUNTER
PCP: Jc White MD    Last appt: 4/28/2025     Future Appointments   Date Time Provider Department Center   5/6/2025  9:45 AM [facility] CT ER 2 [facility]   10/6/2025 10:00 AM Jc White MD [facility] DEP       Requested Prescriptions     Pending Prescriptions Disp Refills    JARDIANCE 25 MG tablet [Pharmacy Med Name: JARDIANCE 25MG TABLETS] 90 tablet 0     Sig: TAKE 1 TABLET BY MOUTH DAILY    simvastatin (ZOCOR) 20 MG tablet [Pharmacy Med Name: SIMVASTATIN 20MG TABLETS] 90 tablet 0     Sig: TAKE 1 TABLET BY MOUTH EVERY NIGHT    OZEMPIC, 2 MG/DOSE, 8 MG/3ML SOPN sc injection [Pharmacy Med Name: OZEMPIC 2MG PER DOSE (8MG/3ML) PFP] 9 mL 5     Sig: INJECT 2 MG UNDER THE SKIN EVERY 7 DAYS       Prior labs and Blood pressures:  BP Readings from Last 3 Encounters:   04/28/25 108/74   03/31/25 134/82   01/07/25 112/76     Lab Results   Component Value Date/Time     03/31/2025 01:07 PM    K 4.4 03/31/2025 01:07 PM     03/31/2025 01:07 PM    CO2 23 03/31/2025 01:07 PM    BUN 23 03/31/2025 01:07 PM    GFRAA 44 09/21/2022 01:56 PM     Lab Results   Component Value Date/Time    [id number] 6.6 03/31/2025 11:15 AM     Lab Results   Component Value Date/Time    CHOL 167 03/31/2025 01:07 PM    HDL 72 03/31/2025 01:07 PM    LDL 64.2 03/31/2025 01:07 PM    LDL 72.2 10/30/2023 12:18 PM    VLDL 30.8 03/31/2025 01:07 PM     No results found for: \"VITD3\"    No results found for: \"TSH\", \"TSH2\", \"TSH3\"

## 2025-05-06 ENCOUNTER — HOSPITAL ENCOUNTER (OUTPATIENT)
Facility: HOSPITAL | Age: 65
Discharge: HOME | End: 2025-05-09
Attending: FAMILY MEDICINE
Payer: MEDICARE

## 2025-05-06 ENCOUNTER — RESULTS FOLLOW-UP (OUTPATIENT)
Age: 65
End: 2025-05-06

## 2025-05-06 DIAGNOSIS — R91.1 PULMONARY NODULE, RIGHT: ICD-10-CM

## 2025-05-06 PROCEDURE — 71271 CT THORAX LUNG CANCER SCR C-: CPT

## 2025-05-13 ENCOUNTER — PATIENT MESSAGE (OUTPATIENT)
Age: 65
End: 2025-05-13

## 2025-05-13 DIAGNOSIS — Z00.00 MEDICARE ANNUAL WELLNESS VISIT, SUBSEQUENT: Primary | ICD-10-CM

## 2025-05-13 NOTE — PROGRESS NOTES
[phone] attempted to call patient no answer left message to call me back in regards to her lab results No Vaccines Administered.

## 2025-06-03 DIAGNOSIS — N18.32 TYPE 2 DIABETES MELLITUS WITH STAGE 3B CHRONIC KIDNEY DISEASE, WITHOUT LONG-TERM CURRENT USE OF INSULIN (HCC): ICD-10-CM

## 2025-06-03 DIAGNOSIS — E11.22 TYPE 2 DIABETES MELLITUS WITH STAGE 3B CHRONIC KIDNEY DISEASE, WITHOUT LONG-TERM CURRENT USE OF INSULIN (HCC): ICD-10-CM

## 2025-06-03 RX ORDER — EMPAGLIFLOZIN 25 MG/1
25 TABLET, FILM COATED ORAL DAILY
Qty: 90 TABLET | Refills: 0 | OUTPATIENT
Start: 2025-06-03

## 2025-06-03 RX ORDER — SIMVASTATIN 20 MG
20 TABLET ORAL NIGHTLY
Qty: 90 TABLET | Refills: 0 | OUTPATIENT
Start: 2025-06-03

## 2025-06-12 ENCOUNTER — PATIENT MESSAGE (OUTPATIENT)
Age: 65
End: 2025-06-12

## 2025-06-12 DIAGNOSIS — I10 PRIMARY HYPERTENSION: ICD-10-CM

## 2025-06-12 NOTE — TELEPHONE ENCOUNTER
PCP: Jc White MD    Last appt: 2025     Future Appointments   Date Time Provider Department Center   10/6/2025 10:00 AM Jc White MD [facility]       Requested Prescriptions     Pending Prescriptions Disp Refills    albuterol sulfate HFA (PROVENTIL;VENTOLIN;PROAIR) 108 (90 Base) MCG/ACT inhaler 18 g 2     Sig: Inhale 2 puffs into the lungs every 6 hours as needed for Wheezing    fluticasone (FLONASE) 50 MCG/ACT nasal spray 32 g 1     Si spray by Each Nostril route daily       Prior labs and Blood pressures:  BP Readings from Last 3 Encounters:   25 108/74   25 134/82   25 112/76     Lab Results   Component Value Date/Time     2025 01:07 PM    K 4.4 2025 01:07 PM     2025 01:07 PM    CO2 23 2025 01:07 PM    BUN 23 2025 01:07 PM    GFRAA 44 2022 01:56 PM     Lab Results   Component Value Date/Time    YLU3SBKO 6.6 2025 11:15 AM     Lab Results   Component Value Date/Time    CHOL 167 2025 01:07 PM    HDL 72 2025 01:07 PM    LDL 64.2 2025 01:07 PM    LDL 72.2 10/30/2023 12:18 PM    VLDL 30.8 2025 01:07 PM     No results found for: \"VITD3\"    No results found for: \"TSH\", \"TSH2\", \"TSH3\"

## 2025-06-12 NOTE — TELEPHONE ENCOUNTER
PCP: Jc White MD    Last appt: 4/28/2025   Future Appointments   Date Time Provider Department Center   10/6/2025 10:00 AM Jc White MD [facility] DEP       Requested Prescriptions     Pending Prescriptions Disp Refills    pantoprazole (PROTONIX) 40 MG tablet 90 tablet 0     Sig: Take 1 tablet by mouth daily    losartan (COZAAR) 50 MG tablet 90 tablet 0     Sig: Take 1 tablet by mouth daily

## 2025-06-16 NOTE — TELEPHONE ENCOUNTER
PCP: Jc White MD    Last appt: 4/28/2025     Future Appointments   Date Time Provider Department Center   10/6/2025 10:00 AM Jc White MD [facility] DEP       Requested Prescriptions     Pending Prescriptions Disp Refills    fluticasone (FLONASE) 50 MCG/ACT nasal spray [Pharmacy Med Name: FLUTICASONE 50MCG NASAL SP (120) RX] 48 g      Sig: SHAKE LIQUID AND USE 2 SPRAYS IN EACH NOSTRIL DAILY       Prior labs and Blood pressures:  BP Readings from Last 3 Encounters:   04/28/25 108/74   03/31/25 134/82   01/07/25 112/76     Lab Results   Component Value Date/Time     03/31/2025 01:07 PM    K 4.4 03/31/2025 01:07 PM     03/31/2025 01:07 PM    CO2 23 03/31/2025 01:07 PM    BUN 23 03/31/2025 01:07 PM    GFRAA 44 09/21/2022 01:56 PM     Lab Results   Component Value Date/Time    [id number] 6.6 03/31/2025 11:15 AM     Lab Results   Component Value Date/Time    CHOL 167 03/31/2025 01:07 PM    HDL 72 03/31/2025 01:07 PM    LDL 64.2 03/31/2025 01:07 PM    LDL 72.2 10/30/2023 12:18 PM    VLDL 30.8 03/31/2025 01:07 PM     No results found for: \"VITD3\"    No results found for: \"TSH\", \"TSH2\", \"TSH3\"

## 2025-06-18 RX ORDER — PANTOPRAZOLE SODIUM 40 MG/1
40 TABLET, DELAYED RELEASE ORAL DAILY
Qty: 90 TABLET | Refills: 1 | Status: SHIPPED | OUTPATIENT
Start: 2025-06-18

## 2025-06-18 RX ORDER — LOSARTAN POTASSIUM 50 MG/1
50 TABLET ORAL DAILY
Qty: 90 TABLET | Refills: 1 | Status: SHIPPED | OUTPATIENT
Start: 2025-06-18

## 2025-06-18 RX ORDER — ALBUTEROL SULFATE 90 UG/1
2 INHALANT RESPIRATORY (INHALATION) EVERY 6 HOURS PRN
Qty: 18 G | Refills: 2 | Status: SHIPPED | OUTPATIENT
Start: 2025-06-18

## 2025-06-18 RX ORDER — FLUTICASONE PROPIONATE 50 MCG
2 SPRAY, SUSPENSION (ML) NASAL DAILY
Qty: 48 G | Refills: 5 | Status: SHIPPED | OUTPATIENT
Start: 2025-06-18

## 2025-06-18 RX ORDER — FLUTICASONE PROPIONATE 50 MCG
1 SPRAY, SUSPENSION (ML) NASAL DAILY
Qty: 32 G | Refills: 1 | Status: SHIPPED | OUTPATIENT
Start: 2025-06-18

## 2025-07-15 ENCOUNTER — TRANSCRIBE ORDERS (OUTPATIENT)
Facility: HOSPITAL | Age: 65
End: 2025-07-15

## 2025-07-15 DIAGNOSIS — Z12.31 OTHER SCREENING MAMMOGRAM: Primary | ICD-10-CM

## 2025-07-23 DIAGNOSIS — N18.32 TYPE 2 DIABETES MELLITUS WITH STAGE 3B CHRONIC KIDNEY DISEASE, WITHOUT LONG-TERM CURRENT USE OF INSULIN (HCC): ICD-10-CM

## 2025-07-23 DIAGNOSIS — E11.22 TYPE 2 DIABETES MELLITUS WITH STAGE 3B CHRONIC KIDNEY DISEASE, WITHOUT LONG-TERM CURRENT USE OF INSULIN (HCC): ICD-10-CM

## 2025-07-23 NOTE — TELEPHONE ENCOUNTER
Murtaza request for refill ozempic.  Thanks, Migdalia    Last appointment: 4/28/25 Christopher  Next appointment: 10/6/25 Christopher  Previous refill encounter(s): 5/1/25 9ml    Requested Prescriptions     Pending Prescriptions Disp Refills    semaglutide, 2 MG/DOSE, (OZEMPIC, 2 MG/DOSE,) 8 MG/3ML SOPN sc injection 9 mL 1     Sig: Inject 2 mg into the skin every 7 days     For Pharmacy Admin Tracking Only    Program: Medication Refill  CPA in place:    Recommendation Provided To:   Intervention Detail: New Rx: 1, reason: Patient Preference  Intervention Accepted By:   Gap Closed?:    Time Spent (min): 5

## 2025-07-24 RX ORDER — SEMAGLUTIDE 2.68 MG/ML
2 INJECTION, SOLUTION SUBCUTANEOUS
Qty: 9 ML | Refills: 1 | Status: SHIPPED | OUTPATIENT
Start: 2025-07-24

## 2025-07-31 DIAGNOSIS — N18.32 TYPE 2 DIABETES MELLITUS WITH STAGE 3B CHRONIC KIDNEY DISEASE, WITHOUT LONG-TERM CURRENT USE OF INSULIN (HCC): ICD-10-CM

## 2025-07-31 DIAGNOSIS — E11.22 TYPE 2 DIABETES MELLITUS WITH STAGE 3B CHRONIC KIDNEY DISEASE, WITHOUT LONG-TERM CURRENT USE OF INSULIN (HCC): ICD-10-CM

## 2025-07-31 DIAGNOSIS — I10 PRIMARY HYPERTENSION: ICD-10-CM

## 2025-07-31 RX ORDER — PANTOPRAZOLE SODIUM 40 MG/1
40 TABLET, DELAYED RELEASE ORAL DAILY
Qty: 90 TABLET | Refills: 1 | Status: CANCELLED | OUTPATIENT
Start: 2025-07-31

## 2025-07-31 RX ORDER — LOSARTAN POTASSIUM 50 MG/1
50 TABLET ORAL DAILY
Qty: 90 TABLET | Refills: 1 | Status: CANCELLED | OUTPATIENT
Start: 2025-07-31

## 2025-07-31 NOTE — TELEPHONE ENCOUNTER
PCP: Jc White MD    Last appt: 4/28/2025     Future Appointments   Date Time Provider Department Center   10/6/2025 10:00 AM Jc White MD [facility]   10/23/2025  1:30 PM [facility] 2 [facility]       Requested Prescriptions     Pending Prescriptions Disp Refills    empagliflozin (JARDIANCE) 25 MG tablet 90 tablet 0     Sig: Take 1 tablet by mouth daily    simvastatin (ZOCOR) 20 MG tablet 90 tablet 0     Sig: Take 1 tablet by mouth nightly       Prior labs and Blood pressures:  BP Readings from Last 3 Encounters:   04/28/25 108/74   03/31/25 134/82   01/07/25 112/76     Lab Results   Component Value Date/Time     03/31/2025 01:07 PM    K 4.4 03/31/2025 01:07 PM     03/31/2025 01:07 PM    CO2 23 03/31/2025 01:07 PM    BUN 23 03/31/2025 01:07 PM    GFRAA 44 09/21/2022 01:56 PM     Lab Results   Component Value Date/Time    GFP5MWWD 6.6 03/31/2025 11:15 AM     Lab Results   Component Value Date/Time    CHOL 167 03/31/2025 01:07 PM    HDL 72 03/31/2025 01:07 PM    LDL 64.2 03/31/2025 01:07 PM    LDL 72.2 10/30/2023 12:18 PM    VLDL 30.8 03/31/2025 01:07 PM     No results found for: \"VITD3\"    No results found for: \"TSH\", \"TSH2\", \"TSH3\"

## 2025-07-31 NOTE — TELEPHONE ENCOUNTER
PCP: Jc White MD    Last appt: 4/28/2025   Future Appointments   Date Time Provider Department Center   10/6/2025 10:00 AM Jc White MD [facility]   10/23/2025  1:30 PM [facility]       Requested Prescriptions     Pending Prescriptions Disp Refills    triamcinolone (KENALOG) 0.025 % ointment 15 g 0     Sig: Apply topically 2 times daily         Prior labs and Blood pressures:  BP Readings from Last 3 Encounters:   04/28/25 108/74   03/31/25 134/82   01/07/25 112/76     Lab Results   Component Value Date/Time     03/31/2025 01:07 PM    K 4.4 03/31/2025 01:07 PM     03/31/2025 01:07 PM    CO2 23 03/31/2025 01:07 PM    BUN 23 03/31/2025 01:07 PM    GFRAA 44 09/21/2022 01:56 PM     Lab Results   Component Value Date/Time    [id number] 6.6 03/31/2025 11:15 AM     Lab Results   Component Value Date/Time    CHOL 167 03/31/2025 01:07 PM    HDL 72 03/31/2025 01:07 PM    LDL 64.2 03/31/2025 01:07 PM    LDL 72.2 10/30/2023 12:18 PM    VLDL 30.8 03/31/2025 01:07 PM     No results found for: \"VITD3\"    No results found for: \"TSH\", \"TSH2\", \"TSH3\"

## 2025-08-02 RX ORDER — TRIAMCINOLONE ACETONIDE 0.25 MG/G
OINTMENT TOPICAL 2 TIMES DAILY
Qty: 15 G | Refills: 0 | OUTPATIENT
Start: 2025-08-02

## 2025-08-02 RX ORDER — SIMVASTATIN 20 MG
20 TABLET ORAL NIGHTLY
Qty: 90 TABLET | Refills: 1 | Status: SHIPPED | OUTPATIENT
Start: 2025-08-02

## 2025-08-25 RX ORDER — AMLODIPINE BESYLATE 10 MG/1
10 TABLET ORAL DAILY
Qty: 90 TABLET | Refills: 1 | Status: SHIPPED | OUTPATIENT
Start: 2025-08-25

## 2025-08-25 RX ORDER — MONTELUKAST SODIUM 10 MG/1
10 TABLET ORAL DAILY
Qty: 90 TABLET | Refills: 1 | Status: SHIPPED | OUTPATIENT
Start: 2025-08-25

## 2025-08-25 RX ORDER — TRIAMCINOLONE ACETONIDE 0.25 MG/G
OINTMENT TOPICAL 2 TIMES DAILY
Qty: 15 G | Refills: 0 | Status: SHIPPED | OUTPATIENT
Start: 2025-08-25

## (undated) DEVICE — SUTURE VCRL SZ 4-0 L27IN ABSRB UD L26MM SH 1/2 CIR J415H

## (undated) DEVICE — IV STRT KT 3282] LSL INDUSTRIES INC]

## (undated) DEVICE — BAG BELONG PT PERS CLEAR HANDL

## (undated) DEVICE — TOTAL TRAY, DB, 100% SILI FOLEY, 16FR 10: Brand: MEDLINE

## (undated) DEVICE — SUTURE VCRL SZ 0 L27IN ABSRB VLT L36MM CT-1 1/2 CIR J340H

## (undated) DEVICE — NEEDLE HYPO 18GA L1.5IN PNK S STL HUB POLYPR SHLD REG BVL

## (undated) DEVICE — PACK,AURORA,LAVH: Brand: MEDLINE

## (undated) DEVICE — BIPOLAR FORCEPS CORD: Brand: VALLEYLAB

## (undated) DEVICE — ENDO CARRY-ON PROCEDURE KIT INCLUDES ENZYMATIC SPONGE, GAUZE, BIOHAZARD LABEL, TRAY, LUBRICANT, DIRTY SCOPE LABEL, WATER LABEL, TRAY, DRAWSTRING PAD, AND DEFENDO 4-PIECE KIT.: Brand: ENDO CARRY-ON PROCEDURE KIT

## (undated) DEVICE — STRAP,POSITIONING,KNEE/BODY,FOAM,4X60": Brand: MEDLINE

## (undated) DEVICE — DRAPE,UTILTY,TAPE,15X26, 4EA/PK: Brand: MEDLINE

## (undated) DEVICE — FORCEP BX 3 2.2 MMX240 CM FOR ENDOSCP RADIAL JAW

## (undated) DEVICE — QUILTED PREMIUM COMFORT UNDERPAD,EXTRA HEAVY: Brand: WINGS

## (undated) DEVICE — TOWEL SURG W17XL27IN STD BLU COT NONFENESTRATED PREWASHED

## (undated) DEVICE — GOWN,AURORA,NON-REINFORCED,2XL: Brand: MEDLINE

## (undated) DEVICE — BITEBLOCK ENDOSCP 60FR MAXI WHT POLYETH STURDY W/ VELC WVN

## (undated) DEVICE — SYRINGE 50ML E/T

## (undated) DEVICE — SOLUTION IRRIG 1000ML 0.9% SOD CHL USP POUR PLAS BTL

## (undated) DEVICE — STERILE POLYISOPRENE POWDER-FREE SURGICAL GLOVES WITH EMOLLIENT COATING: Brand: PROTEXIS

## (undated) DEVICE — SNARE ENDOSCP M L240CM W27MM SHTH DIA2.4MM CHN 2.8MM HEX

## (undated) DEVICE — SOLIDIFIER FLUID 3000 CC ABSORB

## (undated) DEVICE — TRAP SURG QUAD PARABOLA SLOT DSGN SFTY SCRN TRAPEASE

## (undated) DEVICE — SOLIDIFIER FLD 2OZ 1500CC N DISINF IN BTL DISP SAFESORB

## (undated) DEVICE — SNARE ENDOSCP L240CM LOOP W27MM SHTH DIA2.4MM WRK CHN 2.8MM

## (undated) DEVICE — CATHETER IV 20 GAX1 IN N WNG KINK RESIST INSYT AUTOGRD

## (undated) DEVICE — INSULATED BLADE ELECTRODE: Brand: EDGE

## (undated) DEVICE — SUTURE PDS II SZ 2-0 L27IN ABSRB UD CT-1 L36MM 1/2 CIR Z259H

## (undated) DEVICE — SET ADMIN 16ML TBNG L100IN 2 Y INJ SITE IV PIGGY BK DISP

## (undated) DEVICE — KIT IV STRT W CHLORAPREP PD 1ML

## (undated) DEVICE — SUTURE VCRL SZ 0 L27IN ABSRB UD L36MM CT-1 1/2 CIR J260H

## (undated) DEVICE — PROBE 8225101 5PK STD PRASS FL TIP ROHS

## (undated) DEVICE — PREMIUM WET SKIN PREP TRAY: Brand: MEDLINE INDUSTRIES, INC.

## (undated) DEVICE — PACK,EENT,TURBAN DRAPE,PK II: Brand: MEDLINE

## (undated) DEVICE — PAD GROUNDING UNIVERSAL SPLIT UNCORDED

## (undated) DEVICE — SUTURE VCRL SZ 3-0 L27IN ABSRB UD L26MM SH 1/2 CIR J416H

## (undated) DEVICE — Z DISCONTINUED USE 2751540 TUBING IRRIG L10IN DISP PMP ENDOGATOR

## (undated) DEVICE — XEROFORM NON-OCCLUSIVE GAUZE ROLL, 3% BISMUTH TRIBROMOPHENATE IN OIL EMULSION: Brand: XEROFORM

## (undated) DEVICE — GARMENT,MEDLINE,DVT,INT,CALF,MED, GEN2: Brand: MEDLINE

## (undated) DEVICE — SOLUTION IV 1000ML 0.9% SOD CHL

## (undated) DEVICE — AIRLIFE™ U/CONNECT-IT OXYGEN TUBING 7 FEET (2.1 M) CRUSH-RESISTANT OXYGEN TUBING, VINYL TIPPED: Brand: AIRLIFE™

## (undated) DEVICE — ZINACTIVE USE 2641837 CLIP LIG M BLU TI HRT SHP WIRE HORZ 600 PER BX

## (undated) DEVICE — APPLICATOR BNDG 1MM ADH PREMIERPRO EXOFIN

## (undated) DEVICE — FORCEPS BX L240CM JAW DIA2.8MM L CAP W/ NDL MIC MESH TOOTH

## (undated) DEVICE — SPONGE: SPECIALTY PEANUT XR 100/CS: Brand: MEDICAL ACTION INDUSTRIES

## (undated) DEVICE — 1200 GUARD II KIT W/5MM TUBE W/O VAC TUBE: Brand: GUARDIAN

## (undated) DEVICE — BAG SPEC BIOHZD LF 2MIL 6X10IN -- CONVERT TO ITEM 357326

## (undated) DEVICE — ROCKER SWITCH PENCIL BLADE ELECTRODE, HOLSTER: Brand: EDGE

## (undated) DEVICE — ELECTRODE PT RET AD L9FT HI MOIST COND ADH HYDRGEL CORDED

## (undated) DEVICE — INTENDED FOR TISSUE SEPARATION, AND OTHER PROCEDURES THAT REQUIRE A SHARP SURGICAL BLADE TO PUNCTURE OR CUT.: Brand: BARD-PARKER ® CARBON RIB-BACK BLADES

## (undated) DEVICE — KIT COLON W/ 1.1OZ LUB AND 2 END

## (undated) DEVICE — BASIN ST MAJOR-NO CAUTERY: Brand: MEDLINE INDUSTRIES, INC.

## (undated) DEVICE — NEONATAL-ADULT SPO2 SENSOR: Brand: NELLCOR

## (undated) DEVICE — SOLUTION IRRIGATION H2O 0797305] ICU MEDICAL INC]

## (undated) DEVICE — ELECTRODE,RADIOTRANSLUCENT,FOAM,3PK: Brand: MEDLINE

## (undated) DEVICE — BW-412T DISP COMBO CLEANING BRUSH: Brand: SINGLE USE COMBINATION CLEANING BRUSH

## (undated) DEVICE — SUTURE PERMAHAND SZ 3-0 L30IN NONABSORBABLE BLK SILK BRAID A304H

## (undated) DEVICE — SET GRAV CK VLV NEEDLESS ST 3 GANGED 4WAY STPCOCK HI FLO 10

## (undated) DEVICE — KENDALL RADIOLUCENT FOAM MONITORING ELECTRODE -RECTANGULAR SHAPE: Brand: KENDALL

## (undated) DEVICE — CATH IV AUTOGRD BC BLU 22GA 25 -- INSYTE

## (undated) DEVICE — SUTURE CAPTURING DEVICE: Brand: CAPIO SLIM

## (undated) DEVICE — SYRINGE CATH TIP 50ML

## (undated) DEVICE — Device

## (undated) DEVICE — SOLUTION SCRB 4OZ 4% CHG H2O AIDED FOR PREOPERATIVE SKIN

## (undated) DEVICE — CONTAINER SPEC 20 ML LID NEUT BUFF FORMALIN 10 % POLYPR STS

## (undated) DEVICE — TUBING HYDR IRR --

## (undated) DEVICE — REM POLYHESIVE ADULT PATIENT RETURN ELECTRODE: Brand: VALLEYLAB

## (undated) DEVICE — SYRINGE MED 20ML STD CLR PLAS LUERLOCK TIP N CTRL DISP

## (undated) DEVICE — PAD,SANITARY,11 IN,MAXI,N-STRL,IND WRAP: Brand: MEDLINE

## (undated) DEVICE — CLIP LIG ADH PD HORZ MED BLU --

## (undated) DEVICE — SOLUTION IRRIG 1000ML STRL H2O USP PLAS POUR BTL

## (undated) DEVICE — SET EXTN TBNG L BOR 4 W STPCOCK ST 32IN PRIMING VOL 6ML

## (undated) DEVICE — GLOVE SURG SZ 8 L11.6IN THK9.8MIL STRW LTX POLYMER BEAD CUF

## (undated) DEVICE — SUTURE MCRYL SZ 4-0 L27IN ABSRB UD L19MM PS-2 1/2 CIR PRIM Y426H

## (undated) DEVICE — INFECTION CONTROL KIT SYS

## (undated) DEVICE — SYRINGE MED 3ML CLR PLAS STD N CTRL LUERLOCK TIP DISP

## (undated) DEVICE — CONNECTOR TBNG AUX H2O JET DISP FOR OLY 160/180 SER

## (undated) DEVICE — SUTURE VCRL SZ 2-0 L36IN ABSRB VLT L36MM CT-1 1/2 CIR J345H

## (undated) DEVICE — (D)PREP SKN CHLRAPRP APPL 26ML -- CONVERT TO ITEM 371833

## (undated) DEVICE — SURGICAL PROCEDURE PACK BASIN MAJ SET CUST NO CAUT

## (undated) DEVICE — SYR 10ML LUER LOK 1/5ML GRAD --